# Patient Record
Sex: FEMALE | Race: WHITE | Employment: OTHER | URBAN - METROPOLITAN AREA
[De-identification: names, ages, dates, MRNs, and addresses within clinical notes are randomized per-mention and may not be internally consistent; named-entity substitution may affect disease eponyms.]

---

## 2017-01-11 ENCOUNTER — ANTI-COAG VISIT (OUTPATIENT)
Dept: CARDIOLOGY CLINIC | Age: 77
End: 2017-01-11

## 2017-01-11 LAB — INR BLD: 1.9

## 2017-02-09 ENCOUNTER — ANTI-COAG VISIT (OUTPATIENT)
Dept: CARDIOLOGY CLINIC | Age: 77
End: 2017-02-09

## 2017-02-09 LAB — INR BLD: 2.2

## 2017-03-10 ENCOUNTER — ANTI-COAG VISIT (OUTPATIENT)
Dept: CARDIOLOGY CLINIC | Age: 77
End: 2017-03-10

## 2017-03-10 DIAGNOSIS — E78.00 PURE HYPERCHOLESTEROLEMIA: Primary | ICD-10-CM

## 2017-03-10 LAB — INR BLD: 3.3

## 2017-04-11 DIAGNOSIS — I10 ESSENTIAL HYPERTENSION, BENIGN: ICD-10-CM

## 2017-04-11 DIAGNOSIS — I48.0 PAROXYSMAL ATRIAL FIBRILLATION (HCC): ICD-10-CM

## 2017-04-20 ENCOUNTER — ANTI-COAG VISIT (OUTPATIENT)
Dept: CARDIOLOGY CLINIC | Age: 77
End: 2017-04-20

## 2017-04-20 ENCOUNTER — OFFICE VISIT (OUTPATIENT)
Dept: CARDIOLOGY CLINIC | Age: 77
End: 2017-04-20

## 2017-04-20 VITALS
HEART RATE: 64 BPM | HEIGHT: 66 IN | BODY MASS INDEX: 24.43 KG/M2 | WEIGHT: 152 LBS | SYSTOLIC BLOOD PRESSURE: 158 MMHG | DIASTOLIC BLOOD PRESSURE: 90 MMHG

## 2017-04-20 DIAGNOSIS — I10 ESSENTIAL HYPERTENSION, BENIGN: ICD-10-CM

## 2017-04-20 DIAGNOSIS — E78.00 PURE HYPERCHOLESTEROLEMIA: ICD-10-CM

## 2017-04-20 DIAGNOSIS — I48.0 PAROXYSMAL ATRIAL FIBRILLATION (HCC): Primary | ICD-10-CM

## 2017-04-20 LAB — INR BLD: 2.4

## 2017-04-20 PROCEDURE — G8420 CALC BMI NORM PARAMETERS: HCPCS | Performed by: INTERNAL MEDICINE

## 2017-04-20 PROCEDURE — 93000 ELECTROCARDIOGRAM COMPLETE: CPT | Performed by: INTERNAL MEDICINE

## 2017-04-20 PROCEDURE — G8400 PT W/DXA NO RESULTS DOC: HCPCS | Performed by: INTERNAL MEDICINE

## 2017-04-20 PROCEDURE — 4040F PNEUMOC VAC/ADMIN/RCVD: CPT | Performed by: INTERNAL MEDICINE

## 2017-04-20 PROCEDURE — 1090F PRES/ABSN URINE INCON ASSESS: CPT | Performed by: INTERNAL MEDICINE

## 2017-04-20 PROCEDURE — 1123F ACP DISCUSS/DSCN MKR DOCD: CPT | Performed by: INTERNAL MEDICINE

## 2017-04-20 PROCEDURE — 1036F TOBACCO NON-USER: CPT | Performed by: INTERNAL MEDICINE

## 2017-04-20 PROCEDURE — 99214 OFFICE O/P EST MOD 30 MIN: CPT | Performed by: INTERNAL MEDICINE

## 2017-04-20 PROCEDURE — G8428 CUR MEDS NOT DOCUMENT: HCPCS | Performed by: INTERNAL MEDICINE

## 2017-05-19 ENCOUNTER — ANTI-COAG VISIT (OUTPATIENT)
Dept: CARDIOLOGY CLINIC | Age: 77
End: 2017-05-19

## 2017-05-19 DIAGNOSIS — I48.20 CHRONIC ATRIAL FIBRILLATION (HCC): ICD-10-CM

## 2017-05-19 LAB
INTERNATIONAL NORMALIZATION RATIO, POC: 2.7
PROTHROMBIN TIME, POC: NORMAL

## 2017-05-19 PROCEDURE — 85610 PROTHROMBIN TIME: CPT | Performed by: INTERNAL MEDICINE

## 2017-06-14 ENCOUNTER — ANTI-COAG VISIT (OUTPATIENT)
Dept: CARDIOLOGY CLINIC | Age: 77
End: 2017-06-14

## 2017-06-14 DIAGNOSIS — I48.20 CHRONIC ATRIAL FIBRILLATION (HCC): ICD-10-CM

## 2017-06-14 LAB
INTERNATIONAL NORMALIZATION RATIO, POC: 2.2
PROTHROMBIN TIME, POC: NORMAL

## 2017-06-14 PROCEDURE — 85610 PROTHROMBIN TIME: CPT | Performed by: INTERNAL MEDICINE

## 2017-07-19 ENCOUNTER — ANTI-COAG VISIT (OUTPATIENT)
Dept: CARDIOLOGY CLINIC | Age: 77
End: 2017-07-19

## 2017-07-19 DIAGNOSIS — I48.0 PAROXYSMAL ATRIAL FIBRILLATION (HCC): ICD-10-CM

## 2017-07-19 LAB
INR BLD: 2.2
INTERNATIONAL NORMALIZATION RATIO, POC: 2.2
PROTHROMBIN TIME, POC: NORMAL

## 2017-07-19 PROCEDURE — 85610 PROTHROMBIN TIME: CPT | Performed by: INTERNAL MEDICINE

## 2017-07-19 RX ORDER — ATENOLOL 100 MG/1
100 TABLET ORAL DAILY
Qty: 90 TABLET | Refills: 3 | Status: SHIPPED | OUTPATIENT
Start: 2017-07-19 | End: 2018-08-02 | Stop reason: SDUPTHER

## 2017-07-19 RX ORDER — SIMVASTATIN 40 MG
40 TABLET ORAL NIGHTLY
Qty: 90 TABLET | Refills: 3 | Status: SHIPPED | OUTPATIENT
Start: 2017-07-19 | End: 2018-08-02 | Stop reason: SDUPTHER

## 2017-08-16 ENCOUNTER — ANTI-COAG VISIT (OUTPATIENT)
Dept: CARDIOLOGY CLINIC | Age: 77
End: 2017-08-16

## 2017-08-16 DIAGNOSIS — I48.0 PAROXYSMAL ATRIAL FIBRILLATION (HCC): ICD-10-CM

## 2017-08-16 LAB
INTERNATIONAL NORMALIZATION RATIO, POC: 2.9
PROTHROMBIN TIME, POC: NORMAL

## 2017-08-16 PROCEDURE — 85610 PROTHROMBIN TIME: CPT | Performed by: INTERNAL MEDICINE

## 2017-09-13 ENCOUNTER — ANTI-COAG VISIT (OUTPATIENT)
Dept: CARDIOLOGY CLINIC | Age: 77
End: 2017-09-13

## 2017-09-13 DIAGNOSIS — I48.0 PAROXYSMAL ATRIAL FIBRILLATION (HCC): ICD-10-CM

## 2017-09-13 DIAGNOSIS — E78.00 PURE HYPERCHOLESTEROLEMIA: Primary | ICD-10-CM

## 2017-09-13 LAB
INTERNATIONAL NORMALIZATION RATIO, POC: 2.8
PROTHROMBIN TIME, POC: NORMAL

## 2017-09-13 PROCEDURE — 85610 PROTHROMBIN TIME: CPT | Performed by: INTERNAL MEDICINE

## 2017-09-13 RX ORDER — WARFARIN SODIUM 5 MG/1
TABLET ORAL
Qty: 90 TABLET | Refills: 1 | Status: ON HOLD | OUTPATIENT
Start: 2017-09-13 | End: 2018-07-12

## 2017-10-05 ENCOUNTER — ANTI-COAG VISIT (OUTPATIENT)
Dept: CARDIOLOGY CLINIC | Age: 77
End: 2017-10-05

## 2017-10-05 ENCOUNTER — OFFICE VISIT (OUTPATIENT)
Dept: CARDIOLOGY CLINIC | Age: 77
End: 2017-10-05

## 2017-10-05 VITALS
HEART RATE: 70 BPM | BODY MASS INDEX: 23.3 KG/M2 | DIASTOLIC BLOOD PRESSURE: 84 MMHG | WEIGHT: 145 LBS | SYSTOLIC BLOOD PRESSURE: 140 MMHG | HEIGHT: 66 IN

## 2017-10-05 DIAGNOSIS — I48.0 PAROXYSMAL ATRIAL FIBRILLATION (HCC): Primary | ICD-10-CM

## 2017-10-05 DIAGNOSIS — E78.00 PURE HYPERCHOLESTEROLEMIA: ICD-10-CM

## 2017-10-05 DIAGNOSIS — I10 ESSENTIAL HYPERTENSION, BENIGN: ICD-10-CM

## 2017-10-05 LAB — INR BLD: 2.5

## 2017-10-05 PROCEDURE — 1036F TOBACCO NON-USER: CPT | Performed by: INTERNAL MEDICINE

## 2017-10-05 PROCEDURE — 99214 OFFICE O/P EST MOD 30 MIN: CPT | Performed by: INTERNAL MEDICINE

## 2017-10-05 PROCEDURE — G8420 CALC BMI NORM PARAMETERS: HCPCS | Performed by: INTERNAL MEDICINE

## 2017-10-05 PROCEDURE — G8427 DOCREV CUR MEDS BY ELIG CLIN: HCPCS | Performed by: INTERNAL MEDICINE

## 2017-10-05 PROCEDURE — 4040F PNEUMOC VAC/ADMIN/RCVD: CPT | Performed by: INTERNAL MEDICINE

## 2017-10-05 PROCEDURE — G8484 FLU IMMUNIZE NO ADMIN: HCPCS | Performed by: INTERNAL MEDICINE

## 2017-10-05 PROCEDURE — G8400 PT W/DXA NO RESULTS DOC: HCPCS | Performed by: INTERNAL MEDICINE

## 2017-10-05 PROCEDURE — 1090F PRES/ABSN URINE INCON ASSESS: CPT | Performed by: INTERNAL MEDICINE

## 2017-10-05 PROCEDURE — 1123F ACP DISCUSS/DSCN MKR DOCD: CPT | Performed by: INTERNAL MEDICINE

## 2017-10-05 NOTE — PROGRESS NOTES
[address]   Cardiac Evaluation      Patient: Abner Vick  YOB: 1940  Date: 10/5/17       Chief Complaint   Patient presents with    Atrial Fibrillation    Hypertension    Hyperlipidemia        Referring provider: Scharlene Libman    History of Present Illness:   Mrs. Vonda Thornton is here today for regular follow up of her AF, HTN. Today, Ms Vonda Thornton states she has been well. She denies any chest pain, palpitations, LOPEZ, or edema. She walks regularly for exercise. She will be leaving for [place] at the end of the month. Past Medical History:   has a past medical history of A-fib ([address] Atrial fibrillation ([address] Hypercholesterolemia; and Hypertension. Surgical History:   has a past surgical history that includes Appendectomy. Social History:  Social History     Social History    Marital status:      Spouse name: N/A    Number of children: N/A    Years of education: N/A     Occupational History    Not on file. Social History Main Topics    Smoking status: Never Smoker    Smokeless tobacco: Not on file    Alcohol use No    Drug use: Not on file    Sexual activity: Not on file     Other Topics Concern    Not on file     Social History Narrative       Family History:  family history includes Heart Disease in her father. Allergies:  Penicillins and Sulfur     Review of Systems:   · Constitutional: there has been no unanticipated weight loss. No change in energy or activity level   · Eyes: No visual changes   · ENT: No Headaches, hearing loss or vertigo. No mouth sores or sore throat. · Cardiovascular: Reviewed in HPI  · Respiratory: No cough or wheezing, no sputum production. No hematemesis. · Gastrointestinal: No abdominal pain, appetite loss, blood in stools. No change in bowel or bladder habits. · Genitourinary: No nocturia, dysuria, trouble voiding  · Musculoskeletal:  No gait disturbance, weakness or joint complaints.   · Integumentary: No fibrillation. Normal left ventricle size, wall thickness and systolic function with an estimated ejection fraction of 60%. No regional wall motion abnormalities are seen. There is severe bi atrial enlargement. There is mild mitral, pulmonic and trivial aorta as well as mild tricuspid regurgitation with RVSP estimated at 41.5 mmHg. HR controlled, remains on Coumadin, INR's thru our office      Al Pal appears stable at this time. I encourage her to continue with regular exercise. Follow up 6 months. Thank you for allowing to me to participate in the care of Eliud Mccabe.

## 2017-10-05 NOTE — MR AVS SNAPSHOT
After Visit Summary             Evangelina Parsons   10/5/2017 9:45 AM   Office Visit    Description:  Female : 1940   Provider:  Noé Carney MD   Department:  [address] [place] [address] Cardiology - [facility] [place] and Future Appointments         Below is a list of your follow-up and future appointments. This may not be a complete list as you may have made appointments directly with providers that we are not aware of or your providers may have made some for you. Please call your providers to confirm appointments. It is important to keep your appointments. Please bring your current insurance card, photo ID, co-pay, and all medication bottles to your appointment. If self-pay, payment is expected at the time of service. Your To-Do List     Standing Orders Interval Expires    Protime-INR [employer]]  Once a week until 10/25/2018 10/25/2018    Follow-Up    Return in about 6 months (around 2018). Information from Your Visit        Department     Name Address Phone Fax    [address] [place], [place] [address] [facility] [address] [facility] [address] [phone]      You Were Seen for:         Comments    Paroxysmal atrial fibrillation [facility])   [[id number]]         Vital Signs     Blood Pressure Pulse Height Weight Body Mass Index Smoking Status    140/84 70 5' 6\" (1.676 m) 145 lb (65.8 kg) 23.4 kg/m2 Never Smoker         Medications and Orders      Your Current Medications Are              warfarin (COUMADIN) 5 MG tablet 5 mg three days a week 2.5 the other.     simvastatin (ZOCOR) 40 MG tablet Take 1 tablet by mouth nightly    atenolol (TENORMIN) 100 MG tablet Take 1 tablet by mouth daily    Omega-3 Fatty Acids (FISH OIL PO) Take by mouth    Multiple Vitamins-Minerals (MULTIVITAMIN PO) Take by mouth      Allergies              Penicillins     Sulfur          Additional Information        Basic Information     Date Of Birth Sex Race Ethnicity Preferred Language 1940 Female White Non-/Non  English      Problem List as of 10/5/2017  Date Reviewed: 10/5/2017                Pure hypercholesterolemia    Essential hypertension, benign    Atrial fibrillation [facility])      Preventive Care        Date Due    Tetanus Combination Vaccine (1 - Tdap) 11/25/1958    Zoster Vaccine 11/25/1999    Osteoporosis screening or a bone density scan (Dexa) is recommended once at age 72. Based upon the results and risk factors for bone loss, your provider will recommend whether this needs to be repeated. 11/25/2004    Pneumococcal Vaccines (two) for all adults aged 72 and over (2 of 2 - PCV13) 7/7/2006    Cholesterol Screening 8/25/2016    Yearly Flu Vaccine (1) 9/1/2017            [employer] Signup           [employer] allows you to send messages to your doctor, view your test results, renew your prescriptions, schedule appointments, view visit notes, and more. How Do I Sign Up? 1. In your Internet browser, go to [email or web address]  2. Click on the Sign Up Now link in the Sign In box. You will see the New Member Sign Up page. 3. Enter your [employer] Access Code exactly as it appears below. You will not need to use this code after youve completed the sign-up process. If you do not sign up before the expiration date, you must request a new code. [employer] Access Code: [email or web address]  Expires: 12/4/2017 10:44 AM    4. Enter your Social Security Number ([id number]) and Date of Birth (mm/dd/yyyy) as indicated and click Submit. You will be taken to the next sign-up page. 5. Create a [employer] ID. This will be your [employer] login ID and cannot be changed, so think of one that is secure and easy to remember. 6. Create a [employer] password. You can change your password at any time. 7. Enter your Password Reset Question and Answer. This can be used at a later time if you forget your password. 8. Enter your e-mail address.  You will receive e-mail notification when new information is available in [employer]. 9. Click Sign Up. You can now view your medical record. Additional Information  If you have questions, please contact the physician practice where you receive care. Remember, [employer] is NOT to be used for urgent needs. For medical emergencies, dial 911. For questions regarding your [employer] account call [phone]. If you have a clinical question, please call your doctor's office.

## 2017-10-05 NOTE — LETTER
[address] Cardiology [facility]  [address] [place] [address]  Phone: [phone]  Fax: [phone]    Jeana Lacy MD        October 25, 2017     Glory Soriano  No address on file    Patient: Eliud Mccabe  MR Number: [id number]  YOB: 1940  Date of Visit: 10/5/2017    Dear Dr. Glory Soriano:    Thank you for the request for consultation for Dominic Spann to me for the evaluation of Ms Kaylee Calero. Below are the relevant portions of my assessment and plan of care. [address]   Cardiac Evaluation      Patient: Eliud Mccabe  YOB: 1940  Date: 10/5/17       Chief Complaint   Patient presents with    Atrial Fibrillation    Hypertension    Hyperlipidemia        Referring provider: Glory Soriano    History of Present Illness:   Mrs. Kaylee Calero is here today for regular follow up of her AF, HTN. Today, Ms Kaylee Calero states she has been well. She denies any chest pain, palpitations, LOPEZ, or edema. She walks regularly for exercise. She will be leaving for [place] at the end of the month. Past Medical History:   has a past medical history of A-fib ([address] Atrial fibrillation ([address] Hypercholesterolemia; and Hypertension. Surgical History:   has a past surgical history that includes Appendectomy. Social History:  Social History     Social History    Marital status:      Spouse name: N/A    Number of children: N/A    Years of education: N/A     Occupational History    Not on file. Social History Main Topics    Smoking status: Never Smoker    Smokeless tobacco: Not on file    Alcohol use No    Drug use: Not on file    Sexual activity: Not on file     Other Topics Concern    Not on file     Social History Narrative       Family History:  family history includes Heart Disease in her father.      Allergies:  Penicillins and Sulfur     Review of Systems: · There is no clubbing, cyanosis of the extremities  · No edema  · Femoral Arteries: 2+ and equal  · Pedal Pulses: 2+ and equal   Abdomen:  · No masses or tenderness  · Normal bowel sounds  Neurological/Psychiatric:  · Alert and oriented x3  · Moves all extremities well  · Exhibits normal gait balance and coordination      Assessment/Plan  Pure hypercholesterolemia  Stable, labs 9/25/17: ; TRIG 207; HDL 33;     Essential hypertension, benign  Stable     Atrial fibrillation  Carotid doppler 5/16: 16-49% bilateral   Carotid doppler 4/13: 69-18% MALINI, 6-47% LICA  Echo 3/41: Underlying rhythm is atrial fibrillation. Normal left ventricle size, wall thickness and systolic function with an estimated ejection fraction of 60%. No regional wall motion abnormalities are seen. There is severe bi atrial enlargement. There is mild mitral, pulmonic and trivial aorta as well as mild tricuspid regurgitation with RVSP estimated at 41.5 mmHg. HR controlled, remains on Coumadin, INR's thru our office      Holli Villa appears stable at this time. I encourage her to continue with regular exercise. Follow up 6 months. Thank you for allowing to me to participate in the care of Román Marmolejo. If you have questions, please do not hesitate to call me. I look forward to following Max Ramírez along with you.     Sincerely,        Shasta Berman MD

## 2017-10-25 NOTE — COMMUNICATION BODY
[address]   Cardiac Evaluation      Patient: Gely Castro  YOB: 1940  Date: 10/5/17       Chief Complaint   Patient presents with    Atrial Fibrillation    Hypertension    Hyperlipidemia        Referring provider: Blaire Castillo    History of Present Illness:   Mrs. Mario Street is here today for regular follow up of her AF, HTN. Today, Ms Mario Street states she has been well. She denies any chest pain, palpitations, LOPEZ, or edema. She walks regularly for exercise. She will be leaving for [place] at the end of the month. Past Medical History:   has a past medical history of A-fib [address] Atrial fibrillation ([facility] Hypercholesterolemia; and Hypertension. Surgical History:   has a past surgical history that includes Appendectomy. Social History:  Social History     Social History    Marital status:      Spouse name: N/A    Number of children: N/A    Years of education: N/A     Occupational History    Not on file. Social History Main Topics    Smoking status: Never Smoker    Smokeless tobacco: Not on file    Alcohol use No    Drug use: Not on file    Sexual activity: Not on file     Other Topics Concern    Not on file     Social History Narrative       Family History:  family history includes Heart Disease in her father. Allergies:  Penicillins and Sulfur     Review of Systems:   · Constitutional: there has been no unanticipated weight loss. No change in energy or activity level   · Eyes: No visual changes   · ENT: No Headaches, hearing loss or vertigo. No mouth sores or sore throat. · Cardiovascular: Reviewed in HPI  · Respiratory: No cough or wheezing, no sputum production. No hematemesis. · Gastrointestinal: No abdominal pain, appetite loss, blood in stools. No change in bowel or bladder habits. · Genitourinary: No nocturia, dysuria, trouble voiding  · Musculoskeletal:  No gait disturbance, weakness or joint complaints.   · Integumentary: No fibrillation. Normal left ventricle size, wall thickness and systolic function with an estimated ejection fraction of 60%. No regional wall motion abnormalities are seen. There is severe bi atrial enlargement. There is mild mitral, pulmonic and trivial aorta as well as mild tricuspid regurgitation with RVSP estimated at 41.5 mmHg. HR controlled, remains on Coumadin, INR's thru our office      Facundo Bay appears stable at this time. I encourage her to continue with regular exercise. Follow up 6 months. Thank you for allowing to me to participate in the care of Kaila Wilson.

## 2017-11-07 LAB — INR BLD: 2.2

## 2017-11-09 ENCOUNTER — ANTI-COAG VISIT (OUTPATIENT)
Dept: CARDIOLOGY CLINIC | Age: 77
End: 2017-11-09

## 2017-12-07 LAB — INR BLD: 2.3

## 2017-12-12 ENCOUNTER — ANTI-COAG VISIT (OUTPATIENT)
Dept: CARDIOLOGY CLINIC | Age: 77
End: 2017-12-12

## 2018-01-12 ENCOUNTER — ANTI-COAG VISIT (OUTPATIENT)
Dept: CARDIOLOGY CLINIC | Age: 78
End: 2018-01-12

## 2018-01-12 LAB — INR BLD: 2.4

## 2018-02-08 LAB — INR BLD: 2.2

## 2018-02-13 ENCOUNTER — TELEPHONE (OUTPATIENT)
Dept: CARDIOLOGY CLINIC | Age: 78
End: 2018-02-13

## 2018-02-13 ENCOUNTER — ANTI-COAG VISIT (OUTPATIENT)
Dept: CARDIOLOGY CLINIC | Age: 78
End: 2018-02-13

## 2018-02-13 NOTE — TELEPHONE ENCOUNTER
pt called to fu on PT-INR. Indicated labs were drawn if Fla on 2/8/18 and would like results. Please call to adv.   Thank you CSF

## 2018-03-12 ENCOUNTER — ANTI-COAG VISIT (OUTPATIENT)
Dept: CARDIOLOGY CLINIC | Age: 78
End: 2018-03-12

## 2018-03-12 LAB — INR BLD: 2.9

## 2018-04-12 ENCOUNTER — ANTI-COAG VISIT (OUTPATIENT)
Dept: CARDIOLOGY CLINIC | Age: 78
End: 2018-04-12

## 2018-04-12 DIAGNOSIS — E78.00 PURE HYPERCHOLESTEROLEMIA: Primary | ICD-10-CM

## 2018-04-12 DIAGNOSIS — I48.0 PAROXYSMAL ATRIAL FIBRILLATION (HCC): ICD-10-CM

## 2018-04-12 LAB
INTERNATIONAL NORMALIZATION RATIO, POC: 2.4
PROTHROMBIN TIME, POC: NORMAL

## 2018-04-12 PROCEDURE — 85610 PROTHROMBIN TIME: CPT | Performed by: INTERNAL MEDICINE

## 2018-04-26 ENCOUNTER — OFFICE VISIT (OUTPATIENT)
Dept: CARDIOLOGY CLINIC | Age: 78
End: 2018-04-26

## 2018-04-26 VITALS
WEIGHT: 150 LBS | HEART RATE: 70 BPM | BODY MASS INDEX: 24.11 KG/M2 | HEIGHT: 66 IN | DIASTOLIC BLOOD PRESSURE: 80 MMHG | SYSTOLIC BLOOD PRESSURE: 144 MMHG

## 2018-04-26 DIAGNOSIS — I10 ESSENTIAL HYPERTENSION, BENIGN: ICD-10-CM

## 2018-04-26 DIAGNOSIS — I48.0 PAROXYSMAL ATRIAL FIBRILLATION (HCC): ICD-10-CM

## 2018-04-26 DIAGNOSIS — E78.00 PURE HYPERCHOLESTEROLEMIA: Primary | ICD-10-CM

## 2018-04-26 PROCEDURE — G8427 DOCREV CUR MEDS BY ELIG CLIN: HCPCS | Performed by: INTERNAL MEDICINE

## 2018-04-26 PROCEDURE — 1123F ACP DISCUSS/DSCN MKR DOCD: CPT | Performed by: INTERNAL MEDICINE

## 2018-04-26 PROCEDURE — 1036F TOBACCO NON-USER: CPT | Performed by: INTERNAL MEDICINE

## 2018-04-26 PROCEDURE — G8400 PT W/DXA NO RESULTS DOC: HCPCS | Performed by: INTERNAL MEDICINE

## 2018-04-26 PROCEDURE — G8420 CALC BMI NORM PARAMETERS: HCPCS | Performed by: INTERNAL MEDICINE

## 2018-04-26 PROCEDURE — 99214 OFFICE O/P EST MOD 30 MIN: CPT | Performed by: INTERNAL MEDICINE

## 2018-04-26 PROCEDURE — 4040F PNEUMOC VAC/ADMIN/RCVD: CPT | Performed by: INTERNAL MEDICINE

## 2018-04-26 PROCEDURE — 1090F PRES/ABSN URINE INCON ASSESS: CPT | Performed by: INTERNAL MEDICINE

## 2018-05-14 ENCOUNTER — ANTI-COAG VISIT (OUTPATIENT)
Dept: CARDIOLOGY CLINIC | Age: 78
End: 2018-05-14

## 2018-05-14 DIAGNOSIS — I48.0 PAROXYSMAL ATRIAL FIBRILLATION (HCC): ICD-10-CM

## 2018-05-14 LAB
INTERNATIONAL NORMALIZATION RATIO, POC: 4.3
PROTHROMBIN TIME, POC: NORMAL

## 2018-05-14 PROCEDURE — 85610 PROTHROMBIN TIME: CPT | Performed by: INTERNAL MEDICINE

## 2018-05-23 ENCOUNTER — ANTI-COAG VISIT (OUTPATIENT)
Dept: CARDIOLOGY CLINIC | Age: 78
End: 2018-05-23

## 2018-05-23 DIAGNOSIS — I48.0 PAROXYSMAL ATRIAL FIBRILLATION (HCC): ICD-10-CM

## 2018-05-23 LAB
INR BLD: 2.7
INTERNATIONAL NORMALIZATION RATIO, POC: 2.7
PROTHROMBIN TIME, POC: NORMAL

## 2018-05-23 PROCEDURE — 85610 PROTHROMBIN TIME: CPT | Performed by: INTERNAL MEDICINE

## 2018-06-14 PROBLEM — A41.9 SEPSIS (HCC): Status: ACTIVE | Noted: 2018-06-14

## 2018-06-15 PROBLEM — R65.20 SEVERE SEPSIS (HCC): Status: ACTIVE | Noted: 2018-06-14

## 2018-06-22 ENCOUNTER — TELEPHONE (OUTPATIENT)
Dept: CARDIOLOGY CLINIC | Age: 78
End: 2018-06-22

## 2018-06-28 ENCOUNTER — OFFICE VISIT (OUTPATIENT)
Dept: CARDIOLOGY CLINIC | Age: 78
End: 2018-06-28

## 2018-06-28 VITALS
SYSTOLIC BLOOD PRESSURE: 102 MMHG | DIASTOLIC BLOOD PRESSURE: 58 MMHG | WEIGHT: 143.44 LBS | HEIGHT: 66 IN | BODY MASS INDEX: 23.05 KG/M2 | HEART RATE: 60 BPM

## 2018-06-28 DIAGNOSIS — I48.20 ATRIAL FIBRILLATION, CHRONIC (HCC): Primary | ICD-10-CM

## 2018-06-28 DIAGNOSIS — E78.00 PURE HYPERCHOLESTEROLEMIA: ICD-10-CM

## 2018-06-28 DIAGNOSIS — I10 ESSENTIAL HYPERTENSION: ICD-10-CM

## 2018-06-28 PROCEDURE — 1090F PRES/ABSN URINE INCON ASSESS: CPT | Performed by: INTERNAL MEDICINE

## 2018-06-28 PROCEDURE — 1123F ACP DISCUSS/DSCN MKR DOCD: CPT | Performed by: INTERNAL MEDICINE

## 2018-06-28 PROCEDURE — 1036F TOBACCO NON-USER: CPT | Performed by: INTERNAL MEDICINE

## 2018-06-28 PROCEDURE — 93000 ELECTROCARDIOGRAM COMPLETE: CPT | Performed by: INTERNAL MEDICINE

## 2018-06-28 PROCEDURE — 1111F DSCHRG MED/CURRENT MED MERGE: CPT | Performed by: INTERNAL MEDICINE

## 2018-06-28 PROCEDURE — 99214 OFFICE O/P EST MOD 30 MIN: CPT | Performed by: INTERNAL MEDICINE

## 2018-06-28 PROCEDURE — G8420 CALC BMI NORM PARAMETERS: HCPCS | Performed by: INTERNAL MEDICINE

## 2018-06-28 PROCEDURE — 4040F PNEUMOC VAC/ADMIN/RCVD: CPT | Performed by: INTERNAL MEDICINE

## 2018-06-28 PROCEDURE — G8427 DOCREV CUR MEDS BY ELIG CLIN: HCPCS | Performed by: INTERNAL MEDICINE

## 2018-06-28 PROCEDURE — G8400 PT W/DXA NO RESULTS DOC: HCPCS | Performed by: INTERNAL MEDICINE

## 2018-06-28 RX ORDER — DILTIAZEM HYDROCHLORIDE 60 MG/1
30 TABLET, FILM COATED ORAL EVERY 12 HOURS SCHEDULED
Qty: 90 TABLET | Refills: 3
Start: 2018-06-28 | End: 2019-10-03 | Stop reason: SDUPTHER

## 2018-06-29 ENCOUNTER — ANTI-COAG VISIT (OUTPATIENT)
Dept: CARDIOLOGY | Age: 78
End: 2018-06-29

## 2018-06-29 LAB
ATYPICAL LYMPHOCYTE RELATIVE PERCENT: ABNORMAL % (ref 0–10)
BAND NEUTROPHILS: ABNORMAL %
BANDED NEUTROPHILS ABSOLUTE COUNT: ABNORMAL CELLS/UL (ref 0–750)
BASOPHILS ABSOLUTE: 88 CELLS/UL (ref 0–200)
BASOPHILS RELATIVE PERCENT: 0.8 %
BLASTS ABSOLUTE COUNT: ABNORMAL CELLS/UL
BLASTS RELATIVE PERCENT: ABNORMAL %
BUN / CREAT RATIO: 14 (CALC) (ref 6–22)
BUN BLDV-MCNC: 17 MG/DL (ref 7–25)
CALCIUM SERPL-MCNC: 9.6 MG/DL (ref 8.6–10.4)
CHLORIDE BLD-SCNC: 104 MMOL/L (ref 98–110)
CO2: 28 MMOL/L (ref 20–31)
COMMENT: ABNORMAL
CREAT SERPL-MCNC: 1.25 MG/DL (ref 0.6–0.93)
EOSINOPHILS ABSOLUTE: 132 CELLS/UL (ref 15–500)
EOSINOPHILS RELATIVE PERCENT: 1.2 %
GFR AFRICAN AMERICAN: 48 ML/MIN/1.73M2
GFR SERPL CREATININE-BSD FRML MDRD: 41 ML/MIN/1.73M2
GLUCOSE BLD-MCNC: 93 MG/DL (ref 65–139)
HCT VFR BLD CALC: 38.5 % (ref 35–45)
HEMOGLOBIN: 12.1 G/DL (ref 11.7–15.5)
LYMPHOCYTES ABSOLUTE: 2343 CELLS/UL (ref 850–3900)
LYMPHOCYTES RELATIVE PERCENT: 21.3 %
MCH RBC QN AUTO: 28.3 PG (ref 27–33)
MCHC RBC AUTO-ENTMCNC: 31.4 G/DL (ref 32–36)
MCV RBC AUTO: 90.2 FL (ref 80–100)
METAMYELOCYTES ABSOLUTE COUNT: ABNORMAL CELLS/UL
METAMYELOCYTES RELATIVE PERCENT: ABNORMAL %
MONOCYTES ABSOLUTE: 715 CELLS/UL (ref 200–950)
MONOCYTES RELATIVE PERCENT: 6.5 %
MYELOCYTE PERCENT: ABNORMAL %
MYELOCYTES ABSOLUTE COUNT: ABNORMAL CELLS/UL
NEUTROPHILS ABSOLUTE: 7722 CELLS/UL (ref 1500–7800)
NUCLEATED RED BLOOD CELLS: ABNORMAL /100 WBC
NUCLEATED RED BLOOD CELLS: ABNORMAL CELLS/UL
PDW BLD-RTO: 14.3 % (ref 11–15)
PLATELET # BLD: 506 THOUSAND/UL (ref 140–400)
PMV BLD AUTO: 10.4 FL (ref 7.5–12.5)
POTASSIUM SERPL-SCNC: 3.6 MMOL/L (ref 3.5–5.3)
PROMYELOCYTES ABSOLUTE COUNT: ABNORMAL CELLS/UL
PROMYELOCYTES PERCENT: ABNORMAL %
RBC # BLD: 4.27 MILLION/UL (ref 3.8–5.1)
SEGMENTED NEUTROPHILS RELATIVE PERCENT: 70.2 %
SODIUM BLD-SCNC: 141 MMOL/L (ref 135–146)
WBC # BLD: 11 THOUSAND/UL (ref 3.8–10.8)

## 2018-07-05 ENCOUNTER — TELEPHONE (OUTPATIENT)
Dept: CARDIOLOGY CLINIC | Age: 78
End: 2018-07-05

## 2018-07-05 DIAGNOSIS — I48.20 ATRIAL FIBRILLATION, CHRONIC (HCC): Primary | ICD-10-CM

## 2018-07-06 ENCOUNTER — ANTI-COAG VISIT (OUTPATIENT)
Dept: CARDIOLOGY CLINIC | Age: 78
End: 2018-07-06

## 2018-07-06 LAB
INR BLD: 2.6
PROTHROMBIN TIME: 26.1 SEC (ref 9–11.5)

## 2018-07-06 NOTE — PROGRESS NOTES
Called patient, she states that she is having surgery Monday to remove a mass on her left ovary her instructions from OB/gyn were to hold x 3 days. Called  to advise her INR 2.6 she said to keep her off coumadin till after her surgery.

## 2018-07-06 NOTE — PROGRESS NOTES
I called pt to dose her. I went to tell her to continue normal dose schedule. She then informed me that last night was her last day as she is having surgery on Monday. I gave a verbal understanding letting her know to follow the holding instructions by doctor who is doing surgery. She then gave a verbal understanding.

## 2018-07-09 PROBLEM — R19.00 PELVIC MASS IN FEMALE: Status: ACTIVE | Noted: 2018-07-09

## 2018-07-13 ENCOUNTER — CARE COORDINATION (OUTPATIENT)
Dept: CASE MANAGEMENT | Age: 78
End: 2018-07-13

## 2018-07-13 DIAGNOSIS — R19.00 PELVIC MASS IN FEMALE: Primary | ICD-10-CM

## 2018-07-13 PROCEDURE — 1111F DSCHRG MED/CURRENT MED MERGE: CPT

## 2018-07-13 NOTE — CARE COORDINATION
[address] Transitions Initial Follow Up Call    Call within 2 business days of discharge: Yes    Patient: Rom Living Patient : 1940   MRN: [id number]  Reason for Admission: Pelvic Mass  Discharge Date: 18 RARS: Readmission Risk Score: 15     Spoke with: [address] [facility] [address]: [address] [facility]    Non-face-to-face services provided:  Obtained and reviewed discharge summary and/or continuity of care documents  Education of patient/family/caregiver/guardian to support self-management-discharge instructions, activity, diet and hospital follow up apts  Assessment and support for treatment adherence and medication management-medication review and reconciliation  1111f completed    Care Transitions 24 Hour Call    Do you have any ongoing symptoms?:  No  Do you have a copy of your discharge instructions?:  Yes  Do you have all of your prescriptions and are they filled?:  Yes  Have you been contacted by a [address]?:  No  Have you scheduled your follow up appointment?:  Yes  How are you going to get to your appointment?:  Car - family or friend to transport  Were you discharged with any Home Care or Post Acute Services:  No  Do you feel like you have everything you need to keep you well at home?:  Yes  Care Transitions Interventions  No Identified Needs       Reports she is doing well, denies pain or discomfort. Is moving around without difficulty; Home Care  You may shower. No soaking in the bathtub, jacuzzi or swimming x 4 weeks. Keep your incision sites clean and dry. Eat a regular diet. Drink plenty of fluids. Physical Activity  No driving x 2 weeks  Return to your normal activities gradually. Most normal activities, including sex, can be resumed in about six weeks. Take daily walks as tolerated. Avoid heavy lifting and strenuous exercise x 4 weeks  Ask your doctor when and how to perform Kegel exercises .  These exercises can strengthen the muscles of the pelvic floor and

## 2018-07-16 ENCOUNTER — ANTI-COAG VISIT (OUTPATIENT)
Dept: CARDIOLOGY CLINIC | Age: 78
End: 2018-07-16

## 2018-07-16 DIAGNOSIS — I48.0 PAROXYSMAL ATRIAL FIBRILLATION (HCC): ICD-10-CM

## 2018-07-16 LAB
INR BLD: 1.3
INTERNATIONAL NORMALIZATION RATIO, POC: 1.3
PROTHROMBIN TIME, POC: NORMAL

## 2018-07-16 PROCEDURE — 85610 PROTHROMBIN TIME: CPT | Performed by: INTERNAL MEDICINE

## 2018-07-25 ENCOUNTER — ANTI-COAG VISIT (OUTPATIENT)
Dept: CARDIOLOGY CLINIC | Age: 78
End: 2018-07-25

## 2018-07-25 DIAGNOSIS — I48.20 ATRIAL FIBRILLATION, CHRONIC (HCC): Primary | ICD-10-CM

## 2018-07-25 LAB
INR BLD: 4.3
INR BLD: 5.8
INTERNATIONAL NORMALIZATION RATIO, POC: 4.3
PROTHROMBIN TIME, POC: NORMAL

## 2018-07-25 PROCEDURE — 85610 PROTHROMBIN TIME: CPT | Performed by: INTERNAL MEDICINE

## 2018-07-27 ENCOUNTER — ANTI-COAG VISIT (OUTPATIENT)
Dept: CARDIOLOGY CLINIC | Age: 78
End: 2018-07-27

## 2018-07-27 DIAGNOSIS — I48.20 ATRIAL FIBRILLATION, CHRONIC (HCC): ICD-10-CM

## 2018-07-27 LAB
INR BLD: 2.6
INTERNATIONAL NORMALIZATION RATIO, POC: 2.6
PROTHROMBIN TIME, POC: NORMAL

## 2018-07-27 PROCEDURE — 85610 PROTHROMBIN TIME: CPT | Performed by: INTERNAL MEDICINE

## 2018-08-02 ENCOUNTER — ANTI-COAG VISIT (OUTPATIENT)
Dept: CARDIOLOGY CLINIC | Age: 78
End: 2018-08-02

## 2018-08-02 DIAGNOSIS — I48.20 ATRIAL FIBRILLATION, CHRONIC (HCC): ICD-10-CM

## 2018-08-02 LAB
INR BLD: 2.5
INTERNATIONAL NORMALIZATION RATIO, POC: 2.5
PROTHROMBIN TIME, POC: NORMAL

## 2018-08-02 PROCEDURE — 85610 PROTHROMBIN TIME: CPT | Performed by: INTERNAL MEDICINE

## 2018-08-02 RX ORDER — ATENOLOL 100 MG/1
100 TABLET ORAL DAILY
Qty: 90 TABLET | Refills: 3 | Status: SHIPPED | OUTPATIENT
Start: 2018-08-02 | End: 2019-09-04 | Stop reason: SDUPTHER

## 2018-08-02 RX ORDER — SIMVASTATIN 40 MG
40 TABLET ORAL NIGHTLY
Qty: 90 TABLET | Refills: 3 | Status: SHIPPED | OUTPATIENT
Start: 2018-08-02 | End: 2019-07-17 | Stop reason: SDUPTHER

## 2018-08-02 RX ORDER — WARFARIN SODIUM 5 MG/1
TABLET ORAL
Qty: 90 TABLET | Refills: 1 | Status: SHIPPED | OUTPATIENT
Start: 2018-08-02 | End: 2019-07-17 | Stop reason: SDUPTHER

## 2018-08-13 ENCOUNTER — ANTI-COAG VISIT (OUTPATIENT)
Dept: CARDIOLOGY CLINIC | Age: 78
End: 2018-08-13

## 2018-08-13 DIAGNOSIS — I48.20 ATRIAL FIBRILLATION, CHRONIC (HCC): ICD-10-CM

## 2018-08-13 LAB
INR BLD: 2.2
INR BLD: 3.7
INTERNATIONAL NORMALIZATION RATIO, POC: 2.2
PROTHROMBIN TIME, POC: NORMAL

## 2018-08-13 PROCEDURE — 85610 PROTHROMBIN TIME: CPT | Performed by: INTERNAL MEDICINE

## 2018-08-23 ENCOUNTER — TELEPHONE (OUTPATIENT)
Dept: CARDIOLOGY CLINIC | Age: 78
End: 2018-08-23

## 2018-08-23 NOTE — TELEPHONE ENCOUNTER
Wants to speak to Gerald DEL REAL . Wants to know if she can come in next week with her  to get labs ?

## 2018-08-27 ENCOUNTER — ANTI-COAG VISIT (OUTPATIENT)
Dept: CARDIOLOGY CLINIC | Age: 78
End: 2018-08-27

## 2018-08-27 DIAGNOSIS — I48.20 ATRIAL FIBRILLATION, CHRONIC (HCC): ICD-10-CM

## 2018-08-27 LAB
INR BLD: 1.8
INTERNATIONAL NORMALIZATION RATIO, POC: 1.8
PROTHROMBIN TIME, POC: NORMAL

## 2018-08-27 PROCEDURE — 85610 PROTHROMBIN TIME: CPT | Performed by: INTERNAL MEDICINE

## 2018-09-17 ENCOUNTER — ANTI-COAG VISIT (OUTPATIENT)
Dept: CARDIOLOGY CLINIC | Age: 78
End: 2018-09-17

## 2018-09-17 DIAGNOSIS — I48.20 ATRIAL FIBRILLATION, CHRONIC (HCC): ICD-10-CM

## 2018-09-17 LAB — INR BLD: 1.7

## 2018-09-17 PROCEDURE — 85610 PROTHROMBIN TIME: CPT | Performed by: INTERNAL MEDICINE

## 2018-09-21 ENCOUNTER — ANTI-COAG VISIT (OUTPATIENT)
Dept: CARDIOLOGY CLINIC | Age: 78
End: 2018-09-21

## 2018-09-21 DIAGNOSIS — I48.20 ATRIAL FIBRILLATION, CHRONIC (HCC): ICD-10-CM

## 2018-09-21 DIAGNOSIS — E78.00 PURE HYPERCHOLESTEROLEMIA: Primary | ICD-10-CM

## 2018-09-21 LAB
INR BLD: 1.9
INTERNATIONAL NORMALIZATION RATIO, POC: 1.9
PROTHROMBIN TIME, POC: NORMAL

## 2018-09-21 PROCEDURE — 85610 PROTHROMBIN TIME: CPT | Performed by: INTERNAL MEDICINE

## 2018-10-02 ENCOUNTER — HOSPITAL ENCOUNTER (OUTPATIENT)
Age: 78
Discharge: HOME | End: 2018-10-02
Payer: MEDICARE

## 2018-10-02 ENCOUNTER — HOSPITAL ENCOUNTER (OUTPATIENT)
Dept: CT IMAGING | Age: 78
Discharge: HOME | End: 2018-10-02
Payer: MEDICARE

## 2018-10-02 DIAGNOSIS — N13.39 OTHER HYDRONEPHROSIS: ICD-10-CM

## 2018-10-02 DIAGNOSIS — R31.0 GROSS HEMATURIA: ICD-10-CM

## 2018-10-02 DIAGNOSIS — R30.0 DYSURIA: ICD-10-CM

## 2018-10-02 LAB
ANION GAP SERPL CALCULATED.3IONS-SCNC: 11 MMOL/L (ref 3–16)
BUN BLDV-MCNC: 17 MG/DL (ref 7–20)
CALCIUM SERPL-MCNC: 10.3 MG/DL (ref 8.3–10.6)
CHLORIDE BLD-SCNC: 101 MMOL/L (ref 99–110)
CO2: 28 MMOL/L (ref 21–32)
CREAT SERPL-MCNC: 1.2 MG/DL (ref 0.6–1.2)
GFR AFRICAN AMERICAN: 52
GFR NON-AFRICAN AMERICAN: 43
GLUCOSE BLD-MCNC: 95 MG/DL (ref 70–99)
POTASSIUM SERPL-SCNC: 4.7 MMOL/L (ref 3.5–5.1)
SODIUM BLD-SCNC: 140 MMOL/L (ref 136–145)

## 2018-10-02 PROCEDURE — 36415 COLL VENOUS BLD VENIPUNCTURE: CPT

## 2018-10-02 PROCEDURE — 80048 BASIC METABOLIC PNL TOTAL CA: CPT

## 2018-10-02 PROCEDURE — 74177 CT ABD & PELVIS W/CONTRAST: CPT

## 2018-10-02 PROCEDURE — 6360000004 HC RX CONTRAST MEDICATION: Performed by: FAMILY MEDICINE

## 2018-10-02 RX ADMIN — IOPAMIDOL 75 ML: 755 INJECTION, SOLUTION INTRAVENOUS at 17:51

## 2018-10-02 RX ADMIN — IOHEXOL 50 ML: 240 INJECTION, SOLUTION INTRATHECAL; INTRAVASCULAR; INTRAVENOUS; ORAL at 17:51

## 2018-10-04 ENCOUNTER — OFFICE VISIT (OUTPATIENT)
Dept: CARDIOLOGY CLINIC | Age: 78
End: 2018-10-04
Payer: MEDICARE

## 2018-10-04 ENCOUNTER — ANTI-COAG VISIT (OUTPATIENT)
Dept: CARDIOLOGY CLINIC | Age: 78
End: 2018-10-04

## 2018-10-04 VITALS
DIASTOLIC BLOOD PRESSURE: 80 MMHG | SYSTOLIC BLOOD PRESSURE: 132 MMHG | BODY MASS INDEX: 22.5 KG/M2 | HEIGHT: 66 IN | WEIGHT: 140 LBS | HEART RATE: 70 BPM

## 2018-10-04 DIAGNOSIS — I48.20 ATRIAL FIBRILLATION, CHRONIC (HCC): Primary | ICD-10-CM

## 2018-10-04 DIAGNOSIS — E78.2 MIXED HYPERLIPIDEMIA: ICD-10-CM

## 2018-10-04 DIAGNOSIS — I10 ESSENTIAL HYPERTENSION: ICD-10-CM

## 2018-10-04 LAB — INR BLD: 2.4

## 2018-10-04 PROCEDURE — G8420 CALC BMI NORM PARAMETERS: HCPCS | Performed by: INTERNAL MEDICINE

## 2018-10-04 PROCEDURE — G8400 PT W/DXA NO RESULTS DOC: HCPCS | Performed by: INTERNAL MEDICINE

## 2018-10-04 PROCEDURE — 1090F PRES/ABSN URINE INCON ASSESS: CPT | Performed by: INTERNAL MEDICINE

## 2018-10-04 PROCEDURE — 1036F TOBACCO NON-USER: CPT | Performed by: INTERNAL MEDICINE

## 2018-10-04 PROCEDURE — G8428 CUR MEDS NOT DOCUMENT: HCPCS | Performed by: INTERNAL MEDICINE

## 2018-10-04 PROCEDURE — 1101F PT FALLS ASSESS-DOCD LE1/YR: CPT | Performed by: INTERNAL MEDICINE

## 2018-10-04 PROCEDURE — 1123F ACP DISCUSS/DSCN MKR DOCD: CPT | Performed by: INTERNAL MEDICINE

## 2018-10-04 PROCEDURE — 4040F PNEUMOC VAC/ADMIN/RCVD: CPT | Performed by: INTERNAL MEDICINE

## 2018-10-04 PROCEDURE — G8484 FLU IMMUNIZE NO ADMIN: HCPCS | Performed by: INTERNAL MEDICINE

## 2018-10-04 PROCEDURE — 99214 OFFICE O/P EST MOD 30 MIN: CPT | Performed by: INTERNAL MEDICINE

## 2018-10-04 RX ORDER — PAROXETINE HYDROCHLORIDE 20 MG/1
20 TABLET, FILM COATED ORAL DAILY
Qty: 90 TABLET | Refills: 3 | Status: SHIPPED | OUTPATIENT
Start: 2018-10-04 | End: 2019-07-18 | Stop reason: SDUPTHER

## 2018-10-04 NOTE — PROGRESS NOTES
unanticipated weight loss. No change in energy or activity level   · Eyes: No visual changes   · ENT: No Headaches, hearing loss or vertigo. No mouth sores or sore throat. · Cardiovascular: Reviewed in HPI  · Respiratory: No cough or wheezing, no sputum production. No hematemesis. · Gastrointestinal: No abdominal pain, appetite loss, blood in stools. No change in bowel or bladder habits. · Genitourinary: No nocturia, dysuria, trouble voiding  · Musculoskeletal:  No gait disturbance, weakness or joint complaints. · Integumentary: No rash or pruritis. · Neurological: No headache, change in muscle strength, numbness or tingling. No change in gait, balance, coordination, mood, affect, memory, mentation, behavior. · Psychiatric: No anxiety or depression  · Endocrine: No malaise or fever  · Hematologic/Lymphatic: No abnormal bruising or bleeding, blood clots or swollen lymph nodes. · Allergic/Immunologic: No nasal congestion or hives. Physical Examination:    Vitals:    10/04/18 0956   BP: 132/80   Site: Left Upper Arm   Position: Sitting   Cuff Size: Medium Adult   Pulse: 70   Weight: 140 lb (63.5 kg)   Height: 5' 6\" (1.676 m)     Body mass index is 22.6 kg/m². Wt Readings from Last 3 Encounters:   10/04/18 140 lb (63.5 kg)   07/09/18 137 lb 12.6 oz (62.5 kg)   07/06/18 143 lb (64.9 kg)     BP Readings from Last 3 Encounters:   10/04/18 132/80   07/12/18 (!) 144/87   06/28/18 (!) 102/58          Constitutional and General Appearance:  appears stated age  Respiratory:  · Normal excursion and expansion without use of accessory muscles  · Resp Auscultation: Normal breath sounds without dullness  Cardiovascular:  · The apical impulses not displaced  · Heart is irregularly irregular in rhythm   · The carotid upstroke is normal, no bruit noted   · JVP is not elevated  · Peripheral pulses are symmetrical  · There is no clubbing, cyanosis of the extremities  · Puffy ankles.    · Femoral Arteries: 2+ and equal  · Pedal Pulses: 2+ and equal   Abdomen:  · No masses or tenderness  · Normal bowel sounds  Neurological/Psychiatric:  · Alert and oriented x3  · Moves all extremities well  · Exhibits normal gait balance and coordination    Assessment:    Atrial fibrillation, chronic  HR irregular & controlled    Echo 6/18 normal LVEF 65% with URSZULA and increased RVSP of 51. Echo 4/13> Underlying rhythm is atrial fibrillation. Normal left ventricle size, wall thickness and systolic function with an estimated ejection fraction of 60%. No regional wall motion abnormalities are seen. There is severe bi atrial enlargement. There is mild mitral, pulmonic and trivial aorta as well as mild tricuspid regurgitation with RVSP estimated at 41.5 mmHg. HR controlled, remains on Coumadin, INR's thru our office. Unable to obtain INR today due to error readings. Essential hypertension, benign  Stable  Blood pressure 132/80, pulse 70, height 5' 6\" (1.676 m), weight 140 lb (63.5 kg). Pure hypercholesterolemia  Stable, labs 9/27/18> , TRIG 150, HDL 37,     Carotid doppler 5/16> 16-49% bilateral   Carotid doppler 4/13> 51-55% MALINI, 3-36% LICA      Plan:  Marely Picking appears stable. No med changes. With next lab draw will check lipids, CMP, and CBC. Fu 6 months    Scribe's attestation: This note was scribed in the presence of Dr Bo Garcia MD by Evelyn Elmore RN. The scribe's documentation has been prepared under my direction and personally reviewed by me in its entirety. I confirm that the note above accurately reflects all work, treatment, procedures, and medical decision making performed by me. Thank you for allowing to me to participate in the care of Viv Xiong.

## 2018-10-19 ENCOUNTER — TELEPHONE (OUTPATIENT)
Dept: CARDIOLOGY CLINIC | Age: 78
End: 2018-10-19

## 2018-10-19 NOTE — TELEPHONE ENCOUNTER
10/30/2018 Pt is having kidney stones and a stent removed. Can she hold her coumadin 7 days prior to procedure?

## 2018-10-26 NOTE — PROGRESS NOTES
Name_______________________________________Printed:____________________  Date and time of surgery___10/31/18  0730_____________________Arrival Time:___0600  MAIN_____________   1. Do not eat or drink anything after 12 midnight (or____hours) prior to surgery. This includes no water, chewing gum or mints. Endoscopy patients follow your doctors bowel prep instructions,which may include taking part of prep after midnight. 2. Take the following pills with a small sip of water on the morning of surgery____ATENOLOL, DILTIAZEM_______________________________________________   3. Aspirin, Ibuprofen, Advil, Naproxen, Vitamin E and other Anti-inflammatory products should be stopped for 5 days before surgery or as directed by your physician. 4.X Check with your Doctor regarding stopping Plavix, Coumadin,Eliquis, Lovenox,Effient,Pradaxa,Xarelto, Fragmin or other blood thinners and follow their instructions. 5. Do not smoke, and do not drink any alcoholic beverages 24 hours prior to surgery. This includes NA Beer. Refrain from the usage of any recreational drugs. 6. You may brush your teeth and gargle the morning of surgery. DO NOT SWALLOW WATER   7. You MUST make arrangements for a responsible adult to stay on site while you are here and take you home after your surgery. You will not be allowed to leave alone or drive yourself home. It is strongly suggested someone stay with you the first 24 hrs. Your surgery will be cancelled if you do not have a ride home. 8. A parent/legal guardian must accompany a child scheduled for surgery and plan to stay at the hospital until the child is discharged. Please do not bring other children with you. 9. Please wear simple, loose fitting clothing to the hospital.  Dahlia Pepper not bring valuables (money, credit cards, checkbooks, etc.) Do not wear any makeup (including no eye makeup) or nail polish on your fingers or toes.              10. DO NOT wear any jewelry or piercings on day of

## 2018-10-31 ENCOUNTER — APPOINTMENT (OUTPATIENT)
Dept: GENERAL RADIOLOGY | Age: 78
End: 2018-10-31
Attending: UROLOGY
Payer: MEDICARE

## 2018-10-31 ENCOUNTER — ANESTHESIA (OUTPATIENT)
Dept: OPERATING ROOM | Age: 78
End: 2018-10-31
Payer: MEDICARE

## 2018-10-31 ENCOUNTER — HOSPITAL ENCOUNTER (OUTPATIENT)
Age: 78
Setting detail: OUTPATIENT SURGERY
Discharge: HOME | End: 2018-10-31
Attending: UROLOGY | Admitting: UROLOGY
Payer: MEDICARE

## 2018-10-31 ENCOUNTER — ANESTHESIA EVENT (OUTPATIENT)
Dept: OPERATING ROOM | Age: 78
End: 2018-10-31
Payer: MEDICARE

## 2018-10-31 VITALS
BODY MASS INDEX: 22.75 KG/M2 | HEART RATE: 75 BPM | SYSTOLIC BLOOD PRESSURE: 153 MMHG | HEIGHT: 66 IN | DIASTOLIC BLOOD PRESSURE: 88 MMHG | TEMPERATURE: 97.5 F | OXYGEN SATURATION: 95 % | RESPIRATION RATE: 18 BRPM | WEIGHT: 141.54 LBS

## 2018-10-31 VITALS
TEMPERATURE: 97.7 F | SYSTOLIC BLOOD PRESSURE: 106 MMHG | DIASTOLIC BLOOD PRESSURE: 62 MMHG | RESPIRATION RATE: 2 BRPM | OXYGEN SATURATION: 98 %

## 2018-10-31 DIAGNOSIS — N20.1 URETERAL STONE: Primary | ICD-10-CM

## 2018-10-31 DIAGNOSIS — R52 PAIN: ICD-10-CM

## 2018-10-31 LAB
APTT: 26.3 SEC (ref 26–36)
HCT VFR BLD CALC: 43.7 % (ref 36–48)
HEMOGLOBIN: 14.6 G/DL (ref 12–16)
INR BLD: 1.02 (ref 0.86–1.14)
MCH RBC QN AUTO: 30.7 PG (ref 26–34)
MCHC RBC AUTO-ENTMCNC: 33.4 G/DL (ref 31–36)
MCV RBC AUTO: 92 FL (ref 80–100)
PDW BLD-RTO: 13.9 % (ref 12.4–15.4)
PLATELET # BLD: 254 K/UL (ref 135–450)
PMV BLD AUTO: 8.2 FL (ref 5–10.5)
PROTHROMBIN TIME: 11.6 SEC (ref 9.8–13)
RBC # BLD: 4.75 M/UL (ref 4–5.2)
WBC # BLD: 7.7 K/UL (ref 4–11)

## 2018-10-31 PROCEDURE — 3209999900 FLUORO FOR SURGICAL PROCEDURES

## 2018-10-31 PROCEDURE — C2617 STENT, NON-COR, TEM W/O DEL: HCPCS | Performed by: UROLOGY

## 2018-10-31 PROCEDURE — 3600000014 HC SURGERY LEVEL 4 ADDTL 15MIN: Performed by: UROLOGY

## 2018-10-31 PROCEDURE — 3700000001 HC ADD 15 MINUTES (ANESTHESIA): Performed by: UROLOGY

## 2018-10-31 PROCEDURE — 2580000003 HC RX 258

## 2018-10-31 PROCEDURE — 2500000003 HC RX 250 WO HCPCS: Performed by: NURSE ANESTHETIST, CERTIFIED REGISTERED

## 2018-10-31 PROCEDURE — 7100000000 HC PACU RECOVERY - FIRST 15 MIN: Performed by: UROLOGY

## 2018-10-31 PROCEDURE — 7100000010 HC PHASE II RECOVERY - FIRST 15 MIN: Performed by: UROLOGY

## 2018-10-31 PROCEDURE — 6360000002 HC RX W HCPCS

## 2018-10-31 PROCEDURE — 7100000001 HC PACU RECOVERY - ADDTL 15 MIN: Performed by: UROLOGY

## 2018-10-31 PROCEDURE — 2580000003 HC RX 258: Performed by: UROLOGY

## 2018-10-31 PROCEDURE — 3700000000 HC ANESTHESIA ATTENDED CARE: Performed by: UROLOGY

## 2018-10-31 PROCEDURE — 3600000004 HC SURGERY LEVEL 4 BASE: Performed by: UROLOGY

## 2018-10-31 PROCEDURE — C1769 GUIDE WIRE: HCPCS | Performed by: UROLOGY

## 2018-10-31 PROCEDURE — 85610 PROTHROMBIN TIME: CPT

## 2018-10-31 PROCEDURE — 7100000011 HC PHASE II RECOVERY - ADDTL 15 MIN: Performed by: UROLOGY

## 2018-10-31 PROCEDURE — 85730 THROMBOPLASTIN TIME PARTIAL: CPT

## 2018-10-31 PROCEDURE — 6360000002 HC RX W HCPCS: Performed by: NURSE ANESTHETIST, CERTIFIED REGISTERED

## 2018-10-31 PROCEDURE — 2720000010 HC SURG SUPPLY STERILE: Performed by: UROLOGY

## 2018-10-31 PROCEDURE — 2709999900 HC NON-CHARGEABLE SUPPLY: Performed by: UROLOGY

## 2018-10-31 PROCEDURE — 82365 CALCULUS SPECTROSCOPY: CPT

## 2018-10-31 PROCEDURE — 85027 COMPLETE CBC AUTOMATED: CPT

## 2018-10-31 DEVICE — URETERAL STENT
Type: IMPLANTABLE DEVICE | Site: URETER | Status: FUNCTIONAL
Brand: PERCUFLEX™ PLUS

## 2018-10-31 RX ORDER — SODIUM CHLORIDE, SODIUM LACTATE, POTASSIUM CHLORIDE, CALCIUM CHLORIDE 600; 310; 30; 20 MG/100ML; MG/100ML; MG/100ML; MG/100ML
INJECTION, SOLUTION INTRAVENOUS
Status: COMPLETED
Start: 2018-10-31 | End: 2018-10-31

## 2018-10-31 RX ORDER — CIPROFLOXACIN 2 MG/ML
400 INJECTION, SOLUTION INTRAVENOUS
Status: COMPLETED | OUTPATIENT
Start: 2018-10-31 | End: 2018-10-31

## 2018-10-31 RX ORDER — ONDANSETRON 2 MG/ML
INJECTION INTRAMUSCULAR; INTRAVENOUS PRN
Status: DISCONTINUED | OUTPATIENT
Start: 2018-10-31 | End: 2018-10-31 | Stop reason: SDUPTHER

## 2018-10-31 RX ORDER — ONDANSETRON 2 MG/ML
4 INJECTION INTRAMUSCULAR; INTRAVENOUS
Status: DISCONTINUED | OUTPATIENT
Start: 2018-10-31 | End: 2018-10-31 | Stop reason: HOSPADM

## 2018-10-31 RX ORDER — HYDRALAZINE HYDROCHLORIDE 20 MG/ML
5 INJECTION INTRAMUSCULAR; INTRAVENOUS EVERY 10 MIN PRN
Status: DISCONTINUED | OUTPATIENT
Start: 2018-10-31 | End: 2018-10-31 | Stop reason: HOSPADM

## 2018-10-31 RX ORDER — HYDROMORPHONE HCL 110MG/55ML
0.5 PATIENT CONTROLLED ANALGESIA SYRINGE INTRAVENOUS EVERY 5 MIN PRN
Status: DISCONTINUED | OUTPATIENT
Start: 2018-10-31 | End: 2018-10-31 | Stop reason: HOSPADM

## 2018-10-31 RX ORDER — MEPERIDINE HYDROCHLORIDE 25 MG/ML
12.5 INJECTION INTRAMUSCULAR; INTRAVENOUS; SUBCUTANEOUS EVERY 5 MIN PRN
Status: DISCONTINUED | OUTPATIENT
Start: 2018-10-31 | End: 2018-10-31 | Stop reason: HOSPADM

## 2018-10-31 RX ORDER — LIDOCAINE HYDROCHLORIDE 10 MG/ML
0.5 INJECTION, SOLUTION EPIDURAL; INFILTRATION; INTRACAUDAL; PERINEURAL ONCE
Status: DISCONTINUED | OUTPATIENT
Start: 2018-10-31 | End: 2018-10-31 | Stop reason: HOSPADM

## 2018-10-31 RX ORDER — CIPROFLOXACIN 2 MG/ML
INJECTION, SOLUTION INTRAVENOUS
Status: COMPLETED
Start: 2018-10-31 | End: 2018-10-31

## 2018-10-31 RX ORDER — LABETALOL HYDROCHLORIDE 5 MG/ML
5 INJECTION, SOLUTION INTRAVENOUS EVERY 10 MIN PRN
Status: DISCONTINUED | OUTPATIENT
Start: 2018-10-31 | End: 2018-10-31 | Stop reason: HOSPADM

## 2018-10-31 RX ORDER — OXYCODONE HYDROCHLORIDE AND ACETAMINOPHEN 5; 325 MG/1; MG/1
1 TABLET ORAL
Status: DISCONTINUED | OUTPATIENT
Start: 2018-10-31 | End: 2018-10-31 | Stop reason: HOSPADM

## 2018-10-31 RX ORDER — SODIUM CHLORIDE, SODIUM LACTATE, POTASSIUM CHLORIDE, CALCIUM CHLORIDE 600; 310; 30; 20 MG/100ML; MG/100ML; MG/100ML; MG/100ML
INJECTION, SOLUTION INTRAVENOUS CONTINUOUS
Status: DISCONTINUED | OUTPATIENT
Start: 2018-10-31 | End: 2018-10-31 | Stop reason: HOSPADM

## 2018-10-31 RX ORDER — MAGNESIUM HYDROXIDE 1200 MG/15ML
LIQUID ORAL
Status: COMPLETED | OUTPATIENT
Start: 2018-10-31 | End: 2018-10-31

## 2018-10-31 RX ORDER — TAMSULOSIN HYDROCHLORIDE 0.4 MG/1
0.4 CAPSULE ORAL DAILY
Qty: 30 CAPSULE | Refills: 0 | Status: SHIPPED | OUTPATIENT
Start: 2018-10-31 | End: 2019-03-25 | Stop reason: ALTCHOICE

## 2018-10-31 RX ORDER — LIDOCAINE HYDROCHLORIDE 20 MG/ML
INJECTION, SOLUTION EPIDURAL; INFILTRATION; INTRACAUDAL; PERINEURAL PRN
Status: DISCONTINUED | OUTPATIENT
Start: 2018-10-31 | End: 2018-10-31 | Stop reason: SDUPTHER

## 2018-10-31 RX ORDER — HYDROCODONE BITARTRATE AND ACETAMINOPHEN 5; 325 MG/1; MG/1
1 TABLET ORAL EVERY 6 HOURS PRN
Qty: 20 TABLET | Refills: 0 | Status: SHIPPED | OUTPATIENT
Start: 2018-10-31 | End: 2018-11-05

## 2018-10-31 RX ORDER — PROPOFOL 10 MG/ML
INJECTION, EMULSION INTRAVENOUS PRN
Status: DISCONTINUED | OUTPATIENT
Start: 2018-10-31 | End: 2018-10-31 | Stop reason: SDUPTHER

## 2018-10-31 RX ORDER — FENTANYL CITRATE 50 UG/ML
INJECTION, SOLUTION INTRAMUSCULAR; INTRAVENOUS PRN
Status: DISCONTINUED | OUTPATIENT
Start: 2018-10-31 | End: 2018-10-31 | Stop reason: SDUPTHER

## 2018-10-31 RX ORDER — AMOXICILLIN 250 MG
1 CAPSULE ORAL 2 TIMES DAILY
Qty: 30 TABLET | Refills: 0 | Status: SHIPPED | OUTPATIENT
Start: 2018-10-31 | End: 2018-11-15

## 2018-10-31 RX ADMIN — SODIUM CHLORIDE, POTASSIUM CHLORIDE, SODIUM LACTATE AND CALCIUM CHLORIDE: 600; 310; 30; 20 INJECTION, SOLUTION INTRAVENOUS at 06:39

## 2018-10-31 RX ADMIN — CIPROFLOXACIN 400 MG: 2 INJECTION, SOLUTION INTRAVENOUS at 07:25

## 2018-10-31 RX ADMIN — ONDANSETRON HYDROCHLORIDE 4 MG: 2 SOLUTION INTRAMUSCULAR; INTRAVENOUS at 07:46

## 2018-10-31 RX ADMIN — FENTANYL CITRATE 25 MCG: 50 INJECTION, SOLUTION INTRAMUSCULAR; INTRAVENOUS at 07:35

## 2018-10-31 RX ADMIN — FENTANYL CITRATE 25 MCG: 50 INJECTION, SOLUTION INTRAMUSCULAR; INTRAVENOUS at 07:31

## 2018-10-31 RX ADMIN — PHENYLEPHRINE HYDROCHLORIDE 100 MCG: 10 INJECTION INTRAVENOUS at 08:20

## 2018-10-31 RX ADMIN — PROPOFOL 100 MG: 10 INJECTION, EMULSION INTRAVENOUS at 07:33

## 2018-10-31 RX ADMIN — PHENYLEPHRINE HYDROCHLORIDE 100 MCG: 10 INJECTION INTRAVENOUS at 08:00

## 2018-10-31 RX ADMIN — PHENYLEPHRINE HYDROCHLORIDE 100 MCG: 10 INJECTION INTRAVENOUS at 07:53

## 2018-10-31 RX ADMIN — SODIUM CHLORIDE, SODIUM LACTATE, POTASSIUM CHLORIDE, CALCIUM CHLORIDE: 600; 310; 30; 20 INJECTION, SOLUTION INTRAVENOUS at 06:39

## 2018-10-31 RX ADMIN — LIDOCAINE HYDROCHLORIDE 50 MG: 20 INJECTION, SOLUTION EPIDURAL; INFILTRATION; INTRACAUDAL; PERINEURAL at 07:32

## 2018-10-31 ASSESSMENT — PULMONARY FUNCTION TESTS
PIF_VALUE: 3
PIF_VALUE: 4
PIF_VALUE: 2
PIF_VALUE: 2
PIF_VALUE: 4
PIF_VALUE: 4
PIF_VALUE: 2
PIF_VALUE: 3
PIF_VALUE: 1
PIF_VALUE: 3
PIF_VALUE: 4
PIF_VALUE: 13
PIF_VALUE: 0
PIF_VALUE: 3
PIF_VALUE: 4
PIF_VALUE: 3
PIF_VALUE: 1
PIF_VALUE: 2
PIF_VALUE: 3
PIF_VALUE: 2
PIF_VALUE: 3
PIF_VALUE: 3
PIF_VALUE: 4
PIF_VALUE: 2
PIF_VALUE: 1
PIF_VALUE: 0
PIF_VALUE: 2
PIF_VALUE: 3
PIF_VALUE: 3
PIF_VALUE: 2
PIF_VALUE: 3
PIF_VALUE: 0
PIF_VALUE: 3
PIF_VALUE: 3
PIF_VALUE: 2
PIF_VALUE: 3
PIF_VALUE: 4
PIF_VALUE: 4
PIF_VALUE: 2
PIF_VALUE: 3
PIF_VALUE: 4
PIF_VALUE: 3
PIF_VALUE: 2
PIF_VALUE: 3
PIF_VALUE: 4
PIF_VALUE: 3
PIF_VALUE: 4
PIF_VALUE: 4
PIF_VALUE: 3
PIF_VALUE: 4
PIF_VALUE: 2
PIF_VALUE: 3
PIF_VALUE: 4

## 2018-10-31 ASSESSMENT — PAIN - FUNCTIONAL ASSESSMENT: PAIN_FUNCTIONAL_ASSESSMENT: 0-10

## 2018-10-31 NOTE — ANESTHESIA PRE PROCEDURE
10/31/18                        Date of last solid food consumption: 10/30/18    BMI:   Wt Readings from Last 3 Encounters:   10/31/18 141 lb 8.6 oz (64.2 kg)   10/04/18 140 lb (63.5 kg)   07/09/18 137 lb 12.6 oz (62.5 kg)     Body mass index is 22.84 kg/m². CBC:   Lab Results   Component Value Date    WBC 7.7 10/31/2018    RBC 4.75 10/31/2018    HGB 14.6 10/31/2018    HCT 43.7 10/31/2018    MCV 92.0 10/31/2018    RDW 13.9 10/31/2018     10/31/2018       CMP:   Lab Results   Component Value Date     10/02/2018    K 4.7 10/02/2018    K 4.4 07/11/2018     10/02/2018    CO2 28 10/02/2018    BUN 17 10/02/2018    CREATININE 1.2 10/02/2018    GFRAA 52 10/02/2018    AGRATIO 0.7 06/13/2018    LABGLOM 43 10/02/2018    LABGLOM 41 06/28/2018    GLUCOSE 95 10/02/2018    GLUCOSE 101 08/25/2011    PROT 6.4 06/14/2018    CALCIUM 10.3 10/02/2018    BILITOT 0.7 06/14/2018    ALKPHOS 149 06/14/2018    AST 33 06/14/2018    ALT 21 06/14/2018       POC Tests: No results for input(s): POCGLU, POCNA, POCK, POCCL, POCBUN, POCHEMO, POCHCT in the last 72 hours.     Coags:   Lab Results   Component Value Date    PROTIME 11.6 10/31/2018    PROTIME 3.3 07/08/2013    INR 1.02 10/31/2018    APTT 26.3 10/31/2018       HCG (If Applicable): No results found for: PREGTESTUR, PREGSERUM, HCG, HCGQUANT     ABGs: No results found for: PHART, PO2ART, NPS9SRD, AAS8PMB, BEART, W9OIASFG     Type & Screen (If Applicable):  No results found for: LABABO, [address]    Anesthesia Evaluation  Patient summary reviewed and Nursing notes reviewed no history of anesthetic complications:   Airway: Mallampati: II  TM distance: >3 FB   Neck ROM: full  Mouth opening: < 3 FB Dental:          Pulmonary:normal exam                               Cardiovascular:  Exercise tolerance: good (>4 METS),   (+) hypertension:, dysrhythmias: atrial fibrillation, hyperlipidemia                  Neuro/Psych:   (+) psychiatric history:            GI/Hepatic/Renal:

## 2018-11-02 ENCOUNTER — ANTI-COAG VISIT (OUTPATIENT)
Dept: CARDIOLOGY CLINIC | Age: 78
End: 2018-11-02
Payer: MEDICARE

## 2018-11-02 DIAGNOSIS — I48.20 ATRIAL FIBRILLATION, CHRONIC (HCC): ICD-10-CM

## 2018-11-02 LAB
INTERNATIONAL NORMALIZATION RATIO, POC: 1.1
PROTHROMBIN TIME, POC: NORMAL

## 2018-11-02 PROCEDURE — 85610 PROTHROMBIN TIME: CPT | Performed by: INTERNAL MEDICINE

## 2018-11-05 LAB
CALCULI COMPOSITION: NORMAL
MASS: 273 MG
STONE DESCRIPTION: NORMAL
STONE NUMBER: NORMAL
STONE SIZE: NORMAL MM

## 2018-11-21 ENCOUNTER — ANTI-COAG VISIT (OUTPATIENT)
Dept: CARDIOLOGY CLINIC | Age: 78
End: 2018-11-21

## 2018-11-21 ENCOUNTER — ANTI-COAG VISIT (OUTPATIENT)
Dept: CARDIOLOGY CLINIC | Age: 78
End: 2018-11-21
Payer: MEDICARE

## 2018-11-21 LAB
INR BLD: 2.1
INR BLD: 3

## 2018-11-21 PROCEDURE — 85610 PROTHROMBIN TIME: CPT | Performed by: INTERNAL MEDICINE

## 2018-12-05 ENCOUNTER — ANTI-COAG VISIT (OUTPATIENT)
Dept: CARDIOLOGY CLINIC | Age: 78
End: 2018-12-05
Payer: MEDICARE

## 2018-12-05 DIAGNOSIS — I48.20 ATRIAL FIBRILLATION, CHRONIC (HCC): ICD-10-CM

## 2018-12-05 LAB
INR BLD: 2.8
INR BLD: 2.9
INTERNATIONAL NORMALIZATION RATIO, POC: 2.9
PROTHROMBIN TIME, POC: NORMAL

## 2018-12-05 PROCEDURE — 85610 PROTHROMBIN TIME: CPT | Performed by: INTERNAL MEDICINE

## 2019-01-09 ENCOUNTER — ANTI-COAG VISIT (OUTPATIENT)
Dept: CARDIOLOGY CLINIC | Age: 79
End: 2019-01-09
Payer: MEDICARE

## 2019-01-09 DIAGNOSIS — I48.20 ATRIAL FIBRILLATION, CHRONIC (HCC): ICD-10-CM

## 2019-01-09 LAB
INR BLD: 2.1
INTERNATIONAL NORMALIZATION RATIO, POC: 2.1
PROTHROMBIN TIME, POC: NORMAL

## 2019-01-09 PROCEDURE — 36415 COLL VENOUS BLD VENIPUNCTURE: CPT | Performed by: INTERNAL MEDICINE

## 2019-01-09 PROCEDURE — 85610 PROTHROMBIN TIME: CPT | Performed by: INTERNAL MEDICINE

## 2019-02-06 ENCOUNTER — ANTI-COAG VISIT (OUTPATIENT)
Dept: CARDIOLOGY CLINIC | Age: 79
End: 2019-02-06
Payer: MEDICARE

## 2019-02-06 DIAGNOSIS — I48.20 ATRIAL FIBRILLATION, CHRONIC (HCC): ICD-10-CM

## 2019-02-06 LAB
INTERNATIONAL NORMALIZATION RATIO, POC: 2.8
PROTHROMBIN TIME, POC: NORMAL

## 2019-02-06 PROCEDURE — 85610 PROTHROMBIN TIME: CPT | Performed by: INTERNAL MEDICINE

## 2019-03-07 ENCOUNTER — ANTI-COAG VISIT (OUTPATIENT)
Dept: CARDIOLOGY CLINIC | Age: 79
End: 2019-03-07
Payer: MEDICARE

## 2019-03-07 DIAGNOSIS — I48.20 ATRIAL FIBRILLATION, CHRONIC (HCC): ICD-10-CM

## 2019-03-07 LAB
INR BLD: 3.1
INTERNATIONAL NORMALIZATION RATIO, POC: 3.1
PROTHROMBIN TIME, POC: NORMAL

## 2019-03-07 PROCEDURE — 85610 PROTHROMBIN TIME: CPT | Performed by: INTERNAL MEDICINE

## 2019-03-22 ENCOUNTER — ANTI-COAG VISIT (OUTPATIENT)
Dept: CARDIOLOGY CLINIC | Age: 79
End: 2019-03-22
Payer: MEDICARE

## 2019-03-22 DIAGNOSIS — I48.20 ATRIAL FIBRILLATION, CHRONIC (HCC): ICD-10-CM

## 2019-03-22 DIAGNOSIS — E78.2 MIXED HYPERLIPIDEMIA: Primary | ICD-10-CM

## 2019-03-22 LAB
INTERNATIONAL NORMALIZATION RATIO, POC: 2.9
PROTHROMBIN TIME, POC: NORMAL

## 2019-03-22 PROCEDURE — 85610 PROTHROMBIN TIME: CPT | Performed by: INTERNAL MEDICINE

## 2019-04-25 ENCOUNTER — OFFICE VISIT (OUTPATIENT)
Dept: CARDIOLOGY CLINIC | Age: 79
End: 2019-04-25
Payer: MEDICARE

## 2019-04-25 ENCOUNTER — ANTI-COAG VISIT (OUTPATIENT)
Dept: CARDIOLOGY CLINIC | Age: 79
End: 2019-04-25

## 2019-04-25 VITALS
BODY MASS INDEX: 23.3 KG/M2 | HEIGHT: 66 IN | OXYGEN SATURATION: 96 % | SYSTOLIC BLOOD PRESSURE: 144 MMHG | WEIGHT: 145 LBS | HEART RATE: 71 BPM | DIASTOLIC BLOOD PRESSURE: 80 MMHG

## 2019-04-25 DIAGNOSIS — E78.00 PURE HYPERCHOLESTEROLEMIA: Primary | ICD-10-CM

## 2019-04-25 DIAGNOSIS — I10 ESSENTIAL HYPERTENSION: ICD-10-CM

## 2019-04-25 DIAGNOSIS — I48.20 ATRIAL FIBRILLATION, CHRONIC (HCC): ICD-10-CM

## 2019-04-25 LAB — INR BLD: 2.6

## 2019-04-25 PROCEDURE — G8427 DOCREV CUR MEDS BY ELIG CLIN: HCPCS | Performed by: INTERNAL MEDICINE

## 2019-04-25 PROCEDURE — 1090F PRES/ABSN URINE INCON ASSESS: CPT | Performed by: INTERNAL MEDICINE

## 2019-04-25 PROCEDURE — 1036F TOBACCO NON-USER: CPT | Performed by: INTERNAL MEDICINE

## 2019-04-25 PROCEDURE — 99214 OFFICE O/P EST MOD 30 MIN: CPT | Performed by: INTERNAL MEDICINE

## 2019-04-25 PROCEDURE — G8420 CALC BMI NORM PARAMETERS: HCPCS | Performed by: INTERNAL MEDICINE

## 2019-04-25 PROCEDURE — 1123F ACP DISCUSS/DSCN MKR DOCD: CPT | Performed by: INTERNAL MEDICINE

## 2019-04-25 PROCEDURE — 4040F PNEUMOC VAC/ADMIN/RCVD: CPT | Performed by: INTERNAL MEDICINE

## 2019-04-25 PROCEDURE — G8400 PT W/DXA NO RESULTS DOC: HCPCS | Performed by: INTERNAL MEDICINE

## 2019-04-25 NOTE — PROGRESS NOTES
[address]   Cardiac Evaluation      Patient: Kevin Rojas  YOB: 1940  Date: 4/25/19       No chief complaint on file. Referring provider: Rajat Moss    History of Present Illness:   Mrs. Rin Joyner is here today for regular follow up of her AF, HTN, HLD. 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 She had surgery on 7/18. Hospital course complicated by increased HR of her AF which is chronic (due to her illness) requiring the addition of cardizem and her usual atenolol. Today, she states she has been well. She denies exertional chest pain, LOPEZ/PND, palpitations, dizziness, or edema. She walks regularly for exercise and takes care of her home and yard. She is with her  today. Past Medical History:   has a past medical history of A-fib [facility]), Atrial fibrillation ([address] Hypercholesterolemia, Hypertension, and Pelvic mass. Surgical History:   has a past surgical history that includes Appendectomy; Cystocopy (Right, 06/14/2018); Hysterectomy, total abdominal (07/09/2018); and pr cysto/uretero w/lithotripsy &indwell stent insrt (Right, 10/31/2018). Resection of ovarian serous cystadenoma.      Social History:  Social History     Socioeconomic History    Marital status:      Spouse name: Not on file    Number of children: Not on file    Years of education: Not on file    Highest education level: Not on file   Occupational History    Not on file   Social Needs    Financial resource strain: Not on file    Food insecurity:     Worry: Not on file     Inability: Not on file    Transportation needs:     Medical: Not on file     Non-medical: Not on file   Tobacco Use    Smoking status: Never Smoker    Smokeless tobacco: Never Used   Substance and Sexual Activity    Alcohol use: No    Drug use: No    Sexual activity: Not Currently Lifestyle    Physical activity:     Days per week: Not on file     Minutes per session: Not on file    Stress: Not on file   Relationships    Social connections:     Talks on phone: Not on file     Gets together: Not on file     Attends Confucianism service: Not on file     Active member of club or organization: Not on file     Attends meetings of clubs or organizations: Not on file     Relationship status: Not on file    Intimate partner violence:     Fear of current or ex partner: Not on file     Emotionally abused: Not on file     Physically abused: Not on file     Forced sexual activity: Not on file   Other Topics Concern    Not on file   Social History Narrative    Not on file     Family History:  family history includes Heart Disease in her father. Allergies:  Penicillins and Sulfa antibiotics     Review of Systems:   · Constitutional: there has been no unanticipated weight loss. No change in energy or activity level   · Eyes: No visual changes   · ENT: No Headaches, hearing loss or vertigo. No mouth sores or sore throat. · Cardiovascular: Reviewed in HPI  · Respiratory: No cough or wheezing, no sputum production. No hematemesis. · Gastrointestinal: No abdominal pain, appetite loss, blood in stools. No change in bowel or bladder habits. · Genitourinary: No nocturia, dysuria, trouble voiding  · Musculoskeletal:  No gait disturbance, weakness or joint complaints. · Integumentary: No rash or pruritis. · Neurological: No headache, change in muscle strength, numbness or tingling. No change in gait, balance, coordination, mood, affect, memory, mentation, behavior. · Psychiatric: No anxiety or depression  · Endocrine: No malaise or fever  · Hematologic/Lymphatic: No abnormal bruising or bleeding, blood clots or swollen lymph nodes. · Allergic/Immunologic: No nasal congestion or hives.     Physical Examination:    Vitals:    04/25/19 1140 04/25/19 1143   BP: (!) 144/80 (!) 144/80   Site: Left Upper Arm Left Upper Arm   Position: Sitting Sitting   Cuff Size: Medium Adult Medium Adult   Pulse: 71    SpO2: 96%    Weight: 145 lb (65.8 kg)    Height: 5' 6\" (1.676 m)      Body mass index is 23.4 kg/m². Wt Readings from Last 3 Encounters:   04/25/19 145 lb (65.8 kg)   10/31/18 141 lb 8.6 oz (64.2 kg)   10/04/18 140 lb (63.5 kg)     BP Readings from Last 3 Encounters:   04/25/19 (!) 144/80   10/31/18 (!) 153/88   10/31/18 106/62          Constitutional and General Appearance:  appears stated age  Respiratory:  · Normal excursion and expansion without use of accessory muscles  · Resp Auscultation: Normal breath sounds without dullness  Cardiovascular:  · The apical impulses not displaced  · Heart is irregularly irregular in rhythm   · The carotid upstroke is normal, no bruit noted   · JVP is not elevated  · Peripheral pulses are symmetrical  · There is no clubbing, cyanosis of the extremities  · No edema   · Femoral Arteries: 2+ and equal  · Pedal Pulses: 2+ and equal   Abdomen:  · No masses or tenderness  · Normal bowel sounds  Neurological/Psychiatric:  · Alert and oriented x3  · Moves all extremities well  · Exhibits normal gait balance and coordination    Assessment:    Atrial fibrillation, chronic  HR irregular but controlled    Echo 6/18 normal LVEF 65% with URSZULA and increased RVSP of 51. Echo 4/13> Underlying rhythm is atrial fibrillation. Normal left ventricle size, wall thickness and systolic function with an estimated ejection fraction of 60%. No regional wall motion abnormalities are seen. There is severe bi atrial enlargement. There is mild mitral, pulmonic and trivial aorta as well as mild tricuspid regurgitation with RVSP estimated at 41.5 mmHg. HR controlled, remains on Coumadin, INR's thru our office. Unable to obtain INR today due to error readings.     Essential hypertension, benign  Stable  Blood pressure (!) 144/80, pulse 71, height 5' 6\" (1.676 m), weight 145 lb (65.8 kg), SpO2 96 %.      Pure hypercholesterolemia  Stable, labs 4/16/19: ; TRIG 136; HDL 45; LDL 92    Carotid doppler 5/16> 16-49% bilateral   Carotid doppler 4/13> 14-69% MALINI, 8-48% LICA      Plan: Cisco Childress appears stable. No med changes. Encouraged to stay active. Fu 6 months. Scribe's attestation: This note was scribed in the presence of Dr Iris Belle MD by Hipolito Tsai RN. The scribe's documentation has been prepared under my direction and personally reviewed by me in its entirety. I confirm that the note above accurately reflects all work, treatment, procedures, and medical decision making performed by me. Thank you for allowing to me to participate in the care of Jagjit Beal.

## 2019-04-25 NOTE — LETTER
[address] Cardiology [facility]  [address] [place] [facility] [address]  Phone: [phone]  Fax: 348 Mere Franks MD        May 8, 2019     Brent Mccain  No address on file    Patient: Jagjit Beal  MR Number: [id number]  YOB: 1940  Date of Visit: 4/25/2019    Dear Dr. Brent Mccain:    [facility]   Cardiac Evaluation      Patient: Jagjit Beal  YOB: 1940  Date: 4/25/19       No chief complaint on file. Referring provider: Brent Mccain    History of Present Illness:   Mrs. Divine Torres is here today for regular follow up of her AF, HTN, HLD. 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 She had surgery on 7/18. Hospital course complicated by increased HR of her AF which is chronic (due to her illness) requiring the addition of cardizem and her usual atenolol. Today, she states she has been well. She denies exertional chest pain, LOPEZ/PND, palpitations, dizziness, or edema. She walks regularly for exercise and takes care of her home and yard. She is with her  today. Past Medical History:   has a past medical history of A-fib [facility]), Atrial fibrillation ([address] Hypercholesterolemia, Hypertension, and Pelvic mass. Surgical History:   has a past surgical history that includes Appendectomy; Cystocopy (Right, 06/14/2018); Hysterectomy, total abdominal (07/09/2018); and pr cysto/uretero w/lithotripsy &indwell stent insrt (Right, 10/31/2018). Resection of ovarian serous cystadenoma.      Social History:  Social History     Socioeconomic History    Marital status:      Spouse name: Not on file    Number of children: Not on file    Years of education: Not on file    Highest education level: Not on file   Occupational History    Not on file   Social Needs  Financial resource strain: Not on file   Kyle-Zain insecurity:     Worry: Not on file     Inability: Not on file    Transportation needs:     Medical: Not on file     Non-medical: Not on file   Tobacco Use    Smoking status: Never Smoker    Smokeless tobacco: Never Used   Substance and Sexual Activity    Alcohol use: No    Drug use: No    Sexual activity: Not Currently   Lifestyle    Physical activity:     Days per week: Not on file     Minutes per session: Not on file    Stress: Not on file   Relationships    Social connections:     Talks on phone: Not on file     Gets together: Not on file     Attends [employer]an service: Not on file     Active member of club or organization: Not on file     Attends meetings of clubs or organizations: Not on file     Relationship status: Not on file    Intimate partner violence:     Fear of current or ex partner: Not on file     Emotionally abused: Not on file     Physically abused: Not on file     Forced sexual activity: Not on file   Other Topics Concern    Not on file   Social History Narrative    Not on file     Family History:  family history includes Heart Disease in her father. Allergies:  Penicillins and Sulfa antibiotics     Review of Systems:   · Constitutional: there has been no unanticipated weight loss. No change in energy or activity level   · Eyes: No visual changes   · ENT: No Headaches, hearing loss or vertigo. No mouth sores or sore throat. · Cardiovascular: Reviewed in HPI  · Respiratory: No cough or wheezing, no sputum production. No hematemesis. · Gastrointestinal: No abdominal pain, appetite loss, blood in stools. No change in bowel or bladder habits. · Genitourinary: No nocturia, dysuria, trouble voiding  · Musculoskeletal:  No gait disturbance, weakness or joint complaints. · Integumentary: No rash or pruritis.   · Neurological: No headache, change in muscle strength, numbness or tingling. No change in gait, balance, coordination, mood, affect, memory, mentation, behavior. · Psychiatric: No anxiety or depression  · Endocrine: No malaise or fever  · Hematologic/Lymphatic: No abnormal bruising or bleeding, blood clots or swollen lymph nodes. · Allergic/Immunologic: No nasal congestion or hives. Physical Examination:    Vitals:    04/25/19 1140 04/25/19 1143   BP: (!) 144/80 (!) 144/80   Site: Left Upper Arm Left Upper Arm   Position: Sitting Sitting   Cuff Size: Medium Adult Medium Adult   Pulse: 71    SpO2: 96%    Weight: 145 lb (65.8 kg)    Height: 5' 6\" (1.676 m)      Body mass index is 23.4 kg/m². Wt Readings from Last 3 Encounters:   04/25/19 145 lb (65.8 kg)   10/31/18 141 lb 8.6 oz (64.2 kg)   10/04/18 140 lb (63.5 kg)     BP Readings from Last 3 Encounters:   04/25/19 (!) 144/80   10/31/18 (!) 153/88   10/31/18 106/62          Constitutional and General Appearance:  appears stated age  Respiratory:  · Normal excursion and expansion without use of accessory muscles  · Resp Auscultation: Normal breath sounds without dullness  Cardiovascular:  · The apical impulses not displaced  · Heart is irregularly irregular in rhythm   · The carotid upstroke is normal, no bruit noted   · JVP is not elevated  · Peripheral pulses are symmetrical  · There is no clubbing, cyanosis of the extremities  · No edema   · Femoral Arteries: 2+ and equal  · Pedal Pulses: 2+ and equal   Abdomen:  · No masses or tenderness  · Normal bowel sounds  Neurological/Psychiatric:  · Alert and oriented x3  · Moves all extremities well  · Exhibits normal gait balance and coordination    Assessment:    Atrial fibrillation, chronic  HR irregular but controlled    Echo 6/18 normal LVEF 65% with URSZULA and increased RVSP of 51. Echo 4/13> Underlying rhythm is atrial fibrillation.  Normal left ventricle size, wall thickness and systolic function with an estimated ejection fraction of 60%. No regional wall motion abnormalities are seen. There is severe bi atrial enlargement. There is mild mitral, pulmonic and trivial aorta as well as mild tricuspid regurgitation with RVSP estimated at 41.5 mmHg. HR controlled, remains on Coumadin, INR's thru our office. Unable to obtain INR today due to error readings. Essential hypertension, benign  Stable  Blood pressure (!) 144/80, pulse 71, height 5' 6\" (1.676 m), weight 145 lb (65.8 kg), SpO2 96 %. Pure hypercholesterolemia  Stable, labs 4/16/19: ; TRIG 136; HDL 45; LDL 92    Carotid doppler 5/16> 16-49% bilateral   Carotid doppler 4/13> 92-54% MALINI, 4-98% LICA      Plan: Kristi Gonzales appears stable. No med changes. Encouraged to stay active. Fu 6 months. Scribe's attestation: This note was scribed in the presence of Dr Anabelle Patel MD by Charli Long RN. The scribe's documentation has been prepared under my direction and personally reviewed by me in its entirety. I confirm that the note above accurately reflects all work, treatment, procedures, and medical decision making performed by me. Thank you for allowing to me to participate in the care of Lorena Hope. If you have questions, please do not hesitate to call me. I look forward to following Kristi Gonzales along with you.     Sincerely,        Sophy Torres MD

## 2019-05-22 ENCOUNTER — ANTI-COAG VISIT (OUTPATIENT)
Dept: CARDIOLOGY CLINIC | Age: 79
End: 2019-05-22
Payer: MEDICARE

## 2019-05-22 DIAGNOSIS — I48.91 ATRIAL FIBRILLATION, UNSPECIFIED TYPE (HCC): Primary | ICD-10-CM

## 2019-05-22 LAB — INR BLD: 2.9

## 2019-05-22 PROCEDURE — 85610 PROTHROMBIN TIME: CPT | Performed by: INTERNAL MEDICINE

## 2019-06-24 ENCOUNTER — ANTI-COAG VISIT (OUTPATIENT)
Dept: CARDIOLOGY CLINIC | Age: 79
End: 2019-06-24
Payer: MEDICARE

## 2019-06-24 DIAGNOSIS — I48.91 ATRIAL FIBRILLATION, UNSPECIFIED TYPE (HCC): ICD-10-CM

## 2019-06-24 LAB — INR BLD: 3.4

## 2019-06-24 PROCEDURE — 85610 PROTHROMBIN TIME: CPT | Performed by: INTERNAL MEDICINE

## 2019-07-05 ENCOUNTER — ANTI-COAG VISIT (OUTPATIENT)
Dept: CARDIOLOGY CLINIC | Age: 79
End: 2019-07-05
Payer: MEDICARE

## 2019-07-05 DIAGNOSIS — I48.91 ATRIAL FIBRILLATION, UNSPECIFIED TYPE (HCC): ICD-10-CM

## 2019-07-05 LAB — INR BLD: 3.1

## 2019-07-05 PROCEDURE — 85610 PROTHROMBIN TIME: CPT | Performed by: INTERNAL MEDICINE

## 2019-07-17 RX ORDER — SIMVASTATIN 40 MG
40 TABLET ORAL NIGHTLY
Qty: 90 TABLET | Refills: 3 | Status: SHIPPED | OUTPATIENT
Start: 2019-07-17 | End: 2020-08-12 | Stop reason: SDUPTHER

## 2019-07-17 RX ORDER — WARFARIN SODIUM 5 MG/1
TABLET ORAL
Qty: 90 TABLET | Refills: 1 | Status: SHIPPED | OUTPATIENT
Start: 2019-07-17 | End: 2020-08-12 | Stop reason: SDUPTHER

## 2019-07-18 RX ORDER — PAROXETINE HYDROCHLORIDE 20 MG/1
20 TABLET, FILM COATED ORAL DAILY
Qty: 90 TABLET | Refills: 3 | Status: SHIPPED | OUTPATIENT
Start: 2019-07-18 | End: 2019-10-03 | Stop reason: SDUPTHER

## 2019-07-29 ENCOUNTER — ANTI-COAG VISIT (OUTPATIENT)
Dept: CARDIOLOGY CLINIC | Age: 79
End: 2019-07-29
Payer: MEDICARE

## 2019-07-29 DIAGNOSIS — I48.91 ATRIAL FIBRILLATION, UNSPECIFIED TYPE (HCC): ICD-10-CM

## 2019-07-29 LAB — INR BLD: 3.6

## 2019-07-29 PROCEDURE — 85610 PROTHROMBIN TIME: CPT | Performed by: INTERNAL MEDICINE

## 2019-08-12 ENCOUNTER — ANTI-COAG VISIT (OUTPATIENT)
Dept: CARDIOLOGY CLINIC | Age: 79
End: 2019-08-12
Payer: MEDICARE

## 2019-08-12 DIAGNOSIS — I48.91 ATRIAL FIBRILLATION, UNSPECIFIED TYPE (HCC): ICD-10-CM

## 2019-08-12 LAB — INR BLD: 2.4

## 2019-08-12 PROCEDURE — 85610 PROTHROMBIN TIME: CPT | Performed by: INTERNAL MEDICINE

## 2019-09-04 RX ORDER — ATENOLOL 100 MG/1
100 TABLET ORAL DAILY
Qty: 90 TABLET | Refills: 3 | Status: SHIPPED | OUTPATIENT
Start: 2019-09-04 | End: 2020-08-12 | Stop reason: SDUPTHER

## 2019-09-06 ENCOUNTER — ANTI-COAG VISIT (OUTPATIENT)
Dept: CARDIOLOGY CLINIC | Age: 79
End: 2019-09-06
Payer: MEDICARE

## 2019-09-06 DIAGNOSIS — I48.91 ATRIAL FIBRILLATION, UNSPECIFIED TYPE (HCC): ICD-10-CM

## 2019-09-06 DIAGNOSIS — E78.2 MIXED HYPERLIPIDEMIA: Primary | ICD-10-CM

## 2019-09-06 LAB — INR BLD: 2.6

## 2019-09-06 PROCEDURE — 85610 PROTHROMBIN TIME: CPT | Performed by: INTERNAL MEDICINE

## 2019-09-24 ENCOUNTER — TELEPHONE (OUTPATIENT)
Dept: CARDIOLOGY CLINIC | Age: 79
End: 2019-09-24

## 2019-09-24 DIAGNOSIS — E78.00 PURE HYPERCHOLESTEROLEMIA: Primary | ICD-10-CM

## 2019-09-24 DIAGNOSIS — E78.2 MIXED HYPERLIPIDEMIA: ICD-10-CM

## 2019-10-03 ENCOUNTER — OFFICE VISIT (OUTPATIENT)
Dept: CARDIOLOGY CLINIC | Age: 79
End: 2019-10-03
Payer: MEDICARE

## 2019-10-03 ENCOUNTER — ANTI-COAG VISIT (OUTPATIENT)
Dept: CARDIOLOGY CLINIC | Age: 79
End: 2019-10-03

## 2019-10-03 VITALS
HEART RATE: 63 BPM | DIASTOLIC BLOOD PRESSURE: 80 MMHG | WEIGHT: 146 LBS | HEIGHT: 66 IN | BODY MASS INDEX: 23.46 KG/M2 | OXYGEN SATURATION: 98 % | SYSTOLIC BLOOD PRESSURE: 144 MMHG

## 2019-10-03 DIAGNOSIS — I10 ESSENTIAL HYPERTENSION: ICD-10-CM

## 2019-10-03 DIAGNOSIS — I48.20 ATRIAL FIBRILLATION, CHRONIC (HCC): ICD-10-CM

## 2019-10-03 DIAGNOSIS — E78.2 MIXED HYPERLIPIDEMIA: ICD-10-CM

## 2019-10-03 DIAGNOSIS — E78.00 PURE HYPERCHOLESTEROLEMIA: Primary | ICD-10-CM

## 2019-10-03 LAB — INR BLD: 2.7

## 2019-10-03 PROCEDURE — 1036F TOBACCO NON-USER: CPT | Performed by: INTERNAL MEDICINE

## 2019-10-03 PROCEDURE — 99214 OFFICE O/P EST MOD 30 MIN: CPT | Performed by: INTERNAL MEDICINE

## 2019-10-03 PROCEDURE — 1123F ACP DISCUSS/DSCN MKR DOCD: CPT | Performed by: INTERNAL MEDICINE

## 2019-10-03 PROCEDURE — G8484 FLU IMMUNIZE NO ADMIN: HCPCS | Performed by: INTERNAL MEDICINE

## 2019-10-03 PROCEDURE — G8428 CUR MEDS NOT DOCUMENT: HCPCS | Performed by: INTERNAL MEDICINE

## 2019-10-03 PROCEDURE — 1090F PRES/ABSN URINE INCON ASSESS: CPT | Performed by: INTERNAL MEDICINE

## 2019-10-03 PROCEDURE — 4040F PNEUMOC VAC/ADMIN/RCVD: CPT | Performed by: INTERNAL MEDICINE

## 2019-10-03 PROCEDURE — G8420 CALC BMI NORM PARAMETERS: HCPCS | Performed by: INTERNAL MEDICINE

## 2019-10-03 PROCEDURE — G8400 PT W/DXA NO RESULTS DOC: HCPCS | Performed by: INTERNAL MEDICINE

## 2019-10-03 RX ORDER — PAROXETINE HYDROCHLORIDE 20 MG/1
20 TABLET, FILM COATED ORAL DAILY
Qty: 90 TABLET | Refills: 3 | Status: SHIPPED | OUTPATIENT
Start: 2019-10-03 | End: 2020-08-12 | Stop reason: SDUPTHER

## 2019-10-03 RX ORDER — DILTIAZEM HYDROCHLORIDE 60 MG/1
30 TABLET, FILM COATED ORAL EVERY 12 HOURS SCHEDULED
Qty: 90 TABLET | Refills: 3 | Status: SHIPPED | OUTPATIENT
Start: 2019-10-03 | End: 2020-08-12 | Stop reason: SDUPTHER

## 2019-10-30 ENCOUNTER — ANTI-COAG VISIT (OUTPATIENT)
Dept: CARDIOLOGY CLINIC | Age: 79
End: 2019-10-30
Payer: MEDICARE

## 2019-10-30 DIAGNOSIS — I48.91 ATRIAL FIBRILLATION, UNSPECIFIED TYPE (HCC): ICD-10-CM

## 2019-10-30 LAB — INR BLD: 2.1

## 2019-10-30 PROCEDURE — 85610 PROTHROMBIN TIME: CPT | Performed by: INTERNAL MEDICINE

## 2019-12-03 LAB — INR BLD: 2

## 2019-12-04 ENCOUNTER — ANTI-COAG VISIT (OUTPATIENT)
Dept: CARDIOLOGY CLINIC | Age: 79
End: 2019-12-04

## 2019-12-09 ENCOUNTER — TELEPHONE (OUTPATIENT)
Dept: CARDIOLOGY CLINIC | Age: 79
End: 2019-12-09

## 2019-12-13 NOTE — PROGRESS NOTES
POST VASECTOMY INSTRUCTIONS    1.) If you have any concerns or questions, please contact our office at [phone].     2.) It is okay to take a shower, however, do not soak in water (bath,swimming, hot tub,etc....) until your incision is healed.    3.) You might notice some swelling, mild bruising, and discomfort for several days after your vasectomy. This is to be expected. For at least the next 24 hours, an ice pack should be applied for 20 minutes every hour that you are awake. Ice will help with discomfort and swelling. Do not place directly on the skin.    4.) No intercourse, strenuous activity or exercise for at least 7-10 days, even if you feel fine.    5.) You need to wear good scrotal support while you are healing. We strongly recommend an athletic supporter or a pair of regular briefs that are one size too small. Boxer briefs do not offer enough support.    6.) Tylenol as directed on the bottle is preferred for discomfort. Please avoid any blood thinning products such as ibuprofen and aspirin (Motrin, Advil, Excedrin, Aleve, ect..) for at least the next week.    7.) It is normal to have mild drainage from the incision area for several days. However, please contact our office if you notice: bright red blood that does not stop after three days, increased pain, heat at the incision, red streaks, foul smelling discharge, or if you start to run a fever.     8.) YOU MUST CONTINUE BIRTH CONTROL UNTIL WE CONFIRM YOUR STERILITY.  This process can take up to a year to complete (rare occurrence).     9.) You have been given a form with specimen cup and instructions for your follow up specimen. You will be cleared once we receive ONE negative specimen. If your specimen comes back positive (sperm seen) you will be asked to repeat the test. This does not mean that your vasectomy has failed.    Teaching / education initiated regarding perioperative experience, expectations, and pain management during stay. Patient verbalized understanding.

## 2020-01-06 LAB — INR BLD: 1.8

## 2020-01-07 ENCOUNTER — ANTI-COAG VISIT (OUTPATIENT)
Dept: CARDIOLOGY CLINIC | Age: 80
End: 2020-01-07

## 2020-01-27 ENCOUNTER — ANTI-COAG VISIT (OUTPATIENT)
Dept: CARDIOLOGY CLINIC | Age: 80
End: 2020-01-27
Payer: MEDICARE

## 2020-01-27 LAB — INR BLD: 3.6

## 2020-01-27 PROCEDURE — 85610 PROTHROMBIN TIME: CPT | Performed by: INTERNAL MEDICINE

## 2020-02-10 ENCOUNTER — ANTI-COAG VISIT (OUTPATIENT)
Dept: CARDIOLOGY CLINIC | Age: 80
End: 2020-02-10
Payer: MEDICARE

## 2020-02-10 LAB — INR BLD: 1.8

## 2020-02-10 PROCEDURE — 85610 PROTHROMBIN TIME: CPT | Performed by: INTERNAL MEDICINE

## 2020-03-02 ENCOUNTER — ANTI-COAG VISIT (OUTPATIENT)
Dept: CARDIOLOGY CLINIC | Age: 80
End: 2020-03-02
Payer: MEDICARE

## 2020-03-02 LAB — INR BLD: 2.2

## 2020-03-02 PROCEDURE — 85610 PROTHROMBIN TIME: CPT | Performed by: INTERNAL MEDICINE

## 2020-03-30 ENCOUNTER — ANTI-COAG VISIT (OUTPATIENT)
Dept: CARDIOLOGY CLINIC | Age: 80
End: 2020-03-30
Payer: MEDICARE

## 2020-03-30 LAB — INR BLD: 3

## 2020-03-30 PROCEDURE — 85610 PROTHROMBIN TIME: CPT | Performed by: INTERNAL MEDICINE

## 2020-04-20 ENCOUNTER — TELEPHONE (OUTPATIENT)
Dept: CARDIOLOGY CLINIC | Age: 80
End: 2020-04-20

## 2020-04-20 NOTE — TELEPHONE ENCOUNTER
She has appt 4/30/20 in [address] [place] [address],[address] office with [address]. She is scared to go to the hospital to have her labs done . Because she isnt getting her labs , does [address] still want to see her ? Should she post-pone her appt ?

## 2020-04-30 ENCOUNTER — ANTI-COAG VISIT (OUTPATIENT)
Dept: CARDIOLOGY CLINIC | Age: 80
End: 2020-04-30

## 2020-04-30 ENCOUNTER — OFFICE VISIT (OUTPATIENT)
Dept: CARDIOLOGY CLINIC | Age: 80
End: 2020-04-30
Payer: MEDICARE

## 2020-04-30 VITALS
SYSTOLIC BLOOD PRESSURE: 138 MMHG | BODY MASS INDEX: 25.71 KG/M2 | WEIGHT: 160 LBS | HEIGHT: 66 IN | HEART RATE: 82 BPM | OXYGEN SATURATION: 97 % | DIASTOLIC BLOOD PRESSURE: 72 MMHG | TEMPERATURE: 98.5 F

## 2020-04-30 LAB — INR BLD: 3.7

## 2020-04-30 PROCEDURE — 99214 OFFICE O/P EST MOD 30 MIN: CPT | Performed by: INTERNAL MEDICINE

## 2020-04-30 PROCEDURE — G8400 PT W/DXA NO RESULTS DOC: HCPCS | Performed by: INTERNAL MEDICINE

## 2020-04-30 PROCEDURE — 1036F TOBACCO NON-USER: CPT | Performed by: INTERNAL MEDICINE

## 2020-04-30 PROCEDURE — G8417 CALC BMI ABV UP PARAM F/U: HCPCS | Performed by: INTERNAL MEDICINE

## 2020-04-30 PROCEDURE — 1123F ACP DISCUSS/DSCN MKR DOCD: CPT | Performed by: INTERNAL MEDICINE

## 2020-04-30 PROCEDURE — 4040F PNEUMOC VAC/ADMIN/RCVD: CPT | Performed by: INTERNAL MEDICINE

## 2020-04-30 PROCEDURE — G8427 DOCREV CUR MEDS BY ELIG CLIN: HCPCS | Performed by: INTERNAL MEDICINE

## 2020-04-30 PROCEDURE — 1090F PRES/ABSN URINE INCON ASSESS: CPT | Performed by: INTERNAL MEDICINE

## 2020-04-30 NOTE — LETTER
 Highest education level: Not on file   Occupational History    Not on file   Social Needs    Financial resource strain: Not on file    Food insecurity     Worry: Not on file     Inability: Not on file    Transportation needs     Medical: Not on file     Non-medical: Not on file   Tobacco Use    Smoking status: Never Smoker    Smokeless tobacco: Never Used   Substance and Sexual Activity    Alcohol use: No    Drug use: No    Sexual activity: Not Currently   Lifestyle    Physical activity     Days per week: Not on file     Minutes per session: Not on file    Stress: Not on file   Relationships    Social connections     Talks on phone: Not on file     Gets together: Not on file     Attends Zoroastrianism service: Not on file     Active member of club or organization: Not on file     Attends meetings of clubs or organizations: Not on file     Relationship status: Not on file    Intimate partner violence     Fear of current or ex partner: Not on file     Emotionally abused: Not on file     Physically abused: Not on file     Forced sexual activity: Not on file   Other Topics Concern    Not on file   Social History Narrative    Not on file     Family History:  family history includes Heart Disease in her father. Allergies:  Penicillins and Sulfa antibiotics     Review of Systems:   · Constitutional: there has been no unanticipated weight loss. No change in energy or activity level   · Eyes: No visual changes   · ENT: No Headaches, hearing loss or vertigo. No mouth sores or sore throat. · Cardiovascular: Reviewed in HPI  · Respiratory: No cough or wheezing, no sputum production. No hematemesis. · Gastrointestinal: No abdominal pain, appetite loss, blood in stools. No change in bowel or bladder habits. · Genitourinary: No nocturia, dysuria, trouble voiding  · Musculoskeletal:  No gait disturbance, weakness or joint complaints. · Integumentary: No rash or pruritis. fraction of 60%. No regional wall motion abnormalities are seen. There is severe bi atrial enlargement. There is mild mitral, pulmonic and trivial aorta as well as mild tricuspid regurgitation with RVSP estimated at 41.5 mmHg. HR controlled, remains on Coumadin, INR's thru our office. Essential hypertension, benign  Stable  Blood pressure 138/72, pulse 82, temperature 98.5 °F (36.9 °C), height 5' 6\" (1.676 m), weight 160 lb (72.6 kg), SpO2 97 %. Pure hypercholesterolemia  Stable, labs 9/25/19: ; TRIG 124; HDL 43; LDL 76    Carotid doppler 5/16> 16-49% bilateral   Carotid doppler 4/13> 01-53% MALINI, 0-64% LICA      Plan:  Ms Doron Petersen appears stable. No med changes. Encouraged to get regular exercise. FU 6 months. Scribe's attestation: This note was scribed in the presence of Dr Kirill Odom MD by Shelly Kruse RN. The scribe's documentation has been prepared under my direction and personally reviewed by me in its entirety. I confirm that the note above accurately reflects all work, treatment, procedures, and medical decision making performed by me. If you have questions, please do not hesitate to call me. I look forward to following Melonie Serna along with you.     Sincerely,        Nacho Bustillo MD

## 2020-05-29 ENCOUNTER — ANTI-COAG VISIT (OUTPATIENT)
Dept: CARDIOLOGY CLINIC | Age: 80
End: 2020-05-29
Payer: MEDICARE

## 2020-05-29 VITALS — TEMPERATURE: 97.8 F

## 2020-05-29 LAB
INR BLD: 2.5
INR BLD: 3

## 2020-05-29 PROCEDURE — 85610 PROTHROMBIN TIME: CPT | Performed by: INTERNAL MEDICINE

## 2020-06-26 ENCOUNTER — ANTI-COAG VISIT (OUTPATIENT)
Dept: CARDIOLOGY CLINIC | Age: 80
End: 2020-06-26
Payer: MEDICARE

## 2020-06-26 LAB — INR BLD: 2.9

## 2020-06-26 PROCEDURE — 85610 PROTHROMBIN TIME: CPT | Performed by: INTERNAL MEDICINE

## 2020-07-24 ENCOUNTER — ANTI-COAG VISIT (OUTPATIENT)
Dept: CARDIOLOGY CLINIC | Age: 80
End: 2020-07-24
Payer: MEDICARE

## 2020-07-24 LAB — INR BLD: 2.4

## 2020-07-24 PROCEDURE — 85610 PROTHROMBIN TIME: CPT | Performed by: INTERNAL MEDICINE

## 2020-08-12 RX ORDER — SIMVASTATIN 40 MG
40 TABLET ORAL NIGHTLY
Qty: 90 TABLET | Refills: 3 | Status: SHIPPED | OUTPATIENT
Start: 2020-08-12 | End: 2021-08-04 | Stop reason: SDUPTHER

## 2020-08-12 RX ORDER — PAROXETINE HYDROCHLORIDE 20 MG/1
20 TABLET, FILM COATED ORAL DAILY
Qty: 90 TABLET | Refills: 3 | Status: SHIPPED | OUTPATIENT
Start: 2020-08-12 | End: 2021-08-04 | Stop reason: SDUPTHER

## 2020-08-12 RX ORDER — WARFARIN SODIUM 5 MG/1
TABLET ORAL
Qty: 90 TABLET | Refills: 1 | Status: SHIPPED | OUTPATIENT
Start: 2020-08-12 | End: 2020-10-29 | Stop reason: SDUPTHER

## 2020-08-12 RX ORDER — DILTIAZEM HYDROCHLORIDE 60 MG/1
30 TABLET, FILM COATED ORAL EVERY 12 HOURS SCHEDULED
Qty: 90 TABLET | Refills: 3 | Status: SHIPPED | OUTPATIENT
Start: 2020-08-12 | End: 2021-08-04 | Stop reason: SDUPTHER

## 2020-08-12 RX ORDER — ATENOLOL 100 MG/1
100 TABLET ORAL DAILY
Qty: 90 TABLET | Refills: 3 | Status: ON HOLD | OUTPATIENT
Start: 2020-08-12 | End: 2020-12-04 | Stop reason: HOSPADM

## 2020-08-26 ENCOUNTER — ANTI-COAG VISIT (OUTPATIENT)
Dept: CARDIOLOGY CLINIC | Age: 80
End: 2020-08-26
Payer: MEDICARE

## 2020-08-26 LAB — INR BLD: 2.5

## 2020-08-26 PROCEDURE — 85610 PROTHROMBIN TIME: CPT | Performed by: INTERNAL MEDICINE

## 2020-09-25 ENCOUNTER — TELEPHONE (OUTPATIENT)
Dept: CARDIOLOGY CLINIC | Age: 80
End: 2020-09-25

## 2020-09-30 ENCOUNTER — ANTI-COAG VISIT (OUTPATIENT)
Dept: CARDIOLOGY CLINIC | Age: 80
End: 2020-09-30
Payer: MEDICARE

## 2020-09-30 LAB — INR BLD: 2.1

## 2020-09-30 PROCEDURE — 85610 PROTHROMBIN TIME: CPT | Performed by: INTERNAL MEDICINE

## 2020-10-29 ENCOUNTER — ANTI-COAG VISIT (OUTPATIENT)
Dept: CARDIOLOGY CLINIC | Age: 80
End: 2020-10-29

## 2020-10-29 ENCOUNTER — OFFICE VISIT (OUTPATIENT)
Dept: CARDIOLOGY CLINIC | Age: 80
End: 2020-10-29
Payer: MEDICARE

## 2020-10-29 VITALS
HEART RATE: 77 BPM | OXYGEN SATURATION: 95 % | DIASTOLIC BLOOD PRESSURE: 84 MMHG | BODY MASS INDEX: 24.91 KG/M2 | SYSTOLIC BLOOD PRESSURE: 138 MMHG | WEIGHT: 155 LBS | HEIGHT: 66 IN

## 2020-10-29 LAB — INR BLD: 2.2

## 2020-10-29 PROCEDURE — G8417 CALC BMI ABV UP PARAM F/U: HCPCS | Performed by: INTERNAL MEDICINE

## 2020-10-29 PROCEDURE — 1036F TOBACCO NON-USER: CPT | Performed by: INTERNAL MEDICINE

## 2020-10-29 PROCEDURE — G8400 PT W/DXA NO RESULTS DOC: HCPCS | Performed by: INTERNAL MEDICINE

## 2020-10-29 PROCEDURE — 93000 ELECTROCARDIOGRAM COMPLETE: CPT | Performed by: INTERNAL MEDICINE

## 2020-10-29 PROCEDURE — 4040F PNEUMOC VAC/ADMIN/RCVD: CPT | Performed by: INTERNAL MEDICINE

## 2020-10-29 PROCEDURE — G8484 FLU IMMUNIZE NO ADMIN: HCPCS | Performed by: INTERNAL MEDICINE

## 2020-10-29 PROCEDURE — G8428 CUR MEDS NOT DOCUMENT: HCPCS | Performed by: INTERNAL MEDICINE

## 2020-10-29 PROCEDURE — 1090F PRES/ABSN URINE INCON ASSESS: CPT | Performed by: INTERNAL MEDICINE

## 2020-10-29 PROCEDURE — 99214 OFFICE O/P EST MOD 30 MIN: CPT | Performed by: INTERNAL MEDICINE

## 2020-10-29 PROCEDURE — 1123F ACP DISCUSS/DSCN MKR DOCD: CPT | Performed by: INTERNAL MEDICINE

## 2020-10-29 RX ORDER — WARFARIN SODIUM 5 MG/1
TABLET ORAL
Qty: 90 TABLET | Refills: 1 | Status: SHIPPED | OUTPATIENT
Start: 2020-10-29 | End: 2021-04-23

## 2020-10-29 NOTE — PROGRESS NOTES
[facility]   Cardiac Evaluation      Patient: Vee Jones  YOB: 1940  Date: 10/29/20       Chief Complaint   Patient presents with    Atrial Fibrillation    Hypertension    Hyperlipidemia     Referring provider: Deana Santana    History of Present Illness:   Mrs. Brenda Lucio is here today for regular follow up of her AF, HTN, HLD. 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 She had surgery on 7/18. Hospital course complicated by increased HR of her AF which is chronic (due to her illness) requiring the addition of cardizem and her usual atenolol. Today, Ivon Richardson states she has been fine. She continues to take care of her yard, but stays home mostly. Ivon Richardson denies any chest pain, palpitations, LOPEZ, dizziness, or edema. Past Medical History:   has a past medical history of A-fib [facility]), Atrial fibrillation ([address] Hypercholesterolemia, Hypertension, and Pelvic mass. Surgical History:   has a past surgical history that includes Appendectomy; Cystocopy (Right, 06/14/2018); Hysterectomy, total abdominal (07/09/2018); and pr cysto/uretero w/lithotripsy &indwell stent insrt (Right, 10/31/2018). Resection of ovarian serous cystadenoma. Social History:  Social History     Socioeconomic History    Marital status:       Spouse name: Not on file    Number of children: Not on file    Years of education: Not on file    Highest education level: Not on file   Occupational History    Not on file   Social Needs    Financial resource strain: Not on file    Food insecurity     Worry: Not on file     Inability: Not on file    Transportation needs     Medical: Not on file     Non-medical: Not on file   Tobacco Use    Smoking status: Never Smoker    Smokeless tobacco: Never Used   Substance and Sexual Activity    Alcohol use: No    Drug use: No    Sexual activity: Not Currently   Lifestyle    Physical activity     Days per week: Not on file     Minutes per session: Not on file    Stress: Not on file   Relationships    Social connections     Talks on phone: Not on file     Gets together: Not on file     Attends Druze service: Not on file     Active member of club or organization: Not on file     Attends meetings of clubs or organizations: Not on file     Relationship status: Not on file    Intimate partner violence     Fear of current or ex partner: Not on file     Emotionally abused: Not on file     Physically abused: Not on file     Forced sexual activity: Not on file   Other Topics Concern    Not on file   Social History Narrative    Not on file     Family History:  family history includes Heart Disease in her father. Allergies:  Penicillins and Sulfa antibiotics     Review of Systems:   · Constitutional: there has been no unanticipated weight loss. No change in energy or activity level   · Eyes: No visual changes   · ENT: No Headaches, hearing loss or vertigo. No mouth sores or sore throat. · Cardiovascular: Reviewed in HPI  · Respiratory: No cough or wheezing, no sputum production. No hematemesis. · Gastrointestinal: No abdominal pain, appetite loss, blood in stools. No change in bowel or bladder habits. · Genitourinary: No nocturia, dysuria, trouble voiding  · Musculoskeletal:  No gait disturbance, weakness or joint complaints. · Integumentary: No rash or pruritis. · Neurological: No headache, change in muscle strength, numbness or tingling. No change in gait, balance, coordination, mood, affect, memory, mentation, behavior. · Psychiatric: No anxiety or depression  · Endocrine: No malaise or fever  · Hematologic/Lymphatic: No abnormal bruising or bleeding, blood clots or swollen lymph nodes. · Allergic/Immunologic: No nasal congestion or hives.     Physical Examination:    Vitals:    10/29/20 0932   BP: 138/84   Site: Left Upper Arm Position: Sitting   Cuff Size: Medium Adult   Pulse: 77   SpO2: 95%   Weight: 155 lb (70.3 kg)   Height: 5' 6\" (1.676 m)     Body mass index is 25.02 kg/m². Wt Readings from Last 3 Encounters:   10/29/20 155 lb (70.3 kg)   04/30/20 160 lb (72.6 kg)   10/03/19 146 lb (66.2 kg)     BP Readings from Last 3 Encounters:   10/29/20 138/84   04/30/20 138/72   10/03/19 (!) 144/80          Constitutional and General Appearance:  appears stated age  Respiratory:  · Normal excursion and expansion without use of accessory muscles  · Resp Auscultation: Normal breath sounds without dullness  Cardiovascular:  · The apical impulses not displaced  · Heart is irregularly irregular in rhythm   · The carotid upstroke is normal, no bruit noted   · JVP is not elevated  · Peripheral pulses are symmetrical  · There is no clubbing, cyanosis of the extremities  · No edema   · Femoral Arteries: 2+ and equal  · Pedal Pulses: 2+ and equal   Abdomen:  · No masses or tenderness  · Normal bowel sounds  Neurological/Psychiatric:  · Alert and oriented x3  · Moves all extremities well  · Exhibits normal gait balance and coordination    Assessment:    Atrial fibrillation, chronic  EKG>atrial fibrillation, nonspecific T wave changes, HR 78bpm    Echo 6/18 normal LVEF 65% with URSZULA and increased RVSP of 51. Echo 4/13> Underlying rhythm is atrial fibrillation. Normal left ventricle size, wall thickness and systolic function with an estimated ejection fraction of 60%. No regional wall motion abnormalities are seen. There is severe bi atrial enlargement. There is mild mitral, pulmonic and trivial aorta as well as mild tricuspid regurgitation with RVSP estimated at 41.5 mmHg. HR controlled, remains on Coumadin, INR's thru our office. Essential hypertension, benign  Stable  Blood pressure 138/84, pulse 77, height 5' 6\" (1.676 m), weight 155 lb (70.3 kg), SpO2 95 %.       Pure hypercholesterolemia  Stable, labs 9/25/19: ; TRIG 124; HDL 43; LDL 76    Carotid doppler 5/16> 16-49% bilateral   Carotid doppler 4/13> 03-88% MALINI, 7-06% LICA      Plan:  Repeat lipids and CMP. Encouraged to get regular exercise. FU 6 months. Scribe's attestation: This note was scribed in the presence of Dr Rudine Nyhan MD by Yoan Do RN. The scribe's documentation has been prepared under my direction and personally reviewed by me in its entirety. I confirm that the note above accurately reflects all work, treatment, procedures, and medical decision making performed by me.

## 2020-11-03 LAB
A/G RATIO: 1.3 (ref 1–2)
ALBUMIN SERPL-MCNC: 4 G/DL (ref 3.5–5.7)
ALBUMIN/PROTEIN TOTAL, SER/PL: 7.2 G/DL (ref 6–8.3)
ALP BLD-CCNC: 62 U/L (ref 34–104)
ALT SERPL-CCNC: 13 U/L (ref 7–52)
ANION GAP 4: 10 MMOL/L (ref 7–16)
AST W/O P-5'-P: 26 U/L (ref 15–39)
BILIRUB SERPL-MCNC: 0.7 MG/DL (ref 0.3–1)
BUN BLDV-MCNC: 20 MG/DL (ref 7–25)
CALCIUM SERPL-MCNC: 9.2 MG/DL (ref 8.6–10.2)
CHLORIDE BLD-SCNC: 102 MMOL/L (ref 98–107)
CHOLESTEROL, STONE: 145 MG/DL
CO2: 29 MMOL/L (ref 21–31)
CREATININE + EGFR PANEL: 1.2 MG/DL (ref 0.6–1.2)
GFR CALCULATED: 43 ML/MIN/1.73M2
GFR CALCULATED: 52 ML/MIN/1.73M2
GLOBULIN: 3.2 G/DL (ref 2.6–4.2)
GLUCOSE: 116 MG/DL (ref 74–109)
HDLC SERPL-MCNC: 32 MG/DL (ref 40–60)
LDL CHOLESTEROL CALCULATED: 78 MG/DL (ref 0–99)
POTASSIUM SERPL-SCNC: 4.2 MMOL/L (ref 3.5–5.3)
SODIUM BLD-SCNC: 141 MMOL/L (ref 136–145)
TRIGL SERPL-MCNC: 176 MG/DL
VLDLC SERPL CALC-MCNC: 35 MG/DL (ref 0–40)

## 2020-11-23 ENCOUNTER — ANTI-COAG VISIT (OUTPATIENT)
Dept: CARDIOLOGY CLINIC | Age: 80
End: 2020-11-23
Payer: MEDICARE

## 2020-11-23 LAB — INR BLD: 2.4

## 2020-11-23 PROCEDURE — 85610 PROTHROMBIN TIME: CPT | Performed by: INTERNAL MEDICINE

## 2020-11-27 ENCOUNTER — HOSPITAL ENCOUNTER (INPATIENT)
Age: 80
LOS: 7 days | Discharge: HOME HEALTH | DRG: 871 | End: 2020-12-04
Attending: STUDENT IN AN ORGANIZED HEALTH CARE EDUCATION/TRAINING PROGRAM | Admitting: FAMILY MEDICINE
Payer: MEDICARE

## 2020-11-27 ENCOUNTER — APPOINTMENT (OUTPATIENT)
Dept: GENERAL RADIOLOGY | Age: 80
DRG: 871 | End: 2020-11-27
Payer: MEDICARE

## 2020-11-27 ENCOUNTER — NURSE TRIAGE (OUTPATIENT)
Dept: OTHER | Facility: CLINIC | Age: 80
End: 2020-11-27

## 2020-11-27 PROBLEM — N19 RENAL FAILURE: Status: ACTIVE | Noted: 2020-11-27

## 2020-11-27 LAB
A/G RATIO: 1 (ref 1.1–2.2)
ALBUMIN SERPL-MCNC: 3.4 G/DL (ref 3.4–5)
ALP BLD-CCNC: 136 U/L (ref 40–129)
ALT SERPL-CCNC: 26 U/L (ref 10–40)
ANION GAP SERPL CALCULATED.3IONS-SCNC: 14 MMOL/L (ref 3–16)
AST SERPL-CCNC: 43 U/L (ref 15–37)
BASOPHILS ABSOLUTE: 0 K/UL (ref 0–0.2)
BASOPHILS RELATIVE PERCENT: 0 %
BILIRUB SERPL-MCNC: 0.6 MG/DL (ref 0–1)
BILIRUBIN URINE: NEGATIVE
BLOOD, URINE: ABNORMAL
BUN BLDV-MCNC: 75 MG/DL (ref 7–20)
CALCIUM SERPL-MCNC: 9.9 MG/DL (ref 8.3–10.6)
CHLORIDE BLD-SCNC: 94 MMOL/L (ref 99–110)
CLARITY: ABNORMAL
CO2: 21 MMOL/L (ref 21–32)
COLOR: YELLOW
CREAT SERPL-MCNC: 5 MG/DL (ref 0.6–1.2)
EOSINOPHILS ABSOLUTE: 0.4 K/UL (ref 0–0.6)
EOSINOPHILS RELATIVE PERCENT: 2 %
EPITHELIAL CELLS, UA: 0 /HPF (ref 0–5)
GFR AFRICAN AMERICAN: 10
GFR NON-AFRICAN AMERICAN: 8
GLOBULIN: 3.5 G/DL
GLUCOSE BLD-MCNC: 196 MG/DL (ref 70–99)
GLUCOSE URINE: NEGATIVE MG/DL
HCT VFR BLD CALC: 44 % (ref 36–48)
HEMOGLOBIN: 14.4 G/DL (ref 12–16)
HYALINE CASTS: 2 /LPF (ref 0–8)
INR BLD: 3.26 (ref 0.86–1.14)
KETONES, URINE: NEGATIVE MG/DL
LACTIC ACID, SEPSIS: 1.9 MMOL/L (ref 0.4–1.9)
LACTIC ACID: 2.2 MMOL/L (ref 0.4–2)
LEUKOCYTE ESTERASE, URINE: ABNORMAL
LYMPHOCYTES ABSOLUTE: 2 K/UL (ref 1–5.1)
LYMPHOCYTES RELATIVE PERCENT: 10 %
MCH RBC QN AUTO: 30.5 PG (ref 26–34)
MCHC RBC AUTO-ENTMCNC: 32.6 G/DL (ref 31–36)
MCV RBC AUTO: 93.6 FL (ref 80–100)
MICROSCOPIC EXAMINATION: YES
MONOCYTES ABSOLUTE: 0.6 K/UL (ref 0–1.3)
MONOCYTES RELATIVE PERCENT: 3 %
NEUTROPHILS ABSOLUTE: 17.2 K/UL (ref 1.7–7.7)
NEUTROPHILS RELATIVE PERCENT: 85 %
NITRITE, URINE: NEGATIVE
PDW BLD-RTO: 14.6 % (ref 12.4–15.4)
PH UA: 6 (ref 5–8)
PLATELET # BLD: 184 K/UL (ref 135–450)
PLATELET SLIDE REVIEW: ADEQUATE
PMV BLD AUTO: 9.4 FL (ref 5–10.5)
POTASSIUM REFLEX MAGNESIUM: 4.6 MMOL/L (ref 3.5–5.1)
PROTEIN UA: 100 MG/DL
PROTHROMBIN TIME: 38.3 SEC (ref 10–13.2)
RBC # BLD: 4.71 M/UL (ref 4–5.2)
RBC # BLD: NORMAL 10*6/UL
RBC UA: 11 /HPF (ref 0–4)
SLIDE REVIEW: ABNORMAL
SODIUM BLD-SCNC: 129 MMOL/L (ref 136–145)
SPECIFIC GRAVITY UA: 1.01 (ref 1–1.03)
TOTAL PROTEIN: 6.9 G/DL (ref 6.4–8.2)
TROPONIN: 0.01 NG/ML
TROPONIN: 0.04 NG/ML
URINE TYPE: ABNORMAL
UROBILINOGEN, URINE: 0.2 E.U./DL
WBC # BLD: 20.2 K/UL (ref 4–11)
WBC UA: 114 /HPF (ref 0–5)

## 2020-11-27 PROCEDURE — 83605 ASSAY OF LACTIC ACID: CPT

## 2020-11-27 PROCEDURE — 99283 EMERGENCY DEPT VISIT LOW MDM: CPT

## 2020-11-27 PROCEDURE — 87150 DNA/RNA AMPLIFIED PROBE: CPT

## 2020-11-27 PROCEDURE — 96375 TX/PRO/DX INJ NEW DRUG ADDON: CPT

## 2020-11-27 PROCEDURE — 84484 ASSAY OF TROPONIN QUANT: CPT

## 2020-11-27 PROCEDURE — 87040 BLOOD CULTURE FOR BACTERIA: CPT

## 2020-11-27 PROCEDURE — 6360000002 HC RX W HCPCS: Performed by: PHYSICIAN ASSISTANT

## 2020-11-27 PROCEDURE — 71045 X-RAY EXAM CHEST 1 VIEW: CPT

## 2020-11-27 PROCEDURE — 87186 SC STD MICRODIL/AGAR DIL: CPT

## 2020-11-27 PROCEDURE — 2580000003 HC RX 258: Performed by: PHYSICIAN ASSISTANT

## 2020-11-27 PROCEDURE — 93005 ELECTROCARDIOGRAM TRACING: CPT | Performed by: STUDENT IN AN ORGANIZED HEALTH CARE EDUCATION/TRAINING PROGRAM

## 2020-11-27 PROCEDURE — 85610 PROTHROMBIN TIME: CPT

## 2020-11-27 PROCEDURE — 2580000003 HC RX 258: Performed by: STUDENT IN AN ORGANIZED HEALTH CARE EDUCATION/TRAINING PROGRAM

## 2020-11-27 PROCEDURE — 81001 URINALYSIS AUTO W/SCOPE: CPT

## 2020-11-27 PROCEDURE — 82570 ASSAY OF URINE CREATININE: CPT

## 2020-11-27 PROCEDURE — 85025 COMPLETE CBC W/AUTO DIFF WBC: CPT

## 2020-11-27 PROCEDURE — 6370000000 HC RX 637 (ALT 250 FOR IP): Performed by: FAMILY MEDICINE

## 2020-11-27 PROCEDURE — 1200000000 HC SEMI PRIVATE

## 2020-11-27 PROCEDURE — 36415 COLL VENOUS BLD VENIPUNCTURE: CPT

## 2020-11-27 PROCEDURE — 80053 COMPREHEN METABOLIC PANEL: CPT

## 2020-11-27 PROCEDURE — U0002 COVID-19 LAB TEST NON-CDC: HCPCS

## 2020-11-27 PROCEDURE — 84300 ASSAY OF URINE SODIUM: CPT

## 2020-11-27 PROCEDURE — U0003 INFECTIOUS AGENT DETECTION BY NUCLEIC ACID (DNA OR RNA); SEVERE ACUTE RESPIRATORY SYNDROME CORONAVIRUS 2 (SARS-COV-2) (CORONAVIRUS DISEASE [COVID-19]), AMPLIFIED PROBE TECHNIQUE, MAKING USE OF HIGH THROUGHPUT TECHNOLOGIES AS DESCRIBED BY CMS-2020-01-R: HCPCS

## 2020-11-27 PROCEDURE — 2580000003 HC RX 258: Performed by: FAMILY MEDICINE

## 2020-11-27 PROCEDURE — 96365 THER/PROPH/DIAG IV INF INIT: CPT

## 2020-11-27 PROCEDURE — 96367 TX/PROPH/DG ADDL SEQ IV INF: CPT

## 2020-11-27 PROCEDURE — 6360000002 HC RX W HCPCS: Performed by: STUDENT IN AN ORGANIZED HEALTH CARE EDUCATION/TRAINING PROGRAM

## 2020-11-27 RX ORDER — PROMETHAZINE HYDROCHLORIDE 25 MG/1
12.5 TABLET ORAL EVERY 6 HOURS PRN
Status: DISCONTINUED | OUTPATIENT
Start: 2020-11-27 | End: 2020-12-04 | Stop reason: HOSPADM

## 2020-11-27 RX ORDER — POLYETHYLENE GLYCOL 3350 17 G/17G
17 POWDER, FOR SOLUTION ORAL DAILY PRN
Status: DISCONTINUED | OUTPATIENT
Start: 2020-11-27 | End: 2020-12-04 | Stop reason: HOSPADM

## 2020-11-27 RX ORDER — 0.9 % SODIUM CHLORIDE 0.9 %
30 INTRAVENOUS SOLUTION INTRAVENOUS ONCE
Status: COMPLETED | OUTPATIENT
Start: 2020-11-27 | End: 2020-11-27

## 2020-11-27 RX ORDER — ATORVASTATIN CALCIUM 20 MG/1
20 TABLET, FILM COATED ORAL NIGHTLY
Status: DISCONTINUED | OUTPATIENT
Start: 2020-11-27 | End: 2020-12-04 | Stop reason: HOSPADM

## 2020-11-27 RX ORDER — SODIUM CHLORIDE 0.9 % (FLUSH) 0.9 %
10 SYRINGE (ML) INJECTION EVERY 12 HOURS SCHEDULED
Status: DISCONTINUED | OUTPATIENT
Start: 2020-11-27 | End: 2020-12-04 | Stop reason: HOSPADM

## 2020-11-27 RX ORDER — SODIUM CHLORIDE 9 MG/ML
INJECTION, SOLUTION INTRAVENOUS CONTINUOUS
Status: DISCONTINUED | OUTPATIENT
Start: 2020-11-27 | End: 2020-11-28

## 2020-11-27 RX ORDER — HEPARIN SODIUM 5000 [USP'U]/ML
5000 INJECTION, SOLUTION INTRAVENOUS; SUBCUTANEOUS EVERY 8 HOURS SCHEDULED
Status: DISCONTINUED | OUTPATIENT
Start: 2020-11-27 | End: 2020-11-27

## 2020-11-27 RX ORDER — WARFARIN SODIUM 2.5 MG/1
1.25 TABLET ORAL DAILY
Status: DISCONTINUED | OUTPATIENT
Start: 2020-11-27 | End: 2020-11-27

## 2020-11-27 RX ORDER — ACETAMINOPHEN 325 MG/1
650 TABLET ORAL EVERY 6 HOURS PRN
Status: DISCONTINUED | OUTPATIENT
Start: 2020-11-27 | End: 2020-12-04 | Stop reason: HOSPADM

## 2020-11-27 RX ORDER — PAROXETINE HYDROCHLORIDE 20 MG/1
20 TABLET, FILM COATED ORAL DAILY
Status: DISCONTINUED | OUTPATIENT
Start: 2020-11-27 | End: 2020-12-04 | Stop reason: HOSPADM

## 2020-11-27 RX ORDER — ACETAMINOPHEN 650 MG/1
650 SUPPOSITORY RECTAL EVERY 6 HOURS PRN
Status: DISCONTINUED | OUTPATIENT
Start: 2020-11-27 | End: 2020-12-04 | Stop reason: HOSPADM

## 2020-11-27 RX ORDER — SODIUM CHLORIDE 0.9 % (FLUSH) 0.9 %
10 SYRINGE (ML) INJECTION PRN
Status: DISCONTINUED | OUTPATIENT
Start: 2020-11-27 | End: 2020-12-04 | Stop reason: HOSPADM

## 2020-11-27 RX ORDER — ONDANSETRON 2 MG/ML
4 INJECTION INTRAMUSCULAR; INTRAVENOUS EVERY 6 HOURS PRN
Status: DISCONTINUED | OUTPATIENT
Start: 2020-11-27 | End: 2020-12-04 | Stop reason: HOSPADM

## 2020-11-27 RX ADMIN — SODIUM CHLORIDE 2040 ML: 9 INJECTION, SOLUTION INTRAVENOUS at 13:03

## 2020-11-27 RX ADMIN — SODIUM CHLORIDE: 9 INJECTION, SOLUTION INTRAVENOUS at 17:04

## 2020-11-27 RX ADMIN — VANCOMYCIN HYDROCHLORIDE 1000 MG: 1 INJECTION, POWDER, LYOPHILIZED, FOR SOLUTION INTRAVENOUS at 14:12

## 2020-11-27 RX ADMIN — DILTIAZEM HYDROCHLORIDE 30 MG: 30 TABLET, FILM COATED ORAL at 21:23

## 2020-11-27 RX ADMIN — PAROXETINE HYDROCHLORIDE 20 MG: 20 TABLET, FILM COATED ORAL at 17:03

## 2020-11-27 RX ADMIN — ATORVASTATIN CALCIUM 20 MG: 20 TABLET, FILM COATED ORAL at 21:23

## 2020-11-27 RX ADMIN — AZITHROMYCIN MONOHYDRATE 500 MG: 500 INJECTION, POWDER, LYOPHILIZED, FOR SOLUTION INTRAVENOUS at 13:03

## 2020-11-27 RX ADMIN — Medication 1 G: at 13:03

## 2020-11-27 ASSESSMENT — ENCOUNTER SYMPTOMS
BACK PAIN: 0
COUGH: 1
SHORTNESS OF BREATH: 1
VOMITING: 0
NAUSEA: 0
ABDOMINAL PAIN: 0
CONSTIPATION: 0
DIARRHEA: 0
COLOR CHANGE: 0

## 2020-11-27 ASSESSMENT — PAIN SCALES - GENERAL
PAINLEVEL_OUTOF10: 0

## 2020-11-27 NOTE — TELEPHONE ENCOUNTER
Reason for Disposition   [1] MILD difficulty breathing (e.g., minimal/no SOB at rest, SOB with walking, pulse <100) AND [2] NEW-onset or WORSE than normal    Answer Assessment - Initial Assessment Questions  1. RESPIRATORY STATUS: \"Describe your breathing? \" (e.g., wheezing, shortness of breath, unable to speak, severe coughing)       Shortness of breath, when I get up to do something. 2. ONSET: \"When did this breathing problem begin? \"       Monday. 3. PATTERN \"Does the difficult breathing come and go, or has it been constant since it started? \"       Comes and go,  When I get up and do something I have to go sit down before I finish what I go up to do. 4. SEVERITY: \"How bad is your breathing? \" (e.g., mild, moderate, severe)     - MILD: No SOB at rest, mild SOB with walking, speaks normally in sentences, can lay down, no retractions, pulse < 100.     - MODERATE: SOB at rest, SOB with minimal exertion and prefers to sit, cannot lie down flat, speaks in phrases, mild retractions, audible wheezing, pulse 100-120.     - SEVERE: Very SOB at rest, speaks in single words, struggling to breathe, sitting hunched forward, retractions, pulse > 120       Mild, no SOB with rest.    5. RECURRENT SYMPTOM: \"Have you had difficulty breathing before? \" If so, ask: \"When was the last time? \" and \"What happened that time? \"       No.    6. CARDIAC HISTORY: \"Do you have any history of heart disease? \" (e.g., heart attack, angina, bypass surgery, angioplasty)       Atrial fib. 7. LUNG HISTORY: \"Do you have any history of lung disease? \"  (e.g., pulmonary embolus, asthma, emphysema)      No    8. CAUSE: \"What do you think is causing the breathing problem? \"       I don't really know. 9. OTHER SYMPTOMS: \"Do you have any other symptoms? (e.g., dizziness, runny nose, cough, chest pain, fever)      Slight cough, but haven't coughed today or last night. 10. PREGNANCY: \"Is there any chance you are pregnant? \" \"When was your last

## 2020-11-27 NOTE — ED PROVIDER NOTES
MidLevel Attestation   I havepersonally performed and/or participated in the history, exam and medical decision making and agree with all pertinent clinical information. I have also reviewed and agree with the past medical, family and social historyunless otherwise noted. I have personally performed a face to face diagnostic evaluation onthis patient. I have reviewed the mid-levels findings and agree. In brief, Bolivar Singh is a 79 y.o. female that presented to the emergency department with   Chief Complaint   Patient presents with    Shortness of Breath     pt states she has had increase SOB and cough for seveal days. dneies fever or pain     CONSTITUTIONAL: Elderly appears to be in no distress  EYES: PERRL, No injection, discharge or scleral icterus. NECK: Normal ROM, NO LAD and Moist mucous membranes. CARDIOVASCULAR: Regular rate and rhythm. No murmurs or gallop. PULMONARY/CHEST: Airway patent. No retractions. Breath sounds clear with good air entry bilaterally. ABDOMEN: Soft, Non-distended and non-tender, without guarding or rebound. SKIN: Acyanotic, warm, dry   MUSCULOSKELETAL: No swelling, tenderness or deformity   NEUROLOGICAL: Awake. Pulses intact. Grossly nonfocal     Presenting with shortness of breath for a few days. On exam she looks elderly and frail but nontoxic. Work-up with leukocytosis of 20, lactate of 2.2 chest x-ray 5 focal pneumonia possible viral especially COVID-19. COVID-19 test obtained and pending. Nonetheless patient admitted for sepsis, at this time of unknown cause pending further investigation. Started on Vanco ceftriaxone and azithromycin and admitted to the hospitalist service for further treatment. EKG by my preliminary interpretation shows sinus rhythm with rate of 88, normal axis, normal intervals, with no ST changes indicative of ischemia at this time.     I have reviewed and interpreted all of the currently available lab results and diagnostics from this visit:  Results for orders placed or performed during the hospital encounter of 11/27/20   CBC Auto Differential   Result Value Ref Range    WBC 20.2 (H) 4.0 - 11.0 K/uL    RBC 4.71 4.00 - 5.20 M/uL    Hemoglobin 14.4 12.0 - 16.0 g/dL    Hematocrit 44.0 36.0 - 48.0 %    MCV 93.6 80.0 - 100.0 fL    MCH 30.5 26.0 - 34.0 pg    MCHC 32.6 31.0 - 36.0 g/dL    RDW 14.6 12.4 - 15.4 %    Platelets 561 444 - 906 K/uL    MPV 9.4 5.0 - 10.5 fL    PLATELET SLIDE REVIEW Adequate     SLIDE REVIEW see below     Neutrophils % 85.0 %    Lymphocytes % 10.0 %    Monocytes % 3.0 %    Eosinophils % 2.0 %    Basophils % 0.0 %    Neutrophils Absolute 17.2 (H) 1.7 - 7.7 K/uL    Lymphocytes Absolute 2.0 1.0 - 5.1 K/uL    Monocytes Absolute 0.6 0.0 - 1.3 K/uL    Eosinophils Absolute 0.4 0.0 - 0.6 K/uL    Basophils Absolute 0.0 0.0 - 0.2 K/uL    RBC Morphology Normal    Comprehensive Metabolic Panel w/ Reflex to MG   Result Value Ref Range    Sodium 129 (L) 136 - 145 mmol/L    Potassium reflex Magnesium 4.6 3.5 - 5.1 mmol/L    Chloride 94 (L) 99 - 110 mmol/L    CO2 21 21 - 32 mmol/L    Anion Gap 14 3 - 16    Glucose 196 (H) 70 - 99 mg/dL    BUN 75 (H) 7 - 20 mg/dL    CREATININE 5.0 (H) 0.6 - 1.2 mg/dL    GFR Non-African American 8 (A) >60    GFR  10 (A) >60    Calcium 9.9 8.3 - 10.6 mg/dL    Total Protein 6.9 6.4 - 8.2 g/dL    Alb 3.4 3.4 - 5.0 g/dL    Albumin/Globulin Ratio 1.0 (L) 1.1 - 2.2    Total Bilirubin 0.6 0.0 - 1.0 mg/dL    Alkaline Phosphatase 136 (H) 40 - 129 U/L    ALT 26 10 - 40 U/L    AST 43 (H) 15 - 37 U/L    Globulin 3.5 g/dL   Lactic Acid, Plasma   Result Value Ref Range    Lactic Acid 2.2 (H) 0.4 - 2.0 mmol/L   Troponin   Result Value Ref Range    Troponin 0.04 (H) <0.01 ng/mL   Protime-INR   Result Value Ref Range    Protime 38.3 (H) 10.0 - 13.2 sec    INR 3.26 (H) 0.86 - 1.14   Lactate, Sepsis   Result Value Ref Range    Lactic Acid, Sepsis 1.9 0.4 - 1.9 mmol/L   EKG 12 Lead   Result Value Ref Range    Ventricular Rate 88 BPM    Atrial Rate 227 BPM    QRS Duration 82 ms    Q-T Interval 346 ms    QTc Calculation (Bazett) 418 ms    R Axis 43 degrees    T Axis -24 degrees    Diagnosis       Atrial fibrillationT wave abnormality, consider inferior ischemia or digitalis effectAbnormal ECG     Xr Chest Portable    Result Date: 11/27/2020  EXAMINATION: ONE X-RAY VIEW OF THE CHEST 11/27/2020 11:56 am COMPARISON: 06/14/2018. HISTORY: ORDERING SYSTEM PROVIDED HISTORY: SOB TECHNOLOGIST PROVIDED HISTORY: Reason for exam:   SOB Reason for Exam: Shortness of Breath (pt states she has had increase SOB and cough for several days. denies fever or pain). Acuity: Unknown Type of Exam: Unknown FINDINGS: Minimal streaky bibasilar airspace opacities. No pleural effusions, pulmonary edema or pneumothoraces. Stable cardiomegaly. No acute bony abnormality. Minimal streaky bibasilar airspace opacities may relate to atelectasis or multifocal pneumonia with atypical/viral pneumonia in the differential. Stable cardiomegaly.        ED Medication Orders (From admission, onward)    Start Ordered     Status Ordering Provider    11/27/20 1345 11/27/20 1337  vancomycin 1000 mg IVPB in 250 mL D5W addavial  ONCE     Question:  Antimicrobial Indications  Answer:  Sepsis of Unknown Etiology    Last MAR action:  Stopped - by Raegan Benson [facility] [birth year] [facility] [address]    11/27/20 1245 11/27/20 1243  0.9 % sodium chloride bolus  ONCE      Last MAR action:  Stopped - by Raegan Benson on 89/01/03 at [address] [facility] KIMBERLY [place] N    11/27/20 1245 11/27/20 1243  cefTRIAXone (ROCEPHIN) 1 g in sterile water 10 mL IV syringe  ONCE     Question:  Antimicrobial Indications  Answer:  Pneumonia (CAP)    Last MAR action:  Given - by Raegan Benson on 41/87/08 at [address] N    11/27/20 1245 11/27/20 1243  azithromycin (ZITHROMAX) 500 mg in D5W 250ml Vial Mate  ONCE     Question:  Antimicrobial Indications  Answer:  Pneumonia (CAP) Last MAR action:  Stopped - by Migel Calvillo on [birth year] at 535 KIMBER[address] [place]          Final Impression      1. Multifocal pneumonia    2. Severe sepsis ([facility] [address]    3. CHARISSE (acute kidney injury) ([facility] [address]    4. Elevated troponin        DISPOSITION Admitted 11/27/2020 02:37:50 PM       Amount and/or Complexity of Data Reviewed:  Clinical lab tests: ordered and reviewed   Tests in the radiology section of CPT®: ordered and reviewed   Tests in the medicine section of CPT®: ordered and reviewed   Decide to obtain previous medical records or to obtain history from someone other than the patient: no  Obtain history from someone other than the patient: no  Review and summarize past medical records:yes  I looked up the patient in our electronic medical record:yes  Discuss the patient with other providers:yes  Independent visualization of images, tracings, or specimens:yes  Risk of Complications, Morbidity, and/or Mortality:Moderate  Presenting problems: moderate Diagnostic procedures: moderate yes Management options: moderate     Critical Care Attestation   Total critical care time: 35 minutes minimum   Critical care time does not include separately billable procedures and treating other patients. Critical care was necessary to treat or prevent imminent or life-threatening deterioration of the following conditions: Pneumonia COVID with sepsis. Patient provided with supplemental oxygen and treated urgently in the ED with antibiotics. Case discussed with consultants. This record is transcribed utilizing voice recognition technology. There are inherent limitations in this technology. In addition, there may be limitations in editing of this report. If there are any discrepancies, please contact me directly.     Naveen Mejia MD   11/27/2020         [place]seh[place] Karlene Barbosa MD  11/27/20 1625

## 2020-11-27 NOTE — ED NOTES
Bed: 02  Expected date:   Expected time:   Means of arrival: Walk In  Comments:     Mer Chavez, DINH  11/27/20 0242

## 2020-11-27 NOTE — ED NOTES
Bedside handoff to [facility], nurse resuming care of patient. Pt placed on telemetry monitor. Transported via wheelchair with belongings in tow.       Alistair Zhang RN  15/86/66 [id number]

## 2020-11-27 NOTE — ED PROVIDER NOTES
constipation, diarrhea, nausea and vomiting. Genitourinary: Negative for difficulty urinating and dysuria. Musculoskeletal: Negative for back pain. Skin: Negative for color change and rash. Neurological: Negative for dizziness, weakness, light-headedness and numbness. All other systems reviewed and are negative. Positives and Pertinent negatives as per HPI. Except as noted above in the ROS, all other systems were reviewed and negative. PAST MEDICAL HISTORY     Past Medical History:   Diagnosis Date    A-fib [facility])     Atrial fibrillation ([address]     Hypercholesterolemia     Hypertension     Pelvic mass          SURGICAL HISTORY     Past Surgical History:   Procedure Laterality Date    APPENDECTOMY      CYSTOSCOPY Right 06/14/2018    retrograde pyelogram, ureteroscopy, and stent placement    HYSTERECTOMY, TOTAL ABDOMINAL  07/09/2018    robotic with BSO, omentectomy, pelvic mass removal, lymphnode dissection    NE CYSTO/URETERO W/LITHOTRIPSY &INDWELL STENT INSRT Right 10/31/2018    CYSTOSCOPY WITH RIGHT URETEROSCOPY HOLMIUM LASER LITHOTRIPSY STONE MANIPULATION STONE BASKET WITH RIGHT STENT EXCHANGE performed by Matty Narvaez MD at [facility]       Previous Medications    ATENOLOL (TENORMIN) 100 MG TABLET    Take 1 tablet by mouth daily    DILTIAZEM (CARDIZEM) 60 MG TABLET    Take 0.5 tablets by mouth every 12 hours    MULTIPLE VITAMINS-MINERALS (MULTIVITAMIN PO)    Take by mouth    OMEGA-3 FATTY ACIDS (FISH OIL PO)    Take by mouth    PAROXETINE (PAXIL) 20 MG TABLET    Take 1 tablet by mouth daily    SIMVASTATIN (ZOCOR) 40 MG TABLET    Take 1 tablet by mouth nightly    WARFARIN (COUMADIN) 5 MG TABLET    5 mg three days a week 2.5 the other.          ALLERGIES     Penicillins and Sulfa antibiotics    FAMILYHISTORY       Family History   Problem Relation Age of Onset    Heart Disease Father           SOCIAL HISTORY       Social History     Tobacco Use    Smoking status: Never Smoker    Smokeless tobacco: Never Used   Substance Use Topics    Alcohol use: No    Drug use: No       SCREENINGS             PHYSICAL EXAM    (up to 7 for level 4, 8 or more for level 5)     ED Triage Vitals [11/27/20 1126]   BP Temp Temp Source Pulse Resp SpO2 Height Weight   99/66 97.7 °F (36.5 °C) Oral 87 20 96 % 5' 6\" (1.676 m) 150 lb (68 kg)       Physical Exam  Vitals signs and nursing note reviewed. Constitutional:       Appearance: Normal appearance. She is well-developed. She is not toxic-appearing or diaphoretic. HENT:      Head: Normocephalic. Right Ear: External ear normal.      Left Ear: External ear normal.      Nose: Nose normal.   Eyes:      General:         Right eye: No discharge. Left eye: No discharge. Conjunctiva/sclera: Conjunctivae normal.   Neck:      Musculoskeletal: Normal range of motion and neck supple. Cardiovascular:      Rate and Rhythm: Normal rate and regular rhythm. Heart sounds: Normal heart sounds. No murmur. No friction rub. No gallop. Pulmonary:      Effort: Pulmonary effort is normal. No respiratory distress. Breath sounds: Normal breath sounds. No wheezing or rales. Musculoskeletal: Normal range of motion. Skin:     General: Skin is warm and dry. Coloration: Skin is not pale. Neurological:      Mental Status: She is alert and oriented to person, place, and time. Psychiatric:         Behavior: Behavior normal. Behavior is cooperative.          DIAGNOSTIC RESULTS   LABS:    Labs Reviewed   CBC WITH AUTO DIFFERENTIAL - Abnormal; Notable for the following components:       Result Value    WBC 20.2 (*)     Neutrophils Absolute 17.2 (*)     All other components within normal limits    Narrative:     Performed at:  [facility]  [address],  [place], [address]   Phone [phone]   COMPREHENSIVE METABOLIC PANEL W/ REFLEX TO MG FOR LOW K - Abnormal; Notable for the following components:    Sodium 129 (*)     Chloride 94 (*)     Glucose 196 (*)     BUN 75 (*)     CREATININE 5.0 (*)     GFR Non- 8 (*)     GFR African American 10 (*)     Albumin/Globulin Ratio 1.0 (*)     Alkaline Phosphatase 136 (*)     AST 43 (*)     All other components within normal limits    Narrative:     Performed at:  [facility]  [address] [place]  [place], [address]   Phone [phone]   LACTIC ACID, PLASMA - Abnormal; Notable for the following components:    Lactic Acid 2.2 (*)     All other components within normal limits    Narrative:     Performed at:  [facility]  [address] [facility],  [place], [address]   Phone [phone]   TROPONIN - Abnormal; Notable for the following components:    Troponin 0.04 (*)     All other components within normal limits    Narrative:     Performed at:  [facility]  [address]   Phone [phone]   CULTURE, BLOOD 1   CULTURE, BLOOD 2   COVID-19   PROTIME-INR   LACTATE, SEPSIS   LACTATE, SEPSIS       All other labs were within normal range or not returned as of this dictation. EKG: All EKG's are interpreted by the Emergency Department Physician in the absence of a cardiologist.  Please see their note for interpretation of EKG. RADIOLOGY:   Non-plain film images such as CT, Ultrasound and MRI are read by the radiologist. Plain radiographic images are visualized and preliminarily interpreted by the ED Provider with the below findings:        Interpretation per the Radiologist below, if available at the time of this note:    XR CHEST PORTABLE   Final Result   Minimal streaky bibasilar airspace opacities may relate to atelectasis or   multifocal pneumonia with atypical/viral pneumonia in the differential.      Stable cardiomegaly.              PROCEDURES   Unless otherwise noted below, none     Procedures    CRITICAL CARE TIME Critical Care  There was a high probability of life-threatening clinical deterioration in the patient's condition requiring my urgent intervention. Total critical care time with the patient was 35 minutes excluding separately reportable procedures. Critical care required due to patients severe sepsis, multifocal pneumonia. CONSULTS:  IP CONSULT TO HOSPITALIST      EMERGENCY DEPARTMENT COURSE and DIFFERENTIAL DIAGNOSIS/MDM:   Vitals:    Vitals:    11/27/20 1200 11/27/20 1203 11/27/20 1215 11/27/20 1230   BP: 115/64 (!) 116/58  100/63   Pulse: 87  83 79   Resp: 24 20 20   Temp:       TempSrc:       SpO2: 95%  92% 92%   Weight:       Height:           Patient was given the following medications:  Medications   vancomycin 1000 mg IVPB in 250 mL D5W addavial (1,000 mg Intravenous New Bag 11/27/20 1412)   0.9 % sodium chloride bolus (0 mLs Intravenous Stopped 11/27/20 1414)   cefTRIAXone (ROCEPHIN) 1 g in sterile water 10 mL IV syringe (1 g Intravenous Given 11/27/20 1303)     And   azithromycin (ZITHROMAX) 500 mg in D5W 250ml Vial Mate (0 mg Intravenous Stopped 11/27/20 1414)     Argelia Wu is a 79 y.o. female who presents to the emergency department with complaints of shortness of breath and cough that started on Monday. She no longer has cough but does still continue to feel short of breath. Denies any fever or pain. Denies productive cough. Denies any known exposure to COVID-19. Does not feel at this time lightheaded, weak or dizzy. Denies chest pain. Denies any home oxygen use. Symptoms are worse with ambulation. At time of arrival while laying flat patient's oxygen is at 92% and increased respirations. Denies any history of PE or DVT. No pleuritic component. Denies recent travel recent surgeries or prolonged immobilization. Patient states that she only goes to the grocery store and does not have much interaction with others in public. Patient on room air has an SPO2 of 92%.   Even

## 2020-11-27 NOTE — ED PROVIDER NOTES
EKG is reviewed myself. Dated today at [phone]. Rate 88. A. fib.   No significant change from 14 June 2018     Dina Damon MD  11/27/20 1131

## 2020-11-27 NOTE — PROGRESS NOTES
Clinical Pharmacy Note: Pharmacy to Dose Warfarin    Pharmacy consulted by Dr. Zane Hawley to dose warfarin. Bolivar Singh is a 79 y.o. female  is receiving warfarin for indication: AF. INR Goal Range: 2-3  Prior to admission warfarin dosing regimen: 2.5mg daily    INR today:   Lab Results   Component Value Date    INR 3.26 11/27/2020       Assessment/Plan:  INR is supratherapeutic on prior to admission dosing regimen. Possible concomitant drug-drug interactions include: Azithromycin   Based on today's assessment, patient has not received warfarin yet today, will hold tonight and redose in the morning based off morning INR. Daily INR is ordered. Pharmacy will continue to monitor and make adjustments to regimen as necessary.      Thank you for the consult,     Sarah Kay, PharmD

## 2020-11-27 NOTE — PROGRESS NOTES
Pt upto 3A from ER. Vitals obtained and are stable. Pt on 2L 02. IS 85% on RA. Denies any pain. Call light with in reach. Denies an needs at this time. Will monitor.

## 2020-11-28 LAB
A/G RATIO: 1.3 (ref 1.1–2.2)
ABO/RH: NORMAL
ALBUMIN SERPL-MCNC: 2.8 G/DL (ref 3.4–5)
ALP BLD-CCNC: 119 U/L (ref 40–129)
ALT SERPL-CCNC: 26 U/L (ref 10–40)
ANION GAP SERPL CALCULATED.3IONS-SCNC: 10 MMOL/L (ref 3–16)
ANION GAP SERPL CALCULATED.3IONS-SCNC: 15 MMOL/L (ref 3–16)
ANTIBODY SCREEN: NORMAL
AST SERPL-CCNC: 41 U/L (ref 15–37)
BASE EXCESS VENOUS: -5.5 MMOL/L (ref -3–3)
BASOPHILS ABSOLUTE: 0.1 K/UL (ref 0–0.2)
BASOPHILS RELATIVE PERCENT: 0.4 %
BILIRUB SERPL-MCNC: 0.6 MG/DL (ref 0–1)
BLOOD BANK DISPENSE STATUS: NORMAL
BLOOD BANK DISPENSE STATUS: NORMAL
BLOOD BANK PRODUCT CODE: NORMAL
BLOOD BANK PRODUCT CODE: NORMAL
BPU ID: NORMAL
BPU ID: NORMAL
BUN BLDV-MCNC: 88 MG/DL (ref 7–20)
BUN BLDV-MCNC: 97 MG/DL (ref 7–20)
CALCIUM SERPL-MCNC: 7.9 MG/DL (ref 8.3–10.6)
CALCIUM SERPL-MCNC: 8.4 MG/DL (ref 8.3–10.6)
CARBOXYHEMOGLOBIN: 5.1 % (ref 0–1.5)
CHLORIDE BLD-SCNC: 105 MMOL/L (ref 99–110)
CHLORIDE BLD-SCNC: 109 MMOL/L (ref 99–110)
CO2: 16 MMOL/L (ref 21–32)
CO2: 20 MMOL/L (ref 21–32)
CREAT SERPL-MCNC: 3.9 MG/DL (ref 0.6–1.2)
CREAT SERPL-MCNC: 4.2 MG/DL (ref 0.6–1.2)
CREATININE URINE: 79.4 MG/DL (ref 28–259)
DESCRIPTION BLOOD BANK: NORMAL
DESCRIPTION BLOOD BANK: NORMAL
EKG ATRIAL RATE: 227 BPM
EKG DIAGNOSIS: NORMAL
EKG Q-T INTERVAL: 346 MS
EKG QRS DURATION: 82 MS
EKG QTC CALCULATION (BAZETT): 418 MS
EKG R AXIS: 43 DEGREES
EKG T AXIS: -24 DEGREES
EKG VENTRICULAR RATE: 88 BPM
EOSINOPHILS ABSOLUTE: 0.6 K/UL (ref 0–0.6)
EOSINOPHILS RELATIVE PERCENT: 3.9 %
FERRITIN: 377.9 NG/ML (ref 15–150)
FOLATE: >20 NG/ML (ref 4.78–24.2)
GFR AFRICAN AMERICAN: 12
GFR AFRICAN AMERICAN: 13
GFR NON-AFRICAN AMERICAN: 10
GFR NON-AFRICAN AMERICAN: 11
GLOBULIN: 2.1 G/DL
GLUCOSE BLD-MCNC: 117 MG/DL (ref 70–99)
GLUCOSE BLD-MCNC: 157 MG/DL (ref 70–99)
GLUCOSE BLD-MCNC: 172 MG/DL (ref 70–99)
HCO3 VENOUS: 18.8 MMOL/L (ref 23–29)
HCT VFR BLD CALC: 21.5 % (ref 36–48)
HCT VFR BLD CALC: 33.5 % (ref 36–48)
HEMOGLOBIN: 11 G/DL (ref 12–16)
HEMOGLOBIN: 6.9 G/DL (ref 12–16)
IMMATURE RETIC FRACT: 0.43 (ref 0.21–0.37)
IRON SATURATION: 12 % (ref 15–50)
IRON: 29 UG/DL (ref 37–145)
LACTIC ACID: 2.3 MMOL/L (ref 0.4–2)
LYMPHOCYTES ABSOLUTE: 2.1 K/UL (ref 1–5.1)
LYMPHOCYTES RELATIVE PERCENT: 13.7 %
MAGNESIUM: 1.8 MG/DL (ref 1.8–2.4)
MCH RBC QN AUTO: 30.7 PG (ref 26–34)
MCHC RBC AUTO-ENTMCNC: 32.8 G/DL (ref 31–36)
MCV RBC AUTO: 93.6 FL (ref 80–100)
METHEMOGLOBIN VENOUS: 0 %
MONOCYTES ABSOLUTE: 0.7 K/UL (ref 0–1.3)
MONOCYTES RELATIVE PERCENT: 4.7 %
NEUTROPHILS ABSOLUTE: 11.7 K/UL (ref 1.7–7.7)
NEUTROPHILS RELATIVE PERCENT: 77.3 %
O2 CONTENT, VEN: 10 VOL %
O2 SAT, VEN: 100 %
O2 THERAPY: ABNORMAL
OCCULT BLOOD DIAGNOSTIC: ABNORMAL
PCO2, VEN: 31.1 MMHG (ref 40–50)
PDW BLD-RTO: 14.3 % (ref 12.4–15.4)
PERFORMED ON: ABNORMAL
PH VENOUS: 7.39 (ref 7.35–7.45)
PHOSPHORUS: 3.8 MG/DL (ref 2.5–4.9)
PLATELET # BLD: 165 K/UL (ref 135–450)
PMV BLD AUTO: 9.8 FL (ref 5–10.5)
PO2, VEN: 116 MMHG (ref 25–40)
POTASSIUM SERPL-SCNC: 5.2 MMOL/L (ref 3.5–5.1)
POTASSIUM SERPL-SCNC: 5.5 MMOL/L (ref 3.5–5.1)
PROCALCITONIN: 15.99 NG/ML (ref 0–0.15)
RBC # BLD: 3.58 M/UL (ref 4–5.2)
REPORT: NORMAL
RETICULOCYTE ABSOLUTE COUNT: 0.03 M/UL (ref 0.02–0.1)
RETICULOCYTE COUNT PCT: 0.91 % (ref 0.5–2.18)
SARS-COV-2, PCR: NOT DETECTED
SODIUM BLD-SCNC: 136 MMOL/L (ref 136–145)
SODIUM BLD-SCNC: 139 MMOL/L (ref 136–145)
SODIUM URINE: 45 MMOL/L
TCO2 CALC VENOUS: 44 MMOL/L
TOTAL IRON BINDING CAPACITY: 239 UG/DL (ref 260–445)
TOTAL PROTEIN: 4.9 G/DL (ref 6.4–8.2)
VITAMIN B-12: 1861 PG/ML (ref 211–911)
WBC # BLD: 15.2 K/UL (ref 4–11)

## 2020-11-28 PROCEDURE — 2700000000 HC OXYGEN THERAPY PER DAY

## 2020-11-28 PROCEDURE — 6360000002 HC RX W HCPCS: Performed by: FAMILY MEDICINE

## 2020-11-28 PROCEDURE — 85014 HEMATOCRIT: CPT

## 2020-11-28 PROCEDURE — P9017 PLASMA 1 DONOR FRZ W/IN 8 HR: HCPCS

## 2020-11-28 PROCEDURE — 94760 N-INVAS EAR/PLS OXIMETRY 1: CPT

## 2020-11-28 PROCEDURE — 83735 ASSAY OF MAGNESIUM: CPT

## 2020-11-28 PROCEDURE — 2580000003 HC RX 258: Performed by: INTERNAL MEDICINE

## 2020-11-28 PROCEDURE — 83605 ASSAY OF LACTIC ACID: CPT

## 2020-11-28 PROCEDURE — 83550 IRON BINDING TEST: CPT

## 2020-11-28 PROCEDURE — 82607 VITAMIN B-12: CPT

## 2020-11-28 PROCEDURE — 82746 ASSAY OF FOLIC ACID SERUM: CPT

## 2020-11-28 PROCEDURE — 84100 ASSAY OF PHOSPHORUS: CPT

## 2020-11-28 PROCEDURE — 86901 BLOOD TYPING SEROLOGIC RH(D): CPT

## 2020-11-28 PROCEDURE — 86900 BLOOD TYPING SEROLOGIC ABO: CPT

## 2020-11-28 PROCEDURE — 85025 COMPLETE CBC W/AUTO DIFF WBC: CPT

## 2020-11-28 PROCEDURE — G0328 FECAL BLOOD SCRN IMMUNOASSAY: HCPCS

## 2020-11-28 PROCEDURE — 84145 PROCALCITONIN (PCT): CPT

## 2020-11-28 PROCEDURE — 86850 RBC ANTIBODY SCREEN: CPT

## 2020-11-28 PROCEDURE — P9016 RBC LEUKOCYTES REDUCED: HCPCS

## 2020-11-28 PROCEDURE — 83540 ASSAY OF IRON: CPT

## 2020-11-28 PROCEDURE — 85018 HEMOGLOBIN: CPT

## 2020-11-28 PROCEDURE — 2580000003 HC RX 258: Performed by: FAMILY MEDICINE

## 2020-11-28 PROCEDURE — 6360000002 HC RX W HCPCS: Performed by: INTERNAL MEDICINE

## 2020-11-28 PROCEDURE — C9113 INJ PANTOPRAZOLE SODIUM, VIA: HCPCS | Performed by: INTERNAL MEDICINE

## 2020-11-28 PROCEDURE — 36430 TRANSFUSION BLD/BLD COMPNT: CPT

## 2020-11-28 PROCEDURE — 80053 COMPREHEN METABOLIC PANEL: CPT

## 2020-11-28 PROCEDURE — 2500000003 HC RX 250 WO HCPCS: Performed by: INTERNAL MEDICINE

## 2020-11-28 PROCEDURE — 86923 COMPATIBILITY TEST ELECTRIC: CPT

## 2020-11-28 PROCEDURE — 6370000000 HC RX 637 (ALT 250 FOR IP): Performed by: INTERNAL MEDICINE

## 2020-11-28 PROCEDURE — 82803 BLOOD GASES ANY COMBINATION: CPT

## 2020-11-28 PROCEDURE — 85045 AUTOMATED RETICULOCYTE COUNT: CPT

## 2020-11-28 PROCEDURE — 2000000000 HC ICU R&B

## 2020-11-28 PROCEDURE — 93010 ELECTROCARDIOGRAM REPORT: CPT | Performed by: INTERNAL MEDICINE

## 2020-11-28 PROCEDURE — 36415 COLL VENOUS BLD VENIPUNCTURE: CPT

## 2020-11-28 PROCEDURE — 6370000000 HC RX 637 (ALT 250 FOR IP): Performed by: FAMILY MEDICINE

## 2020-11-28 PROCEDURE — 82728 ASSAY OF FERRITIN: CPT

## 2020-11-28 RX ORDER — PANTOPRAZOLE SODIUM 40 MG/1
40 TABLET, DELAYED RELEASE ORAL
Status: DISCONTINUED | OUTPATIENT
Start: 2020-12-01 | End: 2020-11-28

## 2020-11-28 RX ORDER — 0.9 % SODIUM CHLORIDE 0.9 %
1000 INTRAVENOUS SOLUTION INTRAVENOUS ONCE
Status: COMPLETED | OUTPATIENT
Start: 2020-11-28 | End: 2020-11-28

## 2020-11-28 RX ORDER — MIDODRINE HYDROCHLORIDE 5 MG/1
5 TABLET ORAL
Status: DISCONTINUED | OUTPATIENT
Start: 2020-11-28 | End: 2020-12-01

## 2020-11-28 RX ORDER — 0.9 % SODIUM CHLORIDE 0.9 %
20 INTRAVENOUS SOLUTION INTRAVENOUS ONCE
Status: COMPLETED | OUTPATIENT
Start: 2020-11-28 | End: 2020-11-28

## 2020-11-28 RX ORDER — 0.9 % SODIUM CHLORIDE 0.9 %
20 INTRAVENOUS SOLUTION INTRAVENOUS ONCE
Status: DISCONTINUED | OUTPATIENT
Start: 2020-11-28 | End: 2020-12-04 | Stop reason: HOSPADM

## 2020-11-28 RX ADMIN — ONDANSETRON 4 MG: 2 INJECTION INTRAMUSCULAR; INTRAVENOUS at 06:47

## 2020-11-28 RX ADMIN — SODIUM CHLORIDE 1000 ML: 9 INJECTION, SOLUTION INTRAVENOUS at 08:23

## 2020-11-28 RX ADMIN — SODIUM CHLORIDE: 9 INJECTION, SOLUTION INTRAVENOUS at 04:11

## 2020-11-28 RX ADMIN — AZITHROMYCIN MONOHYDRATE 500 MG: 500 INJECTION, POWDER, LYOPHILIZED, FOR SOLUTION INTRAVENOUS at 15:48

## 2020-11-28 RX ADMIN — MIDODRINE HYDROCHLORIDE 5 MG: 5 TABLET ORAL at 15:44

## 2020-11-28 RX ADMIN — ONDANSETRON 4 MG: 2 INJECTION INTRAMUSCULAR; INTRAVENOUS at 17:34

## 2020-11-28 RX ADMIN — SODIUM CHLORIDE 80 MG: 9 INJECTION, SOLUTION INTRAVENOUS at 12:00

## 2020-11-28 RX ADMIN — PAROXETINE HYDROCHLORIDE 20 MG: 20 TABLET, FILM COATED ORAL at 15:43

## 2020-11-28 RX ADMIN — SODIUM CHLORIDE 8 MG/HR: 9 INJECTION, SOLUTION INTRAVENOUS at 19:41

## 2020-11-28 RX ADMIN — SODIUM CHLORIDE 20 ML: 9 INJECTION, SOLUTION INTRAVENOUS at 19:00

## 2020-11-28 RX ADMIN — SODIUM BICARBONATE: 84 INJECTION, SOLUTION INTRAVENOUS at 15:44

## 2020-11-28 RX ADMIN — CEFEPIME HYDROCHLORIDE 1000 MG: 1 INJECTION, POWDER, FOR SOLUTION INTRAMUSCULAR; INTRAVENOUS at 15:45

## 2020-11-28 RX ADMIN — SODIUM CHLORIDE 1000 ML: 9 INJECTION, SOLUTION INTRAVENOUS at 08:30

## 2020-11-28 RX ADMIN — PHYTONADIONE 10 MG: 10 INJECTION, EMULSION INTRAMUSCULAR; INTRAVENOUS; SUBCUTANEOUS at 15:44

## 2020-11-28 ASSESSMENT — PAIN SCALES - GENERAL
PAINLEVEL_OUTOF10: 0

## 2020-11-28 NOTE — CONSULTS
[address]                     [address] [facility], [address]                                  CONSULTATION    PATIENT NAME: Aure Washington                   :        1940  MED REC NO:   [id number]                          ROOM:       8503  ACCOUNT NO:   [de-identified]                           ADMIT DATE: 2020  PROVIDER:     Cheryle London, MD    RENAL CONSULTATION    CONSULT DATE:  2020    CONSULTING PHYSICIAN:  Cheryle London, MD    REFERRING PROVIDER:  Robbin Montana MD    REASON FOR CONSULTATION:  Acute kidney injury, hyponatremia,  hyperkalemia, metabolic acidosis. HISTORY OF PRESENT ILLNESS:  The patient is an 19-year-old female with  history of atrial fibrillation, hyperlipidemia, hypertension, pelvic  mass who was admitted to the hospital yesterday secondary to increasing  shortness of breath and coughing. Her COVID testing is currently  pending. I am asked to see the patient because her creatinine is up to  5 from a baseline of 1.2. BUN is also elevated at 75. Creatinine is up  to 5.2 with a serum bicarbonate of 16. Creatinine however improved to  4.2 with IV fluid administration. She has also been having maroon  stools and her hemoglobin has dropped from 14 to 11. She is currently  on antibiotics for possible pneumonia. The patient is currently drowsy. Lowest systolic blood pressure in the 80s. She is on Coumadin as an  outpatient. PAST MEDICAL HISTORY:  Includes hypertension, atrial fibrillation,  hyperlipidemia, pelvic mass. ALLERGIES:  PENICILLIN and SULFA ANTIBIOTICS. MEDICATIONS PRIOR TO ADMISSION:  Includes warfarin, atenolol, omega 3  fatty acid, multivitamins, paroxetine, diltiazem, simvastatin. SOCIAL HISTORY:  No active smoking, alcohol or drug abuse. FAMILY HISTORY:  Includes heart disease. REVIEW OF SYSTEMS:  She is not able to cooperate.     PHYSICAL EXAMINATION:  VITAL SIGNS:  Blood pressure 117/77, pulse 90, respirations 18,  temperature 97.4, saturating 95%. Weight listed at 68 kg. Urine output  recorded only at 150 mL. GENERAL:  Drowsy. HEENT:  Anicteric sclerae, clear conjunctivae. SKIN:  Anicteric, no rash. CHEST:  Rhonchi bilaterally. HEART:  Regular. ABDOMEN:  Soft, nontender. No rebound or involuntary guarding. EXTREMITIES:  Shows 1+ edema. LABORATORY DATA:  Sodium 136, potassium 5.2, chloride 105, bicarb 16,  BUN 88, creatinine 4.2, glucose 172, calcium 8.4, phosphorus 3.8. Procalcitonin 15.99. Albumin 2.8. WBC 15.2, hemoglobin 11, hematocrit  33.5, platelet count 073,091. INR 3.3. Stool for occult blood is  positive. Urinalysis, specific gravity 1.013, pH is 6, blood is  moderate, protein is 100, leukocyte esterase is moderate, wbc 114, urine  sodium 45. DIAGNOSTIC DATA:  Chest x-ray shows stable cardiomegaly, minimal streaky  bibasilar airspace opacities. ASSESSMENT AND PLAN:  1. Acute kidney injury on CKD IIIA, baseline creatinine 1.2. Currently  oliguric. At risk for ATN. Creatinine is slightly better. We will  switch to bicarbonate infusion. No need for acute renal replacement  therapy today, but would need close followup. 2.  Hyperkalemia in the setting of decreased GFR and metabolic acidosis. Low potassium diet. Follow with bicarbonate infusion. 3.  Hyponatremia in the setting of hypotension. This is better. 4.  Low serum bicarbonate. Positive anion gap. Probably secondary to  lactic acidosis and renal impairment. Continue to follow. 5.  Relative hypotension. We will start midodrine. Decrease IV fluid  rate. 6.  Pneumonia, currently on vancomycin plus Zosyn. Follow vancomycin  level. We will discuss with Medicine if we can use cefepime instead to  avoid the vanco and Zosyn combination. 7.  Possible GI bleed. GI is following. Currently on PPI. Follow  hemoglobin closely. 8.  COVID testing is pending.     The patient is high risk, would need close

## 2020-11-28 NOTE — CONSULTS
GI Consult Note      Admission Date: 11/27/2020  Hospital Day: Hospital Day: 2  Attending: Daria Casillas MD  Date of service: 11/28/20    Subjective:     Chief complaint/ Reason for consult:   GI bleed/anemia     HPI: Elham Elizalde is a 79 y.o.  female patient, who was seen at the request of Dr. Daria Casillas MD.    History was obtained from chart review and the patient. Patient admitted to hospital for SOB. Work up here revealed multifocal pneumonia. Patient started on IV antibiotics and being ruled out for Covid. Early today, patient started passing dark maroon colored stools. She was started on IV PPI, FFPs and Vitamin K. She has h/o chronic Afib ( anticoagulated with coumadin), HTN , OA    Past Endoscopic History:  No previous EGD and colonoscopy                 Past Medical History:     Past Medical History:   Diagnosis Date    [facility]     Atrial fibrillation ([facility]     Hypercholesterolemia     Hypertension     Pelvic mass        Past Surgical History:    Past Surgical History:   Procedure Laterality Date    APPENDECTOMY      CYSTOSCOPY Right 06/14/2018    retrograde pyelogram, ureteroscopy, and stent placement    HYSTERECTOMY, TOTAL ABDOMINAL  07/09/2018    robotic with BSO, omentectomy, pelvic mass removal, lymphnode dissection    NE CYSTO/URETERO W/LITHOTRIPSY &INDWELL STENT INSRT Right 10/31/2018    CYSTOSCOPY WITH RIGHT URETEROSCOPY HOLMIUM LASER LITHOTRIPSY STONE MANIPULATION STONE BASKET WITH RIGHT STENT EXCHANGE [facility] History:     · Tobacco use:   reports that she has never smoked. She has never used smokeless tobacco.  · Alcohol use:   reports no history of alcohol use. · Currently lives in: [facility]  ·  reports no history of drug use.    · Lives alone,        Family History:       Problem Relation Age of Onset    Heart Disease Father      Medications:    phytonadione (VITAMIN K)  IVPB  10 hours. No results for input(s): OCCULTBLD in the last 72 hours. Imaging:    XR CHEST PORTABLE   Final Result   Minimal streaky bibasilar airspace opacities may relate to atelectasis or   multifocal pneumonia with atypical/viral pneumonia in the differential.      Stable cardiomegaly. Known drug Allergies: Allergies   Allergen Reactions    Penicillins     Sulfa Antibiotics        CT scan A/P 10/2018  1. Right-sided nephroureteral stent with 11 mm calculus in the upper ureter. 2. Thickening of the gastric fundus may reflect sequela of chronic    inflammatory process but an underlying mass lesion is not excluded.  Consider    direct visualization. 3. Thickened bladder wall may reflect changes related to presence of    nephroureteral stent or cystitis. Assessment:   The patient is a 79 y.o. old female  with following problems:    Active Problems:    Renal failure  Resolved Problems:    * No resolved hospital problems. *    1. Multifocal pneumonia, bacterial vs covid   2. Acute blood loss anemia. Hematochezia started earlier today. Hb 14--> 11. I suspect this is an upper GI bleed. She also has an abnormal CT in 2018 that showed thickening in gastric fundus   3. Acute kidney injury, 1.2 --> 5  4. AF on coumadin. INR 3.26     Recommendations:   IVF   IV PPI   Agree with FFPs and Vitamin K   Monitor H/H Q6, transfuse to keep Hb>8   Depending on clinical course, we may need to do her EGD over the weekend (INR goal 1.5-2 before we can do an EGD). Keep NPO.      Cain Draper MD, [address]

## 2020-11-28 NOTE — PROGRESS NOTES
Called to room by patient for bout of nausea and vomiting. Emesis full of mucous and dark red blood. Call rec'd from lab with Hgb result of 6. 9. Assisted patient with cleaning up,gown change,and josue care. Brief dirty with large amt of dark red blood. Provided hygiene,medicated with Zofran,and placed call to GI. Orders received for 2 units PRBC,Protonix gtt,and follow up H&H and INR after transfusion. Patient's daughter Natasha Barakat updated by phone,understanding voiced. Patient asleep at this time.

## 2020-11-28 NOTE — H&P
HOSPITALISTS HISTORY AND PHYSICAL    11/27/2020 7:45 PM    Patient Information:  Faustino Johnson is a 79 y.o. female [id number]  PCP:  BRIGHT Morgan CNP (Tel: [phone] )    Chief complaint:    Chief Complaint   Patient presents with    Shortness of Breath     pt states she has had increase SOB and cough for seveal days. dneies fever or pain        History of Present Illness:  Arabella Henry is a 79 y.o. female  With chronic Afib ( anticoagulated with coumadin) , HTN , OA presents with c/o dyspnea, and cough on going for several days  The pt was found to be hypoxic in the ER with sats in 85% and is now placed on 2  L O2 via NC. She does not wear O2 at baseline. Found to be in renal failure with cr up to 5. No known sick contact. Chest xray is concerning for multifocal pneumonia. The pt remains afebrile and WBC is normal  .  She has been started on abx    REVIEW OF SYSTEMS:   Constitutional: Negative for fever,chills or night sweats  ENT: Negative for rhinorrhea, epistaxis, hoarseness, sore throat. Respiratory: +ve for shortness of breath,wheezing  Cardiovascular: Negative for chest pain, palpitations   Gastrointestinal: Negative for nausea, vomiting, diarrhea  Genitourinary: Negative for polyuria, dysuria   Hematologic/Lymphatic: Negative for bleeding tendency, easy bruising  Musculoskeletal: Negative for myalgias and arthralgias  Neurologic: Negative for confusion,dysarthria. Skin: Negative for itching,rash  Psychiatric: Negative for depression,anxiety, agitation. Endocrine: Negative for polydipsia,polyuria,heat /cold intolerance. Past Medical History:   has a past medical history of A-fib [facility]), Atrial fibrillation ([place]), Hypercholesterolemia, Hypertension, and Pelvic mass. Past Surgical History:   has a past surgical history that includes Appendectomy; Cystocopy (Right, 06/14/2018);  Hysterectomy, total abdominal (07/09/2018); and pr cysto/uretero w/lithotripsy &indwell stent insrt (Right, 10/31/2018). Medications:  No current facility-administered medications on file prior to encounter. Current Outpatient Medications on File Prior to Encounter   Medication Sig Dispense Refill    warfarin (COUMADIN) 5 MG tablet 5 mg three days a week 2.5 the other.  (Patient taking differently: Take 2.5 mg by mouth daily 5 mg three days a week 2.5 the other.) 90 tablet 1    atenolol (TENORMIN) 100 MG tablet Take 1 tablet by mouth daily 90 tablet 3    dilTIAZem (CARDIZEM) 60 MG tablet Take 0.5 tablets by mouth every 12 hours 90 tablet 3    simvastatin (ZOCOR) 40 MG tablet Take 1 tablet by mouth nightly 90 tablet 3    PARoxetine (PAXIL) 20 MG tablet Take 1 tablet by mouth daily 90 tablet 3    Omega-3 Fatty Acids (FISH OIL PO) Take by mouth      Multiple Vitamins-Minerals (MULTIVITAMIN PO) Take by mouth       Current Facility-Administered Medications   Medication Dose Route Frequency Provider Last Rate Last Dose    [START ON 11/28/2020] cefTRIAXone (ROCEPHIN) 1 g in sterile water 10 mL IV syringe  1 g Intravenous Q24H MD Janet Simpson [START ON 11/28/2020] azithromycin (ZITHROMAX) 500 mg in D5W 250ml Vial Mate  500 mg Intravenous Q24H Waldo Xavier MD        0.9 % sodium chloride infusion   Intravenous Continuous Waldo Xavier  mL/hr at 11/27/20 1704      dilTIAZem (CARDIZEM) tablet 30 mg  30 mg Oral 2 times per day MD Janet Simpson PARoxetine (PAXIL) tablet 20 mg  20 mg Oral Daily Waldo Xavier MD   20 mg at 11/27/20 1703    atorvastatin (LIPITOR) tablet 20 mg  20 mg Oral Nightly Waldo Xavier MD        sodium chloride flush 0.9 % injection 10 mL  10 mL Intravenous 2 times per day Waldo Xavier MD        sodium chloride flush 0.9 % injection 10 mL  10 mL Intravenous PRN Waldo Xavier MD        acetaminophen (TYLENOL) tablet 650 mg  650 mg Oral Q6H PRN Waldo Xavier MD        Or    acetaminophen (TYLENOL) suppository 650 mg  650 mg Rectal Q6H PRN Rashad Borden MD        polyethylene glycol (GLYCOLAX) packet 17 g  17 g Oral Daily PRN Rashad Borden MD        promethazine (PHENERGAN) tablet 12.5 mg  12.5 mg Oral Q6H PRN Rashad Borden MD        Or    ondansetron (ZOFRAN) injection 4 mg  4 mg Intravenous Q6H PRN Rashad Borden MD        warfarin (COUMADIN) daily dosing (placeholder)   Other Kenneydesi Connor MD           Allergies: Allergies   Allergen Reactions    Penicillins     Sulfa Antibiotics         Social History:   reports that she has never smoked. She has never used smokeless tobacco. She reports that she does not drink alcohol or use drugs. Family History:  family history includes Heart Disease in her father. ,     Physical Exam:  BP (!) 109/53   Pulse 83   Temp 97.5 °F (36.4 °C) (Oral)   Resp 18   Ht 5' 6\" (1.676 m)   Wt 150 lb (68 kg)   SpO2 93%   BMI 24.21 kg/m²     General appearance:  Appears comfortable. Well nourished  Eyes: Sclera clear, pupils equal  ENT: Moist mucus membranes, no thrush. Trachea midline. Cardiovascular: Regular rhythm, normal S1, S2. No murmur, gallop, rub. No edema in lower extremities  Respiratory: Clear to auscultation bilaterally, no wheeze, good inspiratory effort  Gastrointestinal: Abdomen soft, non-tender, not distended, normal bowel sounds  Musculoskeletal: No cyanosis in digits, neck supple  Neurology: Cranial nerves grossly intact. Alert and oriented in time, place and person. No speech or motor deficits  Psychiatry: Appropriate affect.  Not agitated  Skin: Warm, dry, normal turgor, no rash    Labs:  CBC:   Lab Results   Component Value Date    WBC 20.2 11/27/2020    RBC 4.71 11/27/2020    HGB 14.4 11/27/2020    HCT 44.0 11/27/2020    MCV 93.6 11/27/2020    MCH 30.5 11/27/2020    MCHC 32.6 11/27/2020    RDW 14.6 11/27/2020     11/27/2020    MPV 9.4 11/27/2020     BMP:    Lab Results   Component Value Date     11/27/2020 K 4.6 11/27/2020    CL 94 11/27/2020    CO2 21 11/27/2020    BUN 75 11/27/2020    CREATININE 5.0 11/27/2020    CALCIUM 9.9 11/27/2020    GFRAA 10 11/27/2020    LABGLOM 8 11/27/2020    LABGLOM 41 06/28/2018    GLUCOSE 196 11/27/2020    GLUCOSE 101 08/25/2011       Chest Xray:   EKG:         Problem List  Active Problems:    Renal failure  Resolved Problems:    * No resolved hospital problems. *        Assessment/Plan:         1. Acute on chronic renal failure  Cr up to 5. Baseline is around 1.2  Hydrate  Holding Lisinopril and spironolactone  Appreciate nephrology recommendation      Multifocal pneumonia  Reported on chest Xray  leucocutosis and lactic acidosis present upon admission   Cultures pending  Will cont IV abx  CoVID test pending    Acute resp failure with hypoxia  On   2 L O2      Admit as inpatien I anticipate hospitalization spanning more than two midnights for investigation and treatment of the above medically necessary diagnoses.       Emilia Bills MD    11/27/2020 7:45 PM

## 2020-11-28 NOTE — PROGRESS NOTES
Patient arrived at [phone] in ICU. She is alert,oriented,pleasant,and cooperative;transfered to bed,positioned for comfort. VSS,O2 sat 98% on 6L nasal O2. Patient in no apparent distress,denies needs;see flowsheets. Call light at hand.

## 2020-11-28 NOTE — FLOWSHEET NOTE
11/28/20 0703   Vital Signs   Pulse 111   BP 98/62   BP Location Left upper arm   Patient with large stool black in appearance with red clots. Blood tinged sputum/emesis as well. Patient dizzy/lightheaded. RN assisted patient back into bed as patient weak at this time. RN attempting to contact provider at this time for further work up.

## 2020-11-28 NOTE — PROGRESS NOTES
Osvaldo Hurley MD called RN back with orders to be placed for patient to be transferred to step down. DINH Bowser made aware during hand off report.

## 2020-11-28 NOTE — PROGRESS NOTES
 sodium bicarbonate infusion       PRN Meds: sodium chloride flush, acetaminophen **OR** acetaminophen, polyethylene glycol, promethazine **OR** ondansetron    Labs/imaging:  CBC:   Recent Labs     11/27/20  1200 11/28/20  0741   WBC 20.2* 15.2*   HGB 14.4 11.0*    165     BMP:    Recent Labs     11/27/20  1200 11/28/20  0741   * 136   K 4.6 5.2*   CL 94* 105   CO2 21 16*   BUN 75* 88*   CREATININE 5.0* 4.2*   GLUCOSE 196* 172*     Hepatic:   Recent Labs     11/27/20  1200 11/28/20  0741   AST 43* 41*   ALT 26 26   BILITOT 0.6 0.6   ALKPHOS 136* 119       Mansoor Lantigua MD  -------------------------------  Rounding hospitalist

## 2020-11-28 NOTE — PROGRESS NOTES
Patient called out for RN to assist at bedside. RN went to bedside to see that patient had accidentally pulled out her PIV access and had blood along with fluids all over floor and bed. RN to provide bed change and replace PIV access.

## 2020-11-28 NOTE — CONSULTS
Renal Consult  Full Note Dictated [id number]  A/P  1. CHARISSE- at risk for ATN. No need for RRT today but would need close f/u. Switch to HCO3.   2.  Low serum HCO3-+anion gap. Check VBG.  +Lactic acidosis and renal impairment. 3.  Hypotension/Pneumonia-?if can use Cefepime to avoid Zosyn+Vanco.  Start Midodrine. 4.  GI bleed - PPI. GI following. 5.  COVID pending    High risk. Thank you. Discussed with Medicine.

## 2020-11-28 NOTE — PROGRESS NOTES
Abnormal lab:    Blood Culture, Routine Abnormal    11/27/2020 12:00 PM  [facility]    Gram stain Anaerobic bottle:   Gram negative rods   Information to follow      Information sent to provider via perfect serve. No new orders at this time.

## 2020-11-28 NOTE — PROGRESS NOTES
Patient ambulated to the restroom. RN went in to check on patient. Patient stated that she was feeling nausea and then began to vomit. Emesis blood tinged and was thick. Not coffee ground in appearance jut bright red. Patient currently attempting to have bowel movement and not feeling well.

## 2020-11-28 NOTE — PROGRESS NOTES
RN still awaiting call back from provider at this time. RN placed stat lab orders in for patient at this time.

## 2020-11-28 NOTE — PROGRESS NOTES
Pt had GI bleed upon arrival to shift. Pressures soft. Called MD. Doc ROSAS ordered fluid bolus and 2 units FFP. Administered. PT brief changed q90 minutes due to continued rectal bleeding. MD aware. Blood Pressure remained stable. Oxygen sat variable. Pt on 4L most of shift. Had to increase to 6L to maintain a saturation above 90%. Pt transferred to ICU @1530. Report given via phone and at bedside to RN, Leila Dakins. Pt stable upon arrival to ICU. Pt daughters updated on Pts status and on pending transfer to ICU.

## 2020-11-29 ENCOUNTER — APPOINTMENT (OUTPATIENT)
Dept: GENERAL RADIOLOGY | Age: 80
DRG: 871 | End: 2020-11-29
Payer: MEDICARE

## 2020-11-29 ENCOUNTER — ANESTHESIA EVENT (OUTPATIENT)
Dept: ENDOSCOPY | Age: 80
DRG: 871 | End: 2020-11-29
Payer: MEDICARE

## 2020-11-29 ENCOUNTER — ANESTHESIA (OUTPATIENT)
Dept: ENDOSCOPY | Age: 80
DRG: 871 | End: 2020-11-29
Payer: MEDICARE

## 2020-11-29 VITALS — DIASTOLIC BLOOD PRESSURE: 65 MMHG | SYSTOLIC BLOOD PRESSURE: 129 MMHG

## 2020-11-29 LAB
A/G RATIO: 1.1 (ref 1.1–2.2)
ALBUMIN SERPL-MCNC: 2.5 G/DL (ref 3.4–5)
ALP BLD-CCNC: 87 U/L (ref 40–129)
ALT SERPL-CCNC: 20 U/L (ref 10–40)
ANION GAP SERPL CALCULATED.3IONS-SCNC: 8 MMOL/L (ref 3–16)
AST SERPL-CCNC: 25 U/L (ref 15–37)
BILIRUB SERPL-MCNC: 1.2 MG/DL (ref 0–1)
BLOOD BANK DISPENSE STATUS: NORMAL
BLOOD BANK DISPENSE STATUS: NORMAL
BLOOD BANK PRODUCT CODE: NORMAL
BLOOD BANK PRODUCT CODE: NORMAL
BPU ID: NORMAL
BPU ID: NORMAL
BUN BLDV-MCNC: 101 MG/DL (ref 7–20)
CALCIUM SERPL-MCNC: 8 MG/DL (ref 8.3–10.6)
CHLORIDE BLD-SCNC: 107 MMOL/L (ref 99–110)
CO2: 23 MMOL/L (ref 21–32)
CREAT SERPL-MCNC: 3.5 MG/DL (ref 0.6–1.2)
DESCRIPTION BLOOD BANK: NORMAL
DESCRIPTION BLOOD BANK: NORMAL
GFR AFRICAN AMERICAN: 15
GFR NON-AFRICAN AMERICAN: 13
GLOBULIN: 2.3 G/DL
GLUCOSE BLD-MCNC: 145 MG/DL (ref 70–99)
HCT VFR BLD CALC: 27.7 % (ref 36–48)
HCT VFR BLD CALC: 28.1 % (ref 36–48)
HCT VFR BLD CALC: 28.1 % (ref 36–48)
HEMOGLOBIN: 9.2 G/DL (ref 12–16)
HEMOGLOBIN: 9.3 G/DL (ref 12–16)
HEMOGLOBIN: 9.4 G/DL (ref 12–16)
INR BLD: 1.2 (ref 0.86–1.14)
POTASSIUM REFLEX MAGNESIUM: 4.7 MMOL/L (ref 3.5–5.1)
PROCALCITONIN: 5.38 NG/ML (ref 0–0.15)
PROTHROMBIN TIME: 14 SEC (ref 10–13.2)
SODIUM BLD-SCNC: 138 MMOL/L (ref 136–145)
TOTAL PROTEIN: 4.8 G/DL (ref 6.4–8.2)

## 2020-11-29 PROCEDURE — 88342 IMHCHEM/IMCYTCHM 1ST ANTB: CPT

## 2020-11-29 PROCEDURE — 3700000000 HC ANESTHESIA ATTENDED CARE: Performed by: INTERNAL MEDICINE

## 2020-11-29 PROCEDURE — 84145 PROCALCITONIN (PCT): CPT

## 2020-11-29 PROCEDURE — 3609012400 HC EGD TRANSORAL BIOPSY SINGLE/MULTIPLE: Performed by: INTERNAL MEDICINE

## 2020-11-29 PROCEDURE — 36415 COLL VENOUS BLD VENIPUNCTURE: CPT

## 2020-11-29 PROCEDURE — 2000000000 HC ICU R&B

## 2020-11-29 PROCEDURE — 2500000003 HC RX 250 WO HCPCS: Performed by: ANESTHESIOLOGY

## 2020-11-29 PROCEDURE — 2580000003 HC RX 258: Performed by: INTERNAL MEDICINE

## 2020-11-29 PROCEDURE — 94150 VITAL CAPACITY TEST: CPT

## 2020-11-29 PROCEDURE — 6370000000 HC RX 637 (ALT 250 FOR IP): Performed by: FAMILY MEDICINE

## 2020-11-29 PROCEDURE — 71045 X-RAY EXAM CHEST 1 VIEW: CPT

## 2020-11-29 PROCEDURE — 88305 TISSUE EXAM BY PATHOLOGIST: CPT

## 2020-11-29 PROCEDURE — 85014 HEMATOCRIT: CPT

## 2020-11-29 PROCEDURE — C9113 INJ PANTOPRAZOLE SODIUM, VIA: HCPCS | Performed by: INTERNAL MEDICINE

## 2020-11-29 PROCEDURE — 85018 HEMOGLOBIN: CPT

## 2020-11-29 PROCEDURE — 99291 CRITICAL CARE FIRST HOUR: CPT | Performed by: INTERNAL MEDICINE

## 2020-11-29 PROCEDURE — 2709999900 HC NON-CHARGEABLE SUPPLY: Performed by: INTERNAL MEDICINE

## 2020-11-29 PROCEDURE — 80053 COMPREHEN METABOLIC PANEL: CPT

## 2020-11-29 PROCEDURE — 0DB68ZX EXCISION OF STOMACH, VIA NATURAL OR ARTIFICIAL OPENING ENDOSCOPIC, DIAGNOSTIC: ICD-10-PCS | Performed by: INTERNAL MEDICINE

## 2020-11-29 PROCEDURE — 85610 PROTHROMBIN TIME: CPT

## 2020-11-29 PROCEDURE — 2720000010 HC SURG SUPPLY STERILE: Performed by: INTERNAL MEDICINE

## 2020-11-29 PROCEDURE — 2700000000 HC OXYGEN THERAPY PER DAY

## 2020-11-29 PROCEDURE — 0W3P8ZZ CONTROL BLEEDING IN GASTROINTESTINAL TRACT, VIA NATURAL OR ARTIFICIAL OPENING ENDOSCOPIC: ICD-10-PCS | Performed by: INTERNAL MEDICINE

## 2020-11-29 PROCEDURE — 6360000002 HC RX W HCPCS: Performed by: INTERNAL MEDICINE

## 2020-11-29 PROCEDURE — 6370000000 HC RX 637 (ALT 250 FOR IP): Performed by: INTERNAL MEDICINE

## 2020-11-29 PROCEDURE — 3609013000 HC EGD TRANSORAL CONTROL BLEEDING ANY METHOD: Performed by: INTERNAL MEDICINE

## 2020-11-29 PROCEDURE — 2580000003 HC RX 258: Performed by: FAMILY MEDICINE

## 2020-11-29 PROCEDURE — 3700000001 HC ADD 15 MINUTES (ANESTHESIA): Performed by: INTERNAL MEDICINE

## 2020-11-29 PROCEDURE — 6360000002 HC RX W HCPCS: Performed by: ANESTHESIOLOGY

## 2020-11-29 PROCEDURE — 2500000003 HC RX 250 WO HCPCS: Performed by: INTERNAL MEDICINE

## 2020-11-29 RX ORDER — SODIUM CHLORIDE, SODIUM LACTATE, POTASSIUM CHLORIDE, CALCIUM CHLORIDE 600; 310; 30; 20 MG/100ML; MG/100ML; MG/100ML; MG/100ML
INJECTION, SOLUTION INTRAVENOUS CONTINUOUS
Status: DISCONTINUED | OUTPATIENT
Start: 2020-11-29 | End: 2020-11-30

## 2020-11-29 RX ORDER — PROPOFOL 10 MG/ML
INJECTION, EMULSION INTRAVENOUS PRN
Status: DISCONTINUED | OUTPATIENT
Start: 2020-11-29 | End: 2020-11-29 | Stop reason: SDUPTHER

## 2020-11-29 RX ORDER — LIDOCAINE HYDROCHLORIDE 20 MG/ML
INJECTION, SOLUTION EPIDURAL; INFILTRATION; INTRACAUDAL; PERINEURAL PRN
Status: DISCONTINUED | OUTPATIENT
Start: 2020-11-29 | End: 2020-11-29 | Stop reason: SDUPTHER

## 2020-11-29 RX ADMIN — PROPOFOL 10 MG: 10 INJECTION, EMULSION INTRAVENOUS at 10:37

## 2020-11-29 RX ADMIN — SODIUM CHLORIDE 8 MG/HR: 9 INJECTION, SOLUTION INTRAVENOUS at 03:05

## 2020-11-29 RX ADMIN — SODIUM CHLORIDE, POTASSIUM CHLORIDE, SODIUM LACTATE AND CALCIUM CHLORIDE: 600; 310; 30; 20 INJECTION, SOLUTION INTRAVENOUS at 22:00

## 2020-11-29 RX ADMIN — SODIUM CHLORIDE 8 MG/HR: 9 INJECTION, SOLUTION INTRAVENOUS at 15:52

## 2020-11-29 RX ADMIN — PROPOFOL 50 MG: 10 INJECTION, EMULSION INTRAVENOUS at 10:21

## 2020-11-29 RX ADMIN — SODIUM BICARBONATE: 84 INJECTION, SOLUTION INTRAVENOUS at 03:05

## 2020-11-29 RX ADMIN — PROPOFOL 10 MG: 10 INJECTION, EMULSION INTRAVENOUS at 10:32

## 2020-11-29 RX ADMIN — CEFEPIME HYDROCHLORIDE 1000 MG: 1 INJECTION, POWDER, FOR SOLUTION INTRAMUSCULAR; INTRAVENOUS at 12:42

## 2020-11-29 RX ADMIN — LIDOCAINE HYDROCHLORIDE 60 MG: 20 INJECTION, SOLUTION EPIDURAL; INFILTRATION; INTRACAUDAL; PERINEURAL at 10:21

## 2020-11-29 RX ADMIN — PROPOFOL 10 MG: 10 INJECTION, EMULSION INTRAVENOUS at 10:25

## 2020-11-29 RX ADMIN — PROPOFOL 10 MG: 10 INJECTION, EMULSION INTRAVENOUS at 10:29

## 2020-11-29 RX ADMIN — MIDODRINE HYDROCHLORIDE 5 MG: 5 TABLET ORAL at 21:55

## 2020-11-29 RX ADMIN — PROPOFOL 10 MG: 10 INJECTION, EMULSION INTRAVENOUS at 10:42

## 2020-11-29 RX ADMIN — ATORVASTATIN CALCIUM 20 MG: 20 TABLET, FILM COATED ORAL at 21:55

## 2020-11-29 RX ADMIN — Medication 10 ML: at 09:06

## 2020-11-29 RX ADMIN — SODIUM CHLORIDE, POTASSIUM CHLORIDE, SODIUM LACTATE AND CALCIUM CHLORIDE: 600; 310; 30; 20 INJECTION, SOLUTION INTRAVENOUS at 09:20

## 2020-11-29 ASSESSMENT — PAIN SCALES - GENERAL
PAINLEVEL_OUTOF10: 0

## 2020-11-29 ASSESSMENT — ENCOUNTER SYMPTOMS
DIARRHEA: 0
SHORTNESS OF BREATH: 0
EYE REDNESS: 0
FACIAL SWELLING: 0
CONSTIPATION: 0
EYE DISCHARGE: 0
COUGH: 0
ABDOMINAL DISTENTION: 0
SHORTNESS OF BREATH: 0
ABDOMINAL PAIN: 0
PHOTOPHOBIA: 0
BLOOD IN STOOL: 0
VOMITING: 0
NAUSEA: 0
CHEST TIGHTNESS: 0

## 2020-11-29 NOTE — ANESTHESIA POSTPROCEDURE EVALUATION
Department of Anesthesiology  Postprocedure Note    Patient: Elham Elizalde  MRN: [id number]  YOB: 1940  Date of evaluation: 11/29/2020  Time:  10:48 AM     Procedure Summary     Date:  11/29/20 Room / Location:  [address]    Anesthesia Start:  3956 Anesthesia Stop:  8008    Procedures:       EGD CONTROL HEMORRHAGE (N/A Abdomen)      EGD BIOPSY (N/A Abdomen) Diagnosis:  (anemia)    Surgeon:  Shira Nesbitt MD Responsible Provider:  Masood Salguero MD    Anesthesia Type:  MAC ASA Status:  3 - Emergent          Anesthesia Type: MAC    Taylor Phase I:      Taylor Phase II:      Last vitals: Reviewed and per EMR flowsheets.        Anesthesia Post Evaluation    Patient location during evaluation: PACU  Patient participation: complete - patient participated  Level of consciousness: awake and alert  Airway patency: patent  Nausea & Vomiting: no nausea and no vomiting  Complications: no  Cardiovascular status: hemodynamically stable  Respiratory status: acceptable  Hydration status: stable

## 2020-11-29 NOTE — PROGRESS NOTES
Nursing reassessment completed. Afebrile. VSS. Atria Fib controlled rate 95. Patient denies pain. Assisted with bedpan. Voiding grisel/tea urine with some blood. No Bloody BM's. Starting her 2nd unit PRBC's. No acute distress. SR up x3. Andre light in reach. Bed at lowest position and wheels are locked.

## 2020-11-29 NOTE — CONSULTS
PULMONARY AND CRITICAL CARE MEDICINE CONSULT NOTE      Name: Kofi Forte  Sex: female  : 1940  MRN: [id number]  Admission Date: 2020  Admission Diagnosis: Renal failure [N19]  Renal failure [N19]  Date of ICU admission:     Reason for ICU admission: Acute GI bleed    HPI: Patient is a 79 y.o. female with past medical history significant for atrial fibrillation, on Coumadin who was initially admitted to the hospital for E. coli bacteremia and UTI with acute renal failure. She was transferred to ICU for acute GI bleed with drop of hemoglobin to 6.9. She is status post blood transfusion and the hemoglobin is improved to 9.3. She underwent EGD this morning that showed mild esophagitis with some erosion and small antral ulcer that required 4 clips and gastric biopsies. She is currently on Protonix drip. Creatinine is improving with IV fluids. She remains on cefepime for E. coli bacteremia. REVIEW OF SYSTEMS:   Constitutional symptoms: The patient denies fever, fatigue, night sweats, weight loss or weight gain. HEENT: No vision changes. No tinnitus, Denies sinus pain. No hoarseness, or dysphagia. Neck: Patient denies swelling in the neck. Cardiovascular: Denies chest pain, palpitation, syncope. Respiratory: Denies shortness of breath, cough, wheezing  Gastrointestinal: Denies nausea, abdominal pain or change in bowel function. Genitourinary: Denies burning in urine  Breasts: No masses or lumps in the breasts. Skin: No rashes or itching. Muskuloskeletal: Denies weakness or bone pain. Neurological: No headaches or seizures. Psychiatric: Denies mood swings or depression. Endocrine: Denies heat or cold intolerance or excessive thirst.  Hematological/Lymphatic: Denies easy bruising or lymph node swelling. Allergic/Immunological: No environmental allergies.     Past Medical History:   Diagnosis Date    A-fib [facility])     Atrial fibrillation ([facility] [address]     Hypercholesterolemia     Hypertension     Pelvic mass      Past Surgical History:   Procedure Laterality Date    APPENDECTOMY      CYSTOSCOPY Right 06/14/2018    retrograde pyelogram, ureteroscopy, and stent placement    HYSTERECTOMY, TOTAL ABDOMINAL  07/09/2018    robotic with BSO, omentectomy, pelvic mass removal, lymphnode dissection    PA CYSTO/URETERO W/LITHOTRIPSY &INDWELL STENT INSRT Right 10/31/2018    CYSTOSCOPY WITH RIGHT URETEROSCOPY HOLMIUM LASER LITHOTRIPSY STONE MANIPULATION STONE BASKET WITH RIGHT STENT EXCHANGE performed by Felicitas Knight MD at [address] History     Socioeconomic History    Marital status:       Spouse name: Not on file    Number of children: Not on file    Years of education: Not on file    Highest education level: Not on file   Occupational History    Not on file   Social Needs    Financial resource strain: Not on file    Food insecurity     Worry: Not on file     Inability: Not on file    Transportation needs     Medical: Not on file     Non-medical: Not on file   Tobacco Use    Smoking status: Never Smoker    Smokeless tobacco: Never Used   Substance and Sexual Activity    Alcohol use: No    Drug use: No    Sexual activity: Not Currently   Lifestyle    Physical activity     Days per week: Not on file     Minutes per session: Not on file    Stress: Not on file   Relationships    Social connections     Talks on phone: Not on file     Gets together: Not on file     Attends Faith service: Not on file     Active member of club or organization: Not on file     Attends meetings of clubs or organizations: Not on file     Relationship status: Not on file    Intimate partner violence     Fear of current or ex partner: Not on file     Emotionally abused: Not on file     Physically abused: Not on file     Forced sexual activity: Not on file   Other Topics Concern    Not on file   Social History Narrative    Not on file     Family History Problem Relation Age of Onset    Heart Disease Father      Allergies   Allergen Reactions    Penicillins      Patient cannot remember reaction    Sulfa Antibiotics        MEDICATIONS:     Scheduled Meds:     sodium chloride  20 mL Intravenous Once    cefepime  1,000 mg Intravenous Q24H    midodrine  5 mg Oral TID WC    PARoxetine  20 mg Oral Daily    atorvastatin  20 mg Oral Nightly    sodium chloride flush  10 mL Intravenous 2 times per day     Current Infusions:    lactated ringers 75 mL/hr at 11/29/20 0920    pantoprozole (PROTONIX) infusion 8 mg/hr (11/29/20 0305)       PRN meds:  sodium chloride flush, acetaminophen **OR** acetaminophen, polyethylene glycol, promethazine **OR** ondansetron    PHYSICAL EXAM:    BP (!) 142/67   Pulse 96   Temp 97 °F (36.1 °C) (Temporal)   Resp 15   Ht 5' 6\" (1.676 m)   Wt 164 lb 14.5 oz (74.8 kg)   SpO2 100%   BMI 26.62 kg/m²  I/O last 3 completed shifts: In: 3003 [I.V.:1403; Blood:1600]  Out: 800 [Urine:800] No intake/output data recorded. Intake/Output Summary (Last 24 hours) at 11/29/2020 1359  Last data filed at 11/29/2020 0305  Gross per 24 hour   Intake 2153 ml   Output 800 ml   Net 1353 ml       Constitutional: She is a 79y.o.-year-old who appears her stated age. She is alert and oriented x 3 and in no acute distress. HEENT: atraumatic, normocephalic, PERRL  Neck: Supple, no JVD  Cardiovascular: S1-S2, no murmurs  Respiratory: No crackles, no wheezing, good air movement. Gastrointestinal: Soft, nontender, positive bowel sounds  Genitourinary: Deferred. Musculoskeletal: No tenderness. No cyanosis, no edema  Skin: No jaundice, no rash  Psychiatric evaluation: Normal affect.   Patient answer questions appropriately  Hematological/Lymphatic: No lymphadenopathy  Neurological: Alert and oriented, grossly nonfocal.    LABS:    Recent Labs     11/27/20  1200 11/28/20  0741 11/28/20  1650 11/29/20  0329 11/29/20  1258   WBC 20.2* 15.2*  --   --   -- review of data including imaging and labs, discussions with other team members and physicians excluding procedures. Electronically signed by Damaso Calderon MD on 11/29/20 at 1:59 PM EST     This note was completed using dragon medical speech recognition software. Grammatical errors, random word insertions, pronoun errors and incomplete sentences are occasional consequences of this technology due to software limitations. If there are questions or concerns about the content of this note of information contained within the body of this dictation, they should be addressed with the provider for clarification.

## 2020-11-29 NOTE — PROGRESS NOTES
Nursing reassessment completed. Patient's Hgb/Hct shows improvement 9.3/28. 1. Potassium reduced to 4.7.  and Cr 3.5. INR 1.20. Incontinent of Bowel/Bladder. Black Tar stool. Some SOB with exertion. RR 19.  O2 100% on 4LO2NC. /54 (68). Atrial Fib 98. Afebrile 97.9. Assisted with repositioning. SR up x3. Call light in reach. Bed at lowest position and wheels locked. Bed alarm activated.

## 2020-11-29 NOTE — PROGRESS NOTES
Physician Progress Note      Griselda Lynne  [facility] #:                  [id number]  :                       1940  ADMIT DATE:       2020 11:37 AM  DISCH DATE:  Lee Ann Duran  PROVIDER #:        Tammi Scheuermann MD          QUERY TEXT:    Dear Dr. Palak Jennings,  Pt admitted with pneumonia. Pt noted to have severe sepsis in ED provider   notes. If possible, please document in the progress notes and discharge   summary if you are evaluating and /or treating any of the following: The medical record reflects the following:  Risk Factors: Age, multifocal pneumonia  Clinical Indicators: CXR with streaky bibasilar airspace opacities may relate   to atelectasis or multifocal pneumonia with atypical/viral pneumonia in the   differential.  Acute kidney injury. WBC 20.2  ANC 17.2  Lactic Acid 2.2  Treatment: Blood cultures, COVID test pending, Oxygen therapy, IV fluids per   sepsis bolus protocol, Antibiotics. Thank you. Vear Halsted RN [email or web address]  Options provided:  -- Sepsis, present on admission  -- Sepsis, not present on admission,  -- No Sepsis, only pneumonia  -- Sepsis was ruled out  -- Other - I will add my own diagnosis  -- Disagree - Not applicable / Not valid  -- Disagree - Clinically unable to determine / Unknown  -- Refer to Clinical Documentation Reviewer    PROVIDER RESPONSE TEXT:    This patient has sepsis which was present on admission.     Query created by: Mirella Gutierres on 2020 12:01 PM      Electronically signed by:  Tammi Scheuermann MD 2020 11:04 AM

## 2020-11-29 NOTE — BRIEF OP NOTE
Brief Postoperative Note      Patient: eCce Brandt  YOB: 1940  MRN: [id number]    Date of Procedure: 11/29/2020    Pre-Op Diagnosis: anemia, acute blood loss anemia          Procedure(s):  EGD CONTROL HEMORRHAGE  EGD BIOPSY    Surgeon(s):  Jorden Xie MD    Anesthesia: Monitor Anesthesia Care    Estimated Blood Loss (mL): Minimal    Complications: None    Specimens:   ID Type Source Tests Collected by Time Destination   A : bx gastric for HPylori Gastric Gastric SURGICAL PATHOLOGY Regi Knight RN 11/29/2020 1049      Findings:   Mild esophagitis. 3 cm HH with erosions, Small antral ulcers s/p 4 clips, s/p gastric biopsies. Blood in fundus, limiting endoscopic evaluation. Mild duodenitis. Incidental D2 diverticulum     Plans:   Await biopsies. IV PPI X 72 hours, Then switch to oral PPI BID X 8 weeks, followed by daily PPI thereafater. No NSAIDs. Repeat EGD in 8 weeks to ensure ulcer healing. Monitor H/H. If H/H stable, may start clears later tonight.      Electronically signed by Jorden Xie MD on 11/29/2020 at 10:54 AM

## 2020-11-29 NOTE — PROGRESS NOTES
Hospitalist Progress Note      PCP: Tatiana Yousif, APRN - CNP    Date of Admission: 11/27/2020    Chief Complaint: Dyspnea    Hospital Course:   Patient had EGD this morning  Denies any shortness of breath  Saturating 100 on 4 L oxygen  Denies any cough  No fevers  COVID-19 negative  Afebrile      Medications:  Reviewed      Exam:    /70   Pulse 87   Temp 97.9 °F (36.6 °C)   Resp 15   Ht 5' 6\" (1.676 m)   Wt 164 lb 14.5 oz (74.8 kg)   SpO2 100%   BMI 26.62 kg/m²     General appearance: No apparent distress, appears stated age and cooperative. HEENT: Pupils equal, round, and reactive to light. Conjunctivae/corneas clear. Neck: Supple, with full range of motion. No jugular venous distention. Trachea midline. Respiratory: Few bibasal crepitations  Cardiovascular: Regular rate and rhythm with normal S1/S2 without MURMURS, rubs or gallops. Abdomen: Soft, non-tender, non-distended with normal bowel sounds. Musculoskeletal: No clubbing, cyanosis or EDEMA bilaterally. Full range of motion without deformity. Skin: Skin color, texture, turgor normal.  No rashes or lesions. Neurologic:  Neurovascularly intact without any focal sensory/motor deficits.  Cranial nerves: II-XII intact, grossly non-focal.        Labs:   Recent Labs     11/27/20  1200 11/28/20  0741 11/28/20 1650 11/29/20 0329   WBC 20.2* 15.2*  --   --    HGB 14.4 11.0* 6.9* 9.3*   HCT 44.0 33.5* 21.5* 28.1*    165  --   --      Recent Labs     11/28/20  0741 11/28/20  1650 11/29/20  0329    139 138   K 5.2* 5.5* 4.7    109 107   CO2 16* 20* 23   BUN 88* 97* 101*   CREATININE 4.2* 3.9* 3.5*   CALCIUM 8.4 7.9* 8.0*   PHOS 3.8  --   --      Recent Labs     11/27/20  1200 11/28/20  0741 11/29/20  0329   AST 43* 41* 25   ALT 26 26 20   BILITOT 0.6 0.6 1.2*   ALKPHOS 136* 119 87     Recent Labs     11/27/20  1200 11/29/20  0329   INR 3.26* 1.20*     Recent Labs     11/27/20  1200 11/27/20  2046   TROPONINI 0.04* 0.01 Urinalysis:      Lab Results   Component Value Date    NITRU Negative 11/27/2020    WBCUA 114 11/27/2020    BACTERIA RARE 06/14/2018    RBCUA 11 11/27/2020    BLOODU MODERATE 11/27/2020    SPECGRAV 1.013 11/27/2020    GLUCOSEU Negative 11/27/2020       Radiology:  XR CHEST PORTABLE   Final Result   Minimal streaky bibasilar airspace opacities may relate to atelectasis or   multifocal pneumonia with atypical/viral pneumonia in the differential.      Stable cardiomegaly.          XR CHEST (2 VW)    (Results Pending)       Assessment/Plan:    Active Hospital Problems    Diagnosis Date Noted    Renal failure [N19] 11/27/2020       Acute Medical Issues Being Addressed:    79-year-old lady admitted with shortness of breath cough and GI bleed    Sepsis secondary to E. coli bacteremia probably intra-abdominal source due to bacterial translocation from GI bleed versus urinary tract infection  She had some cough and shortness of breath having reviewed the chest x-ray of not convinced it is a pneumonia  Repeat blood cultures  Discontinue azithromycin  Repeat chest x-ray two-view  For now we will continue cefepime until sensitivities are available  We will see what the urine analysis and culture shows we will probably get a CT abdomen and pelvis without contrast tomorrow    Acute upper GI bleed due to esophagitis and gastric antral ulcers  S/p EGD which showed mild esophagitis with some erosion  Small antral ulcer status for 4 clips and gastric biopsies  Some blood in the fundus  Mild duodenitis  Incidental D2 diverticulum  Continue pantoprazole drip for neck 72 hours    Anemia acute blood loss  S/p blood transfusion  Hemoglobin up to 9.3  Monitor hemoglobin every 8 hours  No other evidence of active bleeding    Acute kidney injury prerenal  Continue IV fluids  Creatinine trending down to 3.5  Nephrology following along    Hyponatremia secondary to hypovolemia resolved    Chronic atrial fibrillation  On diltiazem which is

## 2020-11-29 NOTE — ANESTHESIA PRE PROCEDURE
Department of Anesthesiology  Preprocedure Note       Name:  Pita Poe   Age:  79 y.o.  :  1940                                          MRN:  [id number]         Date:  2020      Surgeon: Papa Grier):  Mikki Lei MD    Procedure: Procedure(s):  EGD DIAGNOSTIC ONLY    Medications prior to admission:   Prior to Admission medications    Medication Sig Start Date End Date Taking? Authorizing Provider   warfarin (COUMADIN) 5 MG tablet 5 mg three days a week 2.5 the other. Patient taking differently: Take 2.5 mg by mouth daily 5 mg three days a week 2.5 the other.  10/29/20  Yes Kvng Lane MD   atenolol (TENORMIN) 100 MG tablet Take 1 tablet by mouth daily 20 Yes Kvng Lane MD   dilTIAZem (CARDIZEM) 60 MG tablet Take 0.5 tablets by mouth every 12 hours 20  Yes Kvng Lane MD   simvastatin (ZOCOR) 40 MG tablet Take 1 tablet by mouth nightly 20 Yes Kvng Lane MD   PARoxetine (PAXIL) 20 MG tablet Take 1 tablet by mouth daily 20  Yes Kvng Lane MD   Omega-3 Fatty Acids (FISH OIL PO) Take by mouth   Yes Historical Provider, MD   Multiple Vitamins-Minerals (MULTIVITAMIN PO) Take by mouth   Yes Historical Provider, MD       Current medications:    Current Facility-Administered Medications   Medication Dose Route Frequency Provider Last Rate Last Dose    lactated ringers infusion   Intravenous Continuous Ruchi Garcia MD        0.9 % sodium chloride bolus  20 mL Intravenous Once Leonardo King MD        pantoprazole (PROTONIX) 80 mg in sodium chloride 0.9 % 100 mL infusion  8 mg/hr Intravenous Continuous Leonardo King MD 10 mL/hr at 20 0305 8 mg/hr at 20 0305    cefepime (MAXIPIME) 1 g IVPB minibag  1,000 mg Intravenous Q24H Leonardo King MD   Stopped at 20 1702    midodrine (PROAMATINE) tablet 5 mg  5 mg Oral TID WC Deandra Taylor MD   5 mg at 20 1544    azithromycin (ZITHROMAX) 500 mg in D5W 250ml Vial Mate  500 mg Intravenous Q24H Kaylene Grey MD   Stopped at 11/28/20 1702    PARoxetine (PAXIL) tablet 20 mg  20 mg Oral Daily Kaylene Grey MD   20 mg at 11/28/20 1543    atorvastatin (LIPITOR) tablet 20 mg  20 mg Oral Nightly Kaylene Grey MD   20 mg at 11/27/20 2123    sodium chloride flush 0.9 % injection 10 mL  10 mL Intravenous 2 times per day Kaylene Grey MD        sodium chloride flush 0.9 % injection 10 mL  10 mL Intravenous PRN Kaylene Coma, MD       Albright acetaminophen (TYLENOL) tablet 650 mg  650 mg Oral Q6H PRN Kaylene MD Que        Or   Albright acetaminophen (TYLENOL) suppository 650 mg  650 mg Rectal Q6H PRN Kaylene Coma, MD        polyethylene glycol (GLYCOLAX) packet 17 g  17 g Oral Daily PRN Kaylene Grey MD        promethazine (PHENERGAN) tablet 12.5 mg  12.5 mg Oral Q6H PRN Kaylene Grey MD        Or    ondansetron (ZOFRAN) injection 4 mg  4 mg Intravenous Q6H PRN Kaylene Grey MD   4 mg at 11/28/20 1734       Allergies:     Allergies   Allergen Reactions    Penicillins      Patient cannot remember reaction    Sulfa Antibiotics        Problem List:    Patient Active Problem List   Diagnosis Code    Essential hypertension I10    Atrial fibrillation, chronic (HCC) I48.20    Severe sepsis (HCC) A41.9, R65.20    Hydroureteronephrosis N13.30    Pelvic mass R19.00    CHARISSE (acute kidney injury) ([facility] [address] N17.9    Acute metabolic encephalopathy D46.30    Mixed hyperlipidemia E78.2    Primary osteoarthritis of left knee M17.12    Effusion of left knee joint M25.462    Synovitis of left knee M65.9    Pelvic mass in female R19.00    Renal failure N19       Past Medical History:        Diagnosis Date    A-fib ([address]     Atrial fibrillation ([address]     Hypercholesterolemia     Hypertension     Pelvic mass        Past Surgical History:        Procedure Laterality Date    APPENDECTOMY      CYSTOSCOPY Right 06/14/2018    retrograde pyelogram, ureteroscopy, and stent placement    Tests:   Recent Labs     11/28/20  0213   POCGLU 117*       Coags:   Lab Results   Component Value Date    PROTIME 14.0 11/29/2020    PROTIME 3.3 07/08/2013    INR 1.20 11/29/2020    APTT 26.3 10/31/2018       HCG (If Applicable): No results found for: PREGTESTUR, PREGSERUM, HCG, HCGQUANT     ABGs: No results found for: PHART, PO2ART, RLR1TJZ, QJN6CEH, BEART, R6EABSVL     Type & Screen (If Applicable):  No results found for: LABABO, LABRH    Drug/Infectious Status (If Applicable):  No results found for: HIV, HEPCAB    COVID-19 Screening (If Applicable):   Lab Results   Component Value Date    COVID19 Not Detected 11/27/2020         Anesthesia Evaluation  Patient summary reviewed and Nursing notes reviewed no history of anesthetic complications:   Airway: Mallampati: I  TM distance: >3 FB   Neck ROM: full  Mouth opening: > = 3 FB Dental: normal exam         Pulmonary:   (+) pneumonia: unresolved,      (-) asthma and shortness of breath                           Cardiovascular:    (+) hypertension:, dysrhythmias: atrial fibrillation,     (-)  angina          Echocardiogram reviewed                  Neuro/Psych:      (-) CVA           GI/Hepatic/Renal:        (-) GERD and liver disease       Endo/Other:    (+) blood dyscrasia: anemia:., .    (-) diabetes mellitus, hypothyroidism               Abdominal:           Vascular:     - PVD. Anesthesia Plan      MAC     ASA 3 - emergent       Induction: intravenous. Anesthetic plan and risks discussed with patient.                       Darcy Ortez MD   11/29/2020

## 2020-11-29 NOTE — PROGRESS NOTES
Nursing assessment completed. Afebrile. Atrial Fib 108. Patient is on Coumadin for her AFib. RR 17.  BP 98/64.  100% on 5LO2NC. Reduced O2 to 4LO2NC. LS CTA diminished. Voiding bloody urine. Assisted up to bedside commode. Some SOB noted with getting up to bedside commode. Incontinent of liquid Brown-dark red BM in bed. Changed out her bed linens. Assisted back into bed. Will ask for johnson cath. Voided 300 cc urine dark with blood clots and sri dark blood. Reviewed plan of care. Started #20 ga left antecubutal fossa. Bicarb drip infusing through right arm PIV. Protonix drip infusing through left arm PIV. New IV started for blood products. SR up x3. Call light in reach. Bed in lowest position and wheels locked.

## 2020-11-29 NOTE — PROGRESS NOTES
Incentive Spirometry education and demonstration completed by Respiratory Therapy Yes      Response to education: Fair     Teaching Time: 5 minutes    Minimum Predicted Vital Capacity - 300 mL. Patient's Actual Vital Capacity - 593 mL. Turning over to Nursing for routine follow-up No.    Comments:  Will continue to  pt until goal is met    Electronically signed by Mayank Jacobo RCP on 11/29/2020 at 6:12 PM

## 2020-11-29 NOTE — PROGRESS NOTES
[facility] Renal ICU Hospital Note    Patient Active Problem List   Diagnosis    Essential hypertension    Atrial fibrillation, chronic (HCC)    Severe sepsis (HCC)    Hydroureteronephrosis    Pelvic mass    CHARISSE (acute kidney injury) ([address]    Acute metabolic encephalopathy    Mixed hyperlipidemia    Primary osteoarthritis of left knee    Effusion of left knee joint    Synovitis of left knee    Pelvic mass in female    Renal failure       Past Medical History:   has a past medical history of A-fib ([address] Atrial fibrillation ([address] Hypercholesterolemia, Hypertension, and Pelvic mass. Past Social History:   reports that she has never smoked. She has never used smokeless tobacco. She reports that she does not drink alcohol or use drugs. Subjective:  Appears more conversant today    Review of Systems   Constitutional: Positive for fatigue. Negative for activity change, appetite change, chills, fever and unexpected weight change. HENT: Negative for congestion and facial swelling. Eyes: Negative for photophobia, discharge and redness. Respiratory: Negative for cough, chest tightness and shortness of breath. Cardiovascular: Negative for chest pain, palpitations and leg swelling. Gastrointestinal: Negative for abdominal distention, abdominal pain, blood in stool, constipation, diarrhea, nausea and vomiting. Endocrine: Negative for cold intolerance, heat intolerance and polyuria. Genitourinary: Negative for decreased urine volume, difficulty urinating, flank pain and hematuria. Musculoskeletal: Negative for joint swelling and neck pain. Neurological: Negative for dizziness, seizures, syncope, speech difficulty, light-headedness and headaches. Hematological: Does not bruise/bleed easily. Psychiatric/Behavioral: Negative for agitation, confusion and hallucinations.        Objective:      /70   Pulse 87   Temp 97.9 °F (36.6 °C)   Resp 15   Ht 5' 6\" (1.676 m)   Wt 164 lb 14.5 oz (74.8 kg)   SpO2 100%   BMI 26.62 kg/m²     Wt Readings from Last 3 Encounters:   11/29/20 164 lb 14.5 oz (74.8 kg)   10/29/20 155 lb (70.3 kg)   04/30/20 160 lb (72.6 kg)       BP Readings from Last 3 Encounters:   11/29/20 131/70   11/29/20 131/70   10/29/20 138/84     Chest- rhonchi b/l  Heart-regular  Abd-soft  Ext- 1+ edema    Labs  Hemoglobin   Date Value Ref Range Status   11/29/2020 9.3 (L) 12.0 - 16.0 g/dL Final     Hematocrit   Date Value Ref Range Status   11/29/2020 28.1 (L) 36.0 - 48.0 % Final     WBC   Date Value Ref Range Status   11/28/2020 15.2 (H) 4.0 - 11.0 K/uL Final     Platelets   Date Value Ref Range Status   11/28/2020 165 135 - 450 K/uL Final     Lab Results   Component Value Date    CREATININE 3.5 (H) 11/29/2020     (HH) 11/29/2020     11/29/2020    K 4.7 11/29/2020     11/29/2020    CO2 23 11/29/2020       Assessment/Plan:  1. Acute kidney injury on CKD IIIA, baseline creatinine 1.2. Currently  Non-oliguric. Possible ATN. Creatinine is improving. No need for acute renal replacement therapy today, but would need close followup. Agree with LR.   2.  Hyperkalemia in the setting of decreased GFR and metabolic acidosis. Low potassium diet. Better. Follow with LR.   3.  Hyponatremia in the setting of hypotension. This is better. 4.  Low serum bicarbonate. Positive anion gap. Probably secondary to  lactic acidosis and renal impairment. Better. 5.  Relative hypotension. Better. Continue Midodrine. 6.  Pneumonia-now on Cefepime. Appreciate Med and ICU help. 7.  Possible GI bleed. GI is following. Currently on PPI. For EGD. Hgb signficantly lower. Transfuse prbc. 8.  COVID testing is negative    High risk. Discussed with nurse.      Herlinda Bennett MD

## 2020-11-30 LAB
ANION GAP SERPL CALCULATED.3IONS-SCNC: 8 MMOL/L (ref 3–16)
BASOPHILS ABSOLUTE: 0.1 K/UL (ref 0–0.2)
BASOPHILS RELATIVE PERCENT: 0.8 %
BUN BLDV-MCNC: 75 MG/DL (ref 7–20)
CALCIUM SERPL-MCNC: 8.6 MG/DL (ref 8.3–10.6)
CHLORIDE BLD-SCNC: 108 MMOL/L (ref 99–110)
CO2: 27 MMOL/L (ref 21–32)
CREAT SERPL-MCNC: 2.6 MG/DL (ref 0.6–1.2)
EOSINOPHILS ABSOLUTE: 0.4 K/UL (ref 0–0.6)
EOSINOPHILS RELATIVE PERCENT: 4.7 %
GFR AFRICAN AMERICAN: 21
GFR NON-AFRICAN AMERICAN: 18
GLUCOSE BLD-MCNC: 104 MG/DL (ref 70–99)
HCT VFR BLD CALC: 25.4 % (ref 36–48)
HCT VFR BLD CALC: 25.6 % (ref 36–48)
HEMOGLOBIN: 8.6 G/DL (ref 12–16)
HEMOGLOBIN: 8.6 G/DL (ref 12–16)
INR BLD: 1.07 (ref 0.86–1.14)
LYMPHOCYTES ABSOLUTE: 1.3 K/UL (ref 1–5.1)
LYMPHOCYTES RELATIVE PERCENT: 14.8 %
MCH RBC QN AUTO: 30.3 PG (ref 26–34)
MCHC RBC AUTO-ENTMCNC: 33.9 G/DL (ref 31–36)
MCV RBC AUTO: 89.3 FL (ref 80–100)
MONOCYTES ABSOLUTE: 1 K/UL (ref 0–1.3)
MONOCYTES RELATIVE PERCENT: 11.6 %
NEUTROPHILS ABSOLUTE: 6 K/UL (ref 1.7–7.7)
NEUTROPHILS RELATIVE PERCENT: 68.1 %
PDW BLD-RTO: 15.3 % (ref 12.4–15.4)
PLATELET # BLD: 125 K/UL (ref 135–450)
PMV BLD AUTO: 8.9 FL (ref 5–10.5)
POTASSIUM REFLEX MAGNESIUM: 4.1 MMOL/L (ref 3.5–5.1)
PROTHROMBIN TIME: 12.4 SEC (ref 10–13.2)
RBC # BLD: 2.84 M/UL (ref 4–5.2)
SODIUM BLD-SCNC: 143 MMOL/L (ref 136–145)
WBC # BLD: 8.8 K/UL (ref 4–11)

## 2020-11-30 PROCEDURE — 85025 COMPLETE CBC W/AUTO DIFF WBC: CPT

## 2020-11-30 PROCEDURE — 80048 BASIC METABOLIC PNL TOTAL CA: CPT

## 2020-11-30 PROCEDURE — 87040 BLOOD CULTURE FOR BACTERIA: CPT

## 2020-11-30 PROCEDURE — 36415 COLL VENOUS BLD VENIPUNCTURE: CPT

## 2020-11-30 PROCEDURE — 6360000002 HC RX W HCPCS: Performed by: INTERNAL MEDICINE

## 2020-11-30 PROCEDURE — 6370000000 HC RX 637 (ALT 250 FOR IP): Performed by: INTERNAL MEDICINE

## 2020-11-30 PROCEDURE — 2580000003 HC RX 258: Performed by: INTERNAL MEDICINE

## 2020-11-30 PROCEDURE — 6370000000 HC RX 637 (ALT 250 FOR IP): Performed by: FAMILY MEDICINE

## 2020-11-30 PROCEDURE — 85014 HEMATOCRIT: CPT

## 2020-11-30 PROCEDURE — 85610 PROTHROMBIN TIME: CPT

## 2020-11-30 PROCEDURE — 2580000003 HC RX 258: Performed by: FAMILY MEDICINE

## 2020-11-30 PROCEDURE — 2000000000 HC ICU R&B

## 2020-11-30 PROCEDURE — C9113 INJ PANTOPRAZOLE SODIUM, VIA: HCPCS | Performed by: INTERNAL MEDICINE

## 2020-11-30 PROCEDURE — 99223 1ST HOSP IP/OBS HIGH 75: CPT | Performed by: INTERNAL MEDICINE

## 2020-11-30 PROCEDURE — 99233 SBSQ HOSP IP/OBS HIGH 50: CPT | Performed by: INTERNAL MEDICINE

## 2020-11-30 PROCEDURE — 85018 HEMOGLOBIN: CPT

## 2020-11-30 RX ORDER — SODIUM CHLORIDE, SODIUM LACTATE, POTASSIUM CHLORIDE, CALCIUM CHLORIDE 600; 310; 30; 20 MG/100ML; MG/100ML; MG/100ML; MG/100ML
INJECTION, SOLUTION INTRAVENOUS CONTINUOUS
Status: DISCONTINUED | OUTPATIENT
Start: 2020-11-30 | End: 2020-12-04 | Stop reason: HOSPADM

## 2020-11-30 RX ORDER — PANTOPRAZOLE SODIUM 40 MG/1
40 TABLET, DELAYED RELEASE ORAL
Status: DISCONTINUED | OUTPATIENT
Start: 2020-12-01 | End: 2020-12-04 | Stop reason: HOSPADM

## 2020-11-30 RX ORDER — CIPROFLOXACIN 2 MG/ML
400 INJECTION, SOLUTION INTRAVENOUS EVERY 24 HOURS
Status: DISCONTINUED | OUTPATIENT
Start: 2020-11-30 | End: 2020-12-04 | Stop reason: HOSPADM

## 2020-11-30 RX ADMIN — PAROXETINE HYDROCHLORIDE 20 MG: 20 TABLET, FILM COATED ORAL at 10:03

## 2020-11-30 RX ADMIN — CIPROFLOXACIN 400 MG: 2 INJECTION, SOLUTION INTRAVENOUS at 21:02

## 2020-11-30 RX ADMIN — CEFTRIAXONE 2 G: 2 INJECTION, POWDER, FOR SOLUTION INTRAMUSCULAR; INTRAVENOUS at 12:04

## 2020-11-30 RX ADMIN — MIDODRINE HYDROCHLORIDE 5 MG: 5 TABLET ORAL at 10:03

## 2020-11-30 RX ADMIN — SODIUM CHLORIDE 8 MG/HR: 9 INJECTION, SOLUTION INTRAVENOUS at 10:03

## 2020-11-30 RX ADMIN — SODIUM CHLORIDE 8 MG/HR: 9 INJECTION, SOLUTION INTRAVENOUS at 00:15

## 2020-11-30 RX ADMIN — MIDODRINE HYDROCHLORIDE 5 MG: 5 TABLET ORAL at 17:57

## 2020-11-30 RX ADMIN — Medication 10 ML: at 21:03

## 2020-11-30 RX ADMIN — MIDODRINE HYDROCHLORIDE 5 MG: 5 TABLET ORAL at 12:04

## 2020-11-30 RX ADMIN — SODIUM CHLORIDE 8 MG/HR: 9 INJECTION, SOLUTION INTRAVENOUS at 21:05

## 2020-11-30 RX ADMIN — SODIUM CHLORIDE, POTASSIUM CHLORIDE, SODIUM LACTATE AND CALCIUM CHLORIDE: 600; 310; 30; 20 INJECTION, SOLUTION INTRAVENOUS at 17:58

## 2020-11-30 RX ADMIN — ATORVASTATIN CALCIUM 20 MG: 20 TABLET, FILM COATED ORAL at 21:02

## 2020-11-30 ASSESSMENT — ENCOUNTER SYMPTOMS
CONSTIPATION: 0
RHINORRHEA: 0
SHORTNESS OF BREATH: 1
PHOTOPHOBIA: 0
APNEA: 0
EYE DISCHARGE: 0
STRIDOR: 0
ABDOMINAL DISTENTION: 0
FACIAL SWELLING: 0
COUGH: 0
TROUBLE SWALLOWING: 0
EYE REDNESS: 0
ABDOMINAL PAIN: 0
DIARRHEA: 0
CHEST TIGHTNESS: 0
NAUSEA: 0
SHORTNESS OF BREATH: 0
CHOKING: 0
COLOR CHANGE: 0
VOMITING: 0
BLOOD IN STOOL: 0

## 2020-11-30 ASSESSMENT — PAIN SCALES - GENERAL
PAINLEVEL_OUTOF10: 0

## 2020-11-30 NOTE — PROGRESS NOTES
[facility] Renal ICU Hospital Note      Treatment plan update. Acute kidney injury. Severe. Slow improvement. Nonoliguric. Repeat creatinine is 2.6. Creatinine 3.5 on admission. Improving urine output. May have developed ATN. Hypotension. Improving. Continue midodrine    Multiple Electrolyte imbalance. Improving to baseline. Hyperkalemia. Improved. Hyponatremia. Improved. Metabolic Acidosis. Improving       Antibiotic dose reviewed. Appropriate for level of renal function. No indications for dialysis. Continue to monitor closely. Anemia levels are stable. High complexity. Discussed with RN    Patient Active Problem List   Diagnosis    Essential hypertension    Atrial fibrillation, chronic (HCC)    Severe sepsis (HCC)    Hydroureteronephrosis    Pelvic mass    [address]ISSE ([facility]) [address]    Acute metabolic encephalopathy    Mixed hyperlipidemia    Primary osteoarthritis of left knee    Effusion of left knee joint    Synovitis of left knee    Pelvic mass in female    Renal failure       Past Medical History:   has a past medical history of A-fib ([facility] Atrial fibrillation ([facility] Hypercholesterolemia, Hypertension, and Pelvic mass. Past Social History:   reports that she has never smoked. She has never used smokeless tobacco. She reports that she does not drink alcohol or use drugs. Subjective:  Appears more conversant today    Review of Systems   Constitutional: Positive for fatigue. Negative for activity change, appetite change, chills, fever and unexpected weight change. HENT: Negative for congestion and facial swelling. Eyes: Negative for photophobia, discharge and redness. Respiratory: Negative for cough, chest tightness and shortness of breath. Cardiovascular: Negative for chest pain, palpitations and leg swelling.    Gastrointestinal: Negative for abdominal distention, abdominal pain, blood in stool, constipation, diarrhea, nausea and vomiting. Endocrine: Negative for cold intolerance, heat intolerance and polyuria. Genitourinary: Negative for decreased urine volume, difficulty urinating, flank pain and hematuria. Musculoskeletal: Negative for joint swelling and neck pain. Neurological: Negative for dizziness, seizures, syncope, speech difficulty, light-headedness and headaches. Hematological: Does not bruise/bleed easily. Psychiatric/Behavioral: Negative for agitation, confusion and hallucinations. Objective:      /71   Pulse 91   Temp 97.4 °F (36.3 °C) (Temporal)   Resp 15   Ht 5' 6\" (1.676 m)   Wt 161 lb 13.1 oz (73.4 kg)   SpO2 96%   BMI 26.12 kg/m²     Wt Readings from Last 3 Encounters:   11/30/20 161 lb 13.1 oz (73.4 kg)   10/29/20 155 lb (70.3 kg)   04/30/20 160 lb (72.6 kg)       BP Readings from Last 3 Encounters:   11/30/20 116/71   11/29/20 129/65   10/29/20 138/84     Chest- rhonchi b/l  Heart-regular  Abd-soft  Ext- 1+ edema    Labs  Hemoglobin   Date Value Ref Range Status   11/30/2020 8.6 (L) 12.0 - 16.0 g/dL Final     Hematocrit   Date Value Ref Range Status   11/30/2020 25.4 (L) 36.0 - 48.0 % Final     WBC   Date Value Ref Range Status   11/30/2020 8.8 4.0 - 11.0 K/uL Final     Platelets   Date Value Ref Range Status   11/30/2020 125 (L) 135 - 450 K/uL Final     Lab Results   Component Value Date    CREATININE 3.5 (H) 11/29/2020     (HH) 11/29/2020     11/29/2020    K 4.7 11/29/2020     11/29/2020    CO2 23 11/29/2020       Assessment/Plan:  1. Acute kidney injury on CKD IIIA, baseline creatinine 1.2. Currently  Non-oliguric. Possible ATN. Creatinine is improving. No need for acute renal replacement therapy today, but would need close followup. Agree with LR.   2.  Hyperkalemia in the setting of decreased GFR and metabolic acidosis. Low potassium diet. Better. Follow with LR.   3.  Hyponatremia in the setting of hypotension. This is better. 4.  Low serum bicarbonate. Positive anion gap. Probably secondary to  lactic acidosis and renal impairment. Better. 5.  Relative hypotension. Better. Continue Midodrine. 6.  Pneumonia-now on Cefepime. Appreciate Med and ICU help. 7.  Possible GI bleed. GI is following. Currently on PPI. For EGD. Hgb signficantly lower. Transfuse prbc. 8.  COVID testing is negative    High risk. Discussed with nurse.      Kaya Peguero MD

## 2020-11-30 NOTE — PROGRESS NOTES
--  2.84*   HGB 11.0*   < > 9.4* 9.2* 8.6*   HCT 33.5*   < > 28.1* 27.7* 25.4*     --   --   --  125*   MCV 93.6  --   --   --  89.3   MCH 30.7  --   --   --  30.3   MCHC 32.8  --   --   --  33.9   RDW 14.3  --   --   --  15.3    < > = values in this interval not displayed. BMP:  Recent Labs     11/28/20  0741 11/28/20  1650 11/29/20  0329    139 138   K 5.2* 5.5* 4.7    109 107   CO2 16* 20* 23   BUN 88* 97* 101*   CREATININE 4.2* 3.9* 3.5*   CALCIUM 8.4 7.9* 8.0*   GLUCOSE 172* 157* 145*      ABG:  No results for input(s): PHART, WKZ4CVQ, PO2ART, JYH5TYD, H1NXSBLB, BEART in the last 72 hours. Cultures:    Abx:    Radiology Review:  Pertinent images / reports were reviewed as a part of this visit. Assessment:     1. Upper GI bleed  2. Acute blood loss anemia  3. Acute kidney injury  4. Sepsis secondary to E. coli bacteremia/UTI  5.  Atrial fibrillation    I have reviewed laboratories, medical records and images for this visit  Patient did require transfusion  Hemoglobin now 8.6  Hemodynamically stable  No obvious further bleeding  Plan is to continue Protonix drip until tomorrow morning

## 2020-11-30 NOTE — CONSULTS
Infectious Diseases   Consult Note        Admission Date: 11/27/2020  Hospital Day: Hospital Day: 4   Attending: Rosey Love MD  Date of service: 11/30/20     Reason for admission: Renal failure [N19]  Renal failure [N19]    Chief complaint/ Reason for consult: Sepsis, E. coli bacteremia    Microbiology:        I have reviewed allavailable micro lab data and cultures    · Blood culture (2/2) - collected on 11/27/2020: E. coli    Escherichia coli (2)     Antibiotic  Interpretation  YONATAN  Status     ampicillin  Resistant  >=32  mcg/mL      ceFAZolin  Sensitive  <=4  mcg/mL      cefepime  Sensitive  <=0.12  mcg/mL      cefTRIAXone  Sensitive  <=0.25  mcg/mL      ciprofloxacin  Sensitive  <=0.25  mcg/mL      ertapenem  Sensitive  <=0.12  mcg/mL      gentamicin  Sensitive  <=1  mcg/mL      levofloxacin  Sensitive  <=0.12  mcg/mL      piperacillin-tazobactam  Sensitive  <=4  mcg/mL      trimethoprim-sulfamethoxazole  Sensitive  <=20  mcg/mL              Antibiotics and immunizations:       Current antibiotics: All antibiotics and their doses were reviewed by me    Recent Abx Admin                   cefTRIAXone (ROCEPHIN) 2 g IVPB in D5W 50ml minibag (g) 2 g New Bag 11/30/20 1204                  Immunization History: All immunization history was reviewed by me today. There is no immunization history on file for this patient. Known drug allergies: All allergies were reviewed and updated    Allergies   Allergen Reactions    Penicillins      Patient cannot remember reaction    Sulfa Antibiotics        Social history:     Social History:  All social andepidemiologic history was reviewed and updated by me today as needed. · Tobacco use:   reports that she has never smoked. She has never used smokeless tobacco.  · Alcohol use:   reports no history of alcohol use. · Currently lives in: [facility]  ·  reports no history of drug use.        Assessment:     The patient is a 79 y.o. old female who  has a past medical history of A-fib [facility]), Atrial fibrillation ([address] Hypercholesterolemia, Hypertension, and Pelvic mass. with following problems:    · Sepsis on admission with bandemia, hypotension, lactic acidosis  · Gram-negative bacteremia with E. coli  · Acute respiratory failure with hypoxia  · Bilateral atypical infiltrates on chest x-ray-COVID-19 PCR negative on admission  · Acute kidney injury on chronic kidney disease  · Essential hypertension  · Chronic atrial fibrillation      Discussion:      The patient was admitted with severe sepsis, lactic acidosis, hypotension and acute renal failure. Currently on IV ceftriaxone for E. coli bacteremia. Had an EGD, showed mild esophagitis. Got blood transfusions for anemia of acute blood loss. Urinalysis on admission was concerning for UTI. Unfortunately, no urine culture available. I think the bacteremia was likely urinary source. Plan:     Diagnostic Workup:    · Agree with repeat blood culture  · Continue to follow fever curve, WBC count and blood cultures  · Follow up on liverand renal functions closely    Antimicrobials:    · E. coli isolated fluoroquinolone susceptible  · Will switch IV ceftriaxone to IV ciprofloxacin at renal adjusted dose of 400 mg every 24 hours  · Once renal function improves and she is able to swallow okay, can plan to switch her to oral ciprofloxacin at a renally adjusted dose  · If repeat blood culture is negative, plan to continue ciprofloxacin for 10 days until December 10, 2020  · We will follow up on the culture results and clinical progress and will make further recommendations accordingly. · Continue close vitals monitoring. · Continue oxygen support to maintain oxygen saturation above 92%  · Maintain good glycemic control. · Fall precautions. Aspiration precautions. · Continue to watch for new fever or diarrhea. · DVT prophylaxis. · Discussed all above with patient and RN.       Drug Monitoring:    · Continue serial monitoring for antibiotic toxicity as follows: CBC, CMP, QTc interval  · Continue to watch for following: new or worsening fever, hypotension, hives, lip swelling and redness or purulence at vascular access sites. I/v access Management:    · Continue to monitor i.v access sites for erythema, induration, discharge or tenderness. · As always, continue efforts to minimizetubes/lines/drains as clinically appropriate to reduce chances of line associated infections. Current isolation precautions:    Currently active isolation(s): Droplet Plus       Level of complexity of consult: High     Fluoroquinolone related instructions:     Patient instructed to watch for low or high blood sugars, muscle pains and ankle tendon pain while on Ciprofloxacin or Levofloxacin. Patient was advised to keep a sugar candy at all times as all fluoroquinolones have the potential of causing hypoglycemia. If these symptoms develops, patient was instructed to stop the antibiotic and call my office at [phone]. Use sunscreen when going in bright sun while on Ciprofloxacin or Levofloxacin as these antibiotics can cause photosensitivity. Take 1 hour before or 2 hour after dairy, calcium, iron, magnesium, aluminum or zinc.        Thanks for allowing me to participate in your patient's care. I will sign off today, but will be available to answer any further questions or concerns that may arise during patient's stay in the hospital.        Subjective:     Presenting complaint in ER:     Chief Complaint   Patient presents with    Shortness of Breath     pt states she has had increase SOB and cough for seveal days. dneies fever or pain        HPI: Moody Bills is a 79 y.o. female patient, who was seen at the request of Dr. Brad Camp MD.    History was obtained from chart review and the patient. The patient was admitted on 11/27/2020. I have been consulted to see the patient for above mentioned reason(s).     The patient has multiple medical comorbidities, and presented to the ER for cough and shortness of breath. The patient was noted to have elevated white cell count 20,200. She received IV vancomycin, ceftriaxone azithromycin in the ER and had blood cultures sent to the ER. 2 sets of blood cultures have grown E. coli. She is currently on IV ceftriaxone. I have been asked for my opinion for management for this patient. Past Medical History: All past medical history reviewed today. Past Medical History:   Diagnosis Date    A-fib [facility])     Atrial fibrillation ([address]     Hypercholesterolemia     Hypertension     Pelvic mass          Past Surgical History: All pastsurgical history was reviewed today. Past Surgical History:   Procedure Laterality Date    APPENDECTOMY      CYSTOSCOPY Right 06/14/2018    retrograde pyelogram, ureteroscopy, and stent placement    HYSTERECTOMY, TOTAL ABDOMINAL  07/09/2018    robotic with BSO, omentectomy, pelvic mass removal, lymphnode dissection    GA CYSTO/URETERO W/LITHOTRIPSY &INDWELL STENT INSRT Right 10/31/2018    CYSTOSCOPY WITH RIGHT URETEROSCOPY HOLMIUM LASER LITHOTRIPSY STONE MANIPULATION STONE BASKET WITH RIGHT STENT EXCHANGE performed by Angle Crooks MD at [address] [place] [address] 11/29/2020    EGD CONTROL HEMORRHAGE performed by Sujata Temple MD at [address] [place] [address] N/A 11/29/2020    EGD BIOPSY performed by Sujata Tempel MD at [address] [place] [address]         Family History: All family history was reviewed today. Problem Relation Age of Onset    Heart Disease Father          Medications: All current and past medications were reviewed. Medications Prior to Admission: warfarin (COUMADIN) 5 MG tablet, 5 mg three days a week 2.5 the other.  (Patient taking differently: Take 2.5 mg by mouth daily 5 mg three days a week 2.5 the other.)  atenolol (TENORMIN) 100 MG tablet, Take 1 tablet by mouth daily  dilTIAZem (CARDIZEM) 60 MG tablet, Take 0.5 tablets by mouth every 12 hours  simvastatin (ZOCOR) 40 MG tablet, Take 1 tablet by mouth nightly  PARoxetine (PAXIL) 20 MG tablet, Take 1 tablet by mouth daily  Omega-3 Fatty Acids (FISH OIL PO), Take by mouth  Multiple Vitamins-Minerals (MULTIVITAMIN PO), Take by mouth     [START ON 12/1/2020] pantoprazole  40 mg Oral BID AC    ciprofloxacin  400 mg Intravenous Q24H    sodium chloride  20 mL Intravenous Once    midodrine  5 mg Oral TID WC    PARoxetine  20 mg Oral Daily    atorvastatin  20 mg Oral Nightly    sodium chloride flush  10 mL Intravenous 2 times per day          REVIEW OF SYSTEMS:       Review of Systems   Constitutional: Positive for fatigue. Negative for chills, diaphoresis and unexpected weight change. HENT: Negative for congestion, ear discharge, ear pain, facial swelling, hearing loss, rhinorrhea and trouble swallowing. Eyes: Negative for photophobia, discharge, redness and visual disturbance. Respiratory: Positive for shortness of breath. Negative for apnea, cough, choking, chest tightness and stridor. Cardiovascular: Negative for chest pain and palpitations. Gastrointestinal: Negative for abdominal pain, blood in stool, diarrhea and nausea. Endocrine: Negative for polydipsia, polyphagia and polyuria. Genitourinary: Negative for difficulty urinating, dysuria, frequency, hematuria, menstrual problem and vaginal discharge. Musculoskeletal: Negative for arthralgias, joint swelling, myalgias and neck stiffness. Skin: Negative for color change and rash. Allergic/Immunologic: Negative for immunocompromised state. Neurological: Negative for dizziness, seizures, speech difficulty, light-headedness and headaches. Hematological: Negative for adenopathy. Psychiatric/Behavioral: Negative for agitation, hallucinations and suicidal ideas.           Objective:       PHYSICAL EXAM:      Vitals:   Vitals:    11/30/20 1100 11/30/20 11/30/20  1331   WBC 15.2*  --   --  8.8  --    RBC 3.58*  --   --  2.84*  --    HGB 11.0*   < > 9.2* 8.6* 8.6*   HCT 33.5*   < > 27.7* 25.4* 25.6*     --   --  125*  --    MCV 93.6  --   --  89.3  --    MCH 30.7  --   --  30.3  --    MCHC 32.8  --   --  33.9  --    RDW 14.3  --   --  15.3  --     < > = values in this interval not displayed. BMP:  Recent Labs     11/28/20  0741 11/28/20  1650 11/29/20  0329    139 138   K 5.2* 5.5* 4.7    109 107   CO2 16* 20* 23   BUN 88* 97* 101*   CREATININE 4.2* 3.9* 3.5*   CALCIUM 8.4 7.9* 8.0*   GLUCOSE 172* 157* 145*        Hepatic FunctionPanel:   Lab Results   Component Value Date    ALKPHOS 87 11/29/2020    ALT 20 11/29/2020    AST 25 11/29/2020    PROT 4.8 11/29/2020    BILITOT 1.2 11/29/2020    BILIDIR 0.4 06/14/2018    IBILI 0.3 06/14/2018    LABALBU 2.5 11/29/2020       CPK: No results found for: CKTOTAL  ESR:   Lab Results   Component Value Date    SEDRATE 77 (H) 06/17/2018     CRP:   Lab Results   Component Value Date    CRP 91.5 (H) 06/17/2018         Imaging: All pertinent images and reports for the current visit were reviewed by meduring this visit. XR CHEST PORTABLE   Final Result   New bilateral airspace disease, edema versus pneumonia. XR CHEST PORTABLE   Final Result   Minimal streaky bibasilar airspace opacities may relate to atelectasis or   multifocal pneumonia with atypical/viral pneumonia in the differential.      Stable cardiomegaly. Outside records:    Labs, Microbiology, Radiology and pertinent results from Care everywhere, if available, were reviewed as a part ofthe consultation.       Problem list:       Patient Active Problem List   Diagnosis Code    Essential hypertension I10    Chronic atrial fibrillation (HCC) I48.20    Severe sepsis (HCC) A41.9, R65.20    Hydroureteronephrosis N13.30    Pelvic mass R19.00    CHARISSE (acute kidney injury) ([facility] N17.9    Acute metabolic encephalopathy M06.07  Mixed hyperlipidemia E78.2    Primary osteoarthritis of left knee M17.12    Effusion of left knee joint M25.462    Synovitis of left knee M65.9    Pelvic mass in female R19.00    Renal failure N19    Multifocal pneumonia J18.9    Elevated troponin R77.8    Atypical pneumonia J18.9    E coli bacteremia R78.81, B96.20         Please note that this chart was generated using Dragon dictation software. Although every effort was made to ensure the accuracy of this automated transcription, some errors in transcription may have occurred inadvertently. If you may need any clarification, please do not hesitate to contact me through EPIC or at the phone number provided below with my electronic signature. Any pictures or media included in this note were obtained after taking informed verbal consent from the patient and with their approval to include those in the patient's medical record.       Bright Martinez MD, MPH  11/30/20, 6:06 PM [facility] Infectious Disease   [address] [place] [address], [address]  Office: [phone]  Fax: [phone]  Clinic days:  Tuesday & Thursday

## 2020-11-30 NOTE — PLAN OF CARE
Problem: Falls - Risk of:  Goal: Will remain free from falls  Description: Will remain free from falls  Outcome: Ongoing  SR up x3. Call light in reach. Bed in lowest position and wheels locked. Bed alarm activated. Patient instructed to call nurse for all her needs. Problem: Pain:  Goal: Pain level will decrease  Description: Pain level will decrease  Outcome: Ongoing  PRN pain medication is available. 2 warm blankets provided.

## 2020-11-30 NOTE — PROGRESS NOTES
Review:    XR CHEST PORTABLE   Final Result   New bilateral airspace disease, edema versus pneumonia. XR CHEST PORTABLE   Final Result   Minimal streaky bibasilar airspace opacities may relate to atelectasis or   multifocal pneumonia with atypical/viral pneumonia in the differential.      Stable cardiomegaly. S/p EGD 11/29  Mild esophagitis. 3 cm HH with erosions, Small antral ulcers s/p 4 clips, s/p gastric biopsies. Blood in fundus, limiting endoscopic evaluation. Mild duodenitis. Incidental D2 diverticulum         Assessment:   The patient is a 79 y.o. old female  with following problems:     Active Problems:    Renal failure  Resolved Problems:    * No resolved hospital problems. *     1. Multifocal pneumonia with E. Coli sepsis   2. Acute blood loss anemia. EGD 11/29 revealed antral ulcers, treated with clips. Gastric bx pending  3. Acute kidney injury, nephrology on board  4. AF on coumadin. INR 3.26 on admission, reversed with FFPs and Vit K     Recommendations:   Advanced diet to full liquids  IV PPI X 72 hours total and then oral PPI BID X 8 weeks and then daily thereafter   Monitor H/H Q6, transfuse to keep Hb>8.  If Hb stable, may resume anticoagulation tomorrow   Repeat EGD in 8 weeks to ensure ulcer healing     Olivier Demarco MD, MSc  [facility] [address]

## 2020-11-30 NOTE — PROGRESS NOTES
Recent Labs     11/27/20 2046   TROPONINI 0.01       Urinalysis:      Lab Results   Component Value Date    NITRU Negative 11/27/2020    WBCUA 114 11/27/2020    BACTERIA RARE 06/14/2018    RBCUA 11 11/27/2020    BLOODU MODERATE 11/27/2020    SPECGRAV 1.013 11/27/2020    GLUCOSEU Negative 11/27/2020       Radiology:  XR CHEST PORTABLE   Final Result   New bilateral airspace disease, edema versus pneumonia. XR CHEST PORTABLE   Final Result   Minimal streaky bibasilar airspace opacities may relate to atelectasis or   multifocal pneumonia with atypical/viral pneumonia in the differential.      Stable cardiomegaly.              Assessment/Plan:    Active Hospital Problems    Diagnosis Date Noted    Renal failure [N19] 11/27/2020       Acute Medical Issues Being Addressed:    79-year-old lady admitted with shortness of breath cough and GI bleed    Sepsis secondary to E. coli bacteremia probably intra-abdominal source due to bacterial translocation from GI bleed versus urinary tract infection  She had some cough and shortness of breath having reviewed the chest x-ray of not convinced it is a pneumonia  Repeat blood cultures pending  Discontinue azithromycin  Repeat chest x-ray showed some infiltrates  On IV ceftriaxone  Infectious disease consult    Acute upper GI bleed due to esophagitis and gastric antral ulcers  S/p EGD which showed mild esophagitis with some erosion  Small antral ulcer status for 4 clips and gastric biopsies  Some blood in the fundus  Mild duodenitis  Incidental D2 diverticulum  Continue pantoprazole drip for another 24 hours then change over to PPI IV oral twice daily    Anemia acute blood loss  S/p blood transfusion  JUANY globin is remained stable  We will discontinue every 8 hour monitoring  No evidence of active bleeding    Acute kidney injury prerenal  Continue IV fluids  With basic metabolic profile from today  800 mL of urine output in the last 24 hours  Nephrology following along    Hyponatremia secondary to hypovolemia resolved    Chronic atrial fibrillation  On diltiazem which is currently on hold  Was on Coumadin  INR reversed with some vitamin K  Monitor INR  We will discuss with GI regarding timing of starting anticoagulation once she is stable from the GI bleeding standpoint    DVT Prophylaxis: SCD for now  Diet: Diet NPO Effective Now  Code Status: Full Code      Dispo - once acute medical processes have resolved    Harvey Jalloh MD

## 2020-11-30 NOTE — PROGRESS NOTES
Nursing assessment completed. Resting quietly. Arouses easily. Neuro intact. Follows commands. Still passing black tarry loose stools. Incontinent of Bowel and Bladder. Removed external femal catheter due to BM migrating into her josue area. Provided bath and linen/gown change. VSS. Afebrile. Atrial Fib. Controlled rate. Denies pain. No acute distress at this time. SR up x3. Call light in reach. Bed in lowest position and wheels locked. Bed alarm activated.

## 2020-11-30 NOTE — PROGRESS NOTES
Nursing reassessment completed. Afebrile. VSS. Atrial Fib controlled rate. Denies pain. Assisted with repositioning. Arouses easily. Answers questions appropriately. Warm blanket provided. No acute distress at this time. SR up x3. Call light in reach.

## 2020-11-30 NOTE — PROGRESS NOTES
Nursing assessment completed. Afebrile. VSS. 100% 4LO2NC. Patient resting. Arouses easily and A&Ox4. Speech clear and appropriate. Skin warm pale dry and intact. LS CTA diminished. ABD soft +BSx4.  +pulses palpable. Denies pain at this time. Easily returns to sleep. She did have endoscopic procedure today and received conscious sedation. Patient reports she is comfortable. Turns ad yuly in bed. SR up x3. Call light in reach.

## 2020-12-01 LAB
A/G RATIO: 1 (ref 1.1–2.2)
ALBUMIN SERPL-MCNC: 2.5 G/DL (ref 3.4–5)
ALP BLD-CCNC: 74 U/L (ref 40–129)
ALT SERPL-CCNC: 20 U/L (ref 10–40)
ANION GAP SERPL CALCULATED.3IONS-SCNC: 8 MMOL/L (ref 3–16)
AST SERPL-CCNC: 22 U/L (ref 15–37)
BASOPHILS ABSOLUTE: 0.1 K/UL (ref 0–0.2)
BASOPHILS RELATIVE PERCENT: 0.6 %
BILIRUB SERPL-MCNC: 0.5 MG/DL (ref 0–1)
BLOOD CULTURE, ROUTINE: ABNORMAL
BUN BLDV-MCNC: 67 MG/DL (ref 7–20)
CALCIUM SERPL-MCNC: 8.4 MG/DL (ref 8.3–10.6)
CHLORIDE BLD-SCNC: 108 MMOL/L (ref 99–110)
CO2: 25 MMOL/L (ref 21–32)
CREAT SERPL-MCNC: 2.5 MG/DL (ref 0.6–1.2)
CULTURE, BLOOD 2: ABNORMAL
CULTURE, BLOOD 2: ABNORMAL
EOSINOPHILS ABSOLUTE: 0.3 K/UL (ref 0–0.6)
EOSINOPHILS RELATIVE PERCENT: 2.9 %
GFR AFRICAN AMERICAN: 22
GFR NON-AFRICAN AMERICAN: 18
GLOBULIN: 2.5 G/DL
GLUCOSE BLD-MCNC: 111 MG/DL (ref 70–99)
HCT VFR BLD CALC: 25.5 % (ref 36–48)
HEMOGLOBIN: 8.7 G/DL (ref 12–16)
INR BLD: 1.14 (ref 0.86–1.14)
LYMPHOCYTES ABSOLUTE: 1.5 K/UL (ref 1–5.1)
LYMPHOCYTES RELATIVE PERCENT: 14.4 %
MAGNESIUM: 1.7 MG/DL (ref 1.8–2.4)
MCH RBC QN AUTO: 31 PG (ref 26–34)
MCHC RBC AUTO-ENTMCNC: 34.2 G/DL (ref 31–36)
MCV RBC AUTO: 90.7 FL (ref 80–100)
MONOCYTES ABSOLUTE: 1.2 K/UL (ref 0–1.3)
MONOCYTES RELATIVE PERCENT: 12 %
NEUTROPHILS ABSOLUTE: 7.2 K/UL (ref 1.7–7.7)
NEUTROPHILS RELATIVE PERCENT: 70.1 %
ORGANISM: ABNORMAL
PDW BLD-RTO: 14.9 % (ref 12.4–15.4)
PLATELET # BLD: 150 K/UL (ref 135–450)
PMV BLD AUTO: 8.8 FL (ref 5–10.5)
POTASSIUM SERPL-SCNC: 4 MMOL/L (ref 3.5–5.1)
PROTHROMBIN TIME: 13.2 SEC (ref 10–13.2)
RBC # BLD: 2.81 M/UL (ref 4–5.2)
SODIUM BLD-SCNC: 141 MMOL/L (ref 136–145)
TOTAL PROTEIN: 5 G/DL (ref 6.4–8.2)
WBC # BLD: 10.2 K/UL (ref 4–11)

## 2020-12-01 PROCEDURE — 85025 COMPLETE CBC W/AUTO DIFF WBC: CPT

## 2020-12-01 PROCEDURE — 80053 COMPREHEN METABOLIC PANEL: CPT

## 2020-12-01 PROCEDURE — 97162 PT EVAL MOD COMPLEX 30 MIN: CPT

## 2020-12-01 PROCEDURE — 2000000000 HC ICU R&B

## 2020-12-01 PROCEDURE — 6360000002 HC RX W HCPCS: Performed by: INTERNAL MEDICINE

## 2020-12-01 PROCEDURE — 6370000000 HC RX 637 (ALT 250 FOR IP): Performed by: FAMILY MEDICINE

## 2020-12-01 PROCEDURE — 97116 GAIT TRAINING THERAPY: CPT

## 2020-12-01 PROCEDURE — 97530 THERAPEUTIC ACTIVITIES: CPT

## 2020-12-01 PROCEDURE — 99233 SBSQ HOSP IP/OBS HIGH 50: CPT | Performed by: INTERNAL MEDICINE

## 2020-12-01 PROCEDURE — 83735 ASSAY OF MAGNESIUM: CPT

## 2020-12-01 PROCEDURE — 1200000000 HC SEMI PRIVATE

## 2020-12-01 PROCEDURE — 6370000000 HC RX 637 (ALT 250 FOR IP): Performed by: INTERNAL MEDICINE

## 2020-12-01 PROCEDURE — 2580000003 HC RX 258: Performed by: INTERNAL MEDICINE

## 2020-12-01 PROCEDURE — 94760 N-INVAS EAR/PLS OXIMETRY 1: CPT

## 2020-12-01 PROCEDURE — 97165 OT EVAL LOW COMPLEX 30 MIN: CPT

## 2020-12-01 PROCEDURE — 85610 PROTHROMBIN TIME: CPT

## 2020-12-01 RX ORDER — WARFARIN SODIUM 2.5 MG/1
2.5 TABLET ORAL DAILY
Status: DISCONTINUED | OUTPATIENT
Start: 2020-12-01 | End: 2020-12-03

## 2020-12-01 RX ORDER — MAGNESIUM SULFATE 1 G/100ML
1 INJECTION INTRAVENOUS ONCE
Status: COMPLETED | OUTPATIENT
Start: 2020-12-01 | End: 2020-12-01

## 2020-12-01 RX ADMIN — MIDODRINE HYDROCHLORIDE 5 MG: 5 TABLET ORAL at 08:24

## 2020-12-01 RX ADMIN — SODIUM CHLORIDE, POTASSIUM CHLORIDE, SODIUM LACTATE AND CALCIUM CHLORIDE: 600; 310; 30; 20 INJECTION, SOLUTION INTRAVENOUS at 02:13

## 2020-12-01 RX ADMIN — PANTOPRAZOLE SODIUM 40 MG: 40 TABLET, DELAYED RELEASE ORAL at 15:41

## 2020-12-01 RX ADMIN — CIPROFLOXACIN 400 MG: 2 INJECTION, SOLUTION INTRAVENOUS at 17:27

## 2020-12-01 RX ADMIN — MAGNESIUM SULFATE HEPTAHYDRATE 1 G: 1 INJECTION, SOLUTION INTRAVENOUS at 09:30

## 2020-12-01 RX ADMIN — WARFARIN SODIUM 2.5 MG: 2.5 TABLET ORAL at 17:35

## 2020-12-01 RX ADMIN — ATORVASTATIN CALCIUM 20 MG: 20 TABLET, FILM COATED ORAL at 20:41

## 2020-12-01 RX ADMIN — PAROXETINE HYDROCHLORIDE 20 MG: 20 TABLET, FILM COATED ORAL at 08:24

## 2020-12-01 RX ADMIN — PANTOPRAZOLE SODIUM 40 MG: 40 TABLET, DELAYED RELEASE ORAL at 06:28

## 2020-12-01 ASSESSMENT — ENCOUNTER SYMPTOMS
BLOOD IN STOOL: 0
EYE DISCHARGE: 0
EYE REDNESS: 0
ABDOMINAL PAIN: 0
FACIAL SWELLING: 0
SHORTNESS OF BREATH: 0
NAUSEA: 0
DIARRHEA: 0
VOMITING: 0
CONSTIPATION: 0
COUGH: 0
CHEST TIGHTNESS: 0
PHOTOPHOBIA: 0
ABDOMINAL DISTENTION: 0

## 2020-12-01 ASSESSMENT — PAIN SCALES - GENERAL
PAINLEVEL_OUTOF10: 0

## 2020-12-01 NOTE — PROGRESS NOTES
Occupational Therapy   Occupational Therapy Initial Assessment  Date: 2020   Patient Name: Jef Jeong  MRN: [id number]     : 1940    Date of Service: 2020    Discharge Recommendations:  Jef Jeong scored a 18/24 on the AM-PAC ADL Inpatient form. Current research shows that an AM-PAC score of 18 or greater is typically associated with a discharge to the patient's home setting. Based on the patient's AM-PAC score, and their current ADL deficits, it is recommended that the patient have 2-3 sessions per week of Occupational Therapy at d/c to increase the patient's independence. At this time, this patient demonstrates the endurance and safety to discharge home with home health (home vs OP services) and a follow up treatment frequency of 2-3x/wk. Please see assessment section for further patient specific details. If patient discharges prior to next session this note will serve as a discharge summary. Please see below for the latest assessment towards goals. HOME HEALTH CARE: LEVEL 2 SOCIAL    - Initial home health evaluation to occur within 24-48 hours, in patient home   - Therapy to evaluate with goal of regaining prior level of functioning   - Therapy to evaluate if patient has [address] needs for personal care   -  evaluation within 24-48 hours, includes evaluation of resources and insurance to determine AL/IL/LTC/Medicaid options   - [place] [facility] Referral   - Lea Garcia to discuss home support and care needs post discharge within two weeks of discharge. PT expressing concerns about d/c home secondary to spouse being disabled and unable to assist pt. OT Equipment Recommendations  Equipment Needed: No    Assessment   Performance deficits / Impairments: Decreased ADL status; Decreased strength;Decreased high-level IADLs;Decreased endurance  Assessment: pt presents with decreased endurance at this time, pt fatigued and would benefit from ongoing skilled OT services in order to return to [facility]  Treatment Diagnosis: COVID-19  Prognosis: Good  Decision Making: Low Complexity  History: pt independent at baseline  Assistance / Modification: SBA. increased time  OT Education: Plan of Care;ADL Adaptive Strategies; Energy Conservation  Patient Education: eval, POC, discharge-pt independent with education  REQUIRES OT FOLLOW UP: Yes(RW, pt 5'6\")  Activity Tolerance  Activity Tolerance: Patient Tolerated treatment well  Activity Tolerance: pt presents with decreased endurance at this time, pt fatigued  Safety Devices  Safety Devices in place: Yes  Type of devices: All fall risk precautions in place;Nurse notified; Chair alarm in place; Left in chair;Call light within reach; Patient at risk for falls           Patient Diagnosis(es): The primary encounter diagnosis was Multifocal pneumonia. Diagnoses of Severe sepsis ([address] CHARISSE (acute kidney injury) ([address] and Elevated troponin were also pertinent to this visit. has a past medical history of A-fib [facility]), Atrial fibrillation ([address] Hypercholesterolemia, Hypertension, and Pelvic mass. has a past surgical history that includes Appendectomy; Cystocopy (Right, 06/14/2018); Hysterectomy, total abdominal (07/09/2018); pr cysto/uretero w/lithotripsy &indwell stent insrt (Right, 10/31/2018); Upper gastrointestinal endoscopy (N/A, 11/29/2020); and Upper gastrointestinal endoscopy (N/A, 11/29/2020). Treatment Diagnosis: COVID-19      Restrictions  Restrictions/Precautions  Restrictions/Precautions: Fall Risk, Isolation(high fall risk)  Position Activity Restriction  Other position/activity restrictions: Neftali Olmstead is a 79 y.o. female who presents to the emergency department with complaints of shortness of breath and cough that started on Monday. She no longer has cough but does still continue to feel short of breath. Denies any fever or pain. Denies productive cough.   Denies any known exposure to COVID-19. Does not feel at this time lightheaded, weak or dizzy. Denies chest pain. Denies any home oxygen use. Symptoms are worse with ambulation. Subjective   General  Chart Reviewed: Yes  Patient assessed for rehabilitation services?: Yes  Family / Caregiver Present: No  Diagnosis: COVID-19  Subjective  Subjective: pt supine upon arrival, vitals WFL. pt reporting increased weakness. concerned about discharging home secondary to spouse being disabled and unable to help. pt denies pain  Patient Currently in Pain: Denies  Pain Assessment  Pain Assessment: 0-10  Pain Level: 0  RASS Score: Alert and calm  Vital Signs  Temp: 97.2 °F (36.2 °C)  Temp Source: Temporal  Pulse: 116  Heart Rate Source: Monitor  Resp: 17  BP: 132/74  MAP (mmHg): 87  Level of Consciousness: Responds to Voice or New Confusion or Agitation  MEWS Score: 3  Patient Currently in Pain: Denies  Oxygen Therapy  SpO2: 99 %  Pulse Oximeter Device Mode: Continuous  O2 Device: None (Room air)  Social/Functional History  Social/Functional History  Lives With: Alone  Type of Home: House  Home Layout: One level, Laundry in basement  Home Access: Stairs to enter with rails  Entrance Stairs - Number of Steps: 2 EMIL. railing on steps for basement  Bathroom Shower/Tub: Walk-in shower(in basement)  Bathroom Toilet: Handicap height  Bathroom Equipment: Grab bars in shower, Shower chair  ADL Assistance: [facility]: Independent  Homemaking Responsibilities: Yes  Ambulation Assistance: Independent  Transfer Assistance: Independent  Active : Yes  Additional Comments: pt denies falls in the last 6 months. IPLOF and does not use AD normally.        Objective   Vision: Impaired  Vision Exceptions: Wears glasses for reading  Hearing: Within functional limits    Orientation  Overall Orientation Status: Within Normal Limits  Observation/Palpation  Posture: Fair  Observation: flat affect, small frame  Edema: swelling and bruises on R UE  Balance  Sitting Balance: Supervision  Standing Balance: Stand by assistance  Standing Balance  Time: ~3-4 minutes total  Activity: transfer, mobility in room ~10 ft. SBA, increased time, no LOB  Functional Mobility  Functional - Mobility Device: Rolling Walker  Activity: Other  Assist Level: Stand by assistance  ADL  Feeding: (pt reporting concerns for swallowing.  per chart  concerned with pt's swallowing as well. spoke with RN)  LE Dressing: Setup(donning gown)  Additional Comments: pt declined all other ADLs, johnson present  Tone RUE  RUE Tone: Normotonic  Tone LUE  LUE Tone: Normotonic  Coordination  Movements Are Fluid And Coordinated: Yes     Bed mobility  Supine to Sit: Stand by assistance  Scooting: Stand by assistance  Comment: increased time for bed mobility, cues needed  Transfers  Sit to stand: Contact guard assistance  Stand to sit: Contact guard assistance     Cognition  Overall Cognitive Status: WFL  Perception  Overall Perceptual Status: WFL     Sensation  Overall Sensation Status: WFL        LUE AROM (degrees)  LUE AROM : WFL  RUE AROM (degrees)  RUE AROM : WFL  LUE Strength  Gross LUE Strength: (breaks with resistence, B)                   Plan   Plan  Times per week: 3-5  Times per day: Daily  Current Treatment Recommendations: Strengthening, Functional Mobility Training, Endurance Training, Self-Care / ADL, Patient/Caregiver Education & Training, Safety Education & Training      AM-PAC Score        AM-[facility] Inpatient Daily Activity Raw Score: 18 (12/01/20 0950)  AM-PAC Inpatient ADL T-Scale Score : 38.66 (12/01/20 0950)  ADL Inpatient CMS 0-100% Score: 46.65 (12/01/20 0950)  ADL Inpatient CMS G-Code Modifier : CK (12/01/20 0950)    Goals  Short term goals  Time Frame for Short term goals: discharge  Short term goal 1: bed mobility mod I  Short term goal 2: functional mobility with RW mod I  Short term goal 3: UB/LB ADLs mod I  Short term goal 4: simple IADL task mod I  Short term goal 5:

## 2020-12-01 NOTE — PROGRESS NOTES
P Pulmonary and Critical Care    Follow Up Note    Subjective:   CHIEF COMPLAINT / HPI:   Chief Complaint   Patient presents with    Shortness of Breath     pt states she has had increase SOB and cough for seveal days. dneies fever or pain     Interval history: She sitting up in the chair this morning. Reports that she feels a little bit better. No new complaints. On room air    Past Medical History:    Reviewed; no changes    Social History:    Reviewed; no changes    REVIEW OF SYSTEMS:    CONSTITUTIONAL:  negative for fevers and chills  RESPIRATORY:  See HPI  CARDIOVASCULAR:  negative for chest pain, palpitations, edema  GASTROINTESTINAL:  negative for nausea, vomiting, diarrhea, constipation and abdominal pain    Objective:   PHYSICAL EXAM:        VITALS:  /74   Pulse 116   Temp 97.2 °F (36.2 °C) (Temporal)   Resp 17   Ht 5' 6\" (1.676 m)   Wt 159 lb 9.8 oz (72.4 kg)   SpO2 99%   BMI 25.76 kg/m²  on 2L NC    24HR INTAKE/OUTPUT:      Intake/Output Summary (Last 24 hours) at 12/1/2020 1031  Last data filed at 12/1/2020 0800  Gross per 24 hour   Intake 2236 ml   Output --   Net 2236 ml       CONSTITUTIONAL:  awake, alert,  no apparent distress, and appears stated age  LUNGS:  No increased work of breathing and clear to auscultation, no crackles or wheezes  CARDIOVASCULAR: S1 and S2 and no JVD  ABDOMEN:  normal bowel sounds, non-distended and non-tender to palpation  EXT: No edema, no calf tenderness. Pulses are present bilaterally. NEUROLOGIC:  Mental Status Exam:  Level of Alertness:   awake  Orientation:   person, place, time.  Non focal  SKIN:  normal skin color, texture, turgor, no redness, warmth, or swelling at IV sites    DATA:    CBC:  Recent Labs     11/30/20  0404 11/30/20  1331 12/01/20  0426   WBC 8.8  --  10.2   RBC 2.84*  --  2.81*   HGB 8.6* 8.6* 8.7*   HCT 25.4* 25.6* 25.5*   *  --  150   MCV 89.3  --  90.7   MCH 30.3  --  31.0   MCHC 33.9  --  34.2   RDW 15.3  --  14.9 BMP:  Recent Labs     11/29/20  0329 11/30/20  1803 12/01/20  0425    143 141   K 4.7 4.1 4.0    108 108   CO2 23 27 25   * 75* 67*   CREATININE 3.5* 2.6* 2.5*   CALCIUM 8.0* 8.6 8.4   GLUCOSE 145* 104* 111*      ABG:  No results for input(s): PHART, EZT5BWD, PO2ART, QTR5SHK, D5VLKWYG, BEART in the last 72 hours. Cultures:    Abx:    Radiology Review:  Pertinent images / reports were reviewed as a part of this visit. Assessment:     1. Upper GI bleed  2. Acute blood loss anemia  3. Acute kidney injury  4. Sepsis secondary to E. coli bacteremia/UTI  5. Atrial fibrillation    I have reviewed laboratories, medical records and images for this visit  Hemoglobin has remained stable  Has not required additional transfusion  Transition from Protonix drip to p.o. Can start a diet when okay with GI  Stop her midodrine  Growing E. coli from blood.   ID is started her on Cipro  At some point likely needs to resume Coumadin when okay with GI  I will sign off

## 2020-12-01 NOTE — PROGRESS NOTES
[facility] GI  Gastroenterology Progress Note    Kofi Forte is a 79 y.o. female patient. 1. Multifocal pneumonia    2. Severe sepsis ([facility]    3. CHARISSE (acute kidney injury) ([facility]    4. Elevated troponin        SUBJECTIVE:    No complaints   Feels well  Had a smear of green stool today per RN. ROS:  Cardiovascular ROS: no chest pain or dyspnea on exertion  Gastrointestinal ROS: see above  Respiratory ROS: no cough, shortness of breath, or wheezing    Physical    VITALS:  /74   Pulse 116   Temp 97.2 °F (36.2 °C) (Temporal)   Resp 17   Ht 5' 6\" (1.676 m)   Wt 159 lb 9.8 oz (72.4 kg)   SpO2 99%   BMI 25.76 kg/m²   TEMPERATURE:  Current - Temp: 97.2 °F (36.2 °C); Max - Temp  Av.5 °F (36.4 °C)  Min: 96.8 °F (36 °C)  Max: 99 °F (37.2 °C)    NAD  Irregularly irregular  Lungs CTA Bilaterally, normal effort  Abdomen soft, ND, NT, no HSM, Bowel sounds normal  AAOx3, No asterixis     Data      CBC:   Recent Labs     20  0404 20  1331 20  0426   WBC 8.8  --  10.2   RBC 2.84*  --  2.81*   HGB 8.6* 8.6* 8.7*   HCT 25.4* 25.6* 25.5*   *  --  150   MCV 89.3  --  90.7   MCH 30.3  --  31.0   MCHC 33.9  --  34.2   RDW 15.3  --  14.9        BMP:  Recent Labs     20  0329 20  1803 20  0425    143 141   K 4.7 4.1 4.0    108 108   CO2 23 27 25   * 75* 67*   CREATININE 3.5* 2.6* 2.5*   CALCIUM 8.0* 8.6 8.4   GLUCOSE 145* 104* 111*        Hepatic Function Panel:   Recent Labs     20  0329 20  0425   AST 25 22   ALT 20 20   BILITOT 1.2* 0.5   ALKPHOS 87 74       No results for input(s): LIPASE, AMYLASE in the last 72 hours. Recent Labs     20  0329 20  0404 20  0426   PROTIME 14.0* 12.4 13.2   INR 1.20* 1.07 1.14     No results for input(s): PTT in the last 72 hours. No results for input(s): OCCULTBLD in the last 72 hours.       Radiology Review:    XR CHEST PORTABLE   Final Result   New bilateral airspace disease, edema versus

## 2020-12-01 NOTE — CONSULTS
Clinical Pharmacy Note: Pharmacy to Dose Warfarin    Pharmacy consulted by Dr. Lana Roche to dose warfarin. Diego Santana is a 79 y.o. female  is receiving warfarin for indication: Afib. INR Goal Range: 2-3  Prior to admission warfarin dosing regimen: 2.5 mg daily  INR today:   Lab Results   Component Value Date    INR 1.14 12/01/2020       Assessment/Plan:  Per note from [facility] ok to resume warfarin today. Warfarin will resume at home dose of 2.5 mg daily. Daily INR is ordered. Pharmacy will continue to monitor and make adjustments to regimen as necessary.      Thank you for the consult,     Gem Salinas, PharmD, [address]   [id number]

## 2020-12-01 NOTE — PROGRESS NOTES
See flow sheet for VS. Remains in A-fib. Assess complete. See flow sheet. A/O. Lungs w/ fine crackles to bases and diminished. Bowel sounds hypoactive. Follows commands. HS medications administered per order. See emar. LAC PIV infiltrated. New 22 g in RFA. Patient incontinent of a large amount of urine and a small black soft stool. Brief, gown and pad changed. Jie care given. Discussed POC for this shift. Denies any other needs at this time. Will monitor. Bedside table and call light within reach.

## 2020-12-01 NOTE — PROGRESS NOTES
weakness, difficulty with gait. Prognosis: Good  Decision Making: Medium Complexity  PT Education: PT Role;Plan of Care; Functional Mobility Training;Equipment;Transfer Training  Patient Education: Pt verbalized understanding of POC and DC recommendations. REQUIRES PT FOLLOW UP: Yes  Activity Tolerance  Activity Tolerance: Patient limited by fatigue;Patient limited by endurance       Patient Diagnosis(es): The primary encounter diagnosis was Multifocal pneumonia. Diagnoses of Severe sepsis ([facility] CHARISSE (acute kidney injury) ([facility] [facility] and Elevated troponin were also pertinent to this visit. has a past medical history of A-fib [facility]), Atrial fibrillation ([facility] Hypercholesterolemia, Hypertension, and Pelvic mass. has a past surgical history that includes Appendectomy; Cystocopy (Right, 06/14/2018); Hysterectomy, total abdominal (07/09/2018); pr cysto/uretero w/lithotripsy &indwell stent insrt (Right, 10/31/2018); Upper gastrointestinal endoscopy (N/A, 11/29/2020); and Upper gastrointestinal endoscopy (N/A, 11/29/2020). Restrictions  Restrictions/Precautions  Restrictions/Precautions: Fall Risk, Isolation(high fall risk)  Position Activity Restriction  Other position/activity restrictions: Gibson Barnes is a 79 y.o. female who presents to the emergency department with complaints of shortness of breath and cough that started on Monday. She no longer has cough but does still continue to feel short of breath. Denies any fever or pain. Denies productive cough. Denies any known exposure to COVID-19. Does not feel at this time lightheaded, weak or dizzy. Denies chest pain. Denies any home oxygen use. Symptoms are worse with ambulation. Vision/Hearing        Subjective  General  Chart Reviewed: Yes  Patient assessed for rehabilitation services?: Yes  Response To Previous Treatment: Patient with no complaints from previous session.   Family / Caregiver Present: No  Diagnosis: GI bleed, renal failure  Follows Commands: Within Functional Limits  General Comment  Comments: Pt supine in bed upon arrival.  Subjective  Subjective: Pt denies pain, feeling dizzy upon sitting up but agreeable to getting up to a chair. Pain Screening  Patient Currently in Pain: Denies          Orientation  Orientation  Overall Orientation Status: Within Functional Limits  Social/Functional History  Social/Functional History  Lives With: Alone  Type of Home: House  Home Layout: One level, Laundry in basement  Home Access: Stairs to enter with rails  Entrance Stairs - Number of Steps: 2 EMIL. railing on steps for basement  Bathroom Shower/Tub: Walk-in shower(in basement)  Bathroom Toilet: Handicap height  Bathroom Equipment: Grab bars in shower, Shower chair  ADL Assistance: [address] [place] [address] Avenue: Independent  Homemaking Responsibilities: Yes  Ambulation Assistance: Independent  Transfer Assistance: Independent  Active : Yes  Additional Comments: pt denies falls in the last 6 months. IPLOF and does not use AD normally. Cognition        Objective          AROM RLE (degrees)  RLE AROM: WFL  AROM LLE (degrees)  LLE AROM : WFL        Sensation  Overall Sensation Status: WFL  Bed mobility  Supine to Sit: Stand by assistance  Scooting: Stand by assistance  Comment: Pt moving slowly due to dizziness. Pt sat EOB 5 min for vital sign, /92, O2 sats 100%, HR 120s-130s  Transfers  Sit to Stand: Contact guard assistance  Stand to sit: Contact guard assistance  Ambulation  Ambulation?: Yes  Ambulation 1  Surface: level tile  Device: Rolling Walker  Other Apparatus: (IV)  Assistance: Contact guard assistance  Gait Deviations: Slow Alisha;Decreased step length;Decreased step height  Distance: Pt amb 5 ft with RW and CGA. Comments: Pt continued with feeling dizzy. Declined further amb. Pt left seated in chair with needs in reach.   Stairs/Curb  Stairs?: No     Balance  Posture: Good  Sitting - Static: Good  Sitting - Dynamic:

## 2020-12-01 NOTE — ACP (ADVANCE CARE PLANNING)
Advance Care Planning     Advance Care Planning Activator (Inpatient)  Conversation Note      Date of ACP Conversation: 11/27/2020    Conversation Conducted with: Patient with Decision Making Capacity    ACP Activator: [facility] Decision Maker:     Current Designated Health Care Decision Maker:   Primary Decision Maker: christiano thomas - Child - [phone]    Secondary Decision Maker: Sara Cedillo - Child - [phone]    Care Preferences    Ventilation: \"If you were in your present state of health and suddenly became very ill and were unable to breathe on your own, what would your preference be about the use of a ventilator (breathing machine) if it were available to you? \"      Would the patient desire the use of ventilator (breathing machine)?: yes    \"If your health worsens and it becomes clear that your chance of recovery is unlikely, what would your preference be about the use of a ventilator (breathing machine) if it were available to you? \"     Would the patient desire the use of ventilator (breathing machine)?: No      Resuscitation  \"CPR works best to restart the heart when there is a sudden event, like a heart attack, in someone who is otherwise healthy. Unfortunately, CPR does not typically restart the heart for people who have serious health conditions or who are very sick. \"    \"In the event your heart stopped as a result of an underlying serious health condition, would you want attempts to be made to restart your heart (answer \"yes\" for attempt to resuscitate) or would you prefer a natural death (answer \"no\" for do not attempt to resuscitate)? \" yes       [x] Yes   [] No   Educated Patient / Tony Schwab regarding differences between Advance Directives and portable DNR orders.     Length of ACP Conversation in minutes:      Conversation Outcomes:  [x] ACP discussion completed  [] Existing advance directive reviewed with patient; no changes to patient's previously recorded wishes  [] New Advance Directive completed  [] Portable Do Not Rescitate prepared for Provider review and signature  [] POLST/POST/MOLST/MOST prepared for Provider review and signature      Follow-up plan:    [] Schedule follow-up conversation to continue planning  [] Referred individual to Provider for additional questions/concerns   [x] Advised patient/agent/surrogate to review completed ACP document and update if needed with changes in condition, patient preferences or care setting    [] This note routed to one or more involved healthcare providers      Patient has a living will - advised her to have daughter bring it in so we can put it on chart. Advised her to complete an official HCPOA.     Tadeo Díaz RN BSN  Case Management  [phone]

## 2020-12-01 NOTE — PROGRESS NOTES
Occupational Therapy   Occupational Therapy Initial Assessment  Date: 2020   Patient Name: Ashlee Austin  MRN: [id number]     : 1940    Date of Service: 2020    Discharge Recommendations:  Ashlee Austin scored a 18/24 on the AM-PAC ADL Inpatient form. Current research shows that an AM-PAC score of 18 or greater is typically associated with a discharge to the patient's home setting. Based on the patient's AM-PAC score, and their current ADL deficits, it is recommended that the patient have 2-3 sessions per week of Occupational Therapy at d/c to increase the patient's independence. At this time, this patient demonstrates the endurance and safety to discharge home with home health (home vs OP services) and a follow up treatment frequency of 2-3x/wk. Please see assessment section for further patient specific details. If patient discharges prior to next session this note will serve as a discharge summary. Please see below for the latest assessment towards goals. HOME HEALTH CARE: LEVEL 3 SAFETY     - Initial home health evaluation to occur within 24-48 hours, in patient home   - Therapy evaluations in home within 24-48 hours of discharge; including DME and home safety   - Frontload therapy 5 days, then 3x a week   - Therapy to evaluate if patient has [address] needs for personal care   -  evaluation within 24-48 hours, includes evaluation of resources and insurance to determine [place], [place], LTC, and Medicaid options          OT Equipment Recommendations  Equipment Needed: No    Assessment   Performance deficits / Impairments: Decreased ADL status; Decreased strength;Decreased high-level IADLs;Decreased endurance  Assessment: pt presents with decreased endurance at this time, pt fatigued and would benefit from ongoing skilled OT services in order to return to PLOF  Treatment Diagnosis: renal failure  Prognosis: Good  Decision Making: Low Complexity  History: pt independent at baseline  Assistance / Modification: CGA increased time  Patient Education: eval, POC, discharge-pt independent with education  REQUIRES OT FOLLOW UP: Yes(RW, pt 5'6\")  Activity Tolerance  Activity Tolerance: Patient Tolerated treatment well  Activity Tolerance: pt presents with decreased endurance at this time, pt fatigued. Safety Devices  Safety Devices in place: Yes  Type of devices: All fall risk precautions in place;Nurse notified; Chair alarm in place; Left in chair;Call light within reach; Patient at risk for falls           Patient Diagnosis(es): The primary encounter diagnosis was Multifocal pneumonia. Diagnoses of Severe sepsis ([facility] [address] CHARISSE (acute kidney injury) ([facility] [address] and Elevated troponin were also pertinent to this visit. has a past medical history of A-fib [facility]), Atrial fibrillation ([facility] [address] Hypercholesterolemia, Hypertension, and Pelvic mass. has a past surgical history that includes Appendectomy; Cystocopy (Right, 06/14/2018); Hysterectomy, total abdominal (07/09/2018); pr cysto/uretero w/lithotripsy &indwell stent insrt (Right, 10/31/2018); Upper gastrointestinal endoscopy (N/A, 11/29/2020); and Upper gastrointestinal endoscopy (N/A, 11/29/2020). Treatment Diagnosis: renal failure      Restrictions  Restrictions/Precautions  Restrictions/Precautions: Fall Risk, Isolation(high fall risk)  Position Activity Restriction  Other position/activity restrictions: Shana Richardson is a 79 y.o. female who presents to the emergency department with complaints of shortness of breath and cough that started on Monday. She no longer has cough but does still continue to feel short of breath. Denies any fever or pain. Denies productive cough. Denies any known exposure to COVID-19. Does not feel at this time lightheaded, weak or dizzy. Denies chest pain. Denies any home oxygen use. Symptoms are worse with ambulation.     Subjective   General  Chart Reviewed: Yes  Patient assessed for rehabilitation services?: Yes  Family / Caregiver Present: No  Diagnosis: renal failure  Subjective  Subjective: pt supine upon arrival, vitals WFL. agreeable to OT eval, denies pain  Patient Currently in Pain: Denies  Vital Signs  Patient Currently in Pain: Denies  Social/Functional History  Social/Functional History  Lives With: Alone  Type of Home: House  Home Layout: One level, Laundry in basement  Home Access: Stairs to enter with rails  Entrance Stairs - Number of Steps: 2 EMIL. railing on steps for basement  Bathroom Shower/Tub: Walk-in shower(in basement)  Bathroom Toilet: Handicap height  Bathroom Equipment: Grab bars in shower, Shower chair  ADL Assistance: [address] Responsibilities: Yes  Ambulation Assistance: Independent  Transfer Assistance: Independent  Active : Yes  Additional Comments: pt denies falls in the last 6 months. IPLOF and does not use AD normally. Objective   Vision: Impaired  Vision Exceptions: Wears glasses for reading  Hearing: Within functional limits    Orientation  Overall Orientation Status: Within Normal Limits  Observation/Palpation  Posture: Fair  Balance  Sitting Balance: Supervision  Standing Balance: Contact guard assistance  Standing Balance  Time: ~3 mins  Activity: transfer, increased HR ~130-140s with transfer. pt declined further mobility  ADL  Feeding: (pt reporting concerns for swallowing. per chart  concerned with pt's swallowing as well. spoke with RN)  Additional Comments: pt declined ADLs, encouraged to use BSC  Tone RUE  RUE Tone: Normotonic  Tone LUE  LUE Tone: Normotonic  Coordination  Movements Are Fluid And Coordinated: Yes     Bed mobility  Supine to Sit: Stand by assistance  Scooting: Stand by assistance  Comment: HR elevated ~130s-140s. pt reporting lightheadedness.  /92  Transfers  Sit to stand: Contact guard assistance  Stand to sit: Contact guard assistance     Cognition  Overall Cognitive Status:

## 2020-12-01 NOTE — PROGRESS NOTES
[facility] PROGRESS NOTE    12/1/2020 1:03 PM        Name: Jef Jeong . Admitted: 11/27/2020  Primary Care Provider: No primary care provider on file. (Tel: None)    Brief Course: Patient is an 58-year-old  female with history of atrial fibrillation on Coumadin, hypertension, hyperlipidemia who presented with shortness of breath and cough. Patient was noted to be hypoxic in ED and placed on oxygen supplementation. Labs showed acute renal failure with creatinine of 2 5. Chest x-ray suggestive of multifocal pneumonia. CC: Shortness of breath, cough  Subjective: Patient comfortable. No acute events overnight. Offers no complaints. She is aware of medical issues going on.     Reviewed interval ancillary notes    Current Medications  pantoprazole (PROTONIX) tablet 40 mg, BID AC  lactated ringers infusion, Continuous  ciprofloxacin (CIPRO) IVPB 400 mg, Q24H  0.9 % sodium chloride bolus, Once  PARoxetine (PAXIL) tablet 20 mg, Daily  atorvastatin (LIPITOR) tablet 20 mg, Nightly  sodium chloride flush 0.9 % injection 10 mL, 2 times per day  sodium chloride flush 0.9 % injection 10 mL, PRN  acetaminophen (TYLENOL) tablet 650 mg, Q6H PRN    Or  acetaminophen (TYLENOL) suppository 650 mg, Q6H PRN  polyethylene glycol (GLYCOLAX) packet 17 g, Daily PRN  promethazine (PHENERGAN) tablet 12.5 mg, Q6H PRN    Or  ondansetron (ZOFRAN) injection 4 mg, Q6H PRN        Objective:  /75   Pulse 103   Temp 97 °F (36.1 °C) (Temporal)   Resp 20   Ht 5' 6\" (1.676 m)   Wt 159 lb 9.8 oz (72.4 kg)   SpO2 98%   BMI 25.76 kg/m²     Intake/Output Summary (Last 24 hours) at 12/1/2020 1303  Last data filed at 12/1/2020 0800  Gross per 24 hour   Intake 2236 ml   Output --   Net 2236 ml      Wt Readings from Last 3 Encounters:   12/01/20 159 lb 9.8 oz (72.4 kg)   10/29/20 155 lb (70.3 kg)   04/30/20 160 lb (72.6 kg) recommendations. Leukocytosis, lactic acidosis:  Patient was started on IV antibiotics for multifocal pneumonia. Blood cultures came back positive for E. coli bacteremia. Was continued on IV ceftriaxone. ID was consulted on 11/30. Based on susceptibilities, IV antibiotics switched to ciprofloxacin. Repeat blood cultures from 11/30 are pending. As per ID, if repeat blood culture is negative, patient can be switched to oral ciprofloxacin for total of 10 days. Acute upper GI bleed:  Due to esophagitis and gastric antral ulcers. Patient underwent EGD which showed mild esophagitis with gastric erosion. Small antral ulcer and is status post 4 clips and gastric biopsies. Also noted to have some blood in fundus. Mild duodenitis and incidental D2 diverticulum noted on EGD. GI recommends PPI twice daily for 8 weeks and then daily thereafter. Hemoglobin has remained stable in last 24 hours. Okay to resume anticoagulation as per GI. Patient will need repeat EGD in 8 weeks to ensure ulcer healing. Chronic atrial fibrillation:  Rate controlled. Cardizem on hold. On Coumadin for anticoagulation which was held after patient came in with GI bleed. Okay to resume anticoagulation as per GI. Spoke to pharmacy. Coumadin to be dosed starting today based on daily INR. Hyperlipidemia: On statin.     IV Access: Peripheral IV  Villanueva: No  Diet: DIET GENERAL;  Code:Full Code  DVT PPX: Resuming Coumadin 12/1  Disposition: PT/OT to evaluate and treat, discharge in 1 to 2 days      Kaitlin Brown MD   12/1/2020 1:03 PM

## 2020-12-02 LAB
A/G RATIO: 1 (ref 1.1–2.2)
ALBUMIN SERPL-MCNC: 2.7 G/DL (ref 3.4–5)
ALP BLD-CCNC: 69 U/L (ref 40–129)
ALT SERPL-CCNC: 17 U/L (ref 10–40)
ANION GAP SERPL CALCULATED.3IONS-SCNC: 10 MMOL/L (ref 3–16)
AST SERPL-CCNC: 19 U/L (ref 15–37)
BASOPHILS ABSOLUTE: 0.1 K/UL (ref 0–0.2)
BASOPHILS RELATIVE PERCENT: 0.5 %
BILIRUB SERPL-MCNC: 0.6 MG/DL (ref 0–1)
BILIRUBIN URINE: NEGATIVE
BLOOD, URINE: ABNORMAL
BUN BLDV-MCNC: 46 MG/DL (ref 7–20)
CALCIUM SERPL-MCNC: 8.2 MG/DL (ref 8.3–10.6)
CHLORIDE BLD-SCNC: 107 MMOL/L (ref 99–110)
CLARITY: CLEAR
CO2: 24 MMOL/L (ref 21–32)
COLOR: YELLOW
CREAT SERPL-MCNC: 1.9 MG/DL (ref 0.6–1.2)
EOSINOPHIL,URINE: NORMAL
EOSINOPHILS ABSOLUTE: 0.3 K/UL (ref 0–0.6)
EOSINOPHILS RELATIVE PERCENT: 3 %
EPITHELIAL CELLS, UA: 1 /HPF (ref 0–5)
GFR AFRICAN AMERICAN: 31
GFR NON-AFRICAN AMERICAN: 25
GLOBULIN: 2.6 G/DL
GLUCOSE BLD-MCNC: 108 MG/DL (ref 70–99)
GLUCOSE URINE: NEGATIVE MG/DL
HCT VFR BLD CALC: 24.6 % (ref 36–48)
HEMOGLOBIN: 8.2 G/DL (ref 12–16)
HYALINE CASTS: 1 /LPF (ref 0–8)
INR BLD: 1.16 (ref 0.86–1.14)
KETONES, URINE: NEGATIVE MG/DL
LEUKOCYTE ESTERASE, URINE: ABNORMAL
LYMPHOCYTES ABSOLUTE: 1.8 K/UL (ref 1–5.1)
LYMPHOCYTES RELATIVE PERCENT: 16.2 %
MAGNESIUM: 1.8 MG/DL (ref 1.8–2.4)
MCH RBC QN AUTO: 30.4 PG (ref 26–34)
MCHC RBC AUTO-ENTMCNC: 33.3 G/DL (ref 31–36)
MCV RBC AUTO: 91.3 FL (ref 80–100)
MICROSCOPIC EXAMINATION: YES
MONOCYTES ABSOLUTE: 0.8 K/UL (ref 0–1.3)
MONOCYTES RELATIVE PERCENT: 7.1 %
NEUTROPHILS ABSOLUTE: 8.2 K/UL (ref 1.7–7.7)
NEUTROPHILS RELATIVE PERCENT: 73.2 %
NITRITE, URINE: NEGATIVE
PDW BLD-RTO: 14.5 % (ref 12.4–15.4)
PH UA: 6 (ref 5–8)
PLATELET # BLD: 177 K/UL (ref 135–450)
PMV BLD AUTO: 9.2 FL (ref 5–10.5)
POTASSIUM SERPL-SCNC: 4.2 MMOL/L (ref 3.5–5.1)
PROTEIN UA: ABNORMAL MG/DL
PROTHROMBIN TIME: 13.5 SEC (ref 10–13.2)
RBC # BLD: 2.69 M/UL (ref 4–5.2)
RBC UA: 15 /HPF (ref 0–4)
SODIUM BLD-SCNC: 141 MMOL/L (ref 136–145)
SPECIFIC GRAVITY UA: 1.01 (ref 1–1.03)
TOTAL PROTEIN: 5.3 G/DL (ref 6.4–8.2)
URINE REFLEX TO CULTURE: YES
URINE TYPE: ABNORMAL
UROBILINOGEN, URINE: 0.2 E.U./DL
WBC # BLD: 11.2 K/UL (ref 4–11)
WBC UA: 12 /HPF (ref 0–5)

## 2020-12-02 PROCEDURE — 6370000000 HC RX 637 (ALT 250 FOR IP): Performed by: FAMILY MEDICINE

## 2020-12-02 PROCEDURE — 2580000003 HC RX 258: Performed by: INTERNAL MEDICINE

## 2020-12-02 PROCEDURE — 94760 N-INVAS EAR/PLS OXIMETRY 1: CPT

## 2020-12-02 PROCEDURE — 1200000000 HC SEMI PRIVATE

## 2020-12-02 PROCEDURE — 85025 COMPLETE CBC W/AUTO DIFF WBC: CPT

## 2020-12-02 PROCEDURE — 85610 PROTHROMBIN TIME: CPT

## 2020-12-02 PROCEDURE — 6370000000 HC RX 637 (ALT 250 FOR IP): Performed by: INTERNAL MEDICINE

## 2020-12-02 PROCEDURE — 6360000002 HC RX W HCPCS: Performed by: INTERNAL MEDICINE

## 2020-12-02 PROCEDURE — 81001 URINALYSIS AUTO W/SCOPE: CPT

## 2020-12-02 PROCEDURE — 36415 COLL VENOUS BLD VENIPUNCTURE: CPT

## 2020-12-02 PROCEDURE — 87086 URINE CULTURE/COLONY COUNT: CPT

## 2020-12-02 PROCEDURE — 83735 ASSAY OF MAGNESIUM: CPT

## 2020-12-02 PROCEDURE — 80053 COMPREHEN METABOLIC PANEL: CPT

## 2020-12-02 PROCEDURE — 87205 SMEAR GRAM STAIN: CPT

## 2020-12-02 PROCEDURE — 99222 1ST HOSP IP/OBS MODERATE 55: CPT | Performed by: NURSE PRACTITIONER

## 2020-12-02 RX ORDER — MAGNESIUM SULFATE 1 G/100ML
1 INJECTION INTRAVENOUS ONCE
Status: COMPLETED | OUTPATIENT
Start: 2020-12-02 | End: 2020-12-02

## 2020-12-02 RX ADMIN — PAROXETINE HYDROCHLORIDE 20 MG: 20 TABLET, FILM COATED ORAL at 08:25

## 2020-12-02 RX ADMIN — WARFARIN SODIUM 2.5 MG: 2.5 TABLET ORAL at 18:21

## 2020-12-02 RX ADMIN — PANTOPRAZOLE SODIUM 40 MG: 40 TABLET, DELAYED RELEASE ORAL at 16:25

## 2020-12-02 RX ADMIN — PANTOPRAZOLE SODIUM 40 MG: 40 TABLET, DELAYED RELEASE ORAL at 06:09

## 2020-12-02 RX ADMIN — SODIUM CHLORIDE, POTASSIUM CHLORIDE, SODIUM LACTATE AND CALCIUM CHLORIDE: 600; 310; 30; 20 INJECTION, SOLUTION INTRAVENOUS at 06:07

## 2020-12-02 RX ADMIN — CIPROFLOXACIN 400 MG: 2 INJECTION, SOLUTION INTRAVENOUS at 18:21

## 2020-12-02 RX ADMIN — DILTIAZEM HYDROCHLORIDE 30 MG: 30 TABLET, FILM COATED ORAL at 20:27

## 2020-12-02 RX ADMIN — SODIUM CHLORIDE, POTASSIUM CHLORIDE, SODIUM LACTATE AND CALCIUM CHLORIDE: 600; 310; 30; 20 INJECTION, SOLUTION INTRAVENOUS at 20:28

## 2020-12-02 RX ADMIN — MAGNESIUM SULFATE HEPTAHYDRATE 1 G: 1 INJECTION, SOLUTION INTRAVENOUS at 08:27

## 2020-12-02 RX ADMIN — ATORVASTATIN CALCIUM 20 MG: 20 TABLET, FILM COATED ORAL at 20:27

## 2020-12-02 RX ADMIN — DILTIAZEM HYDROCHLORIDE 30 MG: 30 TABLET, FILM COATED ORAL at 08:24

## 2020-12-02 ASSESSMENT — ENCOUNTER SYMPTOMS
ABDOMINAL PAIN: 0
SHORTNESS OF BREATH: 0
VOMITING: 0
PHOTOPHOBIA: 0
FACIAL SWELLING: 0
EYE DISCHARGE: 0
CHEST TIGHTNESS: 0
EYE REDNESS: 0
DIARRHEA: 0
COUGH: 0
BLOOD IN STOOL: 0
ABDOMINAL DISTENTION: 0
CONSTIPATION: 0
NAUSEA: 0

## 2020-12-02 ASSESSMENT — PAIN SCALES - GENERAL: PAINLEVEL_OUTOF10: 0

## 2020-12-02 NOTE — PROGRESS NOTES
[facility] PROGRESS NOTE    12/2/2020 7:20 AM        Name: Pita Poe . Admitted: 11/27/2020  Primary Care Provider: No primary care provider on file. (Tel: None)    Brief Course: Patient is an 79-year-old  female with history of atrial fibrillation on Coumadin, hypertension, hyperlipidemia who presented with shortness of breath and cough. Patient was noted to be hypoxic in ED and placed on oxygen supplementation. Labs showed acute renal failure with creatinine of 2 5. Chest x-ray suggestive of multifocal pneumonia. CC: Shortness of breath, cough  Subjective: No acute events overnight. Patient is noted to be in A. fib with RVR and labile BPs. Patient is asymptomatic. Offers no complaints.     Reviewed interval ancillary notes    Current Medications  magnesium sulfate 1 g in dextrose 5% 100 mL IVPB, Once  warfarin (COUMADIN) tablet 2.5 mg, Daily  pantoprazole (PROTONIX) tablet 40 mg, BID AC  lactated ringers infusion, Continuous  ciprofloxacin (CIPRO) IVPB 400 mg, Q24H  0.9 % sodium chloride bolus, Once  PARoxetine (PAXIL) tablet 20 mg, Daily  atorvastatin (LIPITOR) tablet 20 mg, Nightly  sodium chloride flush 0.9 % injection 10 mL, 2 times per day  sodium chloride flush 0.9 % injection 10 mL, PRN  acetaminophen (TYLENOL) tablet 650 mg, Q6H PRN    Or  acetaminophen (TYLENOL) suppository 650 mg, Q6H PRN  polyethylene glycol (GLYCOLAX) packet 17 g, Daily PRN  promethazine (PHENERGAN) tablet 12.5 mg, Q6H PRN    Or  ondansetron (ZOFRAN) injection 4 mg, Q6H PRN        Objective:  /69   Pulse 111   Temp 98.3 °F (36.8 °C) (Temporal)   Resp 20   Ht 5' 6\" (1.676 m)   Wt 159 lb 2.8 oz (72.2 kg)   SpO2 94%   BMI 25.69 kg/m²     Intake/Output Summary (Last 24 hours) at 12/2/2020 0720  Last data filed at 12/1/2020 1856  Gross per 24 hour   Intake 720 ml   Output 400 ml   Net 320 ml      Wt Readings from Last 3 Encounters:   12/02/20 159 lb 2.8 oz (72.2 kg)   10/29/20 155 lb (70.3 kg)   04/30/20 160 lb (72.6 kg)       General appearance: Elderly white female, appears comfortable  Cardiovascular: Regular rhythm, normal S1, S2. No murmur. No edema in lower extremities  Respiratory: Not using accessory muscles. Good inspiratory effort. Clear to auscultation bilaterally, no wheeze or crackles. GI: Abdomen soft, no tenderness, not distended, normal bowel sounds  Musculoskeletal: No cyanosis in digits, neck supple  Neurology: CN 2-12 grossly intact. No speech or motor deficits  Psych: Normal affect. Alert and oriented in time, place and person  Extremity exam shows brisk capillary refill. Peripheral pulses are palpable in lower extremities     Labs and Tests:  CBC:   Recent Labs     11/30/20  0404 11/30/20  1331 12/01/20  0426 12/02/20  0423   WBC 8.8  --  10.2 11.2*   HGB 8.6* 8.6* 8.7* 8.2*   *  --  150 177     BMP:    Recent Labs     11/30/20  1803 12/01/20  0425 12/02/20  0423    141 141   K 4.1 4.0 4.2    108 107   CO2 27 25 24   BUN 75* 67* 46*   CREATININE 2.6* 2.5* 1.9*   GLUCOSE 104* 111* 108*     Hepatic:   Recent Labs     12/01/20  0425 12/02/20  0423   AST 22 19   ALT 20 17   BILITOT 0.5 0.6   ALKPHOS 74 69     XR CHEST PORTABLE   Final Result   New bilateral airspace disease, edema versus pneumonia. XR CHEST PORTABLE   Final Result   Minimal streaky bibasilar airspace opacities may relate to atelectasis or   multifocal pneumonia with atypical/viral pneumonia in the differential.      Stable cardiomegaly. Problem List  Active Problems:    Chronic atrial fibrillation (HCC)    Renal failure    Multifocal pneumonia    Elevated troponin    Atypical pneumonia    E coli bacteremia  Resolved Problems:    * No resolved hospital problems. *       Assessment & Plan:     A. fib with RVR, labile blood pressures:  Patient has history of chronic atrial fibrillation. Noted to be in RVR, rate in the range 100-120. Resumed Cardizem 30 mg p.o. twice daily with holding parameters. On Coumadin for anticoagulation which was held after patient came in with GI bleed. Okay to resume anticoagulation as per GI. Pharmacy consulted to dose Coumadin based on daily INR. Started on Coumadin on 12/1. CHARISSE on CKD:  Patient admitted with serum creatinine around 5. Improving renal function. Baseline serum creatinine at 1.6. Received IV fluids. Holding lisinopril and spironolactone. Nephrology consulted. Appreciate recommendations. Leukocytosis, lactic acidosis:  Patient was started on IV antibiotics for multifocal pneumonia. Blood cultures came back positive for E. coli bacteremia. Was initially started on IV ceftriaxone. ID was consulted on 11/30. Based on susceptibilities, IV antibiotics switched to ciprofloxacin. Repeat blood cultures from 11/30 are negative to date, final report is still pending. As per ID, if repeat blood culture is negative, patient can be switched to oral ciprofloxacin for total of 10 days. Acute upper GI bleed:  Due to esophagitis and gastric antral ulcers. Patient underwent EGD which showed mild esophagitis with gastric erosion. Small antral ulcer and is status post 4 clips and gastric biopsies. Also noted to have some blood in fundus. Mild duodenitis and incidental D2 diverticulum noted on EGD. GI recommends PPI twice daily for 8 weeks and then daily thereafter. Hemoglobin has remained stable in last 24 hours. Okay to resume anticoagulation as per GI. Patient will need repeat EGD in 8 weeks to ensure ulcer healing. Hyperlipidemia: On statin.     IV Access: Peripheral IV  Villanueva: No  Diet: DIET GENERAL;  Code:Full Code  DVT PPX: Resuming Coumadin 12/1  Disposition: PT/OT to evaluate and treat, discharge in 1 to 2 days      Rona Corey MD   12/2/2020 7:20 AM

## 2020-12-02 NOTE — PROGRESS NOTES
[facility] Renal ICU Hospital Note      Treatment plan update. Acute kidney injury. Severe. Slow improvement. Nonoliguric. Creatinine is 1.9   Yesterday creatinine is 2.5   Creatinine 3.5 on admission. Improving urine output. May have developed ATN. Hypotension. Improving. Midodrine has been discontinued    Multiple Electrolyte imbalance. Improving to baseline. Hyperkalemia. Improved. Hyponatremia. Improved. Metabolic Acidosis. Improving       Antibiotic dose reviewed. Appropriate for level of renal function. Cipro  mg renal adjusted dose    No indications for dialysis With improving kidney function    Continue to monitor closely. Anemia levels are stable. Volume status is stable. Continue IV fluids at current rate. Ringer lactate 75 mL/h. Continue to monitor for fluid overload. Daily weight. High complexity. Discussed with RN    Patient Active Problem List   Diagnosis    Essential hypertension    Chronic atrial fibrillation (HCC)    Severe sepsis (HCC)    Hydroureteronephrosis    Pelvic mass    CHARISSE (acute kidney injury) ([facility] [address]    Acute metabolic encephalopathy    Mixed hyperlipidemia    Primary osteoarthritis of left knee    Effusion of left knee joint    Synovitis of left knee    Pelvic mass in female    Renal failure    Multifocal pneumonia    Elevated troponin    Atypical pneumonia    E coli bacteremia     Portions of this note have been copied forward. I have reviewed and updated the HPI, history, medications, allergies, review of systems, physical exam, date, assessment, and plan of the note so that it reflects the evaluation and management of the patient by me on 12/2/2020    Past Medical History:   has a past medical history of A-fib [facility]), Atrial fibrillation ([address] Hypercholesterolemia, Hypertension, and Pelvic mass. Past Social History:   reports that she has never smoked.  She has never used smokeless tobacco. She reports that she does not drink alcohol or use drugs. Subjective:  Appears more conversant today    Review of Systems   Constitutional: Positive for fatigue. Negative for activity change, appetite change, chills, fever and unexpected weight change. HENT: Negative for congestion and facial swelling. Eyes: Negative for photophobia, discharge and redness. Respiratory: Negative for cough, chest tightness and shortness of breath. Cardiovascular: Negative for chest pain, palpitations and leg swelling. Gastrointestinal: Negative for abdominal distention, abdominal pain, blood in stool, constipation, diarrhea, nausea and vomiting. Endocrine: Negative for cold intolerance, heat intolerance and polyuria. Genitourinary: Negative for decreased urine volume, difficulty urinating, flank pain and hematuria. Musculoskeletal: Negative for joint swelling and neck pain. Neurological: Negative for dizziness, seizures, syncope, speech difficulty, light-headedness and headaches. Hematological: Does not bruise/bleed easily. Psychiatric/Behavioral: Negative for agitation, confusion and hallucinations.        Objective:      BP (!) 112/50   Pulse 114   Temp 98.8 °F (37.1 °C) (Temporal)   Resp 17   Ht 5' 6\" (1.676 m)   Wt 159 lb 2.8 oz (72.2 kg)   SpO2 96%   BMI 25.69 kg/m²     Wt Readings from Last 3 Encounters:   12/02/20 159 lb 2.8 oz (72.2 kg)   10/29/20 155 lb (70.3 kg)   04/30/20 160 lb (72.6 kg)       BP Readings from Last 3 Encounters:   12/02/20 (!) 112/50   11/29/20 129/65   10/29/20 138/84     Chest- rhonchi b/l  Heart-regular  Abd-soft  Ext- 1+ edema    Labs  Hemoglobin   Date Value Ref Range Status   12/02/2020 8.2 (L) 12.0 - 16.0 g/dL Final     Hematocrit   Date Value Ref Range Status   12/02/2020 24.6 (L) 36.0 - 48.0 % Final     WBC   Date Value Ref Range Status   12/02/2020 11.2 (H) 4.0 - 11.0 K/uL Final     Platelets   Date Value Ref Range Status   12/02/2020 177 135 - 450 K/uL Final     Lab Results   Component Value Date    CREATININE 1.9 (H) 12/02/2020    BUN 46 (H) 12/02/2020     12/02/2020    K 4.2 12/02/2020     12/02/2020    CO2 24 12/02/2020       Assessment/Plan:  1. Acute kidney injury on CKD IIIA, baseline creatinine 1.2. Currently  Non-oliguric. Possible ATN. Creatinine is improving. No need for acute renal replacement therapy today, but would need close followup. Agree with LR.   2.  Hyperkalemia in the setting of decreased GFR and metabolic acidosis. Low potassium diet. Better. Follow with LR.   3.  Hyponatremia in the setting of hypotension. This is better. 4.  Low serum bicarbonate. Positive anion gap. Probably secondary to  lactic acidosis and renal impairment. Better. 5.  Relative hypotension. Better. Continue Midodrine. 6.  Pneumonia-now on Cefepime. Appreciate Med and ICU help. 7.  Possible GI bleed. GI is following. Currently on PPI. For EGD. Hgb signficantly lower. Transfuse prbc. 8.  COVID testing is negative    High risk. Discussed with nurse.      [place] Bustillo MD

## 2020-12-02 NOTE — PROGRESS NOTES
[facility] Hospital Note      Treatment plan update. Acute kidney injury. Severe. Slow improvement. Nonoliguric. Repeat creatinine is 2.5   Creatinine 3.5 on admission. Improving urine output. May have developed ATN. Hypotension. Improving. Midodrine has been discontinued    Multiple Electrolyte imbalance. Improving to baseline. Hyperkalemia. Improved. Hyponatremia. Improved. Metabolic Acidosis. Improving       Antibiotic dose reviewed. Appropriate for level of renal function. Cipro  mg renal adjusted dose    No indications for dialysis. Continue to monitor closely. Anemia levels are stable. Volume status is stable. Continue IV fluids at current rate. High complexity. Discussed with RN    Patient Active Problem List   Diagnosis    Essential hypertension    Chronic atrial fibrillation (HCC)    Severe sepsis (HCC)    Hydroureteronephrosis    Pelvic mass    CHARISSE (acute kidney injury) ([address]    Acute metabolic encephalopathy    Mixed hyperlipidemia    Primary osteoarthritis of left knee    Effusion of left knee joint    Synovitis of left knee    Pelvic mass in female    Renal failure    Multifocal pneumonia    Elevated troponin    Atypical pneumonia    E coli bacteremia     Portions of this note have been copied forward. I have reviewed and updated the HPI, history, medications, allergies, review of systems, physical exam, date, assessment, and plan of the note so that it reflects the evaluation and management of the patient by me on 12/1/2020    Past Medical History:   has a past medical history of A-fib [facility]), Atrial fibrillation ([address] Hypercholesterolemia, Hypertension, and Pelvic mass. Past Social History:   reports that she has never smoked. She has never used smokeless tobacco. She reports that she does not drink alcohol or use drugs.     Subjective:  Appears more conversant today    Review of Systems   Constitutional: Positive for fatigue. Negative for activity change, appetite change, chills, fever and unexpected weight change. HENT: Negative for congestion and facial swelling. Eyes: Negative for photophobia, discharge and redness. Respiratory: Negative for cough, chest tightness and shortness of breath. Cardiovascular: Negative for chest pain, palpitations and leg swelling. Gastrointestinal: Negative for abdominal distention, abdominal pain, blood in stool, constipation, diarrhea, nausea and vomiting. Endocrine: Negative for cold intolerance, heat intolerance and polyuria. Genitourinary: Negative for decreased urine volume, difficulty urinating, flank pain and hematuria. Musculoskeletal: Negative for joint swelling and neck pain. Neurological: Negative for dizziness, seizures, syncope, speech difficulty, light-headedness and headaches. Hematological: Does not bruise/bleed easily. Psychiatric/Behavioral: Negative for agitation, confusion and hallucinations.        Objective:      /62   Pulse 107   Temp 98.6 °F (37 °C) (Temporal)   Resp 19   Ht 5' 6\" (1.676 m)   Wt 159 lb 9.8 oz (72.4 kg)   SpO2 98%   BMI 25.76 kg/m²     Wt Readings from Last 3 Encounters:   12/01/20 159 lb 9.8 oz (72.4 kg)   10/29/20 155 lb (70.3 kg)   04/30/20 160 lb (72.6 kg)       BP Readings from Last 3 Encounters:   12/01/20 102/62   11/29/20 129/65   10/29/20 138/84     Chest- rhonchi b/l  Heart-regular  Abd-soft  Ext- 1+ edema    Labs  Hemoglobin   Date Value Ref Range Status   12/01/2020 8.7 (L) 12.0 - 16.0 g/dL Final     Hematocrit   Date Value Ref Range Status   12/01/2020 25.5 (L) 36.0 - 48.0 % Final     WBC   Date Value Ref Range Status   12/01/2020 10.2 4.0 - 11.0 K/uL Final     Platelets   Date Value Ref Range Status   12/01/2020 150 135 - 450 K/uL Final     Lab Results   Component Value Date    CREATININE 2.5 (H) 12/01/2020    BUN 67 (H) 12/01/2020     12/01/2020    K 4.0 12/01/2020     12/01/2020    CO2 25 12/01/2020       Assessment/Plan:  1. Acute kidney injury on CKD IIIA, baseline creatinine 1.2. Currently  Non-oliguric. Possible ATN. Creatinine is improving. No need for acute renal replacement therapy today, but would need close followup. Agree with LR.   2.  Hyperkalemia in the setting of decreased GFR and metabolic acidosis. Low potassium diet. Better. Follow with LR.   3.  Hyponatremia in the setting of hypotension. This is better. 4.  Low serum bicarbonate. Positive anion gap. Probably secondary to  lactic acidosis and renal impairment. Better. 5.  Relative hypotension. Better. Continue Midodrine. 6.  Pneumonia-now on Cefepime. Appreciate Med and ICU help. 7.  Possible GI bleed. GI is following. Currently on PPI. For EGD. Hgb signficantly lower. Transfuse prbc. 8.  COVID testing is negative    High risk. Discussed with nurse.      Ashlee Segovia MD

## 2020-12-02 NOTE — PROGRESS NOTES
PM assessment complete and documented. Meds given per MAR. RA with sats >92%. No needs or concerns expressed. Will continue to monitor.

## 2020-12-02 NOTE — PROGRESS NOTES
No changes noted in assessment. Sleeping between care. Bed alarm on and call light within reach. No needs expressed. Will continue to monitor.

## 2020-12-02 NOTE — PROGRESS NOTES
Assisted pt to [facility]. Tolerated well. No changes noted in assessment. Resting between care. No other needs expressed. Will continue to monitor.

## 2020-12-02 NOTE — PROGRESS NOTES
Patient assessment completed and morning medications given. Vitals are stable. HR elevated. cardizem given po. Patient denies any pain or shortness of breath. Patient on RA. Will continue to monitor.

## 2020-12-02 NOTE — CONSULTS
[address]   Electrophysiology Nurse Practitioner  Consult    Date: 12/2/2020  Date of admission: 11/27/2020 11:37 AM  Reason for Admission: Renal failure [N19]  Renal failure [N19]    Consult Requesting Physician: Scotty Jo MD    -Reason for Consultation: Atrial fibrillation    Chief Complaint   Patient presents with    Shortness of Breath     pt states she has had increase SOB and cough for seveal days. dneies fever or pain       HISTORY OF PRESENT ILLNESS: History obtained from patient and medical record. Pita Poe is a 79 y.o. female with a past medical history of chronic atrial fibrillation, HTN, OA, and HLD. Pt has long standing history of atrial fibrillation, anti-coagulated on coumadin. She has followed by Dr. Kalina Gasca from our office for years. Pt presented to hospital with worsening dyspnea and cough. In the ER, she was found to be hypoxic, anemic, and in acute renal failure with a creatinine of 5. A CXR showed PNA, but her COVID testing was negative. Her hemoglobin dropped to 6.9 requiring blood transfusion. She underwent EGD (11/29/20) which showed mild esophagitis and small antral ulcers s/p 4 clips. Her kidney function improved with IVF. EP consulted due to atrial fibrillation with RVR. Pt denies any symptoms of atrial fibrillation. Denies any chest pain, SOB, dizziness, heart racing, or palpitations. She has been off her cardizem and atenolol since admission due to hypotension. Her coumadin was restarted last evening. Interval Hx: Today, she is being seen for consult for atrial fibrillation. She is feeling better day by day. She is in atrial fibrillation, rate mildly elevated. Her cardizem was resumed this morning. Her BP is stable and remains on the marginal side. No further GI bleeding. Patient seen and examined. Clinical notes reviewed. Telemetry reviewed. No new complaints today. No major events overnight.    Denies having chest pain, palpitations, shortness of breath, orthopnea, cough, or dizziness at the time of this visit. Allergies: Allergies   Allergen Reactions    Penicillins      Patient cannot remember reaction    Sulfa Antibiotics      Home Meds:  Prior to Visit Medications    Medication Sig Taking? Authorizing Provider   warfarin (COUMADIN) 5 MG tablet 5 mg three days a week 2.5 the other. Patient taking differently: Take 2.5 mg by mouth daily 5 mg three days a week 2.5 the other. Yes Eder Haas MD   atenolol (TENORMIN) 100 MG tablet Take 1 tablet by mouth daily Yes Eder Haas MD   dilTIAZem (CARDIZEM) 60 MG tablet Take 0.5 tablets by mouth every 12 hours Yes Eedr Haas MD   simvastatin (ZOCOR) 40 MG tablet Take 1 tablet by mouth nightly Yes Eder Haas MD   PARoxetine (PAXIL) 20 MG tablet Take 1 tablet by mouth daily Yes Eder Haas MD   Omega-3 Fatty Acids (FISH OIL PO) Take by mouth Yes Historical Provider, MD   Multiple Vitamins-Minerals (MULTIVITAMIN PO) Take by mouth Yes Historical Provider, MD      Past Medical History:  Past Medical History:   Diagnosis Date    [facility]     Atrial fibrillation [facility]     Hypercholesterolemia     Hypertension     Pelvic mass       Past Surgical History:    has a past surgical history that includes Appendectomy; Cystocopy (Right, 06/14/2018); Hysterectomy, total abdominal (07/09/2018); pr cysto/uretero w/lithotripsy &indwell stent insrt (Right, 10/31/2018); Upper gastrointestinal endoscopy (N/A, 11/29/2020); and Upper gastrointestinal endoscopy (N/A, 11/29/2020). Social History:  Reviewed. reports that she has never smoked. She has never used smokeless tobacco. She reports that she does not drink alcohol or use drugs. Family History:  Reviewed. family history includes Heart Disease in her father.      Review of System:  · Constitutional: Negative for fever, night sweats, chills, weight changes, or weakness  · Skin: Negative for rash, dry skin, pruritus, bruising, bleeding, blood clots, or changes in skin pigment  · HEENT: Negative for vision changes, ringing in the ears, sore throat, dysphagia, or swollen lymph nodes  · Respiratory: Reviewed in HPI  · Cardiovascular: Reviewed in HPI  · Gastrointestinal: Negative for abdominal pain, N/V/D, constipation, or black/tarry stools  · Genito-Urinary: Negative for dysuria, incontinence, urgency, or hematuria  · Musculoskeletal: Negative for joint swelling, muscle pain, or injuries  · Neurological/Psych: Negative for confusion, seizures, headaches, balance issues or TIA-like symptoms. No anxiety, depression, or insomnia    Physical Examination:  Vitals:    12/02/20 1215   BP:    Pulse: 106   Resp: 16   Temp:    SpO2: 97%      In: 240 [P.O.:240]  Out: -    Wt Readings from Last 3 Encounters:   12/02/20 159 lb 2.8 oz (72.2 kg)   10/29/20 155 lb (70.3 kg)   04/30/20 160 lb (72.6 kg)       Telemetry: Personally Reviewed  - Atrial fibrillation, rate 90-110s  · Constitutional: Cooperative and in no apparent distress, and appears well nourished  · Skin: Warm and pink; no pallor, cyanosis, bruising, or clubbing  · HEENT: Symmetric and normocephalic. PERRL, EOM intact. Conjunctiva pink with clear sclera. Mucus membranes pink and moist. Teeth intact. Thyroid smooth without nodules or goiter. · Cardiovascular: Mildly tachycardic rate and irregular rhythm. S1/S2 present without murmurs, rubs, or gallops. Peripheral pulses 2+, capillary refill < 3 seconds. No peripheral edema, no elevation of JVP  · Respiratory: Respirations symmetric and unlabored. Lungs clear to auscultation bilaterally, no wheezing, crackles, or rhonchi  · Gastrointestinal: Abdomen soft and rotund. Bowel sounds normoactive in all quadrants without tenderness or masses. · Musculoskeletal: Bilateral upper and lower extremity strength 5/5 with full ROM  · Neurologic/Psych: Awake and orientated to person, place and time.  Calm affect, appropriate mood    Pertinent labs, diagnostic, device, and imaging results reviewed as a part of this visit    Labs:  BMP:   Recent Labs     20  1803 20  0425 20  0423    141 141   K 4.1 4.0 4.2    108 107   CO2 27 25 24   BUN 75* 67* 46*   CREATININE 2.6* 2.5* 1.9*   MG  --  1.70* 1.80     Estimated Creatinine Clearance: 24 mL/min (A) (based on SCr of 1.9 mg/dL (H)). CBC:   Recent Labs     20  0404 20  1331 20  0426 20  0423   WBC 8.8  --  10.2 11.2*   HGB 8.6* 8.6* 8.7* 8.2*   HCT 25.4* 25.6* 25.5* 24.6*   MCV 89.3  --  90.7 91.3   *  --  150 177     Thyroid: No results found for: TSH, Y8MYMJS, C7LSLFS, THYROIDAB  Lipids:  Lab Results   Component Value Date    CHOL 164 04/15/2019    HDL 45 04/15/2019    TRIG 136 04/15/2019     LFTS:   Lab Results   Component Value Date    ALT 17 2020    AST 19 2020    ALKPHOS 69 2020    PROT 5.3 2020    AGRATIO 1.0 2020    BILITOT 0.6 2020     Cardiac Enzymes:   Lab Results   Component Value Date    TROPONINI 0.01 2020    TROPONINI 0.04 2020     Coags:   Lab Results   Component Value Date    PROTIME 13.5 2020    PROTIME 3.3 2013    INR 1.16 2020       EC20  Atrial fibrillation with T wave abnormality    ECHO:    -Normal left ventricle size, wall thickness, and systolic function with an   estimated ejection fraction of 65%.   -No regional wall motion abnormalities are seen. -Moderate mitral regurgitation.   -Moderate tricuspid regurgitation with a estimated RVSP of 51 mmHg. -The left atrium is dilated. -The right atrium is dilated. -Normal diastolic function. Stress Test: None    CXR: 20  New bilateral airspace disease, edema versus pneumonia.      Problem List:   Patient Active Problem List    Diagnosis Date Noted    Multifocal pneumonia     Elevated troponin     Atypical pneumonia     E coli bacteremia     Renal failure 2020    Pelvic mass in discussed the above stated plan and the patient verbalized understanding and agreed with the plan. Discussed plan with patient and nurse. Thank you for allowing to us to participate in the care of Papi Andino.     RICARDO Garcia  [facility]   Office: [phone]

## 2020-12-03 LAB
A/G RATIO: 0.8 (ref 1.1–2.2)
ALBUMIN SERPL-MCNC: 2.3 G/DL (ref 3.4–5)
ALP BLD-CCNC: 66 U/L (ref 40–129)
ALT SERPL-CCNC: 14 U/L (ref 10–40)
ANION GAP SERPL CALCULATED.3IONS-SCNC: 8 MMOL/L (ref 3–16)
AST SERPL-CCNC: 15 U/L (ref 15–37)
BASOPHILS ABSOLUTE: 0 K/UL (ref 0–0.2)
BASOPHILS RELATIVE PERCENT: 0.4 %
BILIRUB SERPL-MCNC: 0.6 MG/DL (ref 0–1)
BUN BLDV-MCNC: 30 MG/DL (ref 7–20)
CALCIUM SERPL-MCNC: 8.2 MG/DL (ref 8.3–10.6)
CHLORIDE BLD-SCNC: 105 MMOL/L (ref 99–110)
CO2: 25 MMOL/L (ref 21–32)
CREAT SERPL-MCNC: 2 MG/DL (ref 0.6–1.2)
EOSINOPHILS ABSOLUTE: 0.2 K/UL (ref 0–0.6)
EOSINOPHILS RELATIVE PERCENT: 2 %
GFR AFRICAN AMERICAN: 29
GFR NON-AFRICAN AMERICAN: 24
GLOBULIN: 2.9 G/DL
GLUCOSE BLD-MCNC: 116 MG/DL (ref 70–99)
HCT VFR BLD CALC: 22.6 % (ref 36–48)
HCT VFR BLD CALC: 23.9 % (ref 36–48)
HCT VFR BLD CALC: 26 % (ref 36–48)
HEMOGLOBIN: 7.5 G/DL (ref 12–16)
HEMOGLOBIN: 7.9 G/DL (ref 12–16)
HEMOGLOBIN: 8.4 G/DL (ref 12–16)
INR BLD: 1.19 (ref 0.86–1.14)
LYMPHOCYTES ABSOLUTE: 1.6 K/UL (ref 1–5.1)
LYMPHOCYTES RELATIVE PERCENT: 12.9 %
MAGNESIUM: 1.8 MG/DL (ref 1.8–2.4)
MCH RBC QN AUTO: 30.3 PG (ref 26–34)
MCHC RBC AUTO-ENTMCNC: 33.3 G/DL (ref 31–36)
MCV RBC AUTO: 91.1 FL (ref 80–100)
MONOCYTES ABSOLUTE: 0.7 K/UL (ref 0–1.3)
MONOCYTES RELATIVE PERCENT: 5.9 %
NEUTROPHILS ABSOLUTE: 9.7 K/UL (ref 1.7–7.7)
NEUTROPHILS RELATIVE PERCENT: 78.8 %
PDW BLD-RTO: 14.4 % (ref 12.4–15.4)
PLATELET # BLD: 199 K/UL (ref 135–450)
PMV BLD AUTO: 8.5 FL (ref 5–10.5)
POTASSIUM SERPL-SCNC: 3.8 MMOL/L (ref 3.5–5.1)
PROTHROMBIN TIME: 13.8 SEC (ref 10–13.2)
RBC # BLD: 2.48 M/UL (ref 4–5.2)
SODIUM BLD-SCNC: 138 MMOL/L (ref 136–145)
TOTAL PROTEIN: 5.2 G/DL (ref 6.4–8.2)
URINE CULTURE, ROUTINE: NORMAL
WBC # BLD: 12.4 K/UL (ref 4–11)

## 2020-12-03 PROCEDURE — 36415 COLL VENOUS BLD VENIPUNCTURE: CPT

## 2020-12-03 PROCEDURE — 6370000000 HC RX 637 (ALT 250 FOR IP): Performed by: NURSE PRACTITIONER

## 2020-12-03 PROCEDURE — 6370000000 HC RX 637 (ALT 250 FOR IP): Performed by: FAMILY MEDICINE

## 2020-12-03 PROCEDURE — 97530 THERAPEUTIC ACTIVITIES: CPT

## 2020-12-03 PROCEDURE — 6370000000 HC RX 637 (ALT 250 FOR IP): Performed by: INTERNAL MEDICINE

## 2020-12-03 PROCEDURE — 85018 HEMOGLOBIN: CPT

## 2020-12-03 PROCEDURE — 6360000002 HC RX W HCPCS: Performed by: INTERNAL MEDICINE

## 2020-12-03 PROCEDURE — 85610 PROTHROMBIN TIME: CPT

## 2020-12-03 PROCEDURE — 2580000003 HC RX 258: Performed by: INTERNAL MEDICINE

## 2020-12-03 PROCEDURE — 85014 HEMATOCRIT: CPT

## 2020-12-03 PROCEDURE — 99233 SBSQ HOSP IP/OBS HIGH 50: CPT | Performed by: NURSE PRACTITIONER

## 2020-12-03 PROCEDURE — 80053 COMPREHEN METABOLIC PANEL: CPT

## 2020-12-03 PROCEDURE — 2580000003 HC RX 258: Performed by: FAMILY MEDICINE

## 2020-12-03 PROCEDURE — 83735 ASSAY OF MAGNESIUM: CPT

## 2020-12-03 PROCEDURE — 1200000000 HC SEMI PRIVATE

## 2020-12-03 PROCEDURE — 85025 COMPLETE CBC W/AUTO DIFF WBC: CPT

## 2020-12-03 PROCEDURE — 97116 GAIT TRAINING THERAPY: CPT

## 2020-12-03 RX ORDER — WARFARIN SODIUM 2.5 MG/1
2.5 TABLET ORAL DAILY
Status: DISCONTINUED | OUTPATIENT
Start: 2020-12-03 | End: 2020-12-04 | Stop reason: HOSPADM

## 2020-12-03 RX ORDER — MAGNESIUM SULFATE 1 G/100ML
1 INJECTION INTRAVENOUS ONCE
Status: COMPLETED | OUTPATIENT
Start: 2020-12-03 | End: 2020-12-03

## 2020-12-03 RX ORDER — ATENOLOL 50 MG/1
50 TABLET ORAL DAILY
Status: DISCONTINUED | OUTPATIENT
Start: 2020-12-03 | End: 2020-12-04 | Stop reason: HOSPADM

## 2020-12-03 RX ADMIN — PANTOPRAZOLE SODIUM 40 MG: 40 TABLET, DELAYED RELEASE ORAL at 06:48

## 2020-12-03 RX ADMIN — Medication 10 ML: at 20:41

## 2020-12-03 RX ADMIN — SODIUM CHLORIDE, POTASSIUM CHLORIDE, SODIUM LACTATE AND CALCIUM CHLORIDE: 600; 310; 30; 20 INJECTION, SOLUTION INTRAVENOUS at 11:13

## 2020-12-03 RX ADMIN — DILTIAZEM HYDROCHLORIDE 30 MG: 30 TABLET, FILM COATED ORAL at 20:41

## 2020-12-03 RX ADMIN — DILTIAZEM HYDROCHLORIDE 30 MG: 30 TABLET, FILM COATED ORAL at 09:38

## 2020-12-03 RX ADMIN — PAROXETINE HYDROCHLORIDE 20 MG: 20 TABLET, FILM COATED ORAL at 09:38

## 2020-12-03 RX ADMIN — ATORVASTATIN CALCIUM 20 MG: 20 TABLET, FILM COATED ORAL at 20:41

## 2020-12-03 RX ADMIN — WARFARIN SODIUM 2.5 MG: 2.5 TABLET ORAL at 19:30

## 2020-12-03 RX ADMIN — MAGNESIUM SULFATE HEPTAHYDRATE 1 G: 1 INJECTION, SOLUTION INTRAVENOUS at 11:13

## 2020-12-03 RX ADMIN — ATENOLOL 50 MG: 50 TABLET ORAL at 09:38

## 2020-12-03 RX ADMIN — ACETAMINOPHEN 650 MG: 325 TABLET ORAL at 11:35

## 2020-12-03 RX ADMIN — CIPROFLOXACIN 400 MG: 2 INJECTION, SOLUTION INTRAVENOUS at 19:30

## 2020-12-03 RX ADMIN — PANTOPRAZOLE SODIUM 40 MG: 40 TABLET, DELAYED RELEASE ORAL at 16:54

## 2020-12-03 ASSESSMENT — PAIN SCALES - GENERAL
PAINLEVEL_OUTOF10: 0
PAINLEVEL_OUTOF10: 6
PAINLEVEL_OUTOF10: 0
PAINLEVEL_OUTOF10: 4
PAINLEVEL_OUTOF10: 0
PAINLEVEL_OUTOF10: 0

## 2020-12-03 ASSESSMENT — ENCOUNTER SYMPTOMS
EYE DISCHARGE: 0
DIARRHEA: 0
VOMITING: 0
BLOOD IN STOOL: 0
ABDOMINAL PAIN: 0
EYE REDNESS: 0
CHEST TIGHTNESS: 0
COUGH: 0
ABDOMINAL DISTENTION: 0
FACIAL SWELLING: 0
NAUSEA: 0
PHOTOPHOBIA: 0
CONSTIPATION: 0
SHORTNESS OF BREATH: 0

## 2020-12-03 NOTE — PROGRESS NOTES
[facility] PROGRESS NOTE    12/3/2020 8:39 AM        Name: Estella Peterson . Admitted: 11/27/2020  Primary Care Provider: No primary care provider on file. (Tel: None)    Brief Course: Patient is an 79-year-old  female with history of atrial fibrillation on Coumadin, hypertension, hyperlipidemia who presented with shortness of breath and cough. Patient was noted to be hypoxic in ED and placed on oxygen supplementation. Labs showed acute renal failure with creatinine of 2 5. Chest x-ray suggestive of multifocal pneumonia. CC: Shortness of breath, cough  Subjective: No acute events overnight. Noted to have drop in hemoglobin further to 7.5 today morning. No bloody bowel movements reported.   Reviewed interval ancillary notes    Current Medications  magnesium sulfate 1 g in dextrose 5% 100 mL IVPB, Once  dilTIAZem (CARDIZEM) tablet 30 mg, 2 times per day  warfarin (COUMADIN) tablet 2.5 mg, Daily  pantoprazole (PROTONIX) tablet 40 mg, BID AC  lactated ringers infusion, Continuous  ciprofloxacin (CIPRO) IVPB 400 mg, Q24H  0.9 % sodium chloride bolus, Once  PARoxetine (PAXIL) tablet 20 mg, Daily  atorvastatin (LIPITOR) tablet 20 mg, Nightly  sodium chloride flush 0.9 % injection 10 mL, 2 times per day  sodium chloride flush 0.9 % injection 10 mL, PRN  acetaminophen (TYLENOL) tablet 650 mg, Q6H PRN    Or  acetaminophen (TYLENOL) suppository 650 mg, Q6H PRN  polyethylene glycol (GLYCOLAX) packet 17 g, Daily PRN  promethazine (PHENERGAN) tablet 12.5 mg, Q6H PRN    Or  ondansetron (ZOFRAN) injection 4 mg, Q6H PRN        Objective:  /76   Pulse 110   Temp 98.3 °F (36.8 °C) (Temporal)   Resp 18   Ht 5' 6\" (1.676 m)   Wt 163 lb 5.8 oz (74.1 kg)   SpO2 94%   BMI 26.37 kg/m²     Intake/Output Summary (Last 24 hours) at 12/3/2020 0884  Last data filed at 12/2/2020 1822  Gross per 24 hour   Intake 720 ml Output --   Net 720 ml      Wt Readings from Last 3 Encounters:   12/03/20 163 lb 5.8 oz (74.1 kg)   10/29/20 155 lb (70.3 kg)   04/30/20 160 lb (72.6 kg)       General appearance: Elderly white female, appears comfortable  Cardiovascular: Regular rhythm, normal S1, S2. No murmur. No edema in lower extremities  Respiratory: Not using accessory muscles. Good inspiratory effort. Clear to auscultation bilaterally, no wheeze or crackles. GI: Abdomen soft, no tenderness, not distended, normal bowel sounds  Musculoskeletal: No cyanosis in digits, neck supple  Neurology: CN 2-12 grossly intact. No speech or motor deficits  Psych: Normal affect. Alert and oriented in time, place and person  Extremity exam shows brisk capillary refill. Peripheral pulses are palpable in lower extremities     Labs and Tests:  CBC:   Recent Labs     12/01/20 0426 12/02/20 0423 12/03/20  0424   WBC 10.2 11.2* 12.4*   HGB 8.7* 8.2* 7.5*    177 199     BMP:    Recent Labs     12/01/20 0425 12/02/20  0423 12/03/20  0424    141 138   K 4.0 4.2 3.8    107 105   CO2 25 24 25   BUN 67* 46* 30*   CREATININE 2.5* 1.9* 2.0*   GLUCOSE 111* 108* 116*     Hepatic:   Recent Labs     12/01/20 0425 12/02/20 0423 12/03/20  0424   AST 22 19 15   ALT 20 17 14   BILITOT 0.5 0.6 0.6   ALKPHOS 74 69 66     XR CHEST PORTABLE   Final Result   New bilateral airspace disease, edema versus pneumonia. XR CHEST PORTABLE   Final Result   Minimal streaky bibasilar airspace opacities may relate to atelectasis or   multifocal pneumonia with atypical/viral pneumonia in the differential.      Stable cardiomegaly. Problem List  Active Problems:    Chronic atrial fibrillation (HCC)    Renal failure    Multifocal pneumonia    Elevated troponin    Atypical pneumonia    E coli bacteremia  Resolved Problems:    * No resolved hospital problems. *       Assessment & Plan:     E. coli bacteremia:  ID was consulted on 11/30.   Based on

## 2020-12-03 NOTE — PROGRESS NOTES
Physical Therapy  Facility/Department: [facility] ICU  Daily Treatment Note  NAME: Martha Fraga  : 1940  MRN: [id number]    Date of Service: 12/3/2020    Discharge Recommendations: Martha Fraga scored a 18/24 on the AM-PAC short mobility form. Current research shows that an AM-PAC score of 18 or greater is typically associated with a discharge to the patient's home setting. Based on the patient's AM-PAC score and their current functional mobility deficits, it is recommended that the patient have 2-3 sessions per week of Physical Therapy at d/c to increase the patient's independence. At this time, this patient demonstrates the endurance and safety to discharge home with home services and a follow up treatment frequency of 2-3x/wk. Please see assessment section for further patient specific details. If patient discharges prior to next session this note will serve as a discharge summary. Please see below for the latest assessment towards goals. HOME HEALTH CARE: LEVEL 1 STANDARD    - Initial home health evaluation to occur within 24-48 hours, in patient home   - Therapy to evaluate with goal of regaining prior level of functioning   - Therapy to evaluate if patient has [address] needs for personal care       PT Equipment Recommendations  Equipment Needed: Yes  Mobility Devices: Rula Xie: Rolling    Assessment   Body structures, Functions, Activity limitations: Decreased functional mobility ; Decreased strength;Decreased endurance;Decreased balance  Assessment: The pt presents with decreased activity tolerance which impairs her ADLs and dynamic standing balance. Pt plans to return home with her daughter's assistance. Prognosis: Good  PT Education: PT Role;Plan of Care; Functional Mobility Training;Equipment;Transfer Training;General Safety;Gait Training  Patient Education: Pt verbalized understanding of POC and DC recommendations.   Barriers to Learning: none  REQUIRES PT FOLLOW UP: Yes  Activity Tolerance  Activity Tolerance: Patient Tolerated treatment well;Patient limited by endurance  Activity Tolerance:  bpm at rest, 115-130 bpm sitting EOB and again with gait     Patient Diagnosis(es): The primary encounter diagnosis was Multifocal pneumonia. Diagnoses of Severe sepsis ([facility] CHARISSE (acute kidney injury) ([facility] and Elevated troponin were also pertinent to this visit. has a past medical history of A-fib [facility]), Atrial fibrillation ([facility] Hypercholesterolemia, Hypertension, and Pelvic mass. has a past surgical history that includes Appendectomy; Cystocopy (Right, 06/14/2018); Hysterectomy, total abdominal (07/09/2018); pr cysto/uretero w/lithotripsy &indwell stent insrt (Right, 10/31/2018); Upper gastrointestinal endoscopy (N/A, 11/29/2020); and Upper gastrointestinal endoscopy (N/A, 11/29/2020). Restrictions  Restrictions/Precautions  Restrictions/Precautions: Fall Risk, Isolation(high fall risk)  Position Activity Restriction  Other position/activity restrictions: Papi Andino is a 79 y.o. female who presents to the emergency department with complaints of shortness of breath and cough that started on Monday. She no longer has cough but does still continue to feel short of breath. Denies any fever or pain. Denies productive cough. Denies any known exposure to COVID-19. Does not feel at this time lightheaded, weak or dizzy. Denies chest pain. Denies any home oxygen use. Symptoms are worse with ambulation. Subjective   General  Chart Reviewed: Yes  Response To Previous Treatment: Patient with no complaints from previous session.   Family / Caregiver Present: No  Subjective  Subjective: pt denied pain, pt stated she will be returning home with her daughter's assistance 24/7 initially, pt is not interested in DC to a SNF  General Comment  Comments: Pt supine in bed upon arrival.  Pain Screening  Patient Currently in Pain: Denies  Vital Signs  Patient Currently in Pain: Denies       Orientation  Orientation  Overall Orientation Status: Within Functional Limits     Objective   Bed mobility  Supine to Sit: Stand by assistance  Scooting: Stand by assistance  Transfers  Sit to Stand: Stand by assistance  Stand to sit: Stand by assistance  Ambulation  Ambulation?: Yes  Ambulation 1  Surface: level tile  Device: Rolling Walker  Assistance: Stand by assistance  Gait Deviations: Slow Alisha;Decreased step length;Decreased step height  Distance: 10', 14' x 2  Comments: pt required seated rest breaks due to fatigue with gait     Balance  Posture: Good  Sitting - Static: Good  Sitting - Dynamic: Good  Standing - Static: Good;-  Standing - Dynamic: Fair;+(SBA with RW)     Comment: Pt and PT discussed home safety and equipment for DC. Pt would benefit from using a RW. PT recommended pt plan to have seated rest breaks throughout her home as she ambulates. Dtr will be staying with the pt at [place]. PT educated the pt on the benefits of a w/c, pt does not feel she needs one at this time. AM-PAC Score  AM-PAC Inpatient Mobility Raw Score : 18 (12/03/20 1247)  AM-PAC Inpatient T-Scale Score : 43.63 (12/03/20 1247)  Mobility Inpatient CMS 0-100% Score: 46.58 (12/03/20 1247)  Mobility Inpatient CMS G-Code Modifier : CK (12/03/20 1247)          Goals  Short term goals  Time Frame for Short term goals: to be met by DC. Short term goal 1: Pt to perform bed mob with mod I. Short term goal 2: Pt to perform transfers with mod I. Short term goal 3: Pt to amb with AAD and supervision for 50 ft. Short term goal 4: Pt to am up 2 steps with SBA. Long term goals  Time Frame for Long term goals : LTGs=sTGs  Patient Goals   Patient goals : To get stronger, return home.   *no goals met    Plan    Plan  Times per week: 3-5x/week  Current Treatment Recommendations: Strengthening, Balance Training, Functional Mobility Training, Transfer Training, Endurance Training, Gait Training, Stair training, Home Exercise Program, Safety Education & Training, Patient/Caregiver Education & Training, Equipment Evaluation, Education, & procurement  Safety Devices  Type of devices:  All fall risk precautions in place, Call light within reach, Chair alarm in place, Gait belt, Patient at risk for falls, Left in chair, Nurse notified  Restraints  Initially in place: No     Therapy Time   Individual Concurrent Group Co-treatment   Time In 1217         Time Out 1246         Minutes 29         Timed Code Treatment Minutes: [address], [place] DPT [id number]

## 2020-12-03 NOTE — PROGRESS NOTES
Pharmacy to dose warfarin:     Discussed with Dr. Charbel Sahni. Hold warfarin [facility]. Placeholder entered on MAR.      Felipa Mcmillan, PharmD, [address]   [phone]

## 2020-12-03 NOTE — PROGRESS NOTES
Assisted to [facility]. No changes noted in assessment. Sleeping between care. No other needs expressed. Will continue to monitor.

## 2020-12-03 NOTE — PROGRESS NOTES
[address]   Electrophysiology Nurse Practitioner  Consult    Date: 12/3/2020  Date of admission: 11/27/2020 11:37 AM  Reason for Admission: Renal failure [N19]  Renal failure [N19]    Consult Requesting Physician: Lamin Reyna MD    -Reason for Consultation: Atrial fibrillation    Chief Complaint   Patient presents with    Shortness of Breath     pt states she has had increase SOB and cough for seveal days. dneies fever or pain       HISTORY OF PRESENT ILLNESS: History obtained from patient and medical record. Bárbara Johnson is a 79 y.o. female with a past medical history of chronic atrial fibrillation, HTN, OA, and HLD. Pt has long standing history of atrial fibrillation, anti-coagulated on coumadin. She has followed by Dr. Yanique Hollis from our office for years. Pt presented to hospital with worsening dyspnea and cough. In the ER, she was found to be hypoxic, anemic, and in acute renal failure with a creatinine of 5. A CXR showed PNA, but her COVID testing was negative. Her hemoglobin dropped to 6.9 requiring blood transfusion. She underwent EGD (11/29/20) which showed mild esophagitis and small antral ulcers s/p 4 clips. Her kidney function improved with IVF. EP consulted due to atrial fibrillation with RVR. Pt denies any symptoms of atrial fibrillation. Denies any chest pain, SOB, dizziness, heart racing, or palpitations. She has been off her cardizem and atenolol since admission due to hypotension. Her coumadin was restarted last evening. Interval Hx: Today, she is being seen for follow up. She remains in atrial fibrillation, her rate is slightly elevated. She denies any symptoms. Her BP is stable. Her H/H has dropped slightly from yesterday. Pt denies any issues with bleeding. Her kidney function remains elevated, but her BUN is trending down. She denies any issues when up in the room ambulating and does not want to be d/c to a SNF. Patient seen and examined. Clinical notes reviewed. Telemetry reviewed. No new complaints today. No major events overnight. Denies having chest pain, palpitations, shortness of breath, orthopnea, cough, or dizziness at the time of this visit. Allergies: Allergies   Allergen Reactions    Penicillins      Patient cannot remember reaction    Sulfa Antibiotics      Home Meds:  Prior to Visit Medications    Medication Sig Taking? Authorizing Provider   warfarin (COUMADIN) 5 MG tablet 5 mg three days a week 2.5 the other. Patient taking differently: Take 2.5 mg by mouth daily 5 mg three days a week 2.5 the other. Yes Lisa John MD   atenolol (TENORMIN) 100 MG tablet Take 1 tablet by mouth daily Yes Lisa John MD   dilTIAZem (CARDIZEM) 60 MG tablet Take 0.5 tablets by mouth every 12 hours Yes Lisa John MD   simvastatin (ZOCOR) 40 MG tablet Take 1 tablet by mouth nightly Yes Lisa John MD   PARoxetine (PAXIL) 20 MG tablet Take 1 tablet by mouth daily Yes Lisa John MD   Omega-3 Fatty Acids (FISH OIL PO) Take by mouth Yes Historical Provider, MD   Multiple Vitamins-Minerals (MULTIVITAMIN PO) Take by mouth Yes Historical Provider, MD      Past Medical History:  Past Medical History:   Diagnosis Date    [facility]     Atrial fibrillation ([facility]     Hypercholesterolemia     Hypertension     Pelvic mass       Past Surgical History:    has a past surgical history that includes Appendectomy; Cystocopy (Right, 06/14/2018); Hysterectomy, total abdominal (07/09/2018); pr cysto/uretero w/lithotripsy &indwell stent insrt (Right, 10/31/2018); Upper gastrointestinal endoscopy (N/A, 11/29/2020); and Upper gastrointestinal endoscopy (N/A, 11/29/2020). Social History:  Reviewed. reports that she has never smoked. She has never used smokeless tobacco. She reports that she does not drink alcohol or use drugs. Family History:  Reviewed. family history includes Heart Disease in her father.      Review of System:  · Constitutional: troponin     Atypical pneumonia     E coli bacteremia     Renal failure 11/27/2020    Pelvic mass in female 07/09/2018    Primary osteoarthritis of left knee     Effusion of left knee joint     Synovitis of left knee     Hydroureteronephrosis     Pelvic mass     [facility]     Acute metabolic encephalopathy     Mixed hyperlipidemia     Severe sepsis ([facility] [facility] 06/14/2018    Essential hypertension 09/22/2011    Chronic atrial fibrillation ([facility] [facility] 09/22/2011        Assessment and Plan:     1. Chronic permanent Atrial Fibrillation  - Currently in AF, rate 110s   ~ Likely secondary to infection/PNA    - On cardizem 30 mg BID   ~ Resume home BB: Atenolol 25 mg QD    - [id number] score:high ; [id number] Vasc score and anticoagulation discussed. High risk for stroke and thromboembolism. Anticoagulation is recommended. Risk of bleeding was discussed.  ~ On coumadin; INR 1.19. No need to bridge given recent bleed and anemia      - Poor candidate for invasive procedures (DCCV, ablation) given long standing nature of her atrial fibrillation    2. CHARISSE on CKD   - Improving    ~ Creatinine 2/BUN 30   - On IVF   - Monitor UO   - Nephrology following    3. Sepsis, PNA   - Stable, on room air   - On ABX   - Pulmonology following    4. Hypotension  - Improved   ~ Off midodrine  - Continue current meds  - Continue to monitor BP closely    5. GIB, Anemia   - EGD (11/29/20) which showed mild esophagitis and small antral ulcers s/p 4 clips    ~ Plans for repeat EGD in 8 weeks   - H/H trending back down    ~ 7.5/22.6   - No bleeding reported   - Recheck H/H this afternoon; if lower, would recommend an additional unit of blood to be given    6. Elevated Troponin   - Mild elevation on admit; likely secondary to CHARISSE and anemia   - No CP or SOB   - No need for ischemic work up at this time    All pertinent information and plan of care discussed with the EP physician.     All questions and concerns were addressed to the patient/family. Alternatives to my treatment were discussed. I have discussed the above stated plan and the patient verbalized understanding and agreed with the plan. Discussed plan with patient and nurse. Thank you for allowing to us to participate in the care of Giana Colon.     BRIGHT Miranda-CNP  [address]   Office: [phone]

## 2020-12-03 NOTE — PROGRESS NOTES
PM assessment complete and documented. Meds given per MAR. Assisted pt to [facility]. Lizeth fairly well. HR did increase to 140's. Bed alarm on with call light within reach. Denies other needs at present. Will continue to monitor.

## 2020-12-03 NOTE — PROGRESS NOTES
[facility] Renal ICU Hospital Note      Treatment plan update. Acute kidney injury. Severe. Slow improvement. Nonoliguric. Creatinine has plateaued at 2.0   2 days ago  creatinine is 2.5   Creatinine 3.5 on admission. Improving urine output. May have developed ATN. Hypotension. Improving. Midodrine has been discontinued    Anemia. Continue to monitor. Multiple Electrolyte imbalance. Improving to baseline. Hyperkalemia. Improved. Hyponatremia. Improved. Metabolic Acidosis. Improving       Antibiotic dose reviewed. Appropriate for level of renal function. Cipro  mg renal adjusted dose    No indications for dialysis With improving kidney function    Continue to monitor closely. Anemia levels are stable. Volume status is stable. Continue IV fluids at current rate. Ringer lactate 75 mL/h. Continue to monitor for fluid overload. Daily weight. High complexity. Discussed with RN    Patient Active Problem List   Diagnosis    Essential hypertension    Chronic atrial fibrillation (HCC)    Severe sepsis (HCC)    Hydroureteronephrosis    Pelvic mass    CHARISSE (acute kidney injury) ([address]    Acute metabolic encephalopathy    Mixed hyperlipidemia    Primary osteoarthritis of left knee    Effusion of left knee joint    Synovitis of left knee    Pelvic mass in female    Renal failure    Multifocal pneumonia    Elevated troponin    Atypical pneumonia    E coli bacteremia     Portions of this note have been copied forward. I have reviewed and updated the HPI, history, medications, allergies, review of systems, physical exam, date, assessment, and plan of the note so that it reflects the evaluation and management of the patient by me on 12/3/2020    Past Medical History:   has a past medical history of A-fib [facility]), Atrial fibrillation ([address] Hypercholesterolemia, Hypertension, and Pelvic mass.     Past Social History: reports that she has never smoked. She has never used smokeless tobacco. She reports that she does not drink alcohol or use drugs. Subjective:  Appears more conversant today    Review of Systems   Constitutional: Positive for fatigue. Negative for activity change, appetite change, chills, fever and unexpected weight change. HENT: Negative for congestion and facial swelling. Eyes: Negative for photophobia, discharge and redness. Respiratory: Negative for cough, chest tightness and shortness of breath. Cardiovascular: Negative for chest pain, palpitations and leg swelling. Gastrointestinal: Negative for abdominal distention, abdominal pain, blood in stool, constipation, diarrhea, nausea and vomiting. Endocrine: Negative for cold intolerance, heat intolerance and polyuria. Genitourinary: Negative for decreased urine volume, difficulty urinating, flank pain and hematuria. Musculoskeletal: Negative for joint swelling and neck pain. Neurological: Negative for dizziness, seizures, syncope, speech difficulty, light-headedness and headaches. Hematological: Does not bruise/bleed easily. Psychiatric/Behavioral: Negative for agitation, confusion and hallucinations.        Objective:      /77   Pulse 113   Temp 98.7 °F (37.1 °C) (Temporal)   Resp 20   Ht 5' 6\" (1.676 m)   Wt 163 lb 5.8 oz (74.1 kg)   SpO2 96%   BMI 26.37 kg/m²     Wt Readings from Last 3 Encounters:   12/03/20 163 lb 5.8 oz (74.1 kg)   10/29/20 155 lb (70.3 kg)   04/30/20 160 lb (72.6 kg)       BP Readings from Last 3 Encounters:   12/03/20 137/77   11/29/20 129/65   10/29/20 138/84     Chest- rhonchi b/l  Heart-regular  Abd-soft  Ext- 1+ edema    Labs  Hemoglobin   Date Value Ref Range Status   12/03/2020 8.4 (L) 12.0 - 16.0 g/dL Final     Hematocrit   Date Value Ref Range Status   12/03/2020 26.0 (L) 36.0 - 48.0 % Final     WBC   Date Value Ref Range Status   12/03/2020 12.4 (H) 4.0 - 11.0 K/uL Final     Platelets   Date Value Ref Range Status   12/03/2020 199 135 - 450 K/uL Final     Lab Results   Component Value Date    CREATININE 2.0 (H) 12/03/2020    BUN 30 (H) 12/03/2020     12/03/2020    K 3.8 12/03/2020     12/03/2020    CO2 25 12/03/2020       Assessment/Plan:  1. Acute kidney injury on CKD IIIA, baseline creatinine 1.2. Currently  Non-oliguric. Possible ATN. Creatinine is improving. No need for acute renal replacement therapy today, but would need close followup. Agree with LR.   2.  Hyperkalemia in the setting of decreased GFR and metabolic acidosis. Low potassium diet. Better. Follow with LR.   3.  Hyponatremia in the setting of hypotension. This is better. 4.  Low serum bicarbonate. Positive anion gap. Probably secondary to  lactic acidosis and renal impairment. Better. 5.  Relative hypotension. Better. Continue Midodrine. 6.  Pneumonia-now on Cefepime. Appreciate Med and ICU help. 7.  Possible GI bleed. GI is following. Currently on PPI. For EGD. Hgb signficantly lower. Transfuse prbc. 8.  COVID testing is negative    High risk. Discussed with nurse.      Kaya Peguero MD

## 2020-12-04 ENCOUNTER — TELEPHONE (OUTPATIENT)
Dept: FAMILY MEDICINE CLINIC | Age: 80
End: 2020-12-04

## 2020-12-04 VITALS
OXYGEN SATURATION: 96 % | HEIGHT: 66 IN | BODY MASS INDEX: 26.28 KG/M2 | SYSTOLIC BLOOD PRESSURE: 120 MMHG | RESPIRATION RATE: 25 BRPM | TEMPERATURE: 98 F | WEIGHT: 163.5 LBS | HEART RATE: 86 BPM | DIASTOLIC BLOOD PRESSURE: 56 MMHG

## 2020-12-04 PROBLEM — N19 RENAL FAILURE: Status: RESOLVED | Noted: 2020-11-27 | Resolved: 2020-12-04

## 2020-12-04 LAB
ANION GAP SERPL CALCULATED.3IONS-SCNC: 9 MMOL/L (ref 3–16)
BLOOD CULTURE, ROUTINE: NORMAL
BUN BLDV-MCNC: 28 MG/DL (ref 7–20)
CALCIUM SERPL-MCNC: 8.1 MG/DL (ref 8.3–10.6)
CHLORIDE BLD-SCNC: 105 MMOL/L (ref 99–110)
CO2: 25 MMOL/L (ref 21–32)
CREAT SERPL-MCNC: 2 MG/DL (ref 0.6–1.2)
GFR AFRICAN AMERICAN: 29
GFR NON-AFRICAN AMERICAN: 24
GLUCOSE BLD-MCNC: 119 MG/DL (ref 70–99)
HCT VFR BLD CALC: 24.2 % (ref 36–48)
HEMOGLOBIN: 8.1 G/DL (ref 12–16)
INR BLD: 1.26 (ref 0.86–1.14)
POTASSIUM REFLEX MAGNESIUM: 4.2 MMOL/L (ref 3.5–5.1)
PROTHROMBIN TIME: 14.6 SEC (ref 10–13.2)
SODIUM BLD-SCNC: 139 MMOL/L (ref 136–145)

## 2020-12-04 PROCEDURE — 85014 HEMATOCRIT: CPT

## 2020-12-04 PROCEDURE — 6370000000 HC RX 637 (ALT 250 FOR IP): Performed by: NURSE PRACTITIONER

## 2020-12-04 PROCEDURE — 6370000000 HC RX 637 (ALT 250 FOR IP): Performed by: FAMILY MEDICINE

## 2020-12-04 PROCEDURE — 36415 COLL VENOUS BLD VENIPUNCTURE: CPT

## 2020-12-04 PROCEDURE — 2580000003 HC RX 258: Performed by: FAMILY MEDICINE

## 2020-12-04 PROCEDURE — 6370000000 HC RX 637 (ALT 250 FOR IP): Performed by: INTERNAL MEDICINE

## 2020-12-04 PROCEDURE — 85610 PROTHROMBIN TIME: CPT

## 2020-12-04 PROCEDURE — 99233 SBSQ HOSP IP/OBS HIGH 50: CPT | Performed by: NURSE PRACTITIONER

## 2020-12-04 PROCEDURE — 2580000003 HC RX 258: Performed by: INTERNAL MEDICINE

## 2020-12-04 PROCEDURE — 80048 BASIC METABOLIC PNL TOTAL CA: CPT

## 2020-12-04 PROCEDURE — 85018 HEMOGLOBIN: CPT

## 2020-12-04 RX ORDER — CIPROFLOXACIN 250 MG/1
500 TABLET, FILM COATED ORAL DAILY
Qty: 10 TABLET | Refills: 0 | Status: SHIPPED | OUTPATIENT
Start: 2020-12-04 | End: 2020-12-14 | Stop reason: CLARIF

## 2020-12-04 RX ORDER — PANTOPRAZOLE SODIUM 40 MG/1
40 TABLET, DELAYED RELEASE ORAL
Qty: 30 TABLET | Refills: 1 | Status: SHIPPED | OUTPATIENT
Start: 2021-02-04 | End: 2020-12-14 | Stop reason: CLARIF

## 2020-12-04 RX ORDER — PANTOPRAZOLE SODIUM 40 MG/1
40 TABLET, DELAYED RELEASE ORAL
Qty: 60 TABLET | Refills: 1 | Status: SHIPPED | OUTPATIENT
Start: 2020-12-04 | End: 2021-01-19 | Stop reason: SDUPTHER

## 2020-12-04 RX ORDER — ATENOLOL 50 MG/1
50 TABLET ORAL DAILY
Qty: 30 TABLET | Refills: 3 | Status: SHIPPED | OUTPATIENT
Start: 2020-12-05 | End: 2021-08-04 | Stop reason: SDUPTHER

## 2020-12-04 RX ADMIN — DILTIAZEM HYDROCHLORIDE 30 MG: 30 TABLET, FILM COATED ORAL at 08:15

## 2020-12-04 RX ADMIN — Medication 10 ML: at 09:20

## 2020-12-04 RX ADMIN — SODIUM CHLORIDE, POTASSIUM CHLORIDE, SODIUM LACTATE AND CALCIUM CHLORIDE: 600; 310; 30; 20 INJECTION, SOLUTION INTRAVENOUS at 05:19

## 2020-12-04 RX ADMIN — PANTOPRAZOLE SODIUM 40 MG: 40 TABLET, DELAYED RELEASE ORAL at 05:13

## 2020-12-04 RX ADMIN — ATENOLOL 50 MG: 50 TABLET ORAL at 08:15

## 2020-12-04 RX ADMIN — PAROXETINE HYDROCHLORIDE 20 MG: 20 TABLET, FILM COATED ORAL at 08:15

## 2020-12-04 ASSESSMENT — ENCOUNTER SYMPTOMS
ABDOMINAL PAIN: 0
BLOOD IN STOOL: 0
NAUSEA: 0
SHORTNESS OF BREATH: 0
DIARRHEA: 0
EYE REDNESS: 0
CHEST TIGHTNESS: 0
CONSTIPATION: 0
ABDOMINAL DISTENTION: 0
COUGH: 0
FACIAL SWELLING: 0
PHOTOPHOBIA: 0
EYE DISCHARGE: 0
VOMITING: 0

## 2020-12-04 ASSESSMENT — PAIN SCALES - GENERAL
PAINLEVEL_OUTOF10: 0

## 2020-12-04 NOTE — FLOWSHEET NOTE
12/03/20 2148   Provider Notification   Reason for Communication Evaluate   Provider Name Kory   Provider Notification Physician   Method of Communication Secure Message   Response Waiting for response   Notification Time [id number] or Facility: F From: lAli [place] Idalmis-José Miguel RE: Rylie Fabiana 1940 RM: 6824 FYI pt hemoglobin was @ 7.5 given two unit of blood and level came up to 8.4 and at 1900 tonight went down again to 7.9. Will continue to monitor.  Need Callback: NO CALLBACK REQ 5C STEP DOWN  Unread

## 2020-12-04 NOTE — PROGRESS NOTES
Assessment complete. See flow sheet. Pain evaluation complete. No complaints of pain at this time. Discussed POC for this shift. Patient encouraged to use call light with any needs. Patient states understanding, call light in reach, bed alarm on. Will continue to monitor.

## 2020-12-04 NOTE — CARE COORDINATION
Discharge Note    [facility] services have been arranged through:    FACILITY:     [facility]  ADDRESS:   [address] [place] [address] [address] [facility] [place], [place] [address]   PHONE:        [phone]:              [phone]       Rolling walker provided at bedside by:    Name:  Chapis Trevizo  Phone:  [phone]  Fax:      [phone]        PCP appointment is being coordinated with:    JAYCEE Osorio [address], [facility]    Someone from office will contact patient with an appointment time    All discharge needs met by case management     Janett Camarena RN BSN  Case Management  [phone]

## 2020-12-04 NOTE — CARE COORDINATION
Discharge planning note:    Referral to [facility] for hhc services and to [facility] for wheeled walker. Anticipate discharge today after 2:30 pm when daughter gets off work.     Loyd Key RN BSN  Case Management  [phone]

## 2020-12-04 NOTE — DISCHARGE INSTR - COC
Continuity of Care Form    Patient Name: Argelia Wu   :  1940  MRN:  [id number]    Admit date:  2020  Discharge date:  ***    Code Status Order: Full Code   Advance Directives:   Advance Care Flowsheet Documentation       Date/Time Healthcare Directive Type of Healthcare Directive Copy in [address] Agent's Name Healthcare Agent's Phone Number    20 1010  No, patient does not have an advance directive for healthcare treatment -- -- -- -- --    20 1639  No, patient does not have an advance directive for healthcare treatment -- -- -- -- --            Admitting Physician:  Kaylene Grey MD  PCP: No primary care provider on file. Discharging Nurse: [facility] Unit/Room#: RNA-5000/8297-53  Discharging Unit Phone Number: ***    Emergency Contact:   Extended Emergency Contact Information  Primary Emergency Contact: christiano thomas  Home Phone: [phone]  Relation: Child  Secondary Emergency Contact: Paulo Elena  [employer] Phone: [phone]  Relation: Child   needed? No    Past Surgical History:  Past Surgical History:   Procedure Laterality Date    APPENDECTOMY      CYSTOSCOPY Right 2018    retrograde pyelogram, ureteroscopy, and stent placement    HYSTERECTOMY, TOTAL ABDOMINAL  2018    robotic with BSO, omentectomy, pelvic mass removal, lymphnode dissection    MT CYSTO/URETERO W/LITHOTRIPSY &INDWELL STENT INSRT Right 10/31/2018    CYSTOSCOPY WITH RIGHT URETEROSCOPY HOLMIUM LASER LITHOTRIPSY STONE MANIPULATION STONE BASKET WITH RIGHT STENT EXCHANGE performed by Chitra Dong MD at [facility] N/A 2020    EGD CONTROL HEMORRHAGE performed by Chandan Ray MD at [facility] 370 2020    EGD BIOPSY performed by Chandan Ray MD at [address] ENDOSCOPY       Immunization History:      There is no immunization history on file for this patient.     Active Problems:  Patient Active Problem List   Diagnosis Code    Essential hypertension I10    Chronic atrial fibrillation (HCC) I48.20    Severe sepsis (HCC) A41.9, R65.20    Hydroureteronephrosis N13.30    Pelvic mass R19.00    CHARISSE (acute kidney injury) ([facility] N17.9    Acute metabolic encephalopathy X48.97    Mixed hyperlipidemia E78.2    Primary osteoarthritis of left knee M17.12    Effusion of left knee joint M25.462    Synovitis of left knee M65.9    Pelvic mass in female R19.00    Renal failure N19    Multifocal pneumonia J18.9    Elevated troponin R77.8    Atypical pneumonia J18.9    E coli bacteremia R78.81, B96.20       Isolation/Infection:   Isolation            Droplet Plus          Patient Infection Status       Infection Onset Added Last Indicated Last Indicated By Review Planned Expiration Resolved Resolved By    None active    Resolved    COVID-19 Rule Out 11/27/20 11/27/20 11/27/20 COVID-19 (Ordered)   11/28/20 Rule-Out Test Resulted            Nurse Assessment:  Last Vital Signs: /67   Pulse 96   Temp 97.8 °F (36.6 °C) (Temporal)   Resp 15   Ht 5' 6\" (1.676 m)   Wt 163 lb 8 oz (74.2 kg)   SpO2 92%   BMI 26.39 kg/m²     Last documented pain score (0-10 scale): Pain Level: 0  Last Weight:   Wt Readings from Last 1 Encounters:   12/04/20 163 lb 8 oz (74.2 kg)     Mental Status:  {IP PT MENTAL STATUS:45710}    IV Access:  { SANTI[facility]O IV ACCESS:270705904}    Nursing Mobility/ADLs:  Walking   {[facility] DME OQWD:997016134}  Transfer  {[facility] DME YDDZ:045323481}  Bathing  {[facility] DME ASJP:496779439}  Dressing  {[facility] DME ILCV:449526158}  Toileting  {[facility] DME MIFS:352479354}  Feeding  {[facility] DME HERBIE:179792088}  Med Admin  {[facility] DME EYAM:400922775}  Med Delivery   { SANTIAGO MED Delivery:325501969}    Wound Care Documentation and Therapy:  Incision 07/09/18 Abdomen (Active)   Number of days: 884        Elimination:  Continence:   · Bowel: {YES / UQ:83016}  · Bladder: {YES / JY:46346}  Urinary Catheter: {Urinary Catheter:559305796}   Colostomy/Ileostomy/Ileal Conduit: {YES / WL:13859}       Date of Last BM: ***  No intake or output data in the 24 hours ending 20 1122  No intake/output data recorded.     Safety Concerns:     [employer] Safety Concerns:553156014}    Impairments/Disabilities:      [employer] Impairments/Disabilities:060885166}    Nutrition Therapy:  Current Nutrition Therapy:   [employer] Diet List:676446768}    Routes of Feeding: {CHP DME Other Feedings:814777602}  Liquids: {Slp liquid thickness:49088}  Daily Fluid Restriction: {CHP DME Yes amt example:414876471}  Last Modified Barium Swallow with Video (Video Swallowing Test): {Done Not Done CWBV:532351129}    Treatments at the Time of Hospital Discharge:   Respiratory Treatments: ***  Oxygen Therapy:  {Therapy; copd oxygen:49277}  Ventilator:    {[facility] Vent VDQJ:892652462}    Rehab Therapies: Physical Therapy, Occupational Therapy and Nursing  Weight Bearing Status/Restrictions: {[facility] Weight Bearin}  Other Medical Equipment (for information only, NOT a DME order):  {EQUIPMENT:179642894}  Other Treatments:   HOME HEALTH CARE: LEVEL 1 STANDARD    Home health agency to establish plan of care for patient over 60 day period   Nursing  Initial home SN evaluation visit to occur within 24-48 hours for:  medication management  VS and clinical assessment  S&S chronic disease exacerbation education + when to contact MD / NP  care coordination  Medication Reconciliation during 1st SN visit       PT/OT   Evaluate with goal of regaining prior level of functioning   OT to evaluate if patient has [address] needs for personal care      PCP Visit scheduled within 7 days of hospital discharge       Patient's personal belongings (please select all that are sent with patient):  {CHP DME Belongings:441338892}    RN SIGNATURE:  {Esignature:420186956}    CASE MANAGEMENT/SOCIAL WORK SECTION    Inpatient Status Date: ***    Readmission Risk Assessment Score:  Readmission Risk              Risk of Unplanned Readmission:        18           Discharging to Facility/ Agency     Name: [facility] will call for Appointment  Phone: [phone]  Fax: [phone]        Dialysis Facility (if applicable)   · Name:  · Address:  · Dialysis Schedule:  · Phone:  · Fax:    / signature: {Esignature:616847246}    PHYSICIAN SECTION    Prognosis: Fair    Condition at Discharge: Stable    Rehab Potential (if transferring to Rehab): Fair    Recommended Labs or Other Treatments After Discharge: 1 Susan Ag RN/PT/OT    Physician Certification: I certify the above information and transfer of Elham Elizalde  is necessary for the continuing treatment of the diagnosis listed and that she requires Home Care for greater 30 days.      Update Admission H&P: No change in H&P    PHYSICIAN SIGNATURE:  Electronically signed by Rosanne Simpson MD on 12/4/20 at 2:17 PM EST

## 2020-12-04 NOTE — PROGRESS NOTES
[facility]   Electrophysiology Nurse Practitioner  Consult    Date: 12/4/2020  Date of admission: 11/27/2020 11:37 AM  Reason for Admission: Renal failure [N19]  Renal failure [N19]    Consult Requesting Physician: Bambi Pratt MD    -Reason for Consultation: Atrial fibrillation    Chief Complaint   Patient presents with    Shortness of Breath     pt states she has had increase SOB and cough for seveal days. dneies fever or pain       HISTORY OF PRESENT ILLNESS: History obtained from patient and medical record. Arabella Henry is a 79 y.o. female with a past medical history of chronic atrial fibrillation, HTN, OA, and HLD. Pt has long standing history of atrial fibrillation, anti-coagulated on coumadin. She has followed by Dr. Kemi Harper from our office for years. Pt presented to hospital with worsening dyspnea and cough. In the ER, she was found to be hypoxic, anemic, and in acute renal failure with a creatinine of 5. A CXR showed PNA, but her COVID testing was negative. Her hemoglobin dropped to 6.9 requiring blood transfusion. She underwent EGD (11/29/20) which showed mild esophagitis and small antral ulcers s/p 4 clips. Her kidney function improved with IVF. EP consulted due to atrial fibrillation with RVR. Pt denies any symptoms of atrial fibrillation. Denies any chest pain, SOB, dizziness, heart racing, or palpitations. She has been off her cardizem and atenolol since admission due to hypotension. Her coumadin was restarted last evening. Interval Hx: Today, she is being seen for follow up. She feels well and would like to go home. Pt remains in atrial fibrillation and her HR is better controlled with the resumption of her home BB. Her BP has improved and is stable. She is up ambulating in the room without issue. We discussed need for close monitoring of her H/H given recent bleed. Patient seen and examined. Clinical notes reviewed. Telemetry reviewed. No new complaints today.  No major events overnight. Denies having chest pain, palpitations, shortness of breath, orthopnea, cough, or dizziness at the time of this visit. Allergies: Allergies   Allergen Reactions    Penicillins      Patient cannot remember reaction    Sulfa Antibiotics      Home Meds:  Prior to Visit Medications    Medication Sig Taking? Authorizing Provider   warfarin (COUMADIN) 5 MG tablet 5 mg three days a week 2.5 the other. Patient taking differently: Take 2.5 mg by mouth daily 5 mg three days a week 2.5 the other. Yes Estuardo Staley MD   atenolol (TENORMIN) 100 MG tablet Take 1 tablet by mouth daily Yes Estuardo Staley MD   dilTIAZem (CARDIZEM) 60 MG tablet Take 0.5 tablets by mouth every 12 hours Yes Estuardo Staley MD   simvastatin (ZOCOR) 40 MG tablet Take 1 tablet by mouth nightly Yes Estuardo Staley MD   PARoxetine (PAXIL) 20 MG tablet Take 1 tablet by mouth daily Yes Estuardo Staley MD   Omega-3 Fatty Acids (FISH OIL PO) Take by mouth Yes Historical Provider, MD   Multiple Vitamins-Minerals (MULTIVITAMIN PO) Take by mouth Yes Historical Provider, MD      Past Medical History:  Past Medical History:   Diagnosis Date    [facility]fib ([facility] [address]     Atrial fibrillation ([facility] [address]     Hypercholesterolemia     Hypertension     Pelvic mass       Past Surgical History:    has a past surgical history that includes Appendectomy; Cystocopy (Right, 06/14/2018); Hysterectomy, total abdominal (07/09/2018); pr cysto/uretero w/lithotripsy &indwell stent insrt (Right, 10/31/2018); Upper gastrointestinal endoscopy (N/A, 11/29/2020); and Upper gastrointestinal endoscopy (N/A, 11/29/2020). Social History:  Reviewed. reports that she has never smoked. She has never used smokeless tobacco. She reports that she does not drink alcohol or use drugs. Family History:  Reviewed. family history includes Heart Disease in her father.      Review of System:  · Constitutional: Negative for fever, night sweats, chills, weight changes, or weakness  · Skin: Negative for rash, dry skin, pruritus, bruising, bleeding, blood clots, or changes in skin pigment  · HEENT: Negative for vision changes, ringing in the ears, sore throat, dysphagia, or swollen lymph nodes  · Respiratory: Reviewed in HPI  · Cardiovascular: Reviewed in HPI  · Gastrointestinal: Negative for abdominal pain, N/V/D, constipation, or black/tarry stools  · Genito-Urinary: Negative for dysuria, incontinence, urgency, or hematuria  · Musculoskeletal: Negative for joint swelling, muscle pain, or injuries  · Neurological/Psych: Negative for confusion, seizures, headaches, balance issues or TIA-like symptoms. No anxiety, depression, or insomnia    Physical Examination:  Vitals:    12/04/20 0800   BP: 135/67   Pulse: 96   Resp: 15   Temp: 97.8 °F (36.6 °C)   SpO2: 92%      No intake/output data recorded. Wt Readings from Last 3 Encounters:   12/04/20 163 lb 8 oz (74.2 kg)   10/29/20 155 lb (70.3 kg)   04/30/20 160 lb (72.6 kg)       Telemetry: Personally Reviewed  - Atrial fibrillation, rate 90-110s  · Constitutional: Cooperative and in no apparent distress, and appears well nourished  · Skin: Warm and pink; no pallor, cyanosis, bruising, or clubbing  · HEENT: Symmetric and normocephalic. PERRL, EOM intact. Conjunctiva pink with clear sclera. Mucus membranes pink and moist. Teeth intact. Thyroid smooth without nodules or goiter. · Cardiovascular: Mildly tachycardic rate and irregular rhythm. S1/S2 present without murmurs, rubs, or gallops. Peripheral pulses 2+, capillary refill < 3 seconds. No peripheral edema, no elevation of JVP  · Respiratory: Respirations symmetric and unlabored. Lungs clear to auscultation bilaterally, no wheezing, crackles, or rhonchi  · Gastrointestinal: Abdomen soft and rotund. Bowel sounds normoactive in all quadrants without tenderness or masses.   · Musculoskeletal: Bilateral upper and lower extremity strength 5/5 with full ROM  · Neurologic/Psych: Awake and orientated to person, place and time. Calm affect, appropriate mood    Pertinent labs, diagnostic, device, and imaging results reviewed as a part of this visit    Labs:  BMP:   Recent Labs     20  0620    138 139   K 4.2 3.8 4.2    105 105   CO2 24 25 25   BUN 46* 30* 28*   CREATININE 1.9* 2.0* 2.0*   MG 1.80 1.80  --      Estimated Creatinine Clearance: 23 mL/min (A) (based on SCr of 2 mg/dL (H)). CBC:   Recent Labs     20  0423 12/03/20  0424 20  1138 20  1923 20   WBC 11.2* 12.4*  --   --   --    HGB 8.2* 7.5* 8.4* 7.9* 8.1*   HCT 24.6* 22.6* 26.0* 23.9* 24.2*   MCV 91.3 91.1  --   --   --     199  --   --   --      Thyroid: No results found for: TSH, A4ZQJUA, L1YDRWM, THYROIDAB  Lipids:  Lab Results   Component Value Date    CHOL 164 04/15/2019    HDL 45 04/15/2019    TRIG 136 04/15/2019     LFTS:   Lab Results   Component Value Date    ALT 14 2020    AST 15 2020    ALKPHOS 66 2020    PROT 5.2 2020    AGRATIO 0.8 2020    BILITOT 0.6 2020     Cardiac Enzymes:   Lab Results   Component Value Date    TROPONINI 0.01 2020    TROPONINI 0.04 2020     Coags:   Lab Results   Component Value Date    PROTIME 14.6 2020    PROTIME 3.3 2013    INR 1.26 2020       EC20  Atrial fibrillation with T wave abnormality    ECHO:    -Normal left ventricle size, wall thickness, and systolic function with an   estimated ejection fraction of 65%.   -No regional wall motion abnormalities are seen. -Moderate mitral regurgitation.   -Moderate tricuspid regurgitation with a estimated RVSP of 51 mmHg. -The left atrium is dilated. -The right atrium is dilated. -Normal diastolic function. Stress Test: None    CXR: 20  New bilateral airspace disease, edema versus pneumonia.      Problem List:   Patient Active Problem List    Diagnosis Date Noted    Multifocal pneumonia     Elevated troponin     Atypical pneumonia     E coli bacteremia     Renal failure 11/27/2020    Pelvic mass in female 07/09/2018    Primary osteoarthritis of left knee     Effusion of left knee joint     Synovitis of left knee     Hydroureteronephrosis     Pelvic mass     [facility]SE [facility]) [facility] [address]     Acute metabolic encephalopathy     Mixed hyperlipidemia     Severe sepsis ([facility] [address] 06/14/2018    Essential hypertension 09/22/2011    Chronic atrial fibrillation ([facility] [address] 09/22/2011        Assessment and Plan:     1. Chronic permanent Atrial Fibrillation  - Currently in AF, rate 89s   ~ Likely secondary to infection/PNA    - On cardizem 30 mg BID, atenolol 50 mg QD    - [id number] score:high ; [id number] Vasc score and anticoagulation discussed. High risk for stroke and thromboembolism. Anticoagulation is recommended. Risk of bleeding was discussed.  ~ On coumadin; INR 1.26. No need to bridge given recent bleed and anemia      - Poor candidate for invasive procedures (DCCV, ablation) given long standing nature of her atrial fibrillation    2. CHARISSE on CKD   - Stable    ~ Creatinine 2/BUN 28   - On IVF   - Monitor UO   - Nephrology following    3. Sepsis, PNA   - Stable, on room air   - On ABX   - Pulmonology following    4. Hypotension  - Improved   ~ Off midodrine  - Continue current meds  - Continue to monitor BP closely    5. GIB, Anemia   - EGD (11/29/20) which showed mild esophagitis and small antral ulcers s/p 4 clips    ~ Plans for repeat EGD in 8 weeks   - H/H stable, but low    ~ 8.1/24.2   - No bleeding reported   - Will need close monitoring as outpatient    6. Elevated Troponin   - Mild elevation on admit; likely secondary to CHARISSE and anemia   - No CP or SOB   - No need for ischemic work up at this time    [id number] Kristina Narvaez for d/c from EP standpoint. Will follow up in office in next month with NP. EP will sign off. Please call if there are any questions. Thank you for consult.      All pertinent information and plan of care discussed with the EP physician. All questions and concerns were addressed to the patient/family. Alternatives to my treatment were discussed. I have discussed the above stated plan and the patient verbalized understanding and agreed with the plan. Discussed plan with patient and nurse. Thank you for allowing to us to participate in the care of Killian Dong.     Kristi Odom, BRIGHT-CNP  [facility]   Office: [phone]

## 2020-12-04 NOTE — PLAN OF CARE
Problem: Falls - Risk of:  Goal: Will remain free from falls  Description: Will remain free from falls  Outcome: Ongoing  Goal: Absence of physical injury  Description: Absence of physical injury  Outcome: Ongoing     Problem: Pain:  Goal: Pain level will decrease  Description: Pain level will decrease  Outcome: Ongoing  Goal: Control of acute pain  Description: Control of acute pain  Outcome: Ongoing     Problem: Skin Integrity:  Goal: Will show no infection signs and symptoms  Description: Will show no infection signs and symptoms  Outcome: Ongoing  Goal: Absence of new skin breakdown  Description: Absence of new skin breakdown  Outcome: Ongoing     Problem: Musculor/Skeletal Functional Status  Goal: Highest potential functional level  Outcome: Ongoing  Goal: Absence of falls  Outcome: Ongoing     Problem: Nutrition  Goal: Optimal nutrition therapy  Outcome: Ongoing

## 2020-12-04 NOTE — PROGRESS NOTES
Pt shift assessment completed. Pt is alert and orient * 4. VSS WNL and denies any pain and need at this time. . Pt ambulate in room with one assist.Pt follows command. Pt respiration are regular and unlabored and on room air. Breath sounds diminished. Fluids infusing in t PIV. Scheduled medications given as ordered. Patient encouraged to use call light with any needs. Patient states understanding, call light in reach, bed alarm on. All safety checks in place and will continue to monitor pt.

## 2020-12-04 NOTE — DISCHARGE SUMMARY
[facility]    [facility] Demographics    Patient. Kofi Forte  Date of Birth. 1940  MRN. [id number]     Primary care provider. No primary care provider on file. (Tel: None)    Admit date: 11/27/2020    Discharge date (blank if same as Note Date): Note Date: 12/4/2020     Reason for Hospitalization. Chief Complaint   Patient presents with    Shortness of Breath     pt states she has had increase SOB and cough for seveal days. dneies fever or pain         Significant Findings. Active Problems:    Chronic atrial fibrillation (HCC)  Resolved Problems:    Renal failure    Multifocal pneumonia    Elevated troponin    Atypical pneumonia    E coli bacteremia       Problems and results from this hospitalization that need follow up. 1. Acute GI bleed  2. Atrial fibrillation    Significant test results and incidental findings. XR CHEST PORTABLE   Final Result   New bilateral airspace disease, edema versus pneumonia. XR CHEST PORTABLE   Final Result   Minimal streaky bibasilar airspace opacities may relate to atelectasis or   multifocal pneumonia with atypical/viral pneumonia in the differential.      Stable cardiomegaly. Invasive procedures and treatments. 1. None    Problem-based Hospital Course. Patient is an 69-year-old  female with history of atrial fibrillation on Coumadin, hypertension, hyperlipidemia who presented with shortness of breath and cough. Patient was noted to be hypoxic in ED and placed on oxygen supplementation. Labs showed acute renal failure with creatinine ~5. Chest x-ray suggestive of multifocal pneumonia. Hospitalization complicated by acute GI bleed, E.coli bacteremia, Afib with RVR and labile Bps. CHARISSE on CKD:  Patient's baseline serum creatinine is around 1.6. Admitted with serum creatinine 5. Received IV fluids since admission. Gradual improvement in renal function.   Serum creatinine of 2.0 today morning. Holding lisinopril and spironolactone. Nephrology consulted. E. coli bacteremia:  ID was consulted on 11/30. Based on susceptibilities, IV antibiotics switched to ciprofloxacin. Repeat blood cultures from 11/30 are negative to date, final report is still pending. As per ID, switched to oral ciprofloxacin for total of 10 days.   Anemia, acute upper GI bleed:  Earlier this admission, patient underwent EGD which showed mild esophagitis with gastric erosion, small antral ulcer and is status post 4 clips and gastric biopsies. Also noted to have some blood in fundus. Mild duodenitis and incidental D2 diverticulum noted on EGD. GI recommends PPI twice daily for 8 weeks and then daily thereafter. Okay to resume anticoagulation as per GI. Patient will need repeat EGD in 8 weeks to ensure ulcer healing. Hb level stable after resuming Coumadin. A. fib with RVR, labile blood pressures:  After Bps improved, resumed on Cardizem 30 mg p.o. twice daily with holding parameters. EP cardiology consulted. Initiated on atenolol starting 12/3. HR better controlled after initiation of atenolol. Resumed Coumadin on 12/1. F/u with EP cardiology in clinic as recommended.    Hyperlipidemia: On statin. Consults. IP CONSULT TO HOSPITALIST  IP CONSULT TO NEPHROLOGY  IP CONSULT TO PHARMACY  IP CONSULT TO GI  IP CONSULT TO PULMONOLOGY  IP CONSULT TO INFECTIOUS DISEASES  IP CONSULT TO CARDIOLOGY  IP CONSULT TO HOME CARE NEEDS    Physical examination on discharge day. BP (!) 120/56   Pulse 86   Temp 98 °F (36.7 °C) (Temporal)   Resp 25   Ht 5' 6\" (1.676 m)   Wt 163 lb 8 oz (74.2 kg)   SpO2 96%   BMI 26.39 kg/m²   General appearance: Elderly white female, appears comfortable  Cardiovascular: Regular rhythm, normal S1, S2. No murmur. No edema in lower extremities  Respiratory: Not using accessory muscles. Good inspiratory effort. Clear to auscultation bilaterally, no wheeze or crackles.    GI: Abdomen soft, no simvastatin 40 MG tablet  Commonly known as:  ZOCOR  Take 1 tablet by mouth nightly           Where to Get Your Medications      These medications were sent to [address] [place] [address] Mateo Shahid [phone] Rex Bryan [phone]  [facility] [address], [facility] [address]    Hours:  24-hours Phone:  [phone]   · atenolol 50 MG tablet  · ciprofloxacin 250 MG tablet  · pantoprazole 40 MG tablet  · pantoprazole 40 MG tablet         Discharge recommendations given to patient. Follow Up. pcp in 1 week, GI clinic in 2 months, EP cardiology outpt clinic in 2-4 weeks   Disposition. Home with home care  Activity. activity as tolerated  Diet: DIET GENERAL;  Dietary Nutrition Supplements: Standard High Calorie Oral Supplement      Spent 50 minutes in discharge process.     Signed:  Kaitlin Brown MD     12/4/2020 2:20 PM

## 2020-12-04 NOTE — PROGRESS NOTES
[facility] Renal ICU Hospital Note      Treatment plan update. Acute kidney injury. Severe. Slow improvement. Nonoliguric. Creatinine has plateaued at 2.0   2 days ago  creatinine is 2.5   Creatinine 3.5 on admission. Improving urine output. May have developed ATN. Hypotension. Improving. Midodrine has been discontinued    Anemia. Continue to monitor. Multiple Electrolyte imbalance. Improving to baseline. Hyperkalemia. Improved. Hyponatremia. Improved. Metabolic Acidosis. Improving       Antibiotic dose reviewed. Appropriate for level of renal function. Cipro  mg renal adjusted dose    No indications for dialysis With improving kidney function    Continue to monitor closely. Anemia levels are stable. Volume status is stable. Continue IV fluids at current rate. Ringer lactate 75 mL/h. Continue to monitor for fluid overload. Daily weight. High complexity. Discussed with RN    Patient Active Problem List   Diagnosis    Essential hypertension    Chronic atrial fibrillation (HCC)    Severe sepsis (HCC)    Hydroureteronephrosis    Pelvic mass    CHARISSE (acute kidney injury) ([address]    Acute metabolic encephalopathy    Mixed hyperlipidemia    Primary osteoarthritis of left knee    Effusion of left knee joint    Synovitis of left knee    Pelvic mass in female     Portions of this note have been copied forward. I have reviewed and updated the HPI, history, medications, allergies, review of systems, physical exam, date, assessment, and plan of the note so that it reflects the evaluation and management of the patient by me on 12/4/2020    Past Medical History:   has a past medical history of A-fib [facility]), Atrial fibrillation ([facility] [address] Hypercholesterolemia, Hypertension, and Pelvic mass. Past Social History:   reports that she has never smoked.  She has never used smokeless tobacco. She reports that she does not drink alcohol or use drugs. Subjective:  Appears more conversant today    Review of Systems   Constitutional: Positive for fatigue. Negative for activity change, appetite change, chills, fever and unexpected weight change. HENT: Negative for congestion and facial swelling. Eyes: Negative for photophobia, discharge and redness. Respiratory: Negative for cough, chest tightness and shortness of breath. Cardiovascular: Negative for chest pain, palpitations and leg swelling. Gastrointestinal: Negative for abdominal distention, abdominal pain, blood in stool, constipation, diarrhea, nausea and vomiting. Endocrine: Negative for cold intolerance, heat intolerance and polyuria. Genitourinary: Negative for decreased urine volume, difficulty urinating, flank pain and hematuria. Musculoskeletal: Negative for joint swelling and neck pain. Neurological: Negative for dizziness, seizures, syncope, speech difficulty, light-headedness and headaches. Hematological: Does not bruise/bleed easily. Psychiatric/Behavioral: Negative for agitation, confusion and hallucinations.        Objective:      BP (!) 120/56   Pulse 86   Temp 98 °F (36.7 °C) (Temporal)   Resp 25   Ht 5' 6\" (1.676 m)   Wt 163 lb 8 oz (74.2 kg)   SpO2 96%   BMI 26.39 kg/m²     Wt Readings from Last 3 Encounters:   12/04/20 163 lb 8 oz (74.2 kg)   10/29/20 155 lb (70.3 kg)   04/30/20 160 lb (72.6 kg)       BP Readings from Last 3 Encounters:   12/04/20 (!) 120/56   11/29/20 129/65   10/29/20 138/84     Chest- rhonchi b/l  Heart-regular  Abd-soft  Ext- 1+ edema    Labs  Hemoglobin   Date Value Ref Range Status   12/04/2020 8.1 (L) 12.0 - 16.0 g/dL Final     Hematocrit   Date Value Ref Range Status   12/04/2020 24.2 (L) 36.0 - 48.0 % Final     WBC   Date Value Ref Range Status   12/03/2020 12.4 (H) 4.0 - 11.0 K/uL Final     Platelets   Date Value Ref Range Status   12/03/2020 199 135 - 450 K/uL Final     Lab Results   Component Value Date CREATININE 2.0 (H) 12/04/2020    BUN 28 (H) 12/04/2020     12/04/2020    K 4.2 12/04/2020     12/04/2020    CO2 25 12/04/2020       Assessment/Plan:  1. Acute kidney injury on CKD IIIA, baseline creatinine 1.2. Currently  Non-oliguric. Possible ATN. Creatinine is improving. No need for acute renal replacement therapy today, but would need close followup. Agree with LR.   2.  Hyperkalemia in the setting of decreased GFR and metabolic acidosis. Low potassium diet. Better. Follow with LR.   3.  Hyponatremia in the setting of hypotension. This is better. 4.  Low serum bicarbonate. Positive anion gap. Probably secondary to  lactic acidosis and renal impairment. Better. 5.  Relative hypotension. Better. Continue Midodrine. 6.  Pneumonia-now on Cefepime. Appreciate Med and ICU help. 7.  Possible GI bleed. GI is following. Currently on PPI. For EGD. Hgb signficantly lower. Transfuse prbc. 8.  COVID testing is negative    High risk. Discussed with nurse.      Ashlee Segovia MD

## 2020-12-04 NOTE — PROGRESS NOTES
[facility] home care referral. Spoke with pt  re: home care plan of care/services. Agreeable. Demographic's verified. Will follow for home care. Addendum: Home care orders faxed to [facility].

## 2020-12-04 NOTE — PROGRESS NOTES
Reassessment completed, no significant change observed,patient denies any pain and  needs at this time, call light in reach, will continue to monitor.

## 2020-12-07 ENCOUNTER — TELEPHONE (OUTPATIENT)
Dept: CARDIOLOGY CLINIC | Age: 80
End: 2020-12-07

## 2020-12-07 NOTE — TELEPHONE ENCOUNTER
Left message on machine, she will need a INR check sooner than the 18th. Please check on Wed if possible.

## 2020-12-07 NOTE — TELEPHONE ENCOUNTER
HHN saw this patient yesterday . Can the [facility] wants to know id [id number] wants her INR  Checked before 12/18/20? Could she check her INR when she goes to see her next ?

## 2020-12-08 ENCOUNTER — TELEPHONE (OUTPATIENT)
Dept: INTERNAL MEDICINE CLINIC | Age: 80
End: 2020-12-08

## 2020-12-08 NOTE — TELEPHONE ENCOUNTER
[facility] Trans[facility] Initial Follow Up Call    Outreach made within 2 business days of discharge: Yes    Patient: Donald Robles Patient : 1940   MRN: [id number]  Reason for Admission: There are no discharge diagnoses documented for the most recent discharge. Discharge Date: 20       Spoke with: Patients home health nurse contacted PCP office.   HFU appt scheduled for 12/10    Discharge department/facility: [facility]    Scheduled appointment with PCP within 7-14 days    Follow Up  Future Appointments   Date Time Provider Felipe Mayer   12/10/2020 12:30 PM BRIGHT Puckett - CNP [facility]   2020 11:30 AM BRIGHT Julian CNP [facility]   2020 10:15 AM [facility]   2021  9:30 AM Roman Weller MD [place], [place]

## 2020-12-09 ENCOUNTER — ANTI-COAG VISIT (OUTPATIENT)
Dept: CARDIOLOGY CLINIC | Age: 80
End: 2020-12-09

## 2020-12-09 ENCOUNTER — TELEPHONE (OUTPATIENT)
Dept: CARDIOLOGY CLINIC | Age: 80
End: 2020-12-09

## 2020-12-09 LAB — INR BLD: 1.6

## 2020-12-09 NOTE — TELEPHONE ENCOUNTER
Coumadin/PT/INR    PT: 19.7    INR: 1.6      What dosage do you take daily? 5mg M-W-F then 2.5 all other days     Person calling in results:    Call back number?     Where do you have your blood drawn?    (lab/HHRN/Home Monitor)

## 2020-12-14 ENCOUNTER — HOSPITAL ENCOUNTER (OUTPATIENT)
Age: 80
Setting detail: SPECIMEN
Discharge: HOME | End: 2020-12-14
Payer: MEDICARE

## 2020-12-14 ENCOUNTER — OFFICE VISIT (OUTPATIENT)
Dept: FAMILY MEDICINE CLINIC | Age: 80
End: 2020-12-14
Payer: MEDICARE

## 2020-12-14 ENCOUNTER — ANTI-COAG VISIT (OUTPATIENT)
Dept: CARDIOLOGY CLINIC | Age: 80
End: 2020-12-14

## 2020-12-14 VITALS
TEMPERATURE: 97.8 F | BODY MASS INDEX: 24.75 KG/M2 | DIASTOLIC BLOOD PRESSURE: 88 MMHG | HEART RATE: 71 BPM | WEIGHT: 154 LBS | OXYGEN SATURATION: 94 % | HEIGHT: 66 IN | SYSTOLIC BLOOD PRESSURE: 128 MMHG

## 2020-12-14 LAB
ANION GAP SERPL CALCULATED.3IONS-SCNC: 10 MMOL/L (ref 3–16)
BASOPHILS ABSOLUTE: 0.1 K/UL (ref 0–0.2)
BASOPHILS RELATIVE PERCENT: 1.2 %
BUN BLDV-MCNC: 21 MG/DL (ref 7–20)
CALCIUM SERPL-MCNC: 9.1 MG/DL (ref 8.3–10.6)
CHLORIDE BLD-SCNC: 100 MMOL/L (ref 99–110)
CO2: 28 MMOL/L (ref 21–32)
CREAT SERPL-MCNC: 1.5 MG/DL (ref 0.6–1.2)
EOSINOPHILS ABSOLUTE: 0.2 K/UL (ref 0–0.6)
EOSINOPHILS RELATIVE PERCENT: 2.4 %
GFR AFRICAN AMERICAN: 40
GFR NON-AFRICAN AMERICAN: 33
GLUCOSE BLD-MCNC: 118 MG/DL (ref 70–99)
HCT VFR BLD CALC: 28.3 % (ref 36–48)
HEMOGLOBIN: 9.2 G/DL (ref 12–16)
INR BLD: 2.6
LYMPHOCYTES ABSOLUTE: 1.5 K/UL (ref 1–5.1)
LYMPHOCYTES RELATIVE PERCENT: 22.9 %
MCH RBC QN AUTO: 29.8 PG (ref 26–34)
MCHC RBC AUTO-ENTMCNC: 32.5 G/DL (ref 31–36)
MCV RBC AUTO: 91.5 FL (ref 80–100)
MONOCYTES ABSOLUTE: 0.5 K/UL (ref 0–1.3)
MONOCYTES RELATIVE PERCENT: 8.2 %
NEUTROPHILS ABSOLUTE: 4.2 K/UL (ref 1.7–7.7)
NEUTROPHILS RELATIVE PERCENT: 65.3 %
PDW BLD-RTO: 15.1 % (ref 12.4–15.4)
PLATELET # BLD: 484 K/UL (ref 135–450)
PMV BLD AUTO: 7.6 FL (ref 5–10.5)
POTASSIUM SERPL-SCNC: 3.9 MMOL/L (ref 3.5–5.1)
RBC # BLD: 3.09 M/UL (ref 4–5.2)
SODIUM BLD-SCNC: 138 MMOL/L (ref 136–145)
WBC # BLD: 6.4 K/UL (ref 4–11)

## 2020-12-14 PROCEDURE — 80048 BASIC METABOLIC PNL TOTAL CA: CPT

## 2020-12-14 PROCEDURE — 83550 IRON BINDING TEST: CPT

## 2020-12-14 PROCEDURE — 82728 ASSAY OF FERRITIN: CPT

## 2020-12-14 PROCEDURE — 99495 TRANSJ CARE MGMT MOD F2F 14D: CPT | Performed by: NURSE PRACTITIONER

## 2020-12-14 PROCEDURE — 36415 COLL VENOUS BLD VENIPUNCTURE: CPT

## 2020-12-14 PROCEDURE — 83540 ASSAY OF IRON: CPT

## 2020-12-14 PROCEDURE — 1111F DSCHRG MED/CURRENT MED MERGE: CPT | Performed by: NURSE PRACTITIONER

## 2020-12-14 PROCEDURE — 85025 COMPLETE CBC W/AUTO DIFF WBC: CPT

## 2020-12-14 ASSESSMENT — ENCOUNTER SYMPTOMS
DIARRHEA: 0
VOMITING: 0
SHORTNESS OF BREATH: 1
COUGH: 0
NAUSEA: 0

## 2020-12-14 ASSESSMENT — PATIENT HEALTH QUESTIONNAIRE - PHQ9
SUM OF ALL RESPONSES TO PHQ QUESTIONS 1-9: 1
SUM OF ALL RESPONSES TO PHQ QUESTIONS 1-9: 1
SUM OF ALL RESPONSES TO PHQ9 QUESTIONS 1 & 2: 1
SUM OF ALL RESPONSES TO PHQ QUESTIONS 1-9: 1
1. LITTLE INTEREST OR PLEASURE IN DOING THINGS: 0
2. FEELING DOWN, DEPRESSED OR HOPELESS: 1

## 2020-12-14 NOTE — PROGRESS NOTES
Post-Discharge Transitional Care Management Services or Hospital Follow Up      Estella Peterson   YOB: 1940    Date of Office Visit:  12/14/2020  Date of Hospital Admission: 11/27/20  Date of Hospital Discharge: 12/4/20  Readmission Risk Score(high >=14%. Medium >=10%):Readmission Risk Score: 18    Care management risk score Rising risk (score 2-5) and Complex Care (Scores >=6): 8     Non face to face  following discharge, date last encounter closed (first attempt may have been earlier): 12/8/2020  4:28 PM 12/8/2020  4:28 PM    Call initiated 2 business days of discharge: Yes     Patient Active Problem List   Diagnosis    Essential hypertension    [facility] fibrillation ([facility]    Severe sepsis ([facility]    Hydroureteronephrosis    Pelvic mass    CHARISSE (acute kidney injury) ([facility]    Acute metabolic encephalopathy    Mixed hyperlipidemia    Primary osteoarthritis of left knee    Effusion of left knee joint    Synovitis of left knee    Pelvic mass in female       Allergies   Allergen Reactions    Penicillins      Patient cannot remember reaction    Sulfa Antibiotics        Medications listed as ordered at the time of discharge from hospital   [facility] Medication Instructions JANIS:    Printed on:12/14/20 1240   Medication Information                      atenolol (TENORMIN) 50 MG tablet  Take 1 tablet by mouth daily             dilTIAZem (CARDIZEM) 60 MG tablet  Take 0.5 tablets by mouth every 12 hours             Multiple Vitamins-Minerals (MULTIVITAMIN PO)  Take by mouth             Omega-3 Fatty Acids (FISH OIL PO)  Take by mouth             pantoprazole (PROTONIX) 40 MG tablet  Take 1 tablet by mouth 2 times daily (before meals)             PARoxetine (PAXIL) 20 MG tablet  Take 1 tablet by mouth daily             simvastatin (ZOCOR) 40 MG tablet  Take 1 tablet by mouth nightly             warfarin (COUMADIN) 5 MG tablet  5 mg three days a week 2.5 the other. Medications marked \"taking\" at this time  Outpatient Medications Marked as Taking for the 12/14/20 encounter (Office Visit) with BRIGHT Ballard - CNP   Medication Sig Dispense Refill    atenolol (TENORMIN) 50 MG tablet Take 1 tablet by mouth daily 30 tablet 3    pantoprazole (PROTONIX) 40 MG tablet Take 1 tablet by mouth 2 times daily (before meals) 60 tablet 1    warfarin (COUMADIN) 5 MG tablet 5 mg three days a week 2.5 the other. (Patient taking differently: Take 2.5 mg by mouth daily 5 mg three days a week 2.5 the other.) 90 tablet 1    dilTIAZem (CARDIZEM) 60 MG tablet Take 0.5 tablets by mouth every 12 hours 90 tablet 3    simvastatin (ZOCOR) 40 MG tablet Take 1 tablet by mouth nightly 90 tablet 3    PARoxetine (PAXIL) 20 MG tablet Take 1 tablet by mouth daily 90 tablet 3    Omega-3 Fatty Acids (FISH OIL PO) Take by mouth      Multiple Vitamins-Minerals (MULTIVITAMIN PO) Take by mouth          Medications patient taking as of now reconciled against medications ordered at time of hospital discharge: Yes    Chief Complaint   Patient presents with    Follow-Up from [facility]     ED follow up from 12/8/20 and new pt     HPI    Inpatient course: Discharge summary reviewed- see chart. Interval history/Current status: Presents to clinic today for hospital follow up post hospitalization for 7 days - admitted for multifocal pneumonia - covid negative, but questionable. ICU for pneumonia and was found while hospitalized to have GI bleed and sepsis. Has been being followed by PT, OT, skilled nursing for protime and vitals. Has had one visit a piece - will be evaluating this week whether or not it should continue. Shower chair will be coming today. Help button this week most likely. Was set up with [facility] services when leaving the hospital.  Per patient today is feeling pretty good - 50% improvement since hospital discharge - does somewhat get SOB, but able to be active in house.   Has been working on making out to [employer] and back. Daughter is taking care of all meals - good appetite - back to baseline - not typically a big eater. Possibly will look into meals on wheels. Daughter is living with her currently until she is better. GI bleed - had EGD and clamping done of bleeding ulcerations, received blood due to Hgb dropping from 11 to 8, was doing well. Day before discharge Hgb down again to 7.9 - received more blood. Hgb at discharge 8.1. Has not had repeat draw since 12/4/2020. Follow up with GI scheduled for 1/12/2020 - will be virtual.    Afib - controlled in the hospital.  Has follow up scheduled in 2 days with cardiology. Last INR 1.6 - no dose change. Will be repeated today by home health nurse. Review of Systems   Constitutional: Negative for activity change, appetite change, chills, fatigue and fever. Respiratory: Positive for shortness of breath (intermittently with activity). Negative for cough. Cardiovascular: Negative for chest pain and palpitations. Gastrointestinal: Negative for diarrhea, nausea and vomiting. Vitals:    12/14/20 1036   BP: 128/88   Site: Left Upper Arm   Position: Sitting   Cuff Size: Large Adult   Pulse: 71   Temp: 97.8 °F (36.6 °C)   TempSrc: Temporal   SpO2: 94%   Weight: 154 lb (69.9 kg)   Height: 5' 6\" (1.676 m)     Body mass index is 24.86 kg/m². Wt Readings from Last 3 Encounters:   12/14/20 154 lb (69.9 kg)   12/04/20 163 lb 8 oz (74.2 kg)   10/29/20 155 lb (70.3 kg)     BP Readings from Last 3 Encounters:   12/14/20 128/88   12/04/20 (!) 120/56   11/29/20 129/65     Physical Exam  Constitutional:       General: She is not in acute distress. Appearance: She is well-developed. HENT:      Head: Normocephalic and atraumatic. Cardiovascular:      Rate and Rhythm: Normal rate. Rhythm irregularly irregular.       Heart sounds: Normal heart sounds, S1 normal and S2 normal.   Pulmonary:      Effort: Pulmonary effort is normal. No respiratory distress. Breath sounds: Normal breath sounds. Musculoskeletal:      Right lower le+ Pitting Edema present. Left lower le+ Pitting Edema present. Skin:     General: Skin is warm and dry. Neurological:      Mental Status: She is alert and oriented to person, place, and time. Psychiatric:         Thought Content: Thought content normal.         Judgment: Judgment normal.       Assessment/Plan:  1. Multifocal pneumonia  Overall improving. Lungs clear at visit today. Has continued with some intermittent SOB with activity. Building stamina. Should continue with home care services until safe to be in home independently. Will complete FMLA paperwork for daughter until this is possible. Would assume should significantly improve over the next few weeks. Daughter will need intermittent time off for appointments. - ME DISCHARGE MEDS RECONCILED W/ CURRENT OUTPATIENT MED LIST    2. Severe sepsis ([facility]    3. Chronic atrial fibrillation ([facility]  Continue follow up with Cardiology - appreciate their input and coumadin management. 4. Gastrointestinal hemorrhage associated with gastric ulcer  Repeat blood work - ordered STAT. Will follow weekly until stable. Keep follow up appointment with GI.  - CBC Auto Differential; Future  - Iron and TIBC; Future  - Ferritin; Future    5. CHARISSE (acute kidney injury) ([address]  Creatinine elevated - 2.0 when in hospital.  Will monitor. Possible referral to Nephrology if worsens.   - Basic Metabolic Panel;  Future      Medical Decision Making: high complexity

## 2020-12-15 LAB
FERRITIN: 157.2 NG/ML (ref 15–150)
IRON SATURATION: 10 % (ref 15–50)
IRON: 40 UG/DL (ref 37–145)
TOTAL IRON BINDING CAPACITY: 390 UG/DL (ref 260–445)

## 2020-12-16 ENCOUNTER — OFFICE VISIT (OUTPATIENT)
Dept: CARDIOLOGY CLINIC | Age: 80
End: 2020-12-16
Payer: MEDICARE

## 2020-12-16 VITALS
HEART RATE: 83 BPM | WEIGHT: 153.6 LBS | DIASTOLIC BLOOD PRESSURE: 83 MMHG | HEIGHT: 67 IN | OXYGEN SATURATION: 96 % | SYSTOLIC BLOOD PRESSURE: 126 MMHG | RESPIRATION RATE: 18 BRPM | BODY MASS INDEX: 24.11 KG/M2

## 2020-12-16 PROCEDURE — 1123F ACP DISCUSS/DSCN MKR DOCD: CPT | Performed by: NURSE PRACTITIONER

## 2020-12-16 PROCEDURE — 1090F PRES/ABSN URINE INCON ASSESS: CPT | Performed by: NURSE PRACTITIONER

## 2020-12-16 PROCEDURE — G8400 PT W/DXA NO RESULTS DOC: HCPCS | Performed by: NURSE PRACTITIONER

## 2020-12-16 PROCEDURE — 1111F DSCHRG MED/CURRENT MED MERGE: CPT | Performed by: NURSE PRACTITIONER

## 2020-12-16 PROCEDURE — 99214 OFFICE O/P EST MOD 30 MIN: CPT | Performed by: NURSE PRACTITIONER

## 2020-12-16 PROCEDURE — 1036F TOBACCO NON-USER: CPT | Performed by: NURSE PRACTITIONER

## 2020-12-16 PROCEDURE — G8420 CALC BMI NORM PARAMETERS: HCPCS | Performed by: NURSE PRACTITIONER

## 2020-12-16 PROCEDURE — 4040F PNEUMOC VAC/ADMIN/RCVD: CPT | Performed by: NURSE PRACTITIONER

## 2020-12-16 PROCEDURE — G8484 FLU IMMUNIZE NO ADMIN: HCPCS | Performed by: NURSE PRACTITIONER

## 2020-12-16 PROCEDURE — G8427 DOCREV CUR MEDS BY ELIG CLIN: HCPCS | Performed by: NURSE PRACTITIONER

## 2020-12-16 NOTE — PROGRESS NOTES
cystadenoma. Social History:  Social History     Socioeconomic History    Marital status:      Spouse name: Not on file    Number of children: Not on file    Years of education: Not on file    Highest education level: Not on file   Occupational History    Not on file   Social Needs    Financial resource strain: Not on file    Food insecurity     Worry: Not on file     Inability: Not on file    Transportation needs     Medical: Not on file     Non-medical: Not on file   Tobacco Use    Smoking status: Never Smoker    Smokeless tobacco: Never Used   Substance and Sexual Activity    Alcohol use: No    Drug use: No    Sexual activity: Not Currently   Lifestyle    Physical activity     Days per week: Not on file     Minutes per session: Not on file    Stress: Not on file   Relationships    Social connections     Talks on phone: Not on file     Gets together: Not on file     Attends [employer] service: Not on file     Active member of club or organization: Not on file     Attends meetings of clubs or organizations: Not on file     Relationship status: Not on file    Intimate partner violence     Fear of current or ex partner: Not on file     Emotionally abused: Not on file     Physically abused: Not on file     Forced sexual activity: Not on file   Other Topics Concern    Not on file   Social History Narrative    Not on file     Family History:  family history includes Heart Disease in her father. Allergies:  Penicillins and Sulfa antibiotics     Review of Systems:   · Constitutional: there has been no unanticipated weight loss. No change in energy or activity level   · Eyes: No visual changes   · ENT: No Headaches, hearing loss or vertigo. No mouth sores or sore throat. · Cardiovascular: Reviewed in HPI  · Respiratory: No cough or wheezing, no sputum production. No hematemesis. · Gastrointestinal: No abdominal pain, appetite loss, blood in stools.  No change in bowel or bladder habits. · Genitourinary: No nocturia, dysuria, trouble voiding  · Musculoskeletal:  No gait disturbance, weakness or joint complaints. · Integumentary: No rash or pruritis. · Neurological: No headache, change in muscle strength, numbness or tingling. No change in gait, balance, coordination, mood, affect, memory, mentation, behavior. · Psychiatric: No anxiety or depression  · Endocrine: No malaise or fever  · Hematologic/Lymphatic: No abnormal bruising or bleeding, blood clots or swollen lymph nodes. · Allergic/Immunologic: No nasal congestion or hives. Physical Examination:    Vitals:    12/16/20 1129   BP: 126/83   Site: Right Upper Arm   Position: Sitting   Cuff Size: Medium Adult   Pulse: 83   Resp: 18   SpO2: 96%   Weight: 153 lb 9.6 oz (69.7 kg)   Height: 5' 6.5\" (1.689 m)     Body mass index is 24.42 kg/m².      Wt Readings from Last 3 Encounters:   12/16/20 153 lb 9.6 oz (69.7 kg)   12/14/20 154 lb (69.9 kg)   12/04/20 163 lb 8 oz (74.2 kg)     BP Readings from Last 3 Encounters:   12/16/20 126/83   12/14/20 128/88   12/04/20 (!) 120/56      Constitutional and General Appearance:  appears stated age  Respiratory:  · Normal excursion and expansion without use of accessory muscles  · Resp Auscultation: Normal breath sounds without dullness  Cardiovascular:  · The apical impulses not displaced  · Heart is irregularly irregular in rhythm   · The carotid upstroke is normal, no bruit noted   · JVP is not elevated  · Peripheral pulses are symmetrical  · There is no clubbing, cyanosis of the extremities  · Trace edema   · Femoral Arteries: 2+ and equal  · Pedal Pulses: 2+ and equal   Abdomen:  · No masses or tenderness  · Normal bowel sounds  Neurological/Psychiatric:  · Alert and oriented x3  · Moves all extremities well  · Exhibits normal gait balance and coordination    Assessment:    Atrial fibrillation, chronic  EKG>atrial fibrillation, nonspecific T wave changes, HR 78bpm    Echo 6/18 normal LVEF 65% with URSZULA and increased RVSP of 51. Echo 4/13> Underlying rhythm is atrial fibrillation. Normal left ventricle size, wall thickness and systolic function with an estimated ejection fraction of 60%. No regional wall motion abnormalities are seen. There is severe bi atrial enlargement. There is mild mitral, pulmonic and trivial aorta as well as mild tricuspid regurgitation with RVSP estimated at 41.5 mmHg. HR controlled, remains on Coumadin, INR's thru our office. Essential hypertension, benign  Stable  Blood pressure 126/83, pulse 83, resp. rate 18, height 5' 6.5\" (1.689 m), weight 153 lb 9.6 oz (69.7 kg), SpO2 96 %. Mixed hypercholesterolemia  Stable, labs 11/3/20: ; TRIG 176; HDL 32; LDL 78    Carotid doppler 5/16> 16-49% bilateral   Carotid doppler 4/13> 61-47% MALINI, 7-89% LICA    CHARISSE  Repeat creatinine improved at 1.5 on 12/15/20        Plan:  No medication changes at this time. Keep appointment scheduled with Dr. Neil Ponce in April. Consider repeating carotid doppler at next appointment.

## 2020-12-22 ENCOUNTER — TELEPHONE (OUTPATIENT)
Dept: CARDIOLOGY CLINIC | Age: 80
End: 2020-12-22

## 2020-12-22 ENCOUNTER — ANTI-COAG VISIT (OUTPATIENT)
Dept: CARDIOLOGY CLINIC | Age: 80
End: 2020-12-22

## 2020-12-22 LAB — INR BLD: 5.3

## 2020-12-22 NOTE — TELEPHONE ENCOUNTER
JERRI  Spoke to [facility] hold today and Wed. She will need to be checked on Thur before 10. She was on 2.5 mg qd before she was in [facility]. They discharged her on 5 mg MWF and 2.5 mg all other days. She was therapeutic till today. Discussed with Juliette TAO that will be here on [address] when she rechecks. Also Hgb from last week is 9.2. N states she is not have any blood in her stool or urine.

## 2020-12-22 NOTE — TELEPHONE ENCOUNTER
Coumadin/PT/INR    PT:61    INR: 5.3      What dosage do you take daily? Person calling in results: Yessica [address] [place] [address] OF [place].    Call back number? [phone]    Where do you have your blood drawn?  Home today

## 2020-12-24 ENCOUNTER — ANTI-COAG VISIT (OUTPATIENT)
Dept: CARDIOLOGY CLINIC | Age: 80
End: 2020-12-24
Payer: MEDICARE

## 2020-12-24 ENCOUNTER — TELEPHONE (OUTPATIENT)
Dept: CARDIOLOGY CLINIC | Age: 80
End: 2020-12-24

## 2020-12-24 LAB — INR BLD: 4.6

## 2020-12-24 PROCEDURE — 85610 PROTHROMBIN TIME: CPT | Performed by: INTERNAL MEDICINE

## 2020-12-24 NOTE — TELEPHONE ENCOUNTER
Per Dr Dos Santos  until Sunday, then warfarin 2.5mg Sunday and Monday, check INR on Tuesday. Spoke w/ [facility] and gave instructions.  She verbalized understanding

## 2020-12-24 NOTE — TELEPHONE ENCOUNTER
Gallagher Nicky from [employer] called with a PT/INR result. PT 55.5, INR 4.6. Patient has held warfarin for 2 days.

## 2020-12-29 ENCOUNTER — TELEPHONE (OUTPATIENT)
Dept: FAMILY MEDICINE CLINIC | Age: 80
End: 2020-12-29

## 2020-12-29 ENCOUNTER — ANTI-COAG VISIT (OUTPATIENT)
Dept: FAMILY MEDICINE CLINIC | Age: 80
End: 2020-12-29

## 2020-12-29 LAB — INR BLD: 2.5

## 2020-12-29 NOTE — TELEPHONE ENCOUNTER
Franklin Rosario called to report patients INR 2.5 and P.T. is 29.8      Patients provider is out of office

## 2021-01-05 ENCOUNTER — HOSPITAL ENCOUNTER (OUTPATIENT)
Age: 81
Setting detail: SPECIMEN
Discharge: HOME | End: 2021-01-05
Payer: MEDICARE

## 2021-01-05 LAB
ANION GAP SERPL CALCULATED.3IONS-SCNC: 13 MMOL/L (ref 3–16)
BASOPHILS ABSOLUTE: 0.1 K/UL (ref 0–0.2)
BASOPHILS RELATIVE PERCENT: 0.9 %
BUN BLDV-MCNC: 19 MG/DL (ref 7–20)
CALCIUM SERPL-MCNC: 9.5 MG/DL (ref 8.3–10.6)
CHLORIDE BLD-SCNC: 102 MMOL/L (ref 99–110)
CO2: 27 MMOL/L (ref 21–32)
CREAT SERPL-MCNC: 1.8 MG/DL (ref 0.6–1.2)
EOSINOPHILS ABSOLUTE: 0.1 K/UL (ref 0–0.6)
EOSINOPHILS RELATIVE PERCENT: 1.9 %
GFR AFRICAN AMERICAN: 33
GFR NON-AFRICAN AMERICAN: 27
GLUCOSE BLD-MCNC: 149 MG/DL (ref 70–99)
HCT VFR BLD CALC: 35.9 % (ref 36–48)
HEMOGLOBIN: 11.4 G/DL (ref 12–16)
LYMPHOCYTES ABSOLUTE: 1.9 K/UL (ref 1–5.1)
LYMPHOCYTES RELATIVE PERCENT: 27.6 %
MCH RBC QN AUTO: 28.1 PG (ref 26–34)
MCHC RBC AUTO-ENTMCNC: 31.7 G/DL (ref 31–36)
MCV RBC AUTO: 88.6 FL (ref 80–100)
MONOCYTES ABSOLUTE: 0.5 K/UL (ref 0–1.3)
MONOCYTES RELATIVE PERCENT: 7.4 %
NEUTROPHILS ABSOLUTE: 4.3 K/UL (ref 1.7–7.7)
NEUTROPHILS RELATIVE PERCENT: 62.2 %
PDW BLD-RTO: 15.2 % (ref 12.4–15.4)
PLATELET # BLD: 299 K/UL (ref 135–450)
PMV BLD AUTO: 8.3 FL (ref 5–10.5)
POTASSIUM SERPL-SCNC: 4.4 MMOL/L (ref 3.5–5.1)
RBC # BLD: 4.06 M/UL (ref 4–5.2)
SODIUM BLD-SCNC: 142 MMOL/L (ref 136–145)
WBC # BLD: 6.9 K/UL (ref 4–11)

## 2021-01-05 PROCEDURE — 36415 COLL VENOUS BLD VENIPUNCTURE: CPT

## 2021-01-05 PROCEDURE — 80048 BASIC METABOLIC PNL TOTAL CA: CPT

## 2021-01-05 PROCEDURE — 85025 COMPLETE CBC W/AUTO DIFF WBC: CPT

## 2021-01-12 ENCOUNTER — ANTI-COAG VISIT (OUTPATIENT)
Dept: CARDIOLOGY CLINIC | Age: 81
End: 2021-01-12

## 2021-01-12 ENCOUNTER — TELEPHONE (OUTPATIENT)
Dept: CARDIOLOGY CLINIC | Age: 81
End: 2021-01-12

## 2021-01-12 LAB — INR BLD: 5.7

## 2021-01-12 NOTE — TELEPHONE ENCOUNTER
Coumadin/PT/INR    PT: 68.8    INR: 5.7      What dosage do you take daily? 5 mg Mon Wed Fri 2.5 mg all other days    Person calling in results: [address]    Call back number? [phone]    Where do you have your blood drawn?  Today home

## 2021-01-14 ENCOUNTER — ANTI-COAG VISIT (OUTPATIENT)
Dept: CARDIOLOGY CLINIC | Age: 81
End: 2021-01-14

## 2021-01-14 LAB — INR BLD: 4.3

## 2021-01-16 ENCOUNTER — TELEPHONE (OUTPATIENT)
Dept: FAMILY MEDICINE CLINIC | Age: 81
End: 2021-01-16

## 2021-01-16 NOTE — TELEPHONE ENCOUNTER
Lm for patient if interested in receiving the covid vaccine.   If so can call [phone] option 2

## 2021-01-18 ENCOUNTER — TELEPHONE (OUTPATIENT)
Dept: CARDIOLOGY CLINIC | Age: 81
End: 2021-01-18

## 2021-01-18 NOTE — TELEPHONE ENCOUNTER
Last time she was in the hospital she was given rx for pantoprazole . She did some reading on pantoprazole and found out that it effects your INR. Her INR has been high lately and she wants to know if [facility] can change her to something else ? She will wait to speak to [facility] tomorrow .

## 2021-01-19 ENCOUNTER — ANTI-COAG VISIT (OUTPATIENT)
Dept: CARDIOLOGY CLINIC | Age: 81
End: 2021-01-19
Payer: MEDICARE

## 2021-01-19 ENCOUNTER — TELEPHONE (OUTPATIENT)
Dept: CARDIOLOGY CLINIC | Age: 81
End: 2021-01-19

## 2021-01-19 LAB — INR BLD: 2.5

## 2021-01-19 PROCEDURE — 85610 PROTHROMBIN TIME: CPT | Performed by: INTERNAL MEDICINE

## 2021-01-19 RX ORDER — PANTOPRAZOLE SODIUM 40 MG/1
40 TABLET, DELAYED RELEASE ORAL
Qty: 60 TABLET | Refills: 1 | Status: CANCELLED | OUTPATIENT
Start: 2021-01-19

## 2021-01-19 RX ORDER — PANTOPRAZOLE SODIUM 40 MG/1
40 TABLET, DELAYED RELEASE ORAL
Qty: 60 TABLET | Refills: 1 | Status: SHIPPED | OUTPATIENT
Start: 2021-01-19 | End: 2021-02-22 | Stop reason: SDUPTHER

## 2021-01-19 NOTE — TELEPHONE ENCOUNTER
Kaushal Acosta home health care calling to let [facility] know she will be calling the pt. pls advise thank you

## 2021-01-19 NOTE — TELEPHONE ENCOUNTER
Coumadin/PT/INR    PT: 30.1    INR: 2.5      What dosage do you take daily? 2.5 mg daily    Person calling in results:  Shaka Ramirez    Call back number?  [phone] secure voice mail    Where do you have your blood drawn?    (lab/HHRN/Home Monitor)

## 2021-01-26 ENCOUNTER — TELEPHONE (OUTPATIENT)
Dept: CARDIOLOGY CLINIC | Age: 81
End: 2021-01-26

## 2021-01-26 ENCOUNTER — ANTI-COAG VISIT (OUTPATIENT)
Dept: CARDIOLOGY CLINIC | Age: 81
End: 2021-01-26

## 2021-01-26 LAB — INR BLD: 3.5

## 2021-01-26 NOTE — TELEPHONE ENCOUNTER
Coumadin/PT/INR    PT: 22.5    INR: 3.5      What dosage do you take daily? 2.5 mg daily    Person calling in results: Yaquelin    Call back number?  [phone]    Where do you have your blood drawn?    (lab/HHRN/Home Monitor)

## 2021-01-27 ENCOUNTER — TELEPHONE (OUTPATIENT)
Dept: CARDIOLOGY CLINIC | Age: 81
End: 2021-01-27

## 2021-02-03 ENCOUNTER — ANTI-COAG VISIT (OUTPATIENT)
Dept: CARDIOLOGY CLINIC | Age: 81
End: 2021-02-03
Payer: MEDICARE

## 2021-02-03 ENCOUNTER — OFFICE VISIT (OUTPATIENT)
Dept: PRIMARY CARE CLINIC | Age: 81
End: 2021-02-03
Payer: MEDICARE

## 2021-02-03 DIAGNOSIS — I48.20 ATRIAL FIBRILLATION, CHRONIC (HCC): ICD-10-CM

## 2021-02-03 DIAGNOSIS — Z20.828 EXPOSURE TO SARS-ASSOCIATED CORONAVIRUS: Primary | ICD-10-CM

## 2021-02-03 LAB
INR BLD: 2.6
SARS-COV-2: NOT DETECTED

## 2021-02-03 PROCEDURE — G8420 CALC BMI NORM PARAMETERS: HCPCS | Performed by: NURSE PRACTITIONER

## 2021-02-03 PROCEDURE — G8428 CUR MEDS NOT DOCUMENT: HCPCS | Performed by: NURSE PRACTITIONER

## 2021-02-03 PROCEDURE — 85610 PROTHROMBIN TIME: CPT | Performed by: INTERNAL MEDICINE

## 2021-02-03 PROCEDURE — 99211 OFF/OP EST MAY X REQ PHY/QHP: CPT | Performed by: NURSE PRACTITIONER

## 2021-02-03 NOTE — PROGRESS NOTES
Paul Ngo received a viral test for COVID-19. They were educated on isolation and quarantine as appropriate. For any symptoms, they were directed to seek care from their PCP, given contact information to establish with a doctor, directed to an urgent care or the emergency room.

## 2021-02-03 NOTE — PATIENT INSTRUCTIONS
You have received a viral test for COVID-19. Below is education on quarantine per the CDC guidelines. For any symptoms, seek care from your PCP, call [phone] to establish care with a doctor, or go directly to an urgent care or the emergency room. Test results will take 2-7 days and will be sent to you in your [employer] account. If you test positive, you will be contacted via phone. If you test negative, the ONLY communication will be through [address] GO TO [employer] AND SIGN UP FOR [employer]  (LOWER LEFT OF THE HOME PAGE)  No test is 100%. If you have symptoms, you should follow the guidance of quarantine as previously stated. You can still be contagious if you have symptoms. Your [facility] will reach out to you if you have a positive result. They will provide you with a return to work date and note. If you were tested for a pre-op, then you should remain in quarantine until your procedure. How do I know if I need to be in quarantine? If you live in a community where COVID-19 is or might be spreading (currently, that is virtually everywhere in the [facility])  Be alert for symptoms. Watch for fever, cough, shortness of breath, or other symptoms of COVID-19.  Take your temperature if symptoms develop.  Practice social distancing. Maintain 6 feet of distance from others and stay out of crowded places.  Follow CDC guidance if symptoms develop. If you feel healthy but:   Recently had close contact with a person with COVID-19 you need to Quarantine:   Stay home until 14 days after your last exposure.  Check your temperature twice a day and watch for symptoms of COVID-19.  If possible, stay away from people who are at higher-risk for getting very sick from COVID-19.   Stay Home and Monitor Your Health if you:   Have been diagnosed with COVID-19, or   Are waiting for test results, or   Have cough, fever, or shortness of breath, or symptoms of COVID-19      When You Can

## 2021-02-05 NOTE — PROGRESS NOTES
Patient _X__ reached   _____not reached-preop instructions left on voice mail_____________      DATE__2/16/21  ______ TIME__1030______ARRIVAL__0900  FEC_______      Nothing to eat or drink after midnight the night before,except for what the prep instructions call for. If you do not have the instructions or do not understand them please contact your doctors office. Follow any instructions your doctors office has given you including what medications to take the AM of your procedure and which ones to hold. You may use your inhalers. If you take a long acting insulin the terrence prior please cut the dose in half and take no diabetic medications that AM.Follow specific doctors office instructions regarding blood thinners and if they want you to hold and for how long. If you are on a blood thinner and have no instructions please contact the office and ask-CHECK COUMADIN    Dress comfortably,bring your insurance card,picture ID,and a complete list of medications, including supplements. You must have a responsible adult to stay with you during the procedure,drive you home and stay with you. [facility] phone number [phone] for any questions. OTHER INTRUCTIONS(if applicable)____take atenolol and diltiazem am of procedure_____________________________________________________      COVID TEST         __x___ Done  2/3/21___ where _[facility]  OK to use per ERNST Stafford R.N.___       _____ Scheduled ___ where ____       _____Other_________________      Emilia Gutierrez POLICY(subject to change)    There is a one visitor policy at [facility] for all surgeries and endoscopies. Whether the visitor can stay or will be asked to wait in the car will depend on the current policy and if social distancing can be maintained. The policy is subject to change at any time. Please make sure the visitor has a cell phone that is on,charged and able to accept calls, as this may be the way that the staff communicates with them. Pain management is NO VISITOR policyThe patients ride is expected to remain in the car with a cell phone for communication. If the ride is leaving the hospital grounds please make sure they are back in time for pickup. Have the patient inform the staff on arrival what their rides plans are while the patient is in the facility. At the MAIN there is one visitor allowed. Please note that the visitor policy is subject to change.

## 2021-02-23 RX ORDER — PANTOPRAZOLE SODIUM 40 MG/1
40 TABLET, DELAYED RELEASE ORAL
Qty: 60 TABLET | Refills: 6 | Status: SHIPPED | OUTPATIENT
Start: 2021-02-23 | End: 2021-08-30

## 2021-02-24 ENCOUNTER — ANTI-COAG VISIT (OUTPATIENT)
Dept: CARDIOLOGY CLINIC | Age: 81
End: 2021-02-24
Payer: MEDICARE

## 2021-02-24 DIAGNOSIS — I48.20 ATRIAL FIBRILLATION, CHRONIC (HCC): ICD-10-CM

## 2021-02-24 LAB — INR BLD: 4.7

## 2021-02-24 PROCEDURE — 85610 PROTHROMBIN TIME: CPT | Performed by: INTERNAL MEDICINE

## 2021-02-25 ENCOUNTER — OFFICE VISIT (OUTPATIENT)
Dept: PRIMARY CARE CLINIC | Age: 81
End: 2021-02-25
Payer: MEDICARE

## 2021-02-25 DIAGNOSIS — Z20.828 EXPOSURE TO SARS-ASSOCIATED CORONAVIRUS: Primary | ICD-10-CM

## 2021-02-25 PROCEDURE — 99211 OFF/OP EST MAY X REQ PHY/QHP: CPT | Performed by: NURSE PRACTITIONER

## 2021-02-25 PROCEDURE — G8428 CUR MEDS NOT DOCUMENT: HCPCS | Performed by: NURSE PRACTITIONER

## 2021-02-25 PROCEDURE — G8420 CALC BMI NORM PARAMETERS: HCPCS | Performed by: NURSE PRACTITIONER

## 2021-02-25 NOTE — PATIENT INSTRUCTIONS

## 2021-02-26 LAB — SARS-COV-2: NOT DETECTED

## 2021-03-02 ENCOUNTER — ANESTHESIA (OUTPATIENT)
Dept: ENDOSCOPY | Age: 81
End: 2021-03-02
Payer: MEDICARE

## 2021-03-02 ENCOUNTER — HOSPITAL ENCOUNTER (OUTPATIENT)
Age: 81
Setting detail: OUTPATIENT SURGERY
Discharge: HOME | End: 2021-03-02
Attending: INTERNAL MEDICINE | Admitting: INTERNAL MEDICINE
Payer: MEDICARE

## 2021-03-02 ENCOUNTER — ANESTHESIA EVENT (OUTPATIENT)
Dept: ENDOSCOPY | Age: 81
End: 2021-03-02
Payer: MEDICARE

## 2021-03-02 VITALS
DIASTOLIC BLOOD PRESSURE: 88 MMHG | RESPIRATION RATE: 17 BRPM | OXYGEN SATURATION: 100 % | SYSTOLIC BLOOD PRESSURE: 154 MMHG

## 2021-03-02 VITALS
OXYGEN SATURATION: 97 % | HEART RATE: 70 BPM | DIASTOLIC BLOOD PRESSURE: 79 MMHG | TEMPERATURE: 97.3 F | WEIGHT: 151 LBS | RESPIRATION RATE: 16 BRPM | SYSTOLIC BLOOD PRESSURE: 157 MMHG | HEIGHT: 67 IN | BODY MASS INDEX: 23.7 KG/M2

## 2021-03-02 LAB
APTT: 29.8 SEC (ref 24.2–36.2)
INR BLD: 1.46 (ref 0.86–1.14)
PROTHROMBIN TIME: 17 SEC (ref 10–13.2)

## 2021-03-02 PROCEDURE — 7100000010 HC PHASE II RECOVERY - FIRST 15 MIN: Performed by: INTERNAL MEDICINE

## 2021-03-02 PROCEDURE — 3700000000 HC ANESTHESIA ATTENDED CARE: Performed by: INTERNAL MEDICINE

## 2021-03-02 PROCEDURE — 85730 THROMBOPLASTIN TIME PARTIAL: CPT

## 2021-03-02 PROCEDURE — 2580000003 HC RX 258: Performed by: INTERNAL MEDICINE

## 2021-03-02 PROCEDURE — 85610 PROTHROMBIN TIME: CPT

## 2021-03-02 PROCEDURE — 3609012400 HC EGD TRANSORAL BIOPSY SINGLE/MULTIPLE: Performed by: INTERNAL MEDICINE

## 2021-03-02 PROCEDURE — 2500000003 HC RX 250 WO HCPCS: Performed by: NURSE ANESTHETIST, CERTIFIED REGISTERED

## 2021-03-02 PROCEDURE — 7100000011 HC PHASE II RECOVERY - ADDTL 15 MIN: Performed by: INTERNAL MEDICINE

## 2021-03-02 PROCEDURE — 88305 TISSUE EXAM BY PATHOLOGIST: CPT

## 2021-03-02 PROCEDURE — 6360000002 HC RX W HCPCS: Performed by: NURSE ANESTHETIST, CERTIFIED REGISTERED

## 2021-03-02 PROCEDURE — 2709999900 HC NON-CHARGEABLE SUPPLY: Performed by: INTERNAL MEDICINE

## 2021-03-02 PROCEDURE — 36415 COLL VENOUS BLD VENIPUNCTURE: CPT

## 2021-03-02 RX ORDER — LIDOCAINE HYDROCHLORIDE 20 MG/ML
INJECTION, SOLUTION INFILTRATION; PERINEURAL PRN
Status: DISCONTINUED | OUTPATIENT
Start: 2021-03-02 | End: 2021-03-02 | Stop reason: SDUPTHER

## 2021-03-02 RX ORDER — SODIUM CHLORIDE 9 MG/ML
INJECTION, SOLUTION INTRAVENOUS CONTINUOUS
Status: DISCONTINUED | OUTPATIENT
Start: 2021-03-02 | End: 2021-03-02 | Stop reason: HOSPADM

## 2021-03-02 RX ORDER — PROPOFOL 10 MG/ML
INJECTION, EMULSION INTRAVENOUS PRN
Status: DISCONTINUED | OUTPATIENT
Start: 2021-03-02 | End: 2021-03-02 | Stop reason: SDUPTHER

## 2021-03-02 RX ADMIN — PROPOFOL 30 MG: 10 INJECTION, EMULSION INTRAVENOUS at 11:00

## 2021-03-02 RX ADMIN — LIDOCAINE HYDROCHLORIDE 50 MG: 20 INJECTION, SOLUTION INFILTRATION; PERINEURAL at 10:57

## 2021-03-02 RX ADMIN — PROPOFOL 50 MG: 10 INJECTION, EMULSION INTRAVENOUS at 10:56

## 2021-03-02 RX ADMIN — SODIUM CHLORIDE: 9 INJECTION, SOLUTION INTRAVENOUS at 10:29

## 2021-03-02 RX ADMIN — SODIUM CHLORIDE: 9 INJECTION, SOLUTION INTRAVENOUS at 10:54

## 2021-03-02 ASSESSMENT — PAIN SCALES - GENERAL
PAINLEVEL_OUTOF10: 0

## 2021-03-02 ASSESSMENT — ENCOUNTER SYMPTOMS: SHORTNESS OF BREATH: 0

## 2021-03-02 NOTE — BRIEF OP NOTE
Brief Postoperative Note      Patient: Earlyne Fossa  YOB: 1940  MRN: [id number]    Date of Procedure: 3/2/2021    Pre-Op Diagnosis: GASTRIC ULCER K25.9         Procedure(s):  EGD BIOPSY    Surgeon(s):  Chelsea Man MD    Anesthesia: Monitor Anesthesia Care    Estimated Blood Loss (mL): Minimal    Complications: None    Specimens:   ID Type Source Tests Collected by Time Destination   A : Gastric Bx R/O H. Pylori Tissue Stomach SURGICAL PATHOLOGY Chelsea Man MD 3/2/2021 1058    B : Gastric polyp Bx R/O cancer Tissue Stomach SURGICAL PATHOLOGY Chelsea Man MD 3/2/2021 1058      Findings:   2-3 cm HH, nodular antral gastritis, sessile antral polypoid lesion about 7 mm, biopsied. Patchy gastritis, biopsied to evaluate for HP infection     Plans:  Await biopsies. Continue Pantoprazole 40 mg daily. Resume coumadin after 24 hours.      Electronically signed by Chelsea Man MD on 3/2/2021 at 11:08 AM

## 2021-03-02 NOTE — ANESTHESIA POSTPROCEDURE EVALUATION
Department of Anesthesiology  Postprocedure Note    Patient: Lanie Hernández  MRN: [id number]  YOB: 1940  Date of evaluation: 3/2/2021  Time:  11:12 AM     Procedure Summary     Date: 03/02/21 Room / Location: [address] [place] [address]    Anesthesia Start: 1054 Anesthesia Stop: 2116    Procedure: EGD BIOPSY (N/A Abdomen) Diagnosis: (GASTRIC ULCER K25.9)    Surgeons: Elizabeth Garcia MD Responsible Provider: Susan Win MD    Anesthesia Type: MAC ASA Status: 3          Anesthesia Type: MAC    Taylor Phase I: Taylor Score: 10    Taylor Phase II:      Last vitals: Reviewed and per EMR flowsheets.        Anesthesia Post Evaluation    Patient location during evaluation: PACU  Level of consciousness: awake  Airway patency: patent  Complications: no  Cardiovascular status: hemodynamically stable  Respiratory status: acceptable

## 2021-03-02 NOTE — ANESTHESIA PRE PROCEDURE
Department of Anesthesiology  Preprocedure Note       Name:  Noel Melendez   Age:  79 y.o.  :  1940                                          MRN:  [id number]         Date:  3/2/2021      Surgeon: Billy No):  Estella Valderrama MD    Procedure: Procedure(s):  EGD DIAGNOSTIC ONLY    Medications prior to admission:   Prior to Admission medications    Medication Sig Start Date End Date Taking? Authorizing Provider   pantoprazole (PROTONIX) 40 MG tablet Take 1 tablet by mouth 2 times daily (before meals) 21   Tung Dalton MD   atenolol (TENORMIN) 50 MG tablet Take 1 tablet by mouth daily 20   Tanesha Fermin MD   warfarin (COUMADIN) 5 MG tablet 5 mg three days a week 2.5 the other. Patient taking differently: Take 2.5 mg by mouth daily 5 mg three days a week 2.5 the other. 10/29/20   Tung Dalton MD   dilTIAZem (CARDIZEM) 60 MG tablet Take 0.5 tablets by mouth every 12 hours 20   Tung Dalton MD   simvastatin (ZOCOR) 40 MG tablet Take 1 tablet by mouth nightly 20  Tung Dalton MD   PARoxetine (PAXIL) 20 MG tablet Take 1 tablet by mouth daily 20   Tung Dalton MD   Omega-3 Fatty Acids (FISH OIL PO) Take by mouth    Historical Provider, MD   Multiple Vitamins-Minerals (MULTIVITAMIN PO) Take by mouth    Historical Provider, MD       Current medications:    Current Facility-Administered Medications   Medication Dose Route Frequency Provider Last Rate Last Admin    0.9 % sodium chloride infusion   Intravenous Continuous Estella Valderrama  mL/hr at 21 1029 New Bag at 21 1029       Allergies:     Allergies   Allergen Reactions    Penicillins      Patient cannot remember reaction    Sulfa Antibiotics        Problem List:    Patient Active Problem List   Diagnosis Code    Essential hypertension I10    Chronic atrial fibrillation (HCC) I48.20    Severe sepsis (HCC) A41.9, R65.20    Hydroureteronephrosis N13.30    Pelvic mass R19.00  CHARISSE (acute kidney injury) ([facility] [address] N17.9    Acute metabolic encephalopathy H66.78    Mixed hyperlipidemia E78.2    Primary osteoarthritis of left knee M17.12    Effusion of left knee joint M25.462    Synovitis of left knee M65.9    Pelvic mass in female R19.00       Past Medical History:        Diagnosis Date    A-fib [facility])     Atrial fibrillation ([facility] [address]     Hypercholesterolemia     Hypertension     Pelvic mass        Past Surgical History:        Procedure Laterality Date    APPENDECTOMY      CYSTOSCOPY Right 06/14/2018    retrograde pyelogram, ureteroscopy, and stent placement    HYSTERECTOMY, TOTAL ABDOMINAL  07/09/2018    robotic with BSO, omentectomy, pelvic mass removal, lymphnode dissection    DE CYSTO/URETERO W/LITHOTRIPSY &INDWELL STENT INSRT Right 10/31/2018    CYSTOSCOPY WITH RIGHT URETEROSCOPY HOLMIUM LASER LITHOTRIPSY STONE MANIPULATION STONE BASKET WITH RIGHT STENT EXCHANGE performed by Criss Vazquez MD at [facility] N/A 11/29/2020    EGD CONTROL HEMORRHAGE performed by Leyla Robertson MD at [address] [facility] N/A 11/29/2020    EGD BIOPSY performed by Leyla Robertson MD at [address] [facility] History:    Social History     Tobacco Use    Smoking status: Never Smoker    Smokeless tobacco: Never Used   Substance Use Topics    Alcohol use: No                                Counseling given: Not Answered      Vital Signs (Current):   Vitals:    02/05/21 1536   Weight: 151 lb (68.5 kg)   Height: 5' 6.5\" (1.689 m)                                              BP Readings from Last 3 Encounters:   12/16/20 126/83   12/14/20 128/88   12/04/20 (!) 120/56       NPO Status:                                                                                 BMI:   Wt Readings from Last 3 Encounters:   02/05/21 151 lb (68.5 kg)   12/16/20 153 lb 9.6 oz (69.7 kg)   12/14/20 154 lb (69.9 kg)     Body mass index is 24.01 kg/m². (-) GERD and liver disease       Endo/Other:    (+) : arthritis: OA., .    (-) diabetes mellitus, hypothyroidism               Abdominal:           Vascular:     - PVD. Anesthesia Plan      MAC     ASA 3       Induction: intravenous. Anesthetic plan and risks discussed with patient. Plan discussed with CRNA.                   Heydi Pablo MD   3/2/2021

## 2021-03-02 NOTE — PROGRESS NOTES
Discharge instructions reviewed with patient/responsible adult. All home medications have been reviewed, questions answered and patient verbalized understanding. Discharge instructions signed and copies given. Patient discharged ambulatory to home with belongings. Post-procedure education reviewed with patient and visitor, and they verbalized understanding.

## 2021-03-02 NOTE — H&P
Pre-operative History and Physical    Patient: Shin Odom  : 1940  Acct#:     History Obtained From:  patient    HISTORY OF PRESENT ILLNESS:    The patient is a 79 y.o. female for F/up EGD. She had esophagitis and gastric ulcers in 2020. Past Medical History:        Diagnosis Date    A-fib [facility])     Atrial fibrillation ([address]     Hypercholesterolemia     Hypertension     Pelvic mass      Past Surgical History:        Procedure Laterality Date    APPENDECTOMY      CYSTOSCOPY Right 2018    retrograde pyelogram, ureteroscopy, and stent placement    HYSTERECTOMY, TOTAL ABDOMINAL  2018    robotic with BSO, omentectomy, pelvic mass removal, lymphnode dissection    MS CYSTO/URETERO W/LITHOTRIPSY &INDWELL STENT INSRT Right 10/31/2018    CYSTOSCOPY WITH RIGHT URETEROSCOPY HOLMIUM LASER LITHOTRIPSY STONE MANIPULATION STONE BASKET WITH RIGHT STENT EXCHANGE performed by Criss Vazquez MD at [address] N/A 2020    EGD CONTROL HEMORRHAGE performed by Leyla Robertson MD at [address] N/A 2020    EGD BIOPSY performed by Leyla Robertson MD at [address]     Medications Prior to Admission:   No current facility-administered medications on file prior to encounter. Current Outpatient Medications on File Prior to Encounter   Medication Sig Dispense Refill    atenolol (TENORMIN) 50 MG tablet Take 1 tablet by mouth daily 30 tablet 3    warfarin (COUMADIN) 5 MG tablet 5 mg three days a week 2.5 the other.  (Patient taking differently: Take 2.5 mg by mouth daily 5 mg three days a week 2.5 the other.) 90 tablet 1    dilTIAZem (CARDIZEM) 60 MG tablet Take 0.5 tablets by mouth every 12 hours 90 tablet 3    simvastatin (ZOCOR) 40 MG tablet Take 1 tablet by mouth nightly 90 tablet 3    PARoxetine (PAXIL) 20 MG tablet Take 1 tablet by mouth daily 90 tablet 3    Omega-3 Fatty Acids (FISH OIL PO) Take by mouth      Multiple Vitamins-Minerals (MULTIVITAMIN PO) Take by mouth       Allergies:  Penicillins and Sulfa antibiotics    History of allergic reaction to anesthesia:  No    Social History:   U/R  Family History:   U/R    PHYSICAL EXAM:      Ht 5' 6.5\" (1.689 m)   Wt 151 lb (68.5 kg)   BMI 24.01 kg/m²  I        Heart:  Normal apical impulse, regular rate and rhythm, normal S1 and S2, no S3 or S4, and no murmur noted    Lungs:  No increased work of breathing, good air exchange, clear to auscultation bilaterally, no crackles or wheezing     Abdomen:  No scars, normal bowel sounds, soft, non-distended, non-tender, no masses palpated, no hepatosplenomegally      ASA Grade:  ASA 2 - Patient with mild systemic disease with no functional limitations    Mallampati Class:  Class I: Soft palate, uvula, fauces, pillars visible  __________  Class II: Soft palate, uvula, fauces visible  __________   Class III: Soft palate, base of uvula visible  _____X_____  Class IV: Hard palate only visible   __________      ASSESSMENT AND PLAN:    1. Patient is a 79 y.o. female here for EGD with deep sedation  2. Procedure options, risks and benefits reviewed with patient. Patient expresses understanding.       MD Pricilla Marcelino  03/02/21

## 2021-03-11 ENCOUNTER — ANTI-COAG VISIT (OUTPATIENT)
Dept: CARDIOLOGY CLINIC | Age: 81
End: 2021-03-11
Payer: MEDICARE

## 2021-03-11 DIAGNOSIS — I48.20 ATRIAL FIBRILLATION, CHRONIC (HCC): ICD-10-CM

## 2021-03-11 LAB — INR BLD: 2

## 2021-03-11 PROCEDURE — 85610 PROTHROMBIN TIME: CPT | Performed by: INTERNAL MEDICINE

## 2021-03-15 ENCOUNTER — OFFICE VISIT (OUTPATIENT)
Dept: FAMILY MEDICINE CLINIC | Age: 81
End: 2021-03-15
Payer: MEDICARE

## 2021-03-15 VITALS
BODY MASS INDEX: 24.77 KG/M2 | HEART RATE: 66 BPM | OXYGEN SATURATION: 99 % | SYSTOLIC BLOOD PRESSURE: 130 MMHG | WEIGHT: 155.8 LBS | DIASTOLIC BLOOD PRESSURE: 80 MMHG

## 2021-03-15 DIAGNOSIS — I10 ESSENTIAL HYPERTENSION: ICD-10-CM

## 2021-03-15 DIAGNOSIS — F34.1 DYSTHYMIA: ICD-10-CM

## 2021-03-15 DIAGNOSIS — E78.2 MIXED HYPERLIPIDEMIA: ICD-10-CM

## 2021-03-15 DIAGNOSIS — I48.20 CHRONIC ATRIAL FIBRILLATION (HCC): ICD-10-CM

## 2021-03-15 DIAGNOSIS — K25.4 GASTROINTESTINAL HEMORRHAGE ASSOCIATED WITH GASTRIC ULCER: Primary | ICD-10-CM

## 2021-03-15 PROCEDURE — 1090F PRES/ABSN URINE INCON ASSESS: CPT | Performed by: NURSE PRACTITIONER

## 2021-03-15 PROCEDURE — 99214 OFFICE O/P EST MOD 30 MIN: CPT | Performed by: NURSE PRACTITIONER

## 2021-03-15 PROCEDURE — G8427 DOCREV CUR MEDS BY ELIG CLIN: HCPCS | Performed by: NURSE PRACTITIONER

## 2021-03-15 PROCEDURE — 1123F ACP DISCUSS/DSCN MKR DOCD: CPT | Performed by: NURSE PRACTITIONER

## 2021-03-15 PROCEDURE — G8400 PT W/DXA NO RESULTS DOC: HCPCS | Performed by: NURSE PRACTITIONER

## 2021-03-15 PROCEDURE — G8420 CALC BMI NORM PARAMETERS: HCPCS | Performed by: NURSE PRACTITIONER

## 2021-03-15 PROCEDURE — 4040F PNEUMOC VAC/ADMIN/RCVD: CPT | Performed by: NURSE PRACTITIONER

## 2021-03-15 PROCEDURE — 1036F TOBACCO NON-USER: CPT | Performed by: NURSE PRACTITIONER

## 2021-03-15 PROCEDURE — G8484 FLU IMMUNIZE NO ADMIN: HCPCS | Performed by: NURSE PRACTITIONER

## 2021-03-15 ASSESSMENT — PATIENT HEALTH QUESTIONNAIRE - PHQ9
SUM OF ALL RESPONSES TO PHQ QUESTIONS 1-9: 1
1. LITTLE INTEREST OR PLEASURE IN DOING THINGS: 0
2. FEELING DOWN, DEPRESSED OR HOPELESS: 1
SUM OF ALL RESPONSES TO PHQ QUESTIONS 1-9: 1

## 2021-03-15 ASSESSMENT — ENCOUNTER SYMPTOMS
SHORTNESS OF BREATH: 0
VOMITING: 0
COUGH: 0
DIARRHEA: 0
NAUSEA: 0

## 2021-03-15 NOTE — PATIENT INSTRUCTIONS
Patient Education        Recovering From Depression: Care Instructions  Your Care Instructions     Taking good care of yourself is important as you recover from depression. In time, your symptoms will fade as your treatment takes hold. Do not give up. Instead, focus your energy on getting better. Your mood will improve. It just takes some time. Focus on things that can help you feel better, such as being with friends and family, eating well, and getting enough rest. But take things slowly. Do not do too much too soon. You will begin to feel better gradually. Follow-up care is a key part of your treatment and safety. Be sure to make and go to all appointments, and call your doctor if you are having problems. It's also a good idea to know your test results and keep a list of the medicines you take. How can you care for yourself at home? Be realistic  · If you have a large task to do, break it up into smaller steps you can handle, and just do what you can. · You may want to put off important decisions until your depression has lifted. If you have plans that will have a major impact on your life, such as marriage, divorce, or a job change, try to wait a bit. Talk it over with friends and loved ones who can help you look at the overall picture first.  · Reaching out to people for help is important. Do not isolate yourself. Let your family and friends help you. Find someone you can trust and confide in, and talk to that person. · Be patient, and be kind to yourself. Remember that depression is not your fault and is not something you can overcome with willpower alone. Treatment is important for depression, just like for any other illness. Feeling better takes time, and your mood will improve little by little. Stay active  · Stay busy and get outside. Take a walk, or try some other light exercise. · Talk with your doctor about an exercise program. Exercise can help with mild depression.   · Go to a movie or concert. Take part in a [employer] activity or other social gathering. Go to a [employer] game. · Ask a friend to have dinner with you. Take care of yourself  · Eat a balanced diet with plenty of fresh fruits and vegetables, whole grains, and lean protein. If you have lost your appetite, eat small snacks rather than large meals. · Avoid using illegal drugs or marijuana and drinking alcohol. Do not take medicines that have not been prescribed for you. They may interfere with medicines you may be taking for depression, or they may make your depression worse. · Take your medicines exactly as they are prescribed. You may start to feel better within 1 to 3 weeks of taking antidepressant medicine. But it can take as many as 6 to 8 weeks to see more improvement. If you have questions or concerns about your medicines, or if you do not notice any improvement by 3 weeks, talk to your doctor. · Continue to take your medicine after your symptoms improve. Taking your medicine for at least 6 months after you feel better can help keep you from getting depressed again. If this isn't the first time you have been depressed, your doctor may recommend you to take medicine even longer. · If you have any side effects from your medicine, tell your doctor. Many side effects are mild and will go away on their own after you have been taking the medicine for a few weeks. Some may last longer. Talk to your doctor if side effects are bothering you too much. You might be able to try a different medicine. · Continue counseling. It may help prevent depression from returning, especially if you've had multiple episodes of depression. Talk with your counselor if you are having a hard time attending your sessions or you think the sessions aren't working. Don't just stop going. · Get enough sleep. Talk to your doctor if you are having problems sleeping. · Avoid sleeping pills unless they are prescribed by the doctor treating your depression.  Sleeping instruction, always ask your healthcare professional. [address] any warranty or liability for your use of this information. Patient Education        DASH Diet: Care Instructions  Your Care Instructions     The DASH diet is an eating plan that can help lower your blood pressure. DASH stands for Dietary Approaches to Stop Hypertension. Hypertension is high blood pressure. The DASH diet focuses on eating foods that are high in calcium, potassium, and magnesium. These nutrients can lower blood pressure. The foods that are highest in these nutrients are fruits, vegetables, low-fat dairy products, nuts, seeds, and legumes. But taking calcium, potassium, and magnesium supplements instead of eating foods that are high in those nutrients does not have the same effect. The DASH diet also includes whole grains, fish, and poultry. The DASH diet is one of several lifestyle changes your doctor may recommend to lower your high blood pressure. Your doctor may also want you to decrease the amount of sodium in your diet. Lowering sodium while following the DASH diet can lower blood pressure even further than just the DASH diet alone. Follow-up care is a key part of your treatment and safety. Be sure to make and go to all appointments, and call your doctor if you are having problems. It's also a good idea to know your test results and keep a list of the medicines you take. How can you care for yourself at home? Following the DASH diet  · Eat 4 to 5 servings of fruit each day. A serving is 1 medium-sized piece of fruit, ½ cup chopped or canned fruit, 1/4 cup dried fruit, or 4 ounces (½ cup) of fruit juice. Choose fruit more often than fruit juice. · Eat 4 to 5 servings of vegetables each day. A serving is 1 cup of lettuce or raw leafy vegetables, ½ cup of chopped or cooked vegetables, or 4 ounces (½ cup) of vegetable juice. Choose vegetables more often than vegetable juice.   · Get 2 to 3 servings and lots of vegetable toppings. Try using tomatoes, squash, spinach, broccoli, carrots, cauliflower, and onions. ? Have a variety of cut-up vegetables with a low-fat dip as an appetizer instead of chips and dip. ? Sprinkle sunflower seeds or chopped almonds over salads. Or try adding chopped walnuts or almonds to cooked vegetables. ? Try some vegetarian meals using beans and peas. Add garbanzo or kidney beans to salads. Make burritos and tacos with mashed medley beans or black beans. Where can you learn more? Go to [email or web address] org and sign in to your Interview account. Enter B158 in the [facility] box to learn more about \"DASH Diet: Care Instructions. \"     If you do not have an account, please click on the \"Sign Up Now\" link. Current as of: August 31, 2020               Content Version: 12.8  © 2006-2021 Healthwise, [facility]. Care instructions adapted under license by [facility] ([facility]). If you have questions about a medical condition or this instruction, always ask your healthcare professional. [place] [address] any warranty or liability for your use of this information.

## 2021-03-15 NOTE — PROGRESS NOTES
differently: Take 40 mg by mouth daily ) 60 tablet 6    atenolol (TENORMIN) 50 MG tablet Take 1 tablet by mouth daily 30 tablet 3    warfarin (COUMADIN) 5 MG tablet 5 mg three days a week 2.5 the other. (Patient taking differently: Take 2.5 mg by mouth daily 5 mg three days a week 2.5 the other.) 90 tablet 1    dilTIAZem (CARDIZEM) 60 MG tablet Take 0.5 tablets by mouth every 12 hours 90 tablet 3    simvastatin (ZOCOR) 40 MG tablet Take 1 tablet by mouth nightly 90 tablet 3    PARoxetine (PAXIL) 20 MG tablet Take 1 tablet by mouth daily 90 tablet 3    Omega-3 Fatty Acids (FISH OIL PO) Take by mouth      Multiple Vitamins-Minerals (MULTIVITAMIN PO) Take by mouth       No current facility-administered medications for this visit. Review of Systems   Constitutional: Negative for activity change, appetite change, chills, fatigue and fever. Respiratory: Negative for cough and shortness of breath. Cardiovascular: Negative for chest pain and palpitations. Gastrointestinal: Negative for diarrhea, nausea and vomiting. Psychiatric/Behavioral: Positive for dysphoric mood. Past medical, surgical, family and social history were reviewed and updated with the patient. Objective:    /80 (Site: Right Upper Arm, Position: Sitting, Cuff Size: Medium Adult)   Pulse 66   Wt 155 lb 12.8 oz (70.7 kg)   SpO2 99%   BMI 24.77 kg/m²   Weight: 155 lb 12.8 oz (70.7 kg)     BP Readings from Last 3 Encounters:   03/15/21 130/80   03/02/21 (!) 157/79   03/02/21 (!) 154/88     Wt Readings from Last 3 Encounters:   03/15/21 155 lb 12.8 oz (70.7 kg)   02/05/21 151 lb (68.5 kg)   12/16/20 153 lb 9.6 oz (69.7 kg)       Physical Exam  Constitutional:       General: She is not in acute distress. Appearance: She is well-developed. HENT:      Head: Normocephalic and atraumatic. Cardiovascular:      Rate and Rhythm: Normal rate and regular rhythm.       Heart sounds: Normal heart sounds, S1 normal and S2 normal. Pulmonary:      Effort: Pulmonary effort is normal. No respiratory distress. Breath sounds: Normal breath sounds. Skin:     General: Skin is warm and dry. Neurological:      Mental Status: She is alert and oriented to person, place, and time. Psychiatric:         Thought Content: Thought content normal.         Judgment: Judgment normal.       Assessment/Plan    1. Gastrointestinal hemorrhage associated with gastric ulcer  Resolved. Completed follow up with GI. Call office for biopsy results. 2. Chronic atrial fibrillation ([address]  Continue follow up with Cardiology. 3. Mixed hyperlipidemia  Continue follow up with Cardiology. 4. Essential hypertension  Continue follow up with Cardiology. 5. Dysthymia  Doing okay. Continue to monitor. Advised patient and daughter if continues to be an issue after weather improves follow up with office for a medication adjustment. Mervin Allen was counseled regarding symptoms of current diagnosis, course and complications of disease if inadequately treated. Discussed side effects of medications, diagnosis, treatment options, and prognosis along with risks, benefits, complications, and alternatives of treatment including labs, imaging and other studies/treatment targets and goals. She verbalized understanding of instructions and counseling. Return in about 6 months (around 9/15/2021) for medicare annual wellness. Medical decision making of moderate complexity.

## 2021-04-13 ENCOUNTER — OFFICE VISIT (OUTPATIENT)
Dept: CARDIOLOGY CLINIC | Age: 81
End: 2021-04-13
Payer: MEDICARE

## 2021-04-13 ENCOUNTER — ANTI-COAG VISIT (OUTPATIENT)
Dept: CARDIOLOGY CLINIC | Age: 81
End: 2021-04-13

## 2021-04-13 VITALS
WEIGHT: 153 LBS | SYSTOLIC BLOOD PRESSURE: 160 MMHG | OXYGEN SATURATION: 97 % | HEIGHT: 66 IN | BODY MASS INDEX: 24.59 KG/M2 | DIASTOLIC BLOOD PRESSURE: 88 MMHG | HEART RATE: 86 BPM

## 2021-04-13 DIAGNOSIS — I65.23 BILATERAL CAROTID ARTERY STENOSIS: ICD-10-CM

## 2021-04-13 DIAGNOSIS — E78.2 MIXED HYPERLIPIDEMIA: ICD-10-CM

## 2021-04-13 DIAGNOSIS — I10 ESSENTIAL HYPERTENSION: Primary | ICD-10-CM

## 2021-04-13 DIAGNOSIS — I48.20 CHRONIC ATRIAL FIBRILLATION (HCC): ICD-10-CM

## 2021-04-13 LAB — INR BLD: 4

## 2021-04-13 PROCEDURE — 1090F PRES/ABSN URINE INCON ASSESS: CPT | Performed by: INTERNAL MEDICINE

## 2021-04-13 PROCEDURE — G8420 CALC BMI NORM PARAMETERS: HCPCS | Performed by: INTERNAL MEDICINE

## 2021-04-13 PROCEDURE — G8400 PT W/DXA NO RESULTS DOC: HCPCS | Performed by: INTERNAL MEDICINE

## 2021-04-13 PROCEDURE — 1123F ACP DISCUSS/DSCN MKR DOCD: CPT | Performed by: INTERNAL MEDICINE

## 2021-04-13 PROCEDURE — 4040F PNEUMOC VAC/ADMIN/RCVD: CPT | Performed by: INTERNAL MEDICINE

## 2021-04-13 PROCEDURE — 1036F TOBACCO NON-USER: CPT | Performed by: INTERNAL MEDICINE

## 2021-04-13 PROCEDURE — 99214 OFFICE O/P EST MOD 30 MIN: CPT | Performed by: INTERNAL MEDICINE

## 2021-04-13 PROCEDURE — G8427 DOCREV CUR MEDS BY ELIG CLIN: HCPCS | Performed by: INTERNAL MEDICINE

## 2021-04-13 NOTE — PROGRESS NOTES
[address]   Cardiac Evaluation      Patient: Leticia Lama  YOB: 1940  Date: 4/13/21    Chief Complaint   Patient presents with    Atrial Fibrillation    Hypertension    Hyperlipidemia     Referring provider: BRIGHT Castillo CNP    History of Present Illness:   Mrs. Kirby Cameron is here today for regular follow up of her AF, HTN, HLD. 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 She had surgery on 7/18. Hospital course complicated by increased HR of her AF which is chronic (due to her illness) requiring the addition of cardizem and her usual atenolol. Ms. iKrby Cameron was hospitalized 11/27/20-12/4/20 with afib with RVR, creatinine of 5.0, and acute upper GI bleed. EGD showed small antral ulcer, s/p 4 clips, biopsies taken. Blood was noted in fundus. GI OK'd AC with repeat EGD in 8 weeks. Repeat creatinine improved at 1.5 on 12/15/20/    Today, Ms. Kirby Cameron states she feels ok. She denies any chest pain, palpitations, LOPEZ, dizziness, or edema. Raymond Poole lives alone since her  passed. She takes care of her home and yard with help from neighbors. Past Medical History:   has a past medical history of A-fib [facility]), Atrial fibrillation ([facility] [address] Hypercholesterolemia, Hypertension, and Pelvic mass. Surgical History:   has a past surgical history that includes Appendectomy; Cystocopy (Right, 06/14/2018); Hysterectomy, total abdominal (07/09/2018); pr cysto/uretero w/lithotripsy &indwell stent insrt (Right, 10/31/2018); Upper gastrointestinal endoscopy (N/A, 11/29/2020); Upper gastrointestinal endoscopy (N/A, 11/29/2020); and Upper gastrointestinal endoscopy (N/A, 3/2/2021). Resection of ovarian serous cystadenoma. Social History:  Social History     Socioeconomic History    Marital status:       Spouse name: Not on file    Number of children: Not on file    Years of education: Not on file    Highest education level: Not on file   Occupational History    Not on file   Social Needs    Financial resource strain: Not on file    Food insecurity     Worry: Not on file     Inability: Not on file    Transportation needs     Medical: Not on file     Non-medical: Not on file   Tobacco Use    Smoking status: Never Smoker    Smokeless tobacco: Never Used   Substance and Sexual Activity    Alcohol use: No    Drug use: No    Sexual activity: Not Currently   Lifestyle    Physical activity     Days per week: Not on file     Minutes per session: Not on file    Stress: Not on file   Relationships    Social connections     Talks on phone: Not on file     Gets together: Not on file     Attends [employer] service: Not on file     Active member of club or organization: Not on file     Attends meetings of clubs or organizations: Not on file     Relationship status: Not on file    Intimate partner violence     Fear of current or ex partner: Not on file     Emotionally abused: Not on file     Physically abused: Not on file     Forced sexual activity: Not on file   Other Topics Concern    Not on file   Social History Narrative    Not on file     Family History:  family history includes Heart Disease in her father. Allergies:  Penicillins and Sulfa antibiotics     Review of Systems:   · Constitutional: there has been no unanticipated weight loss. No change in energy or activity level   · Eyes: No visual changes   · ENT: No Headaches, hearing loss or vertigo. No mouth sores or sore throat. · Cardiovascular: Reviewed in HPI  · Respiratory: No cough or wheezing, no sputum production. No hematemesis. · Gastrointestinal: No abdominal pain, appetite loss, blood in stools. No change in bowel or bladder habits. · Genitourinary: No nocturia, dysuria, trouble voiding  · Musculoskeletal:  No gait disturbance, weakness or joint complaints.   · Integumentary: No rash or

## 2021-04-23 ENCOUNTER — HOSPITAL ENCOUNTER (OUTPATIENT)
Dept: CARDIOLOGY | Age: 81
Discharge: HOME | End: 2021-04-23
Payer: MEDICARE

## 2021-04-23 DIAGNOSIS — I65.23 BILATERAL CAROTID ARTERY STENOSIS: ICD-10-CM

## 2021-04-23 PROCEDURE — 93880 EXTRACRANIAL BILAT STUDY: CPT

## 2021-04-23 RX ORDER — WARFARIN SODIUM 5 MG/1
TABLET ORAL
Qty: 60 TABLET | Refills: 2 | Status: ON HOLD | OUTPATIENT
Start: 2021-04-23 | End: 2022-05-31 | Stop reason: HOSPADM

## 2021-04-27 ENCOUNTER — ANTI-COAG VISIT (OUTPATIENT)
Dept: CARDIOLOGY CLINIC | Age: 81
End: 2021-04-27
Payer: MEDICARE

## 2021-04-27 DIAGNOSIS — I48.20 ATRIAL FIBRILLATION, CHRONIC (HCC): ICD-10-CM

## 2021-04-27 LAB — INR BLD: 1.5

## 2021-04-27 PROCEDURE — 85610 PROTHROMBIN TIME: CPT | Performed by: INTERNAL MEDICINE

## 2021-05-25 ENCOUNTER — ANTI-COAG VISIT (OUTPATIENT)
Dept: CARDIOLOGY CLINIC | Age: 81
End: 2021-05-25
Payer: MEDICARE

## 2021-05-25 DIAGNOSIS — I48.20 ATRIAL FIBRILLATION, CHRONIC (HCC): ICD-10-CM

## 2021-05-25 LAB — INR BLD: 3.2

## 2021-05-25 PROCEDURE — 85610 PROTHROMBIN TIME: CPT | Performed by: INTERNAL MEDICINE

## 2021-06-08 ENCOUNTER — ANTI-COAG VISIT (OUTPATIENT)
Dept: CARDIOLOGY CLINIC | Age: 81
End: 2021-06-08
Payer: MEDICARE

## 2021-06-08 LAB — INTERNATIONAL NORMALIZATION RATIO, POC: 2.7

## 2021-06-08 PROCEDURE — 85610 PROTHROMBIN TIME: CPT | Performed by: INTERNAL MEDICINE

## 2021-07-06 ENCOUNTER — ANTI-COAG VISIT (OUTPATIENT)
Dept: CARDIOLOGY CLINIC | Age: 81
End: 2021-07-06
Payer: MEDICARE

## 2021-07-06 LAB — INTERNATIONAL NORMALIZATION RATIO, POC: NORMAL

## 2021-07-06 PROCEDURE — 85610 PROTHROMBIN TIME: CPT | Performed by: INTERNAL MEDICINE

## 2021-07-20 ENCOUNTER — ANTI-COAG VISIT (OUTPATIENT)
Dept: CARDIOLOGY CLINIC | Age: 81
End: 2021-07-20
Payer: MEDICARE

## 2021-07-20 LAB — INTERNATIONAL NORMALIZATION RATIO, POC: 3.9

## 2021-07-20 PROCEDURE — 85610 PROTHROMBIN TIME: CPT | Performed by: INTERNAL MEDICINE

## 2021-07-20 RX ORDER — WARFARIN SODIUM 2.5 MG/1
2.5 TABLET ORAL DAILY
COMMUNITY
End: 2021-07-21 | Stop reason: SDUPTHER

## 2021-07-21 RX ORDER — WARFARIN SODIUM 2.5 MG/1
2.5 TABLET ORAL DAILY
Qty: 90 TABLET | Refills: 0 | Status: SHIPPED | OUTPATIENT
Start: 2021-07-21 | End: 2021-10-15

## 2021-07-23 ENCOUNTER — TELEPHONE (OUTPATIENT)
Dept: CARDIOLOGY CLINIC | Age: 81
End: 2021-07-23

## 2021-08-04 ENCOUNTER — ANTI-COAG VISIT (OUTPATIENT)
Dept: CARDIOLOGY CLINIC | Age: 81
End: 2021-08-04
Payer: MEDICARE

## 2021-08-04 LAB — INTERNATIONAL NORMALIZATION RATIO, POC: 6.2

## 2021-08-04 PROCEDURE — 85610 PROTHROMBIN TIME: CPT | Performed by: INTERNAL MEDICINE

## 2021-08-04 RX ORDER — ATENOLOL 50 MG/1
50 TABLET ORAL DAILY
Qty: 90 TABLET | Refills: 3 | Status: ON HOLD | OUTPATIENT
Start: 2021-08-04 | End: 2022-05-31 | Stop reason: HOSPADM

## 2021-08-04 RX ORDER — DILTIAZEM HYDROCHLORIDE 60 MG/1
30 TABLET, FILM COATED ORAL EVERY 12 HOURS SCHEDULED
Qty: 90 TABLET | Refills: 3 | Status: SHIPPED | OUTPATIENT
Start: 2021-08-04 | End: 2022-08-31

## 2021-08-04 RX ORDER — PAROXETINE HYDROCHLORIDE 20 MG/1
20 TABLET, FILM COATED ORAL DAILY
Qty: 90 TABLET | Refills: 3 | Status: SHIPPED | OUTPATIENT
Start: 2021-08-04 | End: 2021-10-12 | Stop reason: SDUPTHER

## 2021-08-04 RX ORDER — SIMVASTATIN 40 MG
40 TABLET ORAL NIGHTLY
Qty: 90 TABLET | Refills: 3 | Status: SHIPPED | OUTPATIENT
Start: 2021-08-04 | End: 2022-07-27

## 2021-08-06 ENCOUNTER — ANTI-COAG VISIT (OUTPATIENT)
Dept: CARDIOLOGY CLINIC | Age: 81
End: 2021-08-06
Payer: MEDICARE

## 2021-08-06 DIAGNOSIS — I48.20 CHRONIC ATRIAL FIBRILLATION (HCC): Primary | ICD-10-CM

## 2021-08-06 LAB — INTERNATIONAL NORMALIZATION RATIO, POC: 3.9

## 2021-08-06 PROCEDURE — 85610 PROTHROMBIN TIME: CPT | Performed by: INTERNAL MEDICINE

## 2021-08-17 ENCOUNTER — ANTI-COAG VISIT (OUTPATIENT)
Dept: CARDIOLOGY CLINIC | Age: 81
End: 2021-08-17
Payer: MEDICARE

## 2021-08-17 LAB — INTERNATIONAL NORMALIZATION RATIO, POC: 1.3

## 2021-08-17 PROCEDURE — 85610 PROTHROMBIN TIME: CPT | Performed by: INTERNAL MEDICINE

## 2021-08-30 RX ORDER — PANTOPRAZOLE SODIUM 40 MG/1
40 TABLET, DELAYED RELEASE ORAL DAILY
Qty: 90 TABLET | Refills: 3 | Status: SHIPPED | OUTPATIENT
Start: 2021-08-30

## 2021-08-31 ENCOUNTER — ANTI-COAG VISIT (OUTPATIENT)
Dept: CARDIOLOGY CLINIC | Age: 81
End: 2021-08-31
Payer: MEDICARE

## 2021-08-31 LAB — INTERNATIONAL NORMALIZATION RATIO, POC: 2.9

## 2021-08-31 PROCEDURE — 85610 PROTHROMBIN TIME: CPT | Performed by: INTERNAL MEDICINE

## 2021-09-15 ENCOUNTER — OFFICE VISIT (OUTPATIENT)
Dept: FAMILY MEDICINE CLINIC | Age: 81
End: 2021-09-15
Payer: MEDICARE

## 2021-09-15 VITALS
HEART RATE: 80 BPM | HEIGHT: 66 IN | WEIGHT: 152.2 LBS | SYSTOLIC BLOOD PRESSURE: 124 MMHG | BODY MASS INDEX: 24.46 KG/M2 | DIASTOLIC BLOOD PRESSURE: 82 MMHG | TEMPERATURE: 97.3 F | OXYGEN SATURATION: 98 %

## 2021-09-15 DIAGNOSIS — F34.1 DYSTHYMIA: ICD-10-CM

## 2021-09-15 DIAGNOSIS — K25.4 GASTROINTESTINAL HEMORRHAGE ASSOCIATED WITH GASTRIC ULCER: ICD-10-CM

## 2021-09-15 DIAGNOSIS — I10 ESSENTIAL HYPERTENSION: ICD-10-CM

## 2021-09-15 DIAGNOSIS — N17.9 AKI (ACUTE KIDNEY INJURY) (HCC): ICD-10-CM

## 2021-09-15 DIAGNOSIS — Z00.00 ENCOUNTER FOR ANNUAL WELLNESS EXAM IN MEDICARE PATIENT: Primary | ICD-10-CM

## 2021-09-15 DIAGNOSIS — E78.2 MIXED HYPERLIPIDEMIA: ICD-10-CM

## 2021-09-15 DIAGNOSIS — I48.20 CHRONIC ATRIAL FIBRILLATION (HCC): ICD-10-CM

## 2021-09-15 PROCEDURE — 1036F TOBACCO NON-USER: CPT | Performed by: NURSE PRACTITIONER

## 2021-09-15 PROCEDURE — G8427 DOCREV CUR MEDS BY ELIG CLIN: HCPCS | Performed by: NURSE PRACTITIONER

## 2021-09-15 PROCEDURE — G8400 PT W/DXA NO RESULTS DOC: HCPCS | Performed by: NURSE PRACTITIONER

## 2021-09-15 PROCEDURE — 4040F PNEUMOC VAC/ADMIN/RCVD: CPT | Performed by: NURSE PRACTITIONER

## 2021-09-15 PROCEDURE — 99214 OFFICE O/P EST MOD 30 MIN: CPT | Performed by: NURSE PRACTITIONER

## 2021-09-15 PROCEDURE — G0439 PPPS, SUBSEQ VISIT: HCPCS | Performed by: NURSE PRACTITIONER

## 2021-09-15 PROCEDURE — 1090F PRES/ABSN URINE INCON ASSESS: CPT | Performed by: NURSE PRACTITIONER

## 2021-09-15 PROCEDURE — 1123F ACP DISCUSS/DSCN MKR DOCD: CPT | Performed by: NURSE PRACTITIONER

## 2021-09-15 PROCEDURE — G8420 CALC BMI NORM PARAMETERS: HCPCS | Performed by: NURSE PRACTITIONER

## 2021-09-15 ASSESSMENT — PATIENT HEALTH QUESTIONNAIRE - PHQ9
SUM OF ALL RESPONSES TO PHQ QUESTIONS 1-9: 0
2. FEELING DOWN, DEPRESSED OR HOPELESS: 0
SUM OF ALL RESPONSES TO PHQ9 QUESTIONS 1 & 2: 0
SUM OF ALL RESPONSES TO PHQ QUESTIONS 1-9: 0
SUM OF ALL RESPONSES TO PHQ QUESTIONS 1-9: 0
1. LITTLE INTEREST OR PLEASURE IN DOING THINGS: 0

## 2021-09-15 NOTE — PATIENT INSTRUCTIONS
Seated Row with Resistance Band   TARGETED MUSCLES: Upper back, shoulder, and neck  WHAT YOU NEED: Resistance band and sturdy, armless chair    This exercise to strengthen upper back, shoulder, and neck muscles should make everyday activities such as raking and vacuuming easier. 1. Sit in a sturdy, armless chair with your feet flat on the floor, shoulder-width apart. 2. Place the center of the resistance band under both feet. Hold each end of the band with palms facing inward. 3. Relax your shoulders and extend your arms beside your legs. Breathe in slowly. 4. Breathe out slowly and pull both elbows back until your hands are at your hips. 5. Hold position for 1 second. 6. Breathe in as you slowly return your hands to the starting position. 7. Repeat 10-15 times  8. Rest; then repeat 10-15 more times. TIP  As you progress, use a heavier strength band. Chair Dip   TARGETED MUSCLES: Arm muscles  WHAT YOU NEED: Sturdy chair with armrests    This pushing motion will strengthen your arm muscles even if you are not able to lift yourself up off the chair. 9. Sit in a sturdy chair with armrests with your feet flat on the floor, shoulder-width apart. 10. Lean slightly forward; keep your back and shoulders straight. 11. Grasp arms of chair with your hands next to you. Breathe in slowly. 12. Breathe out and use your arms to push your body slowly off the chair. 13. Hold position for 1 second. 14. Breathe in as you slowly lower yourself back down. 15. Repeat 10-15 times. 16. Rest; then repeat 10-15 more times. Arm Curl with Resistance Band   WHAT YOU NEED: Resistance band and sturdy, armless chair  This variation of the Arm Curl uses a resistance band instead of weights. 17. Sit in a sturdy, armless chair with your feet flat on the floor, shoulder-width apart. 18. Place the center of the resistance band under both feet. Hold each end of the band with palms facing inward. Keep elbows at your sides.  Breathe in slowly. 19. Keep wrists straight and slowly breathe out as you bend your elbows and bring your hands toward your shoulders. 20. Hold the position for 1 second. 21. Breathe in as you slowly lower your arms. 22. Repeat 10-15 times. 23. Rest; then repeat 10-15 more times. Side Arm Raise   TARGETED MUSCLES: Shoulders  WHAT YOU NEED: Hand-held weights    This exercise will strengthen your shoulders and make lifting groceries easier. 24. You can do this exercise while standing or sitting in a sturdy, armless chair. 25. Keep your feet flat on the floor, shoulder-width apart. 26. Hold hand weights straight down at your sides with palms facing inward. Breathe in slowly. 27. Slowly breathe out as you raise both arms to the side, shoulder height. 28. Hold the position for 1 second. 29. Breathe in as you slowly lower your arms. 30. Repeat 10-15 times. 31. Rest; then repeat 10-15 more times  Overhead Arm Raise   TARGETED MUSCLES: Shoulders and arms  WHAT YOU NEED: Weighted objects or hand-held weights    This exercise will strengthen your shoulders and arms. It should make swimming and other activities such as lifting and carrying grandchildren easier. 32. You can do this exercise while standing or sitting in a sturdy, armless chair. 33. Keep your feet flat on the floor, shoulder-width apart. 29. Hold weights at your sides at shoulder height with palms facing forward. Breathe in slowly. 35. Slowly breathe out as you raise both arms up over your head keeping your elbows slightly bent. 36. Hold the position for 1 second. 40. Breathe in as you slowly lower your arms. 38. Repeat 10-15 times. 39. Rest; then repeat 10-15 more times. Toe Stand   TARGETED MUSCLES: Calves and ankles  WHAT YOU NEED: Sturdy chair    This exercise will help make walking easier by strengthening your calves and ankles. For an added challenge, you can modify the exercise to improve your balance.   40. Stand behind a sturdy chair, feet YOU NEED: Sturdy chair    Walking and climbing stairs are easier when you do both the Knee Curl and Leg Straightening exercises. For an added challenge, you can modify the exercise to improve your balance. 60. Stand behind a sturdy chair, holding on for balance. Lift one leg straight back without bending your knee or pointing your toes. Breathe in slowly. 61. Breathe out as you slowly bring your heel up toward your buttocks as far as possible. Bend only from your knee, and keep your hips still. The leg you are standing on should be slightly bent. 62. Hold position for 1 second. 63. Breathe in as you slowly lower your foot to the floor. 64. Repeat 10-15 times. 65. Repeat 10-15 times with other leg. 66. Repeat 10-15 more times with each leg. Side Leg Raise   TARGETED MUSCLES: Hips, thighs, and buttocks  WHAT YOU NEED: Sturdy chair    This exercise strengthens hips, thighs, and buttocks. For an added challenge, you can modify the exercise to improve your balance. 67. Stand behind a sturdy chair with feet slightly apart, holding on for balance. Breathe in slowly. 68. Breathe out and slowly lift one leg out to the side. Keep your back straight and your toes facing forward. The leg you are standing on should be slightly bent. 69. Hold position for 1 second. 70. Breathe in as you slowly lower your leg. 71. Repeat 10-15 times. 72. Repeat 10-15 times with other leg. 73. Repeat 10-15 more times with each leg. Back Leg Raise   TARGETED MUSCLES: Buttocks and lower back  WHAT YOU NEED: Sturdy chair    This exercise strengthens your buttocks and lower back. For an added challenge, you can modify the exercise to improve your balance. 74. Stand behind a sturdy chair, holding on for balance. Breathe in slowly. 75. Breathe out and slowly lift one leg straight back without bending your knee or pointing your toes. Try not to lean forward. The leg you are standing on should be slightly bent.   76. Hold position for 1 medicines. Problems caused by side effects from medicine are a common cause of falls. The more medicines you take, the greater your risk of falling.  -Talk to your doctor if you have dizzy spells.  -If your doctor suggests that you use a cane or a walker to help you walk, be sure to use it. This will give you extra stability when walking and will help you avoid falls.  -Dont smoke.  -Limit alcohol to no more than 2 drinks per day. -When you get out of bed in the morning or at night to use the bathroom, sit on the side of the bed for a few minutes before standing up. Your blood pressure takes some time to adjust when you sit up. It may be too low if you get up quickly. This can make you dizzy, and you might lose your balance and fall. Last Updated: November 2010\cb3 This article was contributed by: [email or web address]. org editorial staff    Patient Education        Recovering From Depression: Care Instructions  Your Care Instructions     Taking good care of yourself is important as you recover from depression. In time, your symptoms will fade as your treatment takes hold. Do not give up. Instead, focus your energy on getting better. Your mood will improve. It just takes some time. Focus on things that can help you feel better, such as being with friends and family, eating well, and getting enough rest. But take things slowly. Do not do too much too soon. You will begin to feel better gradually. Follow-up care is a key part of your treatment and safety. Be sure to make and go to all appointments, and call your doctor if you are having problems. It's also a good idea to know your test results and keep a list of the medicines you take. How can you care for yourself at home? Be realistic  · If you have a large task to do, break it up into smaller steps you can handle, and just do what you can. · You may want to put off important decisions until your depression has lifted.  If you have plans that will have a major been depressed, your doctor may recommend you to take medicine even longer. · If you have any side effects from your medicine, tell your doctor. Many side effects are mild and will go away on their own after you have been taking the medicine for a few weeks. Some may last longer. Talk to your doctor if side effects are bothering you too much. You might be able to try a different medicine. · Continue counseling. It may help prevent depression from returning, especially if you've had multiple episodes of depression. Talk with your counselor if you are having a hard time attending your sessions or you think the sessions aren't working. Don't just stop going. · Get enough sleep. Talk to your doctor if you are having problems sleeping. · Avoid sleeping pills unless they are prescribed by the doctor treating your depression. Sleeping pills may make you groggy during the day, and they may interact with other medicine you are taking. · If you have any other illnesses, such as diabetes, heart disease, or high blood pressure, make sure to continue with your treatment. Tell your doctor about all of the medicines you take, including those with or without a prescription. · If you or someone you know talks about suicide, self-harm, or feeling hopeless, get help right away. Call the [address] [place] [address] at 1-800-273-talk (4-101.502.1931) or text HOME to 223892 to access the Crisis Text Line. Consider saving these numbers in your phone. When should you call for help? Call 911 anytime you think you may need emergency care. For example, call if:    · You feel like hurting yourself or someone else.     · Someone you know has depression and is about to attempt or is attempting suicide. Call your doctor now or seek immediate medical care if:    · You hear voices.     · Someone you know has depression and:  ? Starts to give away his or her possessions. ? Uses illegal drugs or drinks alcohol heavily.   ? Talks or writes about death, including writing suicide notes or talking about guns, knives, or pills. ? Starts to spend a lot of time alone. ? Acts very aggressively or suddenly appears calm. Watch closely for changes in your health, and be sure to contact your doctor if:    · You do not get better as expected. Where can you learn more? Go to [email or web address] and sign in to your [employer] account. Enter J693 in the [employer] box to learn more about \"Recovering From Depression: Care Instructions. \"     If you do not have an account, please click on the \"Sign Up Now\" link. Current as of: September 23, 2020               Content Version: 12.9  © 5342-3998 Healthwise, Incorporated. Care instructions adapted under license by [facility] [place] ([facility]). If you have questions about a medical condition or this instruction, always ask your healthcare professional. [address] any warranty or liability for your use of this information.

## 2021-09-15 NOTE — PROGRESS NOTES
[facility] VISIT    Patient is here for their Medicare Annual Wellness Visit and chronic health conditions. Last eye exam: A few years prior   Last dental exam: Has dentures  Exercise: Walks  Diet: well balanced    How would you rate your overall health? : Good    Fall Risk 9/15/2021 12/14/2020   2 or more falls in past year? no no   Fall with injury in past year? no no       PHQ Scores 9/15/2021 3/15/2021 12/14/2020   PHQ2 Score 0 1 1   PHQ9 Score 0 1 1     Do you always wear a seat belt in the car?: Yes    Have you noted any problems with hearing?: No  Have you noted any vision problems?: No  Do you have concerns about your sexual health?: no  In the past month how much has pain been an issue for you?:  Not at all  In the past month have you had issues with anxiety, loneliness, irritability or fatigue:  A little bit- states  has passed so gets lonely at times. Do you take opioid medications even sometimes? No     Living Will: Yes,   Copy requested    Who lives at home with you: Alone  Do you have any services coming to your home (meals on wheels, home health, etc) ? : no    Do you need help with:  Using the phone:  No  Bathing: No  Dressing:  No  Toileting: No  Transportation:  No  Shopping: No  Preparing meals: No  Housework/Laundry: No  Medications: No  Money management: No    Does your home have:  Unsecured throw rugs: Yes:   Grab bars in bathroom: Yes  Walk in shower: Yes  Seat in shower: Yes  Lit pathways for night (nightlights): Yes    Memory:  Have you or anyone close to you expressed concerns about your memory: No    Knows:  Month: Yes  Day: Yes  Year: Yes  Day of Week: Yes  Able to Recall (orange, boat, pencil) : 1/3 orange     Patient history:   Patient's medications, allergies, past medical, surgical, social and family histories were reviewed and updated in the EHR under History. Care Team:  Patient's list of care team members was updated in EHR under the Snap Shot. Immunizations: Reviewed with patient. Health Maintenance Due   Topic Date Due    DEXA (modify frequency per FRAX score)  Never done    DTaP/Tdap/Td vaccine (1 - Tdap) 05/21/2006    Shingles Vaccine (2 of 3) 02/26/2008    Annual Wellness Visit (AWV)  Never done    Flu vaccine (1) 09/01/2021     CHRONIC CONDITIONS     HTN  Per patient well controlled. Checks blood pressure readings at home - typically runs in range, but fluctuates. Compliant with medications. Denies any side effects. Denies any hypotensive episodes. Denies any chest pain, heart palpitations, SOB w/exertion, leg swelling or headaches. Afib  On Warfarin - managed by Cardiology - checks INR every 4 weeks. Follows with Dr. Raymond Gonzales. HLD  Stable. Continue on current medication regimen. Denies any side effects. Last Lipid Nov 2020 - at goal.  Followed by Cardiology. CHARISSE  Last kidney function in Jan - okay - Cardiology monitors. Hx of GI bleed  Continues on Protonix. Depression  Stable. Compliant with medications. Denies any side effects. Physical Exam:    Body mass index is 24.57 kg/m². Vitals:    09/15/21 1031   BP: 124/82   Site: Right Upper Arm   Position: Sitting   Cuff Size: Medium Adult   Pulse: 80   Temp: 97.3 °F (36.3 °C)   SpO2: 98%   Weight: 152 lb 3.2 oz (69 kg)   Height: 5' 6\" (1.676 m)     Wt Readings from Last 3 Encounters:   09/15/21 152 lb 3.2 oz (69 kg)   04/13/21 153 lb (69.4 kg)   03/15/21 155 lb 12.8 oz (70.7 kg)     GENERAL:Alert and oriented x 4 NAD, no acute distress, normally developed and affect appropriate, well hydrated, well developed.   LUNG:clear to auscultation bilaterally with normal respiratory effort  CV: Normal heart sounds, regular rate and rhythm without murmurs  EXTREMETY: no loss of hair, no edema, normal pedal pulses bilaterally    Was the timed get up and go unsteady or longer than 20 seconds: No    Vision Screen for Initial Exam: not applicable    EKG for Initial Exam at 64 (): not applicable    AAA U/S screen for men 65-75 who smoked (): not applicable    Assessment/Plan:    Kevin Snow was seen today for medicare awv. Diagnoses and all orders for this visit:    Encounter for annual wellness exam in Medicare patient  Recommended screenings discussed and ordered if patient agreed  Recommended vaccinations discussed and ordered if patient agreed  Encouraged healthy diet   Encouraged regular exercise and maintaining a healthy weight    Medicare Safety Interventions: Home safety tips provided  Individualized [address] included in patient instructions and AVS    Chronic atrial fibrillation ([facility] [address]  Continue to follow with Cardiology. Essential hypertension  Continue to follow with Cardiology. Dysthymia  Stable. Continue on current medication regimen. Mixed hyperlipidemia  Continue to follow with Cardiology. CHARISSE (acute kidney injury) ([facility] [address]  Kidney function improved. Gastrointestinal hemorrhage associated with gastric ulcer  Continue on Protonix. Return in about 1 year (around 9/15/2022) for medicare annual wellness, chronic health conditions. Lexus Arroyo LPN, am scribing for and in the presence of BRIGHT Moreno CNP.  Electronically signed by Candy Varela LPN on 1/75/83 at 16:97 AM EDT

## 2021-09-28 ENCOUNTER — ANTI-COAG VISIT (OUTPATIENT)
Dept: CARDIOLOGY CLINIC | Age: 81
End: 2021-09-28

## 2021-09-28 LAB — INR BLD: 3.5

## 2021-10-11 NOTE — TELEPHONE ENCOUNTER
Med Refill    PARoxetine (PAXIL) 20 MG tablet   20 mg  90 tablet  Take 1 tablet by mouth daily    [facility]/pharmacy #[id number]- [facility] [address] [phone] Evelyne Rodriguez [phone]   [facility] [address], [facility]   Phone:  [phone]  Fax:  [phone]

## 2021-10-12 RX ORDER — PAROXETINE HYDROCHLORIDE 20 MG/1
20 TABLET, FILM COATED ORAL DAILY
Qty: 90 TABLET | Refills: 3 | Status: SHIPPED | OUTPATIENT
Start: 2021-10-12 | End: 2022-06-02

## 2021-10-15 RX ORDER — WARFARIN SODIUM 2.5 MG/1
TABLET ORAL
Qty: 90 TABLET | Refills: 0 | Status: SHIPPED | OUTPATIENT
Start: 2021-10-15 | End: 2022-01-10

## 2021-10-26 ENCOUNTER — OFFICE VISIT (OUTPATIENT)
Dept: CARDIOLOGY CLINIC | Age: 81
End: 2021-10-26
Payer: MEDICARE

## 2021-10-26 ENCOUNTER — ANTI-COAG VISIT (OUTPATIENT)
Dept: CARDIOLOGY CLINIC | Age: 81
End: 2021-10-26
Payer: MEDICARE

## 2021-10-26 VITALS
BODY MASS INDEX: 24.75 KG/M2 | DIASTOLIC BLOOD PRESSURE: 78 MMHG | HEIGHT: 66 IN | WEIGHT: 154 LBS | SYSTOLIC BLOOD PRESSURE: 134 MMHG | HEART RATE: 82 BPM | OXYGEN SATURATION: 95 %

## 2021-10-26 DIAGNOSIS — I10 ESSENTIAL HYPERTENSION: Primary | ICD-10-CM

## 2021-10-26 DIAGNOSIS — I48.20 CHRONIC ATRIAL FIBRILLATION (HCC): ICD-10-CM

## 2021-10-26 DIAGNOSIS — E78.2 MIXED HYPERLIPIDEMIA: ICD-10-CM

## 2021-10-26 LAB — INTERNATIONAL NORMALIZATION RATIO, POC: 2.8

## 2021-10-26 PROCEDURE — 99214 OFFICE O/P EST MOD 30 MIN: CPT | Performed by: INTERNAL MEDICINE

## 2021-10-26 PROCEDURE — 1036F TOBACCO NON-USER: CPT | Performed by: INTERNAL MEDICINE

## 2021-10-26 PROCEDURE — G8484 FLU IMMUNIZE NO ADMIN: HCPCS | Performed by: INTERNAL MEDICINE

## 2021-10-26 PROCEDURE — G8427 DOCREV CUR MEDS BY ELIG CLIN: HCPCS | Performed by: INTERNAL MEDICINE

## 2021-10-26 PROCEDURE — 1090F PRES/ABSN URINE INCON ASSESS: CPT | Performed by: INTERNAL MEDICINE

## 2021-10-26 PROCEDURE — G8400 PT W/DXA NO RESULTS DOC: HCPCS | Performed by: INTERNAL MEDICINE

## 2021-10-26 PROCEDURE — 85610 PROTHROMBIN TIME: CPT | Performed by: INTERNAL MEDICINE

## 2021-10-26 PROCEDURE — G8420 CALC BMI NORM PARAMETERS: HCPCS | Performed by: INTERNAL MEDICINE

## 2021-10-26 PROCEDURE — 4040F PNEUMOC VAC/ADMIN/RCVD: CPT | Performed by: INTERNAL MEDICINE

## 2021-10-26 PROCEDURE — 1123F ACP DISCUSS/DSCN MKR DOCD: CPT | Performed by: INTERNAL MEDICINE

## 2021-10-26 NOTE — PROGRESS NOTES
[facility]   Cardiac Evaluation      Patient: Aiden Gonzalez  YOB: 1940  Date: 10/26/21    Chief Complaint   Patient presents with    Atrial Fibrillation    Hypertension    Hyperlipidemia     Referring provider: BRIGHT Lowe CNP    History of Present Illness:   Mrs. Sanjay Lozano is here today for regular follow up of her AF, HTN, HLD. 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 She had surgery on 7/18. Hospital course complicated by increased HR of her AF which is chronic (due to her illness) requiring the addition of cardizem and her usual atenolol. Ms. Sanjay Lozano was hospitalized 11/27/20-12/4/20 with afib with RVR, creatinine of 5.0, and acute upper GI bleed. EGD showed small antral ulcer, s/p 4 clips, biopsies taken. Blood was noted in fundus. GI OK'd AC with repeat EGD in 8 weeks. Repeat creatinine improved at 1.5 on 12/15/20/    Today, Ms. Sanjay Lozano states she got her Moderna booster vaccine yesterday. She states her arm is sore, but no other side effects. Haley Hadley denies any chest pain, palpitations, LOPEZ, dizziness, or edema. Past Medical History:   has a past medical history of A-fib [facility]), Atrial fibrillation ([address] Hypercholesterolemia, Hypertension, and Pelvic mass. Surgical History:   has a past surgical history that includes Appendectomy; Cystocopy (Right, 06/14/2018); Hysterectomy, total abdominal (07/09/2018); pr cysto/uretero w/lithotripsy &indwell stent insrt (Right, 10/31/2018); Upper gastrointestinal endoscopy (N/A, 11/29/2020); Upper gastrointestinal endoscopy (N/A, 11/29/2020); and Upper gastrointestinal endoscopy (N/A, 3/2/2021). Resection of ovarian serous cystadenoma. Social History:  Social History     Socioeconomic History    Marital status:       Spouse name: Not on file    Number of children: Not on file    Years of education: Not on file    Highest education level: Not on file   Occupational History    Not on file   Tobacco Use    Smoking status: Never Smoker    Smokeless tobacco: Never Used   Vaping Use    Vaping Use: Never used   Substance and Sexual Activity    Alcohol use: No    Drug use: No    Sexual activity: Not Currently   Other Topics Concern    Not on file   Social History Narrative    Not on file     Social Determinants of Health     Financial Resource Strain:     Difficulty of Paying Living Expenses:    Food Insecurity:     Worried About Running Out of Food in the Last Year:     [address] in the Last Year:    Transportation Needs:     Lack of Transportation (Medical):  Lack of Transportation (Non-Medical):    Physical Activity:     Days of Exercise per Week:     Minutes of Exercise per Session:    Stress:     Feeling of Stress :    Social Connections:     Frequency of Communication with Friends and Family:     Frequency of Social Gatherings with Friends and Family:     Attends Mandaen Services:     Active Member of Clubs or Organizations:     Attends Club or Organization Meetings:     Marital Status:    Intimate Partner Violence:     Fear of Current or Ex-Partner:     Emotionally Abused:     Physically Abused:     Sexually Abused:      Family History:  family history includes Heart Disease in her father. Allergies:  Penicillins and Sulfa antibiotics     Review of Systems:   · Constitutional: there has been no unanticipated weight loss. No change in energy or activity level   · Eyes: No visual changes   · ENT: No Headaches, hearing loss or vertigo. No mouth sores or sore throat. · Cardiovascular: Reviewed in HPI  · Respiratory: No cough or wheezing, no sputum production. No hematemesis. · Gastrointestinal: No abdominal pain, appetite loss, blood in stools. No change in bowel or bladder habits.   · Genitourinary: No nocturia, dysuria, trouble voiding  · Musculoskeletal: estimated ejection fraction of 60%. No regional wall motion abnormalities are seen. There is severe bi atrial enlargement. There is mild mitral, pulmonic and trivial aorta as well as mild tricuspid regurgitation with RVSP estimated at 41.5 mmHg. HR controlled, remains on Coumadin, INR's thru our office. Essential hypertension, benign  stable    Mixed hypercholesterolemia  Stable, labs 11/3/20: ; TRIG 176; HDL 32; LDL 78  Carotid doppler 4/23/21>mild ds bilateral  Carotid doppler 5/16> 16-49% bilateral   Carotid doppler 4/13> 33-75% MALINI, 6-31% LICA    CHARISSE  Repeat creatinine improved at 1.5 on 12/15/20        Plan: repeat labs for lipids, CMP, CBC, and Iron/TIBC. Regular exercise encouraged. FU 6 months. Scribe's attestation: This note was scribed in the presence of Dr Gwendolyn Jara MD by Mary Kwon RN. The scribe's documentation has been prepared under my direction and personally reviewed by me in its entirety. I confirm that the note above accurately reflects all work, treatment, procedures, and medical decision making performed by me.

## 2021-11-01 LAB
A/G RATIO: 1.1 (ref 1–2)
ALBUMIN SERPL-MCNC: 3.8 G/DL (ref 3.5–5.7)
ALBUMIN/PROTEIN TOTAL, SER/PL: 7.3 G/DL (ref 6–8.3)
ALP BLD-CCNC: 66 U/L (ref 34–104)
ALT SERPL-CCNC: 14 U/L (ref 7–52)
ANION GAP 4: 10 MMOL/L (ref 7–16)
AST W/O P-5'-P: 27 U/L (ref 15–39)
BASOPHILS # BLD: 0.7 %
BASOPHILS ABSOLUTE: 0.1 K/UL (ref 0–0.1)
BILIRUB SERPL-MCNC: 0.6 MG/DL (ref 0.3–1)
BUN BLDV-MCNC: 21 MG/DL (ref 7–25)
CALCIUM SERPL-MCNC: 9.9 MG/DL (ref 8.6–10.2)
CHLORIDE BLD-SCNC: 102 MMOL/L (ref 98–107)
CHOLESTEROL, STONE: 162 MG/DL
CO2: 28 MMOL/L (ref 21–31)
CREATININE + EGFR PANEL: 1.6 MG/DL (ref 0.6–1.2)
EOSINOPHILS ABSOLUTE: 0.2 K/UL (ref 0–0.4)
EOSINOPHILS RELATIVE PERCENT: 2.4 %
GFR CALCULATED: 31 ML/MIN/1.73M2
GFR CALCULATED: 37 ML/MIN/1.73M2
GLOBULIN: 3.5 G/DL (ref 2.6–4.2)
GLUCOSE: 144 MG/DL (ref 74–109)
HDLC SERPL-MCNC: 36 MG/DL (ref 40–60)
HEMOGLOBIN URINE, QUAL: 14.8 G/DL (ref 12.1–15.8)
IRON SATURATION: 23 % (ref 20–40)
IRON: 98 UG/DL (ref 50–170)
LDL CHOLESTEROL CALCULATED: 80 MG/DL (ref 0–99)
LYMPHOCYTES # BLD: 29.5 %
LYMPHOCYTES ABSOLUTE: 2.6 K/UL (ref 0.8–3.6)
MCH RBC QN AUTO: 28.6 PG (ref 28.4–33.4)
MCHC RBC AUTO-ENTMCNC: 32.4 G/DL (ref 31.1–37)
MCV RBC AUTO: 88.4 FL (ref 85–99)
MONOCYTES # BLD: 6.1 %
MONOCYTES ABSOLUTE: 0.5 K/UL (ref 0.3–0.9)
NEUTROPHILS ABSOLUTE: 5.3 K/UL (ref 2–7.3)
NEUTROPHILS/100 LEUKOCYTES: 61.3 %
PDW BLD-RTO: 18.2 % (ref 11.7–15.2)
PLATELET COUNT MANUAL: 265 K/UL (ref 154–393)
POTASSIUM SERPL-SCNC: 5 MMOL/L (ref 3.5–5.3)
RBC # BLD: 5.18 M/UL (ref 3.86–5.17)
SODIUM BLD-SCNC: 140 MMOL/L (ref 136–145)
SR HEMATOCRIT: 45.8 % (ref 35.8–46.5)
TOTAL IRON BINDING CAPACITY: 418 UG/DL (ref 250–450)
TRIGL SERPL-MCNC: 230 MG/DL
UNSATURATED IRON BINDING CAPACITY: 320 UG/DL (ref 155–355)
VLDLC SERPL CALC-MCNC: 46 MG/DL (ref 0–40)
WBC BLOOD, MANUAL: 8.7 K/UL (ref 4–10.5)

## 2021-12-03 ENCOUNTER — ANTI-COAG VISIT (OUTPATIENT)
Dept: CARDIOLOGY CLINIC | Age: 81
End: 2021-12-03
Payer: MEDICARE

## 2021-12-03 LAB — INTERNATIONAL NORMALIZATION RATIO, POC: 2.9

## 2021-12-03 PROCEDURE — 85610 PROTHROMBIN TIME: CPT | Performed by: INTERNAL MEDICINE

## 2022-01-06 ENCOUNTER — ANTI-COAG VISIT (OUTPATIENT)
Dept: CARDIOLOGY CLINIC | Age: 82
End: 2022-01-06
Payer: MEDICARE

## 2022-01-06 LAB — INTERNATIONAL NORMALIZATION RATIO, POC: 2.5

## 2022-01-06 PROCEDURE — 85610 PROTHROMBIN TIME: CPT | Performed by: INTERNAL MEDICINE

## 2022-01-10 RX ORDER — WARFARIN SODIUM 2.5 MG/1
TABLET ORAL
Qty: 90 TABLET | Refills: 0 | Status: SHIPPED | OUTPATIENT
Start: 2022-01-10 | End: 2022-01-25

## 2022-01-25 RX ORDER — WARFARIN SODIUM 2.5 MG/1
TABLET ORAL
Qty: 90 TABLET | Refills: 0 | Status: ON HOLD | OUTPATIENT
Start: 2022-01-25 | End: 2022-05-31 | Stop reason: HOSPADM

## 2022-02-08 ENCOUNTER — ANTI-COAG VISIT (OUTPATIENT)
Dept: CARDIOLOGY CLINIC | Age: 82
End: 2022-02-08
Payer: MEDICARE

## 2022-02-08 LAB — INTERNATIONAL NORMALIZATION RATIO, POC: 1.9

## 2022-02-08 PROCEDURE — 85610 PROTHROMBIN TIME: CPT | Performed by: INTERNAL MEDICINE

## 2022-03-17 ENCOUNTER — ANTI-COAG VISIT (OUTPATIENT)
Dept: CARDIOLOGY CLINIC | Age: 82
End: 2022-03-17
Payer: MEDICARE

## 2022-03-17 LAB — INTERNATIONAL NORMALIZATION RATIO, POC: 1.6

## 2022-03-17 PROCEDURE — 85610 PROTHROMBIN TIME: CPT | Performed by: INTERNAL MEDICINE

## 2022-03-29 ENCOUNTER — ANTI-COAG VISIT (OUTPATIENT)
Dept: CARDIOLOGY CLINIC | Age: 82
End: 2022-03-29
Payer: MEDICARE

## 2022-03-29 LAB — INTERNATIONAL NORMALIZATION RATIO, POC: 1.8

## 2022-03-29 PROCEDURE — 85610 PROTHROMBIN TIME: CPT | Performed by: INTERNAL MEDICINE

## 2022-04-13 ENCOUNTER — ANTI-COAG VISIT (OUTPATIENT)
Dept: CARDIOLOGY CLINIC | Age: 82
End: 2022-04-13
Payer: MEDICARE

## 2022-04-13 ENCOUNTER — TELEPHONE (OUTPATIENT)
Dept: CARDIOLOGY CLINIC | Age: 82
End: 2022-04-13

## 2022-04-13 LAB — INTERNATIONAL NORMALIZATION RATIO, POC: 4.3

## 2022-04-13 PROCEDURE — 93793 ANTICOAG MGMT PT WARFARIN: CPT | Performed by: INTERNAL MEDICINE

## 2022-04-13 PROCEDURE — 85610 PROTHROMBIN TIME: CPT | Performed by: INTERNAL MEDICINE

## 2022-04-13 NOTE — TELEPHONE ENCOUNTER
Anabella Carpenter came in for INR. She states she had a nosebleed on Friday night that lasted for several hours so she went to [address] [place] [address]. Her INR was 3.0 at that time. she states she was told it was allergies. She had not had a nosebleed since and never had one before. Her INR today is 4.3, per Dr Martha Egan coumadin for 3 days and recheck 4/25/22. With just 1 nosebleed will not refer to ENT, will see what her next INR is. If she has another nosebleed she will call and let us know. Tho Mitchell spoke w/ patient and relayed instructions.

## 2022-04-21 ENCOUNTER — TELEPHONE (OUTPATIENT)
Dept: CARDIOLOGY CLINIC | Age: 82
End: 2022-04-21

## 2022-04-21 NOTE — TELEPHONE ENCOUNTER
Pt called stating she  rx from the pharmacy and she has never taken or seen the RX before cetirizine 10 mg, [facility]     Pls advise thank you     Anish Mccormick  [phone]

## 2022-04-21 NOTE — TELEPHONE ENCOUNTER
I spoke to pt and let her now that was from the ER when she went. I advised her to call her PCP if she needs a refill.

## 2022-05-28 ENCOUNTER — APPOINTMENT (OUTPATIENT)
Dept: CT IMAGING | Age: 82
DRG: 872 | End: 2022-05-28
Payer: MEDICARE

## 2022-05-28 ENCOUNTER — APPOINTMENT (OUTPATIENT)
Dept: GENERAL RADIOLOGY | Age: 82
DRG: 872 | End: 2022-05-28
Payer: MEDICARE

## 2022-05-28 ENCOUNTER — HOSPITAL ENCOUNTER (INPATIENT)
Age: 82
LOS: 3 days | Discharge: HOME HEALTH | DRG: 872 | End: 2022-05-31
Attending: EMERGENCY MEDICINE | Admitting: INTERNAL MEDICINE
Payer: MEDICARE

## 2022-05-28 DIAGNOSIS — R65.20 SEVERE SEPSIS (HCC): ICD-10-CM

## 2022-05-28 DIAGNOSIS — I48.91 ATRIAL FIBRILLATION WITH RVR (HCC): ICD-10-CM

## 2022-05-28 DIAGNOSIS — N39.0 URINARY TRACT INFECTION WITHOUT HEMATURIA, SITE UNSPECIFIED: Primary | ICD-10-CM

## 2022-05-28 DIAGNOSIS — A41.9 SEVERE SEPSIS (HCC): ICD-10-CM

## 2022-05-28 LAB
A/G RATIO: 1 (ref 1.1–2.2)
ALBUMIN SERPL-MCNC: 3.4 G/DL (ref 3.4–5)
ALP BLD-CCNC: 109 U/L (ref 40–129)
ALT SERPL-CCNC: 11 U/L (ref 10–40)
ANION GAP SERPL CALCULATED.3IONS-SCNC: 17 MMOL/L (ref 3–16)
AST SERPL-CCNC: 25 U/L (ref 15–37)
BACTERIA: ABNORMAL /HPF
BASOPHILS ABSOLUTE: 0.1 K/UL (ref 0–0.2)
BASOPHILS RELATIVE PERCENT: 0.9 %
BILIRUB SERPL-MCNC: 0.5 MG/DL (ref 0–1)
BILIRUBIN URINE: NEGATIVE
BLOOD, URINE: ABNORMAL
BUN BLDV-MCNC: 22 MG/DL (ref 7–20)
CALCIUM SERPL-MCNC: 9.2 MG/DL (ref 8.3–10.6)
CHLORIDE BLD-SCNC: 102 MMOL/L (ref 99–110)
CLARITY: CLEAR
CO2: 19 MMOL/L (ref 21–32)
COLOR: YELLOW
CREAT SERPL-MCNC: 1.6 MG/DL (ref 0.6–1.2)
EOSINOPHILS ABSOLUTE: 0.1 K/UL (ref 0–0.6)
EOSINOPHILS RELATIVE PERCENT: 0.8 %
EPITHELIAL CELLS, UA: 1 /HPF (ref 0–5)
GFR AFRICAN AMERICAN: 37
GFR NON-AFRICAN AMERICAN: 31
GLUCOSE BLD-MCNC: 154 MG/DL (ref 70–99)
GLUCOSE URINE: NEGATIVE MG/DL
HCT VFR BLD CALC: 39.5 % (ref 36–48)
HEMOGLOBIN: 12.4 G/DL (ref 12–16)
HYALINE CASTS: 3 /LPF (ref 0–8)
INFLUENZA A: NOT DETECTED
INFLUENZA B: NOT DETECTED
INR BLD: 1.29 (ref 0.87–1.14)
KETONES, URINE: 15 MG/DL
LACTIC ACID, SEPSIS: 2 MMOL/L (ref 0.4–1.9)
LACTIC ACID, SEPSIS: 2.7 MMOL/L (ref 0.4–1.9)
LEUKOCYTE ESTERASE, URINE: ABNORMAL
LIPASE: 31 U/L (ref 13–60)
LYMPHOCYTES ABSOLUTE: 2.3 K/UL (ref 1–5.1)
LYMPHOCYTES RELATIVE PERCENT: 27.3 %
MCH RBC QN AUTO: 27.1 PG (ref 26–34)
MCHC RBC AUTO-ENTMCNC: 31.3 G/DL (ref 31–36)
MCV RBC AUTO: 86.7 FL (ref 80–100)
MICROSCOPIC EXAMINATION: YES
MONOCYTES ABSOLUTE: 0.5 K/UL (ref 0–1.3)
MONOCYTES RELATIVE PERCENT: 5.8 %
NEUTROPHILS ABSOLUTE: 5.6 K/UL (ref 1.7–7.7)
NEUTROPHILS RELATIVE PERCENT: 65.2 %
NITRITE, URINE: POSITIVE
PDW BLD-RTO: 16.4 % (ref 12.4–15.4)
PH UA: 6.5 (ref 5–8)
PLATELET # BLD: 343 K/UL (ref 135–450)
PMV BLD AUTO: 7.7 FL (ref 5–10.5)
POTASSIUM REFLEX MAGNESIUM: 4.1 MMOL/L (ref 3.5–5.1)
PRO-BNP: 1024 PG/ML (ref 0–449)
PROTEIN UA: 30 MG/DL
PROTHROMBIN TIME: 16.1 SEC (ref 11.7–14.5)
RBC # BLD: 4.56 M/UL (ref 4–5.2)
RBC UA: 2 /HPF (ref 0–4)
SARS-COV-2 RNA, RT PCR: NOT DETECTED
SODIUM BLD-SCNC: 138 MMOL/L (ref 136–145)
SPECIFIC GRAVITY UA: 1.02 (ref 1–1.03)
TOTAL PROTEIN: 6.8 G/DL (ref 6.4–8.2)
TROPONIN: <0.01 NG/ML
URINE REFLEX TO CULTURE: YES
URINE TYPE: ABNORMAL
UROBILINOGEN, URINE: 1 E.U./DL
WBC # BLD: 8.5 K/UL (ref 4–11)
WBC UA: 115 /HPF (ref 0–5)

## 2022-05-28 PROCEDURE — 2580000003 HC RX 258: Performed by: INTERNAL MEDICINE

## 2022-05-28 PROCEDURE — 70450 CT HEAD/BRAIN W/O DYE: CPT

## 2022-05-28 PROCEDURE — 87077 CULTURE AEROBIC IDENTIFY: CPT

## 2022-05-28 PROCEDURE — 80053 COMPREHEN METABOLIC PANEL: CPT

## 2022-05-28 PROCEDURE — 6370000000 HC RX 637 (ALT 250 FOR IP): Performed by: INTERNAL MEDICINE

## 2022-05-28 PROCEDURE — 84439 ASSAY OF FREE THYROXINE: CPT

## 2022-05-28 PROCEDURE — 93005 ELECTROCARDIOGRAM TRACING: CPT | Performed by: EMERGENCY MEDICINE

## 2022-05-28 PROCEDURE — 36415 COLL VENOUS BLD VENIPUNCTURE: CPT

## 2022-05-28 PROCEDURE — 2500000003 HC RX 250 WO HCPCS: Performed by: EMERGENCY MEDICINE

## 2022-05-28 PROCEDURE — 85610 PROTHROMBIN TIME: CPT

## 2022-05-28 PROCEDURE — 81001 URINALYSIS AUTO W/SCOPE: CPT

## 2022-05-28 PROCEDURE — 87186 SC STD MICRODIL/AGAR DIL: CPT

## 2022-05-28 PROCEDURE — 99285 EMERGENCY DEPT VISIT HI MDM: CPT

## 2022-05-28 PROCEDURE — 6360000002 HC RX W HCPCS: Performed by: EMERGENCY MEDICINE

## 2022-05-28 PROCEDURE — 83880 ASSAY OF NATRIURETIC PEPTIDE: CPT

## 2022-05-28 PROCEDURE — 87636 SARSCOV2 & INF A&B AMP PRB: CPT

## 2022-05-28 PROCEDURE — 83690 ASSAY OF LIPASE: CPT

## 2022-05-28 PROCEDURE — 96375 TX/PRO/DX INJ NEW DRUG ADDON: CPT

## 2022-05-28 PROCEDURE — 1200000000 HC SEMI PRIVATE

## 2022-05-28 PROCEDURE — 96365 THER/PROPH/DIAG IV INF INIT: CPT

## 2022-05-28 PROCEDURE — 87086 URINE CULTURE/COLONY COUNT: CPT

## 2022-05-28 PROCEDURE — 84484 ASSAY OF TROPONIN QUANT: CPT

## 2022-05-28 PROCEDURE — 85025 COMPLETE CBC W/AUTO DIFF WBC: CPT

## 2022-05-28 PROCEDURE — 2580000003 HC RX 258: Performed by: EMERGENCY MEDICINE

## 2022-05-28 PROCEDURE — 87040 BLOOD CULTURE FOR BACTERIA: CPT

## 2022-05-28 PROCEDURE — 84443 ASSAY THYROID STIM HORMONE: CPT

## 2022-05-28 PROCEDURE — 83605 ASSAY OF LACTIC ACID: CPT

## 2022-05-28 PROCEDURE — 71045 X-RAY EXAM CHEST 1 VIEW: CPT

## 2022-05-28 RX ORDER — ATENOLOL 50 MG/1
100 TABLET ORAL DAILY
Status: DISCONTINUED | OUTPATIENT
Start: 2022-05-28 | End: 2022-05-31 | Stop reason: HOSPADM

## 2022-05-28 RX ORDER — SODIUM CHLORIDE 0.9 % (FLUSH) 0.9 %
5-40 SYRINGE (ML) INJECTION PRN
Status: DISCONTINUED | OUTPATIENT
Start: 2022-05-28 | End: 2022-05-31 | Stop reason: HOSPADM

## 2022-05-28 RX ORDER — ONDANSETRON 2 MG/ML
4 INJECTION INTRAMUSCULAR; INTRAVENOUS EVERY 6 HOURS PRN
Status: DISCONTINUED | OUTPATIENT
Start: 2022-05-28 | End: 2022-05-31 | Stop reason: HOSPADM

## 2022-05-28 RX ORDER — ATENOLOL 50 MG/1
100 TABLET ORAL DAILY
Status: DISCONTINUED | OUTPATIENT
Start: 2022-05-28 | End: 2022-05-28

## 2022-05-28 RX ORDER — ONDANSETRON 4 MG/1
4 TABLET, ORALLY DISINTEGRATING ORAL EVERY 8 HOURS PRN
Status: DISCONTINUED | OUTPATIENT
Start: 2022-05-28 | End: 2022-05-31 | Stop reason: HOSPADM

## 2022-05-28 RX ORDER — ONDANSETRON 2 MG/ML
8 INJECTION INTRAMUSCULAR; INTRAVENOUS ONCE
Status: DISCONTINUED | OUTPATIENT
Start: 2022-05-28 | End: 2022-05-28

## 2022-05-28 RX ORDER — ACETAMINOPHEN 325 MG/1
650 TABLET ORAL EVERY 6 HOURS PRN
Status: DISCONTINUED | OUTPATIENT
Start: 2022-05-28 | End: 2022-05-31 | Stop reason: HOSPADM

## 2022-05-28 RX ORDER — IBUPROFEN 200 MG
1 CAPSULE ORAL DAILY
COMMUNITY

## 2022-05-28 RX ORDER — ATORVASTATIN CALCIUM 10 MG/1
20 TABLET, FILM COATED ORAL NIGHTLY
Status: DISCONTINUED | OUTPATIENT
Start: 2022-05-28 | End: 2022-05-31 | Stop reason: HOSPADM

## 2022-05-28 RX ORDER — POLYETHYLENE GLYCOL 3350 17 G/17G
17 POWDER, FOR SOLUTION ORAL DAILY PRN
Status: DISCONTINUED | OUTPATIENT
Start: 2022-05-28 | End: 2022-05-31 | Stop reason: HOSPADM

## 2022-05-28 RX ORDER — ACETAMINOPHEN 650 MG/1
650 SUPPOSITORY RECTAL EVERY 6 HOURS PRN
Status: DISCONTINUED | OUTPATIENT
Start: 2022-05-28 | End: 2022-05-31 | Stop reason: HOSPADM

## 2022-05-28 RX ORDER — PANTOPRAZOLE SODIUM 40 MG/1
40 TABLET, DELAYED RELEASE ORAL
Status: DISCONTINUED | OUTPATIENT
Start: 2022-05-29 | End: 2022-05-31 | Stop reason: HOSPADM

## 2022-05-28 RX ORDER — SODIUM CHLORIDE, SODIUM LACTATE, POTASSIUM CHLORIDE, AND CALCIUM CHLORIDE .6; .31; .03; .02 G/100ML; G/100ML; G/100ML; G/100ML
30 INJECTION, SOLUTION INTRAVENOUS ONCE
Status: COMPLETED | OUTPATIENT
Start: 2022-05-28 | End: 2022-05-28

## 2022-05-28 RX ORDER — ATENOLOL 50 MG/1
50 TABLET ORAL DAILY
Status: DISCONTINUED | OUTPATIENT
Start: 2022-05-28 | End: 2022-05-28

## 2022-05-28 RX ORDER — WARFARIN SODIUM 5 MG/1
2.5 TABLET ORAL
Status: DISCONTINUED | OUTPATIENT
Start: 2022-05-28 | End: 2022-05-30

## 2022-05-28 RX ORDER — PAROXETINE HYDROCHLORIDE 20 MG/1
40 TABLET, FILM COATED ORAL DAILY
Status: DISCONTINUED | OUTPATIENT
Start: 2022-05-29 | End: 2022-05-31 | Stop reason: HOSPADM

## 2022-05-28 RX ORDER — WARFARIN SODIUM 2.5 MG/1
1.25 TABLET ORAL
Status: DISCONTINUED | OUTPATIENT
Start: 2022-05-30 | End: 2022-05-30

## 2022-05-28 RX ORDER — PAROXETINE HYDROCHLORIDE 20 MG/1
40 TABLET, FILM COATED ORAL DAILY
Status: DISCONTINUED | OUTPATIENT
Start: 2022-05-28 | End: 2022-05-28

## 2022-05-28 RX ORDER — DILTIAZEM HYDROCHLORIDE 5 MG/ML
10 INJECTION INTRAVENOUS ONCE
Status: COMPLETED | OUTPATIENT
Start: 2022-05-28 | End: 2022-05-28

## 2022-05-28 RX ORDER — PANTOPRAZOLE SODIUM 40 MG/1
40 TABLET, DELAYED RELEASE ORAL DAILY
Status: DISCONTINUED | OUTPATIENT
Start: 2022-05-28 | End: 2022-05-28

## 2022-05-28 RX ORDER — SODIUM CHLORIDE 0.9 % (FLUSH) 0.9 %
5-40 SYRINGE (ML) INJECTION EVERY 12 HOURS SCHEDULED
Status: DISCONTINUED | OUTPATIENT
Start: 2022-05-28 | End: 2022-05-31 | Stop reason: HOSPADM

## 2022-05-28 RX ORDER — SODIUM CHLORIDE 9 MG/ML
INJECTION, SOLUTION INTRAVENOUS PRN
Status: DISCONTINUED | OUTPATIENT
Start: 2022-05-28 | End: 2022-05-31 | Stop reason: HOSPADM

## 2022-05-28 RX ADMIN — DILTIAZEM HYDROCHLORIDE 30 MG: 30 TABLET, FILM COATED ORAL at 20:23

## 2022-05-28 RX ADMIN — SODIUM CHLORIDE, PRESERVATIVE FREE 10 ML: 5 INJECTION INTRAVENOUS at 20:23

## 2022-05-28 RX ADMIN — ATORVASTATIN CALCIUM 20 MG: 10 TABLET, FILM COATED ORAL at 20:23

## 2022-05-28 RX ADMIN — SODIUM CHLORIDE, POTASSIUM CHLORIDE, SODIUM LACTATE AND CALCIUM CHLORIDE 1000 ML: 600; 310; 30; 20 INJECTION, SOLUTION INTRAVENOUS at 15:08

## 2022-05-28 RX ADMIN — CEFEPIME HYDROCHLORIDE 2000 MG: 2 INJECTION, POWDER, FOR SOLUTION INTRAVENOUS at 13:42

## 2022-05-28 RX ADMIN — DILTIAZEM HYDROCHLORIDE 10 MG: 5 INJECTION, SOLUTION INTRAVENOUS at 13:39

## 2022-05-28 RX ADMIN — ATENOLOL 100 MG: 50 TABLET ORAL at 16:08

## 2022-05-28 RX ADMIN — WARFARIN SODIUM 2.5 MG: 5 TABLET ORAL at 17:19

## 2022-05-28 ASSESSMENT — PAIN - FUNCTIONAL ASSESSMENT: PAIN_FUNCTIONAL_ASSESSMENT: NONE - DENIES PAIN

## 2022-05-28 NOTE — PROGRESS NOTES
Patient admitted to room 3326 from ER. Oriented to room, call light, and floor policies. Plan of care reviewed with patient and family. Assessment completed. See Doc flow sheet. Enc to call with any needs or concerns. Will continue to monitor.

## 2022-05-28 NOTE — ACP (ADVANCE CARE PLANNING)
Advance Care Planning Note       Purpose of Encounter: Advanced care planning in light of hospitalization A. fib with RVR  Parties in attendance: :Elia Hernandez MD,  Decisional Capacity:Yes  Objective: Patient admitted with UTI and A. fib with RVR  Goals of Care Determinations: Patient wants full support including CPR, intubation, trach, PEG tube, dialysis   Code Status: Full  Time Spent on Advanced Planning Documents: 18 mins. Advanced Care Planning Documents: Completed advances directives, advanced directives in chart.       Electronically signed by Phu Herring MD on 5/28/2022 at 4:19 PM

## 2022-05-28 NOTE — ED PROVIDER NOTES
[address] [facility] PROVIDER NOTE    Patient Identification  Pt Name: Estella Peterson  MRN: [id number]  Sandra 1940  Date of evaluation: 5/28/2022  Provider: Regina Singh MD  PCP: BRIGHT Patel - CNP    Chief Complaint  Fatigue (arrived via [address] tw[address] from home d/t not feeling well that started approx 4 days ago, weakness, n/v, hx of afib, fall last week was not seen; SOB with exertion; 4mg of Zofran IV enroute)      HPI  (History provided by patient and EMS)  This is a 79 y.o. female who was brought in by EMS transportation for fatigue, malaise, nausea, and vomiting. Patient reports that she \"does not feel well\". Symptoms of been present and worsening for the last few days. She is also had associated nausea and vomiting, but denies hematochezia and hematemesis. She does report shortness of breath, but denies fever, chills, cough. She also denies associated chest pain. She states she fell approximately 1 week ago and hit her head, but she is uncertain if this is related to her current symptoms. She has not had dizziness or lightheadedness. She denies having any pain at this time    ROS  10 systems reviewed, pertinent positives/negatives per HPI otherwise noted to be negative.     I have reviewed the following nursing documentation:  Allergies: Penicillins and Sulfa antibiotics    Past medical history:   Past Medical History:   Diagnosis Date    A-fib ([address]     Atrial fibrillation ([facility]     Hypercholesterolemia     Hypertension     Pelvic mass      Past surgical history:   Past Surgical History:   Procedure Laterality Date    APPENDECTOMY      CYSTOSCOPY Right 06/14/2018    retrograde pyelogram, ureteroscopy, and stent placement    HYSTERECTOMY, TOTAL ABDOMINAL  07/09/2018    robotic with BSO, omentectomy, pelvic mass removal, lymphnode dissection    AK CYSTO/URETERO W/LITHOTRIPSY &INDWELL STENT INSRT Right 10/31/2018    CYSTOSCOPY WITH RIGHT URETEROSCOPY HOLMIUM LASER LITHOTRIPSY STONE MANIPULATION STONE BASKET WITH RIGHT STENT EXCHANGE performed by China Willard MD at [address] N/A 11/29/2020    EGD CONTROL HEMORRHAGE performed by Mane Vaughn MD at [facility] 11/29/2020    EGD BIOPSY performed by Mane Vaughn MD at [facility] 3/2/2021    EGD BIOPSY performed by Mane Vaughn MD at [facility] medications:   Previous Medications    ATENOLOL (TENORMIN) 50 MG TABLET    Take 1 tablet by mouth daily    CALCIUM CITRATE (CALCITRATE) 950 (200 CA) MG TABLET    Take 1 tablet by mouth daily    DILTIAZEM (CARDIZEM) 60 MG TABLET    Take 0.5 tablets by mouth every 12 hours    MULTIPLE VITAMINS-MINERALS (MULTIVITAMIN PO)    Take by mouth    OMEGA-3 FATTY ACIDS (FISH OIL PO)    Take by mouth    PANTOPRAZOLE (PROTONIX) 40 MG TABLET    Take 1 tablet by mouth daily    PAROXETINE (PAXIL) 20 MG TABLET    Take 1 tablet by mouth daily    SIMVASTATIN (ZOCOR) 40 MG TABLET    Take 1 tablet by mouth nightly    WARFARIN (COUMADIN) 2.5 MG TABLET    TAKE 1 TABLET BY MOUTH EVERY DAY    WARFARIN (COUMADIN) 5 MG TABLET    2.5 MG DAILY OR AS DIRECTED BY DOCTOR. Social history:  reports that she has never smoked. She has never used smokeless tobacco. She reports that she does not drink alcohol and does not use drugs. Family history:    Family History   Problem Relation Age of Onset    Heart Disease Father        Exam  ED Triage Vitals [05/28/22 1127]   BP Temp Temp Source Heart Rate Resp SpO2 Height Weight   (!) 161/90 98.4 °F (36.9 °C) Oral (!) 121 18 97 % 5' 6\" (1.676 m) 143 lb (64.9 kg)     Nursing note and vitals reviewed. Constitutional: Mild acute distress. Nontoxic. HENT:      Head: Normocephalic and atraumatic. Ears: External ears normal.      Nose: Nose normal.     Mouth: Membrane mucosa moist and pink. Eyes: Anicteric sclera.  No discharge. Neck: Supple. Trachea midline. Cardiovascular: Tachycardic with irregular rhythm; no murmurs, rubs, or gallops. Pulmonary/Chest: Effort normal. No respiratory distress. CTAB. No stridor. No wheezes. No rales. Abdominal: Soft. No distension. Non-tender to palpation. Musculoskeletal: Moves all extremities. No gross deformity. Neurological: Alert and oriented. Face symmetric. Speech is clear. Skin: Warm and dry. No rash. Psychiatric: Normal mood and affect. Behavior is normal.    EKG  The Ekg interpreted by me in the absence of a cardiologist shows. atrial fibrillation with a rate of 99  Axis is   Normal  QTc is  prolonged  Intervals and Durations are unremarkable. No specific ST-T wave changes appreciated. No evidence of acute ischemia. No significant change from prior EKG dated 11/27/2020      Radiology  CT HEAD WO CONTRAST   Final Result   No acute intracranial abnormality. XR CHEST PORTABLE   Final Result   Cardiomegaly with no acute airspace abnormality.              Labs  Results for orders placed or performed during the hospital encounter of 05/28/22   COVID-19 & Influenza Combo    Specimen: Nasopharyngeal Swab   Result Value Ref Range    SARS-CoV-2 RNA, RT PCR NOT DETECTED NOT DETECTED    INFLUENZA A NOT DETECTED NOT DETECTED    INFLUENZA B NOT DETECTED NOT DETECTED   CBC with Auto Differential   Result Value Ref Range    WBC 8.5 4.0 - 11.0 K/uL    RBC 4.56 4.00 - 5.20 M/uL    Hemoglobin 12.4 12.0 - 16.0 g/dL    Hematocrit 39.5 36.0 - 48.0 %    MCV 86.7 80.0 - 100.0 fL    MCH 27.1 26.0 - 34.0 pg    MCHC 31.3 31.0 - 36.0 g/dL    RDW 16.4 (H) 12.4 - 15.4 %    Platelets 779 908 - 726 K/uL    MPV 7.7 5.0 - 10.5 fL    Neutrophils % 65.2 %    Lymphocytes % 27.3 %    Monocytes % 5.8 %    Eosinophils % 0.8 %    Basophils % 0.9 %    Neutrophils Absolute 5.6 1.7 - 7.7 K/uL    Lymphocytes Absolute 2.3 1.0 - 5.1 K/uL    Monocytes Absolute 0.5 0.0 - 1.3 K/uL    Eosinophils Absolute 0.1 0.0 - 0.6 K/uL    Basophils Absolute 0.1 0.0 - 0.2 K/uL   Comprehensive Metabolic Panel w/ Reflex to MG   Result Value Ref Range    Sodium 138 136 - 145 mmol/L    Potassium reflex Magnesium 4.1 3.5 - 5.1 mmol/L    Chloride 102 99 - 110 mmol/L    CO2 19 (L) 21 - 32 mmol/L    Anion Gap 17 (H) 3 - 16    Glucose 154 (H) 70 - 99 mg/dL    BUN 22 (H) 7 - 20 mg/dL    CREATININE 1.6 (H) 0.6 - 1.2 mg/dL    GFR Non- 31 (A) >60    GFR  37 (A) >60    Calcium 9.2 8.3 - 10.6 mg/dL    Total Protein 6.8 6.4 - 8.2 g/dL    Albumin 3.4 3.4 - 5.0 g/dL    Albumin/Globulin Ratio 1.0 (L) 1.1 - 2.2    Total Bilirubin 0.5 0.0 - 1.0 mg/dL    Alkaline Phosphatase 109 40 - 129 U/L    ALT 11 10 - 40 U/L    AST 25 15 - 37 U/L   Lipase   Result Value Ref Range    Lipase 31.0 13.0 - 60.0 U/L   Urinalysis with Reflex to Culture    Specimen: Urine, clean catch   Result Value Ref Range    Color, UA Yellow Straw/Yellow    Clarity, UA Clear Clear    Glucose, Ur Negative Negative mg/dL    Bilirubin Urine Negative Negative    Ketones, Urine 15 (A) Negative mg/dL    Specific Gravity, UA 1.020 1.005 - 1.030    Blood, Urine TRACE (A) Negative    pH, UA 6.5 5.0 - 8.0    Protein, UA 30 (A) Negative mg/dL    Urobilinogen, Urine 1.0 <2.0 E.U./dL    Nitrite, Urine POSITIVE (A) Negative    Leukocyte Esterase, Urine MODERATE (A) Negative    Microscopic Examination YES     Urine Type not given upv     Urine Reflex to Culture Yes    Troponin   Result Value Ref Range    Troponin <0.01 <0.01 ng/mL   Brain Natriuretic Peptide   Result Value Ref Range    Pro-BNP 1,024 (H) 0 - 449 pg/mL   Lactate, Sepsis   Result Value Ref Range    Lactic Acid, Sepsis 2.7 (H) 0.4 - 1.9 mmol/L   Microscopic Urinalysis   Result Value Ref Range    Bacteria, UA 4+ (A) None Seen /HPF    Hyaline Casts, UA 3 0 - 8 /LPF    WBC,  (H) 0 - 5 /HPF    RBC, UA 2 0 - 4 /HPF    Epithelial Cells, UA 1 0 - 5 /HPF       Screenings  Is this patient to be included in the SEP-1 Core Measure due to severe sepsis or septic shock? Yes   SEP-1 CORE MEASURE DATA      Sepsis Criteria   Severe Sepsis Criteria   Septic Shock Criteria     Must be confirmed or suspected to move forward with diagnosis of sepsis. Must meet 2:    [] Temperature > 100.9 F (38.3 C)        or < 96.8 F (36 C)  [x] HR > 90  [x] RR > 20  [] WBC > 12 or < 4 or 10% bands      AND:      [x] Infection Confirmed or        Suspected. Must meet 1:    [x] Lactate > 2       or   [] Signs of Organ Dysfunction:    - SBP < 90 or MAP < 65  - Altered mental status  - Creatinine > 2 or increased from      baseline  - Urine Output < 0.5 ml/kg/hr  - Bilirubin > 2  - INR > 1.5 (not anticoagulated)  - Platelets < 565,401  - Acute Respiratory Failure as     evidenced by new need for NIPPV     or mechanical ventilation      [] No criteria met for Severe Sepsis. Must meet 1:    [] Lactate > 4        or   [] SBP < 90 or MAP < 65 for at        least two readings in the first        hour after fluid bolus        administration      [] Vasopressors initiated (if hypotension persists after fluid resuscitation)        [x] No criteria met for Septic Shock. No data found. No results for input(s): WBC, LACTA, CREATININE, BILITOT, INR, PLT in the last 72 hours. Time Sepsis Identified: 1300    Fluid Resuscitation Rational: at least 30mL/kg based on entered actual weight at time of triage    Repeat lactate level: improving    Reassessment Exam:   Not applicable. Patient does not have septic shock. MDM and ED Course  Patient reported to the emergency department stating she \"did not feel well\" she been having nausea and vomiting for a few days. Otherwise, her symptoms were generalized and nonspecific. She was tachycardic with a fibrillation and RVR. I treated this with Cardizem, after which her heart rate improved and is now controlled.   She is not febrile, but met criteria for sepsis given her tachypnea, tachycardia, and evidence of urinary tract infection. Initial lactic was also significantly elevated at 2.7. Chest x-ray and head CT were unremarkable. I ordered the head CT because patient reported falling approximately 1 week ago and hitting her head. I wanted to ensure she did not have signs of a subacute subdural.  This was fortunately not present. I treated her urosepsis with Cefepime. She has never developed hypotension and does not meet criteria for septic shock. I consulted Dr. Erin Munguia through [facility] for admission. She reviewed the patient's history, physical exam, labs, imaging studies, and emergency department course and has decided to admit Papi Andino for further evaluation and treatment. As I have deemed necessary from their history, physical, and studies, I have considered and evaluated Papi Andino for the following diagnoses:  ACUTE CORONARY SYNDROME, SEVERE CHRONIC OBSTRUCTIVE PULMONARY DISEASE, RESPIRATORY FAILURE/ARREST, ACUTE CONGESTIVE HEART FAILURE, CARDIAC TAMPONADE, PNEUMONIA, PNEUMOTHORAX, PERICARDITIS, PULMONARY EMBOLISM, AORTIC DISSECTION, ARDS    The total Critical Care time is 30 minutes which excludes separately billable procedures. Final Impression  1. Urinary tract infection without hematuria, site unspecified    2. Severe sepsis ([address]    3. Atrial fibrillation with RVR ([facility])        Blood pressure (!) 154/94, pulse (!) 106, temperature 98.4 °F (36.9 °C), temperature source Oral, resp. rate 22, height 5' 6\" (1.676 m), weight 143 lb (64.9 kg), SpO2 99 %. Disposition:  DISPOSITION Decision To Admit 05/28/2022 01:50:14 PM    This chart was generated using the [address] [place] [address] dictation system. I created this record but it may contain dictation errors given the limitations of this technology.        Fabiola Nayak MD  06/06/22 5255

## 2022-05-28 NOTE — PROGRESS NOTES
Clinical Pharmacy Note: Pharmacy to Dose Warfarin    Pharmacy consulted by Dr. Luis Manuel Corrigan to dose warfarin. Shana Richardson is a 79 y.o. female  is receiving warfarin for indication: afib. INR Goal Range: 2-3  Prior to admission warfarin dosing regimen: 1.25 MWF, 2.5 mg all other days. Managed OP by cardiology. INR today:   Lab Results   Component Value Date    INR 4.3 04/13/2022    INR 3.50 09/28/2021       Assessment/Plan:  INR is pending. Home dose is ordered. Plan to hold warfarin if INR on admission is >3.0. Daily INR is ordered. Pharmacy will continue to monitor and make adjustments to regimen as necessary.      Thank you for the consult,     Majo Layne, PharmD, [facility]

## 2022-05-28 NOTE — PROGRESS NOTES
During admission screening, I asked pt if she has ACP docs. She said she thinks she has a living will, but that it is [place] old she doesn't even remember what it says or where it is\". I asked pt if she would like to complete official ACP docs here and she declined.

## 2022-05-28 NOTE — H&P
HOSPITALISTS HISTORY AND PHYSICAL    2022 2:13 PM    Patient Information:  Gavin Perea is a 79 y.o. female [id number]  PCP:  BRIGHT Benavides CNP (Tel: [phone] )    Chief complaint:    Chief Complaint   Patient presents with    Fatigue     arrived via [address] [facility] from home d/t not feeling well that started approx 4 days ago, weakness, n/v, hx of afib, fall last week was not seen; SOB with exertion; 4mg of Zofran IV enroute        History of Present Illness:  Moody Bills is a 79 y.o. female  with PMHx of A. fib on Coumadin, hypertension, hyperlipidemia, GIB and CKD presented with fatigue. Patient reported that she has not been feeling well for the past 4 days and has been experiencing fatigue, headache, loss of appetite and intermittent shortness of breath on exertion. Patient reported that she fell last week but did not seek any medical attention. Patient denied any fever, chills, chest pain, palpitations, dizziness, orthopnea, PND, BLE edema, bowel or bladder dysfunction, sick contacts, recent travel or change in medications. On admission, patient was tachycardic& tachypneic and was noted to be in A. fib with RVR was given IV diltiazem. Initial diagnostic lab work showed creatinine elevated 1.6, lactic acid 2.7, A, bicarb: 19, BNP: 1,024. Urinalysis suggestive of UTI. CXR negative for pneumonia. CT head negative for any acute intracranial pathology. REVIEW OF SYSTEMS:   Detailed 12 point ROS obtained which were negative except what mentioned above in HPI    Past Medical History:   has a past medical history of A-fib ([facility] [place] [address] Atrial fibrillation ([facility] [address] Hypercholesterolemia, Hypertension, and Pelvic mass. Past Surgical History:   has a past surgical history that includes Appendectomy; Cystocopy (Right, 2018);  Hysterectomy, total abdominal (2018); pr cysto/uretero w/lithotripsy &indwell stent insrt (Right, 10/31/2018); Upper gastrointestinal endoscopy (N/A, 11/29/2020); Upper gastrointestinal endoscopy (N/A, 11/29/2020); and Upper gastrointestinal endoscopy (N/A, 3/2/2021). Medications:  No current facility-administered medications on file prior to encounter. Current Outpatient Medications on File Prior to Encounter   Medication Sig Dispense Refill    calcium citrate (CALCITRATE) 950 (200 Ca) MG tablet Take 1 tablet by mouth daily      warfarin (COUMADIN) 2.5 MG tablet TAKE 1 TABLET BY MOUTH EVERY DAY 90 tablet 0    PARoxetine (PAXIL) 20 MG tablet Take 1 tablet by mouth daily (Patient taking differently: Take 40 mg by mouth daily ) 90 tablet 3    pantoprazole (PROTONIX) 40 MG tablet Take 1 tablet by mouth daily 90 tablet 3    dilTIAZem (CARDIZEM) 60 MG tablet Take 0.5 tablets by mouth every 12 hours 90 tablet 3    simvastatin (ZOCOR) 40 MG tablet Take 1 tablet by mouth nightly 90 tablet 3    atenolol (TENORMIN) 50 MG tablet Take 1 tablet by mouth daily (Patient taking differently: Take 100 mg by mouth daily ) 90 tablet 3    warfarin (COUMADIN) 5 MG tablet 2.5 MG DAILY OR AS DIRECTED BY DOCTOR. 60 tablet 2    Omega-3 Fatty Acids (FISH OIL PO) Take by mouth      Multiple Vitamins-Minerals (MULTIVITAMIN PO) Take by mouth         Allergies: Allergies   Allergen Reactions    Penicillins      Patient cannot remember reaction    Sulfa Antibiotics         Social History:   reports that she has never smoked. She has never used smokeless tobacco. She reports that she does not drink alcohol and does not use drugs. Family History:  family history includes Heart Disease in her father. Physical Exam:  BP (!) 154/94   Pulse (!) 106   Temp 98.4 °F (36.9 °C) (Oral)   Resp 22   Ht 5' 6\" (1.676 m)   Wt 143 lb (64.9 kg)   SpO2 99%   BMI 23.08 kg/m²     General appearance: No apparent distress appears stated age and cooperative.   HEENT Normal cephalic, atraumatic without obvious deformity. Pupils equal, round, and reactive to light. Neck: Supple, No jugular venous distention/bruits. Trachea midline without thyromegaly  Lungs: Clear to auscultation, bilaterally without Rales/Wheezes/Rhonchi with good respiratory effort. Heart: Regular rate and rhythm with Normal S1/S2 without murmurs, rubs or gallops  Abdomen: Soft, non-tender or non-distended without rigidity or guarding and positive bowel sounds all four quadrants. Extremities: No clubbing, cyanosis, or edema bilaterally. Skin: Skin color, texture, turgor normal.  No rashes or lesions. Neurologic: Alert and oriented X 3. Cranial nerves: II-XII intact, grossly non-focal.  Psychiatry: Appropriate affect. Not agitated  Skin: Warm, dry, normal turgor, no rash  Brisk capillary refill, peripheral pulses palpable     Labs:  CBC:   Lab Results   Component Value Date    WBC 8.5 05/28/2022    RBC 4.56 05/28/2022    HGB 12.4 05/28/2022    HCT 39.5 05/28/2022    MCV 86.7 05/28/2022    MCH 27.1 05/28/2022    MCHC 31.3 05/28/2022    RDW 16.4 05/28/2022     05/28/2022    MPV 7.7 05/28/2022     BMP:    Lab Results   Component Value Date     05/28/2022    K 4.1 05/28/2022     05/28/2022    CO2 19 05/28/2022    BUN 22 05/28/2022    CREATININE 1.6 05/28/2022    CALCIUM 9.2 05/28/2022    GFRAA 37 05/28/2022    LABGLOM 31 05/28/2022    LABGLOM 41 06/28/2018    GLUCOSE 154 05/28/2022    GLUCOSE 144 11/01/2021     CT HEAD WO CONTRAST   Final Result   No acute intracranial abnormality. XR CHEST PORTABLE   Final Result   Cardiomegaly with no acute airspace abnormality. Discussed case  with ED physician    Problem List  Principal Problem:    Sepsis ([address]  Resolved Problems:    * No resolved hospital problems.  *        Assessment/Plan:     Severe sepsis likely secondary to UTI  Presented with tachycardia, tachypnea, lactic acidosis and CHARISSE  Status fluid resuscitation according to sepsis protocol. Urinalysis suggestive for UTI. Urine and blood cultures in process  Continue IV antibiotics awaiting cultures and sensitivities    UTI: UA suggestive of UTI. Continue IV antibiotics awaiting urine culture and sensitivity    A. fib with RVR: Likely secondary to sepsis  Status 1 dose of IV diltiazem  Cardiology consulted  Continue Coumadin, diltiazem and beta-blockers  INR: Subtherapeutic on admission. Pharmacy consulted to monitor Coumadin dosing  PT/INR daily and monitor on telemetry    Acute kidney injury on CKD stage III: Creatinine elevated to 1.6 on admission; baseline creatinine 1.2  Nephrology consulted: Continue IV fluids  Avoid nephrotoxin, daily weights, strict intake/ output  Monitor renal function closely    Anion gap metabolic acidosis likely secondary to lactic acidosis and CHARISSE  Continue IV fluids    Hypertension: Continue home medications and monitor BP closely    Hyperlipidemia: Continue statins    Hx of GI bleed, hemoglobin stable  Continue Protonix daily. Monitor CBC closely    DVT prophylaxis: Coumadin  Code status: Full    Admit as inpatient I anticipate hospitalization spanning more than two midnights for investigation and treatment of the above medically necessary diagnoses. Please note that some part of this chart was generated using Dragon dictation software. Although every effort was made to ensure the accuracy of this automated transcription, some errors in transcription may have occurred inadvertently. If you may need any clarification, please do not hesitate to contact me through [facility].        Yajaira Lopez MD    5/28/2022 2:13 PM

## 2022-05-28 NOTE — PLAN OF CARE
Problem: Discharge Planning  Goal: Discharge to home or other facility with appropriate resources  Outcome: Progressing  Flowsheets (Taken 5/28/2022 1727)  Discharge to home or other facility with appropriate resources:   Identify barriers to discharge with patient and caregiver   Arrange for needed discharge resources and transportation as appropriate   Identify discharge learning needs (meds, wound care, etc)   Arrange for interpreters to assist at discharge as needed   Refer to discharge planning if patient needs post-hospital services based on physician order or complex needs related to functional status, cognitive ability or social support system     Problem: Safety - Adult  Goal: Free from fall injury  Outcome: Progressing     Problem: ABCDS Injury Assessment  Goal: Absence of physical injury  Outcome: Progressing     Problem: Cardiovascular - Adult  Goal: Maintains optimal cardiac output and hemodynamic stability  Outcome: Progressing  Goal: Absence of cardiac dysrhythmias or at baseline  Outcome: Progressing     Problem: Skin/Tissue Integrity - Adult  Goal: Skin integrity remains intact  Outcome: Progressing  Goal: Oral mucous membranes remain intact  Outcome: Progressing     Problem: Musculoskeletal - Adult  Goal: Return mobility to safest level of function  Outcome: Progressing  Goal: Maintain proper alignment of affected body part  Outcome: Progressing  Goal: Return ADL status to a safe level of function  Outcome: Progressing     Problem: Gastrointestinal - Adult  Goal: Minimal or absence of nausea and vomiting  Outcome: Progressing  Goal: Maintains or returns to baseline bowel function  Outcome: Progressing  Goal: Maintains adequate nutritional intake  Outcome: Progressing  Goal: Establish and maintain optimal ostomy function  Outcome: Progressing     Problem: Genitourinary - Adult  Goal: Absence of urinary retention  Outcome: Progressing     Problem: Infection - Adult  Goal: Absence of infection at discharge  Outcome: Progressing     Problem: Metabolic/Fluid and Electrolytes - Adult  Goal: Electrolytes maintained within normal limits  Outcome: Progressing  Goal: Hemodynamic stability and optimal renal function maintained  Outcome: Progressing  Goal: Glucose maintained within prescribed range  Outcome: Progressing     Problem: Hematologic - Adult  Goal: Maintains hematologic stability  Outcome: Progressing     Problem: Respiratory - Adult  Goal: Achieves optimal ventilation and oxygenation  Outcome: Progressing

## 2022-05-29 LAB
ANION GAP SERPL CALCULATED.3IONS-SCNC: 12 MMOL/L (ref 3–16)
BUN BLDV-MCNC: 17 MG/DL (ref 7–20)
CALCIUM SERPL-MCNC: 8.6 MG/DL (ref 8.3–10.6)
CHLORIDE BLD-SCNC: 106 MMOL/L (ref 99–110)
CHOLESTEROL, TOTAL: 131 MG/DL (ref 0–199)
CO2: 24 MMOL/L (ref 21–32)
CREAT SERPL-MCNC: 1.4 MG/DL (ref 0.6–1.2)
EKG ATRIAL RATE: 104 BPM
EKG DIAGNOSIS: NORMAL
EKG Q-T INTERVAL: 362 MS
EKG QRS DURATION: 84 MS
EKG QTC CALCULATION (BAZETT): 464 MS
EKG R AXIS: 49 DEGREES
EKG T AXIS: -60 DEGREES
EKG VENTRICULAR RATE: 99 BPM
GFR AFRICAN AMERICAN: 44
GFR NON-AFRICAN AMERICAN: 36
GLUCOSE BLD-MCNC: 107 MG/DL (ref 70–99)
HDLC SERPL-MCNC: 30 MG/DL (ref 40–60)
INR BLD: 1.29 (ref 0.87–1.14)
LACTIC ACID: 1 MMOL/L (ref 0.4–2)
LDL CHOLESTEROL CALCULATED: 78 MG/DL
POTASSIUM SERPL-SCNC: 3.9 MMOL/L (ref 3.5–5.1)
PROTHROMBIN TIME: 16.1 SEC (ref 11.7–14.5)
SODIUM BLD-SCNC: 142 MMOL/L (ref 136–145)
T4 FREE: 1.3 NG/DL (ref 0.9–1.8)
TRIGL SERPL-MCNC: 113 MG/DL (ref 0–150)
TSH REFLEX: 4.5 UIU/ML (ref 0.27–4.2)
VLDLC SERPL CALC-MCNC: 23 MG/DL

## 2022-05-29 PROCEDURE — 80061 LIPID PANEL: CPT

## 2022-05-29 PROCEDURE — 93010 ELECTROCARDIOGRAM REPORT: CPT | Performed by: INTERNAL MEDICINE

## 2022-05-29 PROCEDURE — 6370000000 HC RX 637 (ALT 250 FOR IP): Performed by: INTERNAL MEDICINE

## 2022-05-29 PROCEDURE — 2580000003 HC RX 258: Performed by: INTERNAL MEDICINE

## 2022-05-29 PROCEDURE — 6360000002 HC RX W HCPCS: Performed by: INTERNAL MEDICINE

## 2022-05-29 PROCEDURE — 1200000000 HC SEMI PRIVATE

## 2022-05-29 PROCEDURE — 85610 PROTHROMBIN TIME: CPT

## 2022-05-29 PROCEDURE — 80048 BASIC METABOLIC PNL TOTAL CA: CPT

## 2022-05-29 PROCEDURE — 83605 ASSAY OF LACTIC ACID: CPT

## 2022-05-29 PROCEDURE — 99223 1ST HOSP IP/OBS HIGH 75: CPT | Performed by: INTERNAL MEDICINE

## 2022-05-29 RX ORDER — SODIUM CHLORIDE 9 MG/ML
INJECTION, SOLUTION INTRAVENOUS CONTINUOUS
Status: DISCONTINUED | OUTPATIENT
Start: 2022-05-29 | End: 2022-05-31

## 2022-05-29 RX ADMIN — SODIUM CHLORIDE, PRESERVATIVE FREE 10 ML: 5 INJECTION INTRAVENOUS at 08:15

## 2022-05-29 RX ADMIN — WARFARIN SODIUM 2.5 MG: 5 TABLET ORAL at 17:02

## 2022-05-29 RX ADMIN — PAROXETINE HYDROCHLORIDE 40 MG: 20 TABLET, FILM COATED ORAL at 08:15

## 2022-05-29 RX ADMIN — DILTIAZEM HYDROCHLORIDE 30 MG: 30 TABLET, FILM COATED ORAL at 08:15

## 2022-05-29 RX ADMIN — Medication 1000 MG: at 22:21

## 2022-05-29 RX ADMIN — ATORVASTATIN CALCIUM 20 MG: 10 TABLET, FILM COATED ORAL at 19:46

## 2022-05-29 RX ADMIN — ATENOLOL 100 MG: 50 TABLET ORAL at 08:15

## 2022-05-29 RX ADMIN — DILTIAZEM HYDROCHLORIDE 30 MG: 30 TABLET, FILM COATED ORAL at 19:46

## 2022-05-29 RX ADMIN — PANTOPRAZOLE SODIUM 40 MG: 40 TABLET, DELAYED RELEASE ORAL at 06:31

## 2022-05-29 RX ADMIN — SODIUM CHLORIDE: 9 INJECTION, SOLUTION INTRAVENOUS at 18:07
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
## 2022-05-29 NOTE — PROGRESS NOTES
Pt and family want a concern passed along to Dr. Niko Almeida (Dr. Niko Almeida is unavailable on [facility] at this time, so leaving note and telling receiving nurse this evening). Had significant epistaxis event in April w/ several large clots passed (pt's family has photos). Lasted from evening until next day, pt felt weak in bathroom and was found by family the following day (she had remained on bathroom floor overnight, conscious). Pt reports daily headaches, going on for several months now. Daughter states she took pt to hospital and was told by a provider that she has \"sinus issues\", prescribed zyrtec, but pt stopped taking zyrtec shortly after as she reported no change in symptoms. Pt & family worried something serious may have caused epistaxis and daily HA.

## 2022-05-29 NOTE — CONSULTS
MD Nicol Guardado MD Brendan Rice, MD                                  Office: [phone]                 Fax: [phone]          [address]                     NEPHROLOGY CONSULTATION NOTE:     PATIENT NAME: Tamara Merrill  : 1940  MRN: [id number]      Name:  Tamara Merrill Date/Time of Admission: 2022 11:19 AM    CSN: [id number] Attending Provider: Kathy Alves MD   Room[facility]Bed: [facility]9052/9597-91 : 1940 Age: 79 y.o. Reason for Nephrology consult :  Evaluation of patient with worsening creatinine level            History of Presenting complaint:       Tamara Merrill 79 y.o. Has been admitted with multiple complaints of generalized weakness which has been progressively getting worse for the past 1 week. There was also history of fall at [facility] a week ago. On admission patient has been treated for UTI with IV antibiotics. She is also being evaluated by cardiology team for tachycardia. Patient has a history of A. fib and was noted to have rapid heart rate. No hypotension at the time of admission. Nephrology consult for elevated creatinine of 1.6 and a BUN of 22. - Electrolytes were stable. Noted to have elevated BNP. .  I have reviewed the chest x-ray that shows mild cardiomegaly but no airspace disease or fluid overload. Current medications reviewed. On cefepime 2 g IV. Ceftriaxone 1000 mg IV. Patient has a history of chronic kidney disease. Stage IIIa with a baseline creatinine of 1.2      Medications reviewed. Medical records reviewed. Past Medical History :         Past Medical History:   Diagnosis Date    A-fib [facility])     Atrial fibrillation ([facility] [address]     Hypercholesterolemia     Hypertension     Pelvic mass        Medications  Which i have  Reviewed, also have reviewed home medications  Prior to Admission medications    Medication Sig Start Date End Date Taking?  Authorizing Provider   calcium citrate (CALCITRATE) 950 (200 Ca) MG tablet Take 1 tablet by mouth daily  Patient not taking: Reported on 5/28/2022    Historical Provider, MD   warfarin (COUMADIN) 2.5 MG tablet TAKE 1 TABLET BY MOUTH EVERY DAY 1/25/22   Anahi Chinchilla MD   PARoxetine (PAXIL) 20 MG tablet Take 1 tablet by mouth daily  Patient taking differently: Take 40 mg by mouth daily  10/12/21   Anahi Chinchilla MD   pantoprazole (PROTONIX) 40 MG tablet Take 1 tablet by mouth daily 8/30/21   Anahi Chinchilla MD   dilTIAZem (CARDIZEM) 60 MG tablet Take 0.5 tablets by mouth every 12 hours 8/4/21   Anahi Chinchilla MD   simvastatin (ZOCOR) 40 MG tablet Take 1 tablet by mouth nightly 8/4/21 8/4/22  Anahi Chinchilla MD   atenolol (TENORMIN) 50 MG tablet Take 1 tablet by mouth daily  Patient taking differently: Take 100 mg by mouth daily  8/4/21   Anahi Chinchilla MD   warfarin (COUMADIN) 5 MG tablet 2.5 MG DAILY OR AS DIRECTED BY DOCTOR. 4/23/21   Anahi Chinchilla MD   Omega-3 Fatty Acids (FISH OIL PO) Take by mouth    Historical Provider, MD   Multiple Vitamins-Minerals (MULTIVITAMIN PO) Take by mouth    Historical Provider, MD     Current Facility-Administered Medications: perflutren lipid microspheres (DEFINITY) injection 1.65 mg, 1.5 mL, IntraVENous, ONCE PRN  sodium chloride flush 0.9 % injection 5-40 mL, 5-40 mL, IntraVENous, 2 times per day  sodium chloride flush 0.9 % injection 5-40 mL, 5-40 mL, IntraVENous, PRN  0.9 % sodium chloride infusion, , IntraVENous, PRN  ondansetron (ZOFRAN-ODT) disintegrating tablet 4 mg, 4 mg, Oral, Q8H PRN **OR** ondansetron (ZOFRAN) injection 4 mg, 4 mg, IntraVENous, Q6H PRN  polyethylene glycol (GLYCOLAX) packet 17 g, 17 g, Oral, Daily PRN  acetaminophen (TYLENOL) tablet 650 mg, 650 mg, Oral, Q6H PRN **OR** acetaminophen (TYLENOL) suppository 650 mg, 650 mg, Rectal, Q6H PRN  atorvastatin (LIPITOR) tablet 20 mg, 20 mg, Oral, Nightly  PARoxetine (PAXIL) tablet 40 mg, 40 mg, Oral, Daily  pantoprazole (PROTONIX) tablet 40 mg, 40 mg, Oral, QAM AC  [START ON 5/30/2022] warfarin (COUMADIN) tablet 1.25 mg, 1.25 mg, Oral, Once per day on Mon Wed Fri **AND** warfarin (COUMADIN) tablet 2.5 mg, 2.5 mg, Oral, Once per day on Sun Tue Thu Sat  cefTRIAXone (ROCEPHIN) 1000 mg in sterile water 10 mL IV syringe, 1,000 mg, IntraVENous, Q24H  dilTIAZem (CARDIZEM) tablet 30 mg, 30 mg, Oral, 2 times per day  atenolol (TENORMIN) tablet 100 mg, 100 mg, Oral, Daily   sodium chloride           Allergies. Allergies   Allergen Reactions    Penicillins      Patient cannot remember reaction    Sulfa Antibiotics        Social History. Social History     Socioeconomic History    Marital status:      Spouse name: Not on file    Number of children: Not on file    Years of education: Not on file    Highest education level: Not on file   Occupational History    Not on file   Tobacco Use    Smoking status: Never Smoker    Smokeless tobacco: Never Used   Vaping Use    Vaping Use: Never used   Substance and Sexual Activity    Alcohol use: No    Drug use: No    Sexual activity: Not Currently   Other Topics Concern    Not on file   Social History Narrative    Not on file     Social Determinants of Health     Financial Resource Strain:     Difficulty of Paying Living Expenses: Not on file   Food Insecurity:     Worried About Running Out of Food in the Last Year: Not on file    [place] of Food in the Last Year: Not on file   Transportation Needs:     Lack of Transportation (Medical): Not on file    Lack of Transportation (Non-Medical):  Not on file   Physical Activity:     Days of Exercise per Week: Not on file    Minutes of Exercise per Session: Not on file   Stress:     Feeling of Stress : Not on file   Social Connections:     Frequency of Communication with Friends and Family: Not on file    Frequency of Social Gatherings with Friends and Family: Not on file    Attends Restorationism Services: Not on file   [employer] of [employer]s or Organizations: Not on file    Attends Club or Organization Meetings: Not on file    Marital Status: Not on file   Intimate Partner Violence:     Fear of Current or Ex-Partner: Not on file    Emotionally Abused: Not on file    Physically Abused: Not on file    Sexually Abused: Not on file   Housing Stability:     Unable to Pay for Housing in the Last Year: Not on file    Number of [place] in the Last Year: Not on file    Unstable Housing in the Last Year: Not on file       Family History    Family History   Problem Relation Age of Onset    Heart Disease Father        Review of Systems. I have reviewed in detail the 10 system review with admitting physician and other consultants on the case       PHYSICAL EXAMINATION. BP (!) 108/59   Pulse 68   Temp 97.1 °F (36.2 °C) (Axillary)   Resp 16   Ht 5' 6\" (1.676 m)   Wt 155 lb 3.2 oz (70.4 kg)   SpO2 96%   BMI 25.05 kg/m²     Intake/Output Summary (Last 24 hours) at 5/29/2022 1219  Last data filed at 5/29/2022 1024  Gross per 24 hour   Intake 60 ml   Output 150 ml   Net -90 ml       INTAKE/OUTPUT:  I/O last 3 completed shifts:  In: -   Out: 150 [Urine:150]  I/O this shift:  In: 60 [P.O.:60]  Out: -        Ill Appearing. mild respiratory distress. Awake alert   no hypotension  External exam of the ears and nose are normal  HENT: exam is normal  Eyes: Pupils are equal, round, and reactive to light. Lymph Nodes. No axillary or cervical lymph nodes are palpable. Neck. JVD not visible. No lymph nodes palpable. CVS.  Heart sounds are normal.Palpation of the heart is normal. No murmurs. No pericardial rub.  RS.dullness on percussion of the lower chest wall. Lung fields are clear   PA soft , bowel sounds are normal no distension and no tenderness to palpation. Skin No rash , No palpable nodules  Musculoskeletal: Normal range of motion. 1+ edema and no tenderness. CNS  No focal    Edematrace  More awake and alert.    All pulses are well felt      Labs reviewed by me       CBC:   Recent Labs     05/28/22  1140   WBC 8.5   HGB 12.4   HCT 39.5   MCV 86.7        BMP:   Recent Labs     05/28/22  1140 05/29/22  0424    142   K 4.1 3.9    106   CO2 19* 24   BUN 22* 17   CREATININE 1.6* 1.4*     Magnesium:   Lab Results   Component Value Date    MG 1.80 12/03/2020    MG 1.80 12/02/2020    MG 1.70 12/01/2020                    ASSESSMENT       Acute kidney injury-mild worsening-with decreased urine output. UTI with sepsis on IV antibiotics. A. fib with RVR. Hypertension. Well-controlled. Chronic kidney disease. Stage IIIa. Hypertensive kidney disease. PLAN         Acute kidney injury-on admission-creatinine is 1.6.  - Etiology likely multifactorial.  - Possible prerenal from mild volume depletion.  - May have developed ATN-high risk-admitted for UTI with sepsis and also has hypotension.  - Get a bedside bladder scan to exclude any bladder outlet obstruction. A. fib-with RVR-cardiology following.  - Borderline hypotension noted. - Heart rate much improved. UTI with sepsis on IV antibiotics. - Currently on Rocephin 1 g IV. Hypotension-improving since admission. .  - Medications include atenolol 100 mg once daily. Diltiazem 30 mg once daily.    - ACE inhibitor on hold. Chronic kidney disease. Stage IIIa with a baseline creatinine of 1.2.  - Etiology likely multifactorial.  - Possible related to hypertensive kidney disease. Medications reviewed. All nephrotoxic medications have been discontinued. Hold Ace inhibitors till renal recovery is complete. -  Work up for acute renal failure has been ordered which include:  Renal Panel study  CBC  Urine Analysis   Urine Electrolytes   U Protein : creatinine ratio  Urine for eosinophil smear exam.    Renal Ultrasound Scan     Daily weight   Strict I and O   Renal Diet   Low Na diet     Electrolytes reviewed. Electrolytes have remained stable and needs close monitoring   Magnesium levels  are stable. medications reviewed. Nephrotoxic medications discontinued. Discontinue ACE  Inhibitors. Antibiotic doses reviewed and changes made/ renal dosing. .  discontinue diuretics. continue IV fluids at current rate. Fluid Overload. Edema is stable  Monitor urine output and report if less than 50ml/hr. Renal functions and electrolytes need close monitoring due toWorsening Kidney function. Repeat chest xray in the morning. Treatment Plan discussed withpatient, treating team and RN. Treatment plan discussed with patient. and High risk. Thanks for the consult             Electronically Signed:  Mary Beth Cunningham MD 5/29/2022          Arterial Blood Gasses  No results for input(s): PH, PCO2, PO2 in the last 72 hours.     Invalid input(s): [id number], INSPIREDO2    UA:  Recent Labs     05/28/22  1157   COLORU Yellow   PHUR 6.5   WBCUA 115*   RBCUA 2   BACTERIA 4+*   CLARITYU Clear   SPECGRAV 1.020   LEUKOCYTESUR MODERATE*   UROBILINOGEN 1.0   BILIRUBINUR Negative   BLOODU TRACE*   GLUCOSEU Negative       LIVER PROFILE:   Recent Labs     05/28/22  1140   AST 25   ALT 11   LIPASE 31.0   BILITOT 0.5   ALKPHOS 109     PT/INR:    Lab Results   Component Value Date    PROTIME 16.1 05/29/2022    PROTIME 16.1 05/28/2022    PROTIME 17.0 03/02/2021    PROTIME 3.3 07/08/2013    PROTIME 3.7 06/25/2013    PROTIME 2.9 06/27/2012    INR 1.29 05/29/2022    INR 1.29 05/28/2022    INR 4.3 04/13/2022    INR 1.8 03/29/2022    INR 1.6 03/17/2022    INR 3.50 09/28/2021     PTT:    Lab Results   Component Value Date    APTT 29.8 03/02/2021    APTT 26.3 10/31/2018    APTT 26.3 07/09/2018     SHAY:  No results found for: ANATITER, SHAY        RADIOLOGY:    CT HEAD WO CONTRAST    Result Date: 5/28/2022  EXAMINATION: CT OF THE HEAD WITHOUT CONTRAST  5/28/2022 11:06 am TECHNIQUE: CT of the head was performed without the administration of intravenous contrast. Automated exposure control, iterative reconstruction, and/or weight based adjustment of the mA/kV was utilized to reduce the radiation dose to as low as reasonably achievable. COMPARISON: MRI brain 16 2018 HISTORY: ORDERING SYSTEM PROVIDED HISTORY: fall one week ago TECHNOLOGIST PROVIDED HISTORY: Has a \"code stroke\" or \"stroke alert\" been called? ->No Reason for exam:->fall one week ago Decision Support Exception - unselect if not a suspected or confirmed emergency medical condition->Emergency Medical Condition (MA) Reason for Exam: fall one week ago FINDINGS: BRAIN/VENTRICLES: There is no acute intracranial hemorrhage, mass effect or midline shift. No abnormal extra-axial fluid collection. The gray-white differentiation is maintained without evidence of an acute infarct. There is no evidence of hydrocephalus. Age-appropriate atrophy and small-vessel disease ischemic changes seen. ORBITS: The visualized portion of the orbits demonstrate no acute abnormality. SINUSES: The visualized paranasal sinuses and mastoid air cells demonstrate no acute abnormality. SOFT TISSUES/SKULL:  No acute abnormality of the visualized skull or soft tissues. No acute intracranial abnormality. XR CHEST PORTABLE    Result Date: 5/28/2022  EXAMINATION: ONE XRAY VIEW OF THE CHEST 5/28/2022 11:46 am COMPARISON: 11/29/2020 radiograph HISTORY: ORDERING SYSTEM PROVIDED HISTORY: \"feels unwell\" TECHNOLOGIST PROVIDED HISTORY: Reason for exam:->\"feels unwell\" Reason for Exam: Fatigue (arrived via [employer] [facility] from home d/t not feeling well that started approx 4 days ago, weakness, n/v, hx of afib, fall last week was not seen; SOB with exertion; 4mg of Zofran IV enroute) FINDINGS: The heart is enlarged. Mediastinum and pulmonary vascular markings are normal.  The lungs are clear. No significant skeletal finding in the chest.     Cardiomegaly with no acute airspace abnormality. Imaging Results.   Chest X Ray reviwed by me          Electronically Signed:  Jaimie Mckeon MD 5/29/2022

## 2022-05-29 NOTE — PLAN OF CARE
Problem: Discharge Planning  Goal: Discharge to home or other facility with appropriate resources  5/29/2022 1026 by Galina Wilson RN  Outcome: Progressing  5/29/2022 0041 by Sj Warner RN  Outcome: Progressing     Problem: Safety - Adult  Goal: Free from fall injury  5/29/2022 1026 by Galina Wilson RN  Outcome: Progressing  5/29/2022 0041 by Sj Warner RN  Outcome: Progressing  Note: Fall precautions in place, bed alarm on, nonskid foot wear applied, bed in lowest position, and call light within reach. Will continue to monitor. Problem: ABCDS Injury Assessment  Goal: Absence of physical injury  5/29/2022 1026 by Galina Wilson RN  Outcome: Progressing  5/29/2022 0041 by Sj Warner RN  Outcome: Progressing     Problem: Cardiovascular - Adult  Goal: Maintains optimal cardiac output and hemodynamic stability  5/29/2022 1026 by Galina Wilson RN  Outcome: Progressing  5/29/2022 0041 by Sj Warner RN  Outcome: Progressing  Note: VSS. Telemetry monitoring: Afib, rate controlled. PO Cardizem, anticoagulated on warfarin. Pharmacy to dose warfarin. Daily check INR. Cardiology consulted. Goal: Absence of cardiac dysrhythmias or at baseline  Outcome: Progressing     Problem: Skin/Tissue Integrity - Adult  Goal: Skin integrity remains intact  5/29/2022 1026 by Galina Wilson RN  Outcome: Progressing  5/29/2022 0041 by Sj Warner RN  Outcome: Progressing  Goal: Oral mucous membranes remain intact  Outcome: Progressing     Problem: Musculoskeletal - Adult  Goal: Return mobility to safest level of function  5/29/2022 1026 by Galina Wilson RN  Outcome: Progressing  5/29/2022 0041 by Sj Warner RN  Outcome: Progressing  Note: PT/OT consulted.   Goal: Maintain proper alignment of affected body part  Outcome: Progressing  Goal: Return ADL status to a safe level of function  Outcome: Progressing     Problem: Gastrointestinal - Adult  Goal: Minimal or absence of nausea and vomiting  Outcome: Progressing  Goal: Maintains or returns to baseline bowel function  Outcome: Progressing  Goal: Maintains adequate nutritional intake  Outcome: Progressing  Goal: Establish and maintain optimal ostomy function  Outcome: Progressing     Problem: Genitourinary - Adult  Goal: Absence of urinary retention  Outcome: Progressing     Problem: Infection - Adult  Goal: Absence of infection at discharge  5/29/2022 1026 by Samantha Schaeffer RN  Outcome: Progressing  5/29/2022 0041 by Rosa Simmons RN  Outcome: Progressing  Note: IV Abx. Bolus fluid given in ED. Encouraging intake. Nephrology consulted.      Problem: Metabolic/Fluid and Electrolytes - Adult  Goal: Electrolytes maintained within normal limits  5/29/2022 1026 by Samantha Schaeffer RN  Outcome: Progressing  5/29/2022 0041 by Rosa Simmons RN  Outcome: Progressing  Goal: Hemodynamic stability and optimal renal function maintained  Outcome: Progressing  Goal: Glucose maintained within prescribed range  Outcome: Progressing     Problem: Hematologic - Adult  Goal: Maintains hematologic stability  Outcome: Progressing     Problem: Respiratory - Adult  Goal: Achieves optimal ventilation and oxygenation  Outcome: Progressing

## 2022-05-29 NOTE — PROGRESS NOTES
Reviewing nephrologist's note from today, I noticed several recommended labs and new orders written in the \"Plan\" section, as provider wants to work pt up for acute renal failure. However, no new orders were placed by nephrology today. Harsh Guerrier seeking clarification. Update: Provider response as follows via [employer], \"Irena Thanks for pointing that out. I will enter them later today. They are routine labs for the morning.  Right now I am rounding in another hospital, so It will be late\"

## 2022-05-29 NOTE — PLAN OF CARE
Problem: Discharge Planning  Goal: Discharge to home or other facility with appropriate resources  5/29/2022 0041 by Araceli Soares RN  Outcome: Progressing     Problem: Safety - Adult  Goal: Free from fall injury  5/29/2022 0041 by Araceli Soares RN  Outcome: Progressing  Note: Fall precautions in place, bed alarm on, nonskid foot wear applied, bed in lowest position, and call light within reach. Will continue to monitor. Problem: ABCDS Injury Assessment  Goal: Absence of physical injury  5/29/2022 0041 by Araceli Soares RN  Outcome: Progressing     Problem: Cardiovascular - Adult  Goal: Maintains optimal cardiac output and hemodynamic stability  5/29/2022 0041 by Araceli Soares RN  Outcome: Progressing  Note: VSS. Telemetry monitoring: Afib, rate controlled. PO Cardizem, anticoagulated on warfarin. Pharmacy to dose warfarin. Daily check INR. Cardiology consulted. Problem: Skin/Tissue Integrity - Adult  Goal: Skin integrity remains intact  5/29/2022 0041 by Araceli Soares RN  Outcome: Progressing    Problem: Musculoskeletal - Adult  Goal: Return mobility to safest level of function  5/29/2022 0041 by Araceli Soares RN  Outcome: Progressing  Note: PT/OT consulted. Problem: Infection - Adult  Goal: Absence of infection at discharge  5/29/2022 0041 by Araceli Soares RN  Outcome: Progressing  Note: IV Abx. Bolus fluid given in ED. Encouraging intake. Nephrology consulted.

## 2022-05-29 NOTE — PROGRESS NOTES
HOSPITALISTS PROGRESS NOTE    2022 9:08 AM        Name: Pita Poe . Admitted: 2022  Primary Care Provider: BRIGHT Nash CNP (Tel: [phone])      Brief Course: This 79 y.o. female  with PMHx of A. fib on Coumadin, hypertension, hyperlipidemia, GIB and CKD presented with fatigueOn admission, patient was tachycardic& tachypneic and was noted to be in A. fib with RVR was given IV diltiazem. Initial diagnostic lab work showed creatinine elevated 1.6, lactic acid 2.7, A, bicarb: 19, BNP: 1,024. Urinalysis suggestive of UTI. CXR negative for pneumonia. CT head negative for any acute intracranial       Interval history:   Pt seen and examined today   Overnight events noted and interval ancillary notes  Remains in A. fib with HR around 73  Afebrile overnight, WBCs within normal limits, cultures in progress  Creatinine trended down to 1.4  denied any fevers, chills, chest pain, palpitations, cough, SOB, dizziness, urinary frequency urgency      Assessment & Plan:     Severe sepsis likely secondary to UTI  Presented with tachycardia, tachypnea, lactic acidosis and CHARISSE  Status fluid resuscitation according to sepsis protocol. Urinalysis suggestive for UTI. Urine and blood cultures in process  Continue IV antibiotics awaiting cultures and sensitivities     UTI: Urine culture growing > 100,000 E. coli: Continue IV ceftriaxone     A. fib with RVR: Controlled with HR between 70 to 80s  Status 1 dose of IV diltiazem  Cardiology on board: Appreciate the recs  Continue Coumadin, diltiazem and beta-blockers  INR: Subtherapeutic on admission.   Pharmacy consulted to monitor Coumadin dosing  PT/INR daily and monitor on telemetry     Acute kidney injury on CKD stage III: Creatinine elevated to 1.6 on admission; baseline creatinine 1.2  Nephrology consulted: Continue IV fluids  Avoid nephrotoxin, daily weights, strict intake/ output  Monitor renal function closely     Anion gap metabolic acidosis likely secondary to lactic acidosis and CHARISSE  Continue IV fluids     Hypertension: Continue home medications and monitor BP closely     Hyperlipidemia: Continue statins     Hx of GI bleed, hemoglobin stable  Continue Protonix daily. Monitor CBC closely     DVT prophylaxis: Coumadin  Code:Full Code  Diet: ADULT DIET; Regular    Disposition:     Current Medications  sodium chloride flush 0.9 % injection 5-40 mL, 2 times per day  sodium chloride flush 0.9 % injection 5-40 mL, PRN  0.9 % sodium chloride infusion, PRN  ondansetron (ZOFRAN-ODT) disintegrating tablet 4 mg, Q8H PRN   Or  ondansetron (ZOFRAN) injection 4 mg, Q6H PRN  polyethylene glycol (GLYCOLAX) packet 17 g, Daily PRN  acetaminophen (TYLENOL) tablet 650 mg, Q6H PRN   Or  acetaminophen (TYLENOL) suppository 650 mg, Q6H PRN  atorvastatin (LIPITOR) tablet 20 mg, Nightly  PARoxetine (PAXIL) tablet 40 mg, Daily  pantoprazole (PROTONIX) tablet 40 mg, QAM AC  [START ON 5/30/2022] warfarin (COUMADIN) tablet 1.25 mg, Once per day on Mon Wed Fri   And  warfarin (COUMADIN) tablet 2.5 mg, Once per day on Sun Tue Thu Sat  cefTRIAXone (ROCEPHIN) 1000 mg in sterile water 10 mL IV syringe, Q24H  dilTIAZem (CARDIZEM) tablet 30 mg, 2 times per day  atenolol (TENORMIN) tablet 100 mg, Daily        Objective:  /86   Pulse 73   Temp 98 °F (36.7 °C) (Oral)   Resp 16   Ht 5' 6\" (1.676 m)   Wt 155 lb 3.2 oz (70.4 kg)   SpO2 96%   BMI 25.05 kg/m²     Intake/Output Summary (Last 24 hours) at 5/29/2022 5553  Last data filed at 5/28/2022 2018  Gross per 24 hour   Intake --   Output 150 ml   Net -150 ml      Wt Readings from Last 3 Encounters:   05/29/22 155 lb 3.2 oz (70.4 kg)   10/26/21 154 lb (69.9 kg)   09/15/21 152 lb 3.2 oz (69 kg)       Physical Examination:   General appearance:  No apparent distress, appears stated age and cooperative. Eyes: Sclera clear.  Pupils equal.  ENT: Moist oral mucosa. Trachea midline, no adenopathy. Cardiovascular: Regular rhythm, normal S1, S2. No murmur. No edema in lower extremities  Respiratory:Not using accessory muscles. Good inspiratory effort. Clear to auscultation bilaterally, no wheeze or crackles. GI: Abdomen soft, no tenderness, not distended, normal bowel sounds  Musculoskeletal: normal ROM, No cyanosis in digits  Neurology: CN 2-12 grossly intact. No speech or motor deficits  Psych: Normal affect. Alert and oriented in time, place and person  Skin: Warm, dry, normal turgor    Labs and Tests:  CBC:   Recent Labs     05/28/22  1140   WBC 8.5   HGB 12.4        BMP:    Recent Labs     05/28/22  1140 05/29/22  0424    142   K 4.1 3.9    106   CO2 19* 24   BUN 22* 17   CREATININE 1.6* 1.4*   GLUCOSE 154* 107*     Hepatic:   Recent Labs     05/28/22  1140   AST 25   ALT 11   BILITOT 0.5   ALKPHOS 109     CT HEAD WO CONTRAST   Final Result   No acute intracranial abnormality. XR CHEST PORTABLE   Final Result   Cardiomegaly with no acute airspace abnormality. Problem List  Principal Problem:    Sepsis ([facility] [address]  Resolved Problems:    * No resolved hospital problems.  Yolanda Lama MD   5/29/2022 9:08 AM

## 2022-05-29 NOTE — PROGRESS NOTES
Pharmacy to Dose Warfarin    Pharmacy consulted to dose warfarin for afib. INR Goal: 2-3    INR today: 1.29    Assessment/Plan:  - INR remains subtherapeutic.   - Home dose is alternating 2.5/1.25 mg doses. Received 2.5 mg last night and will receive 2.5 mg again tonight. Pharmacy will continue to follow.     Dante Chavez, PharmD, [facility]

## 2022-05-30 LAB
ANION GAP SERPL CALCULATED.3IONS-SCNC: 12 MMOL/L (ref 3–16)
BASOPHILS ABSOLUTE: 0 K/UL (ref 0–0.2)
BASOPHILS RELATIVE PERCENT: 0.2 %
BUN BLDV-MCNC: 15 MG/DL (ref 7–20)
CALCIUM SERPL-MCNC: 8.5 MG/DL (ref 8.3–10.6)
CHLORIDE BLD-SCNC: 105 MMOL/L (ref 99–110)
CO2: 25 MMOL/L (ref 21–32)
CREAT SERPL-MCNC: 1.4 MG/DL (ref 0.6–1.2)
EOSINOPHILS ABSOLUTE: 0.1 K/UL (ref 0–0.6)
EOSINOPHILS RELATIVE PERCENT: 1.9 %
GFR AFRICAN AMERICAN: 44
GFR NON-AFRICAN AMERICAN: 36
GLUCOSE BLD-MCNC: 109 MG/DL (ref 70–99)
HCT VFR BLD CALC: 34.1 % (ref 36–48)
HEMOGLOBIN: 10.9 G/DL (ref 12–16)
INR BLD: 1.36 (ref 0.87–1.14)
LYMPHOCYTES ABSOLUTE: 1.8 K/UL (ref 1–5.1)
LYMPHOCYTES RELATIVE PERCENT: 24.8 %
MCH RBC QN AUTO: 27.6 PG (ref 26–34)
MCHC RBC AUTO-ENTMCNC: 32.1 G/DL (ref 31–36)
MCV RBC AUTO: 85.9 FL (ref 80–100)
MONOCYTES ABSOLUTE: 0.6 K/UL (ref 0–1.3)
MONOCYTES RELATIVE PERCENT: 7.7 %
NEUTROPHILS ABSOLUTE: 4.8 K/UL (ref 1.7–7.7)
NEUTROPHILS RELATIVE PERCENT: 65.4 %
ORGANISM: ABNORMAL
PDW BLD-RTO: 16 % (ref 12.4–15.4)
PLATELET # BLD: 308 K/UL (ref 135–450)
PMV BLD AUTO: 8.2 FL (ref 5–10.5)
POTASSIUM SERPL-SCNC: 4.4 MMOL/L (ref 3.5–5.1)
PRO-BNP: 2359 PG/ML (ref 0–449)
PROTHROMBIN TIME: 16.7 SEC (ref 11.7–14.5)
RBC # BLD: 3.97 M/UL (ref 4–5.2)
SODIUM BLD-SCNC: 142 MMOL/L (ref 136–145)
URINE CULTURE, ROUTINE: ABNORMAL
WBC # BLD: 7.4 K/UL (ref 4–11)

## 2022-05-30 PROCEDURE — 2580000003 HC RX 258: Performed by: INTERNAL MEDICINE

## 2022-05-30 PROCEDURE — 85025 COMPLETE CBC W/AUTO DIFF WBC: CPT

## 2022-05-30 PROCEDURE — 97530 THERAPEUTIC ACTIVITIES: CPT

## 2022-05-30 PROCEDURE — 6360000002 HC RX W HCPCS: Performed by: INTERNAL MEDICINE

## 2022-05-30 PROCEDURE — 97535 SELF CARE MNGMENT TRAINING: CPT

## 2022-05-30 PROCEDURE — 97162 PT EVAL MOD COMPLEX 30 MIN: CPT

## 2022-05-30 PROCEDURE — 99231 SBSQ HOSP IP/OBS SF/LOW 25: CPT | Performed by: INTERNAL MEDICINE

## 2022-05-30 PROCEDURE — 1200000000 HC SEMI PRIVATE

## 2022-05-30 PROCEDURE — 6370000000 HC RX 637 (ALT 250 FOR IP): Performed by: INTERNAL MEDICINE

## 2022-05-30 PROCEDURE — 85610 PROTHROMBIN TIME: CPT

## 2022-05-30 PROCEDURE — 80048 BASIC METABOLIC PNL TOTAL CA: CPT

## 2022-05-30 PROCEDURE — 97116 GAIT TRAINING THERAPY: CPT

## 2022-05-30 PROCEDURE — 36415 COLL VENOUS BLD VENIPUNCTURE: CPT

## 2022-05-30 PROCEDURE — 97166 OT EVAL MOD COMPLEX 45 MIN: CPT

## 2022-05-30 PROCEDURE — 83880 ASSAY OF NATRIURETIC PEPTIDE: CPT

## 2022-05-30 RX ORDER — WARFARIN SODIUM 2.5 MG/1
2.5 TABLET ORAL
Status: DISCONTINUED | OUTPATIENT
Start: 2022-05-30 | End: 2022-05-31 | Stop reason: HOSPADM

## 2022-05-30 RX ORDER — WARFARIN SODIUM 2.5 MG/1
1.25 TABLET ORAL
Status: DISCONTINUED | OUTPATIENT
Start: 2022-05-31 | End: 2022-05-31 | Stop reason: HOSPADM

## 2022-05-30 RX ADMIN — ATENOLOL 100 MG: 50 TABLET ORAL at 08:01

## 2022-05-30 RX ADMIN — Medication 1000 MG: at 20:22

## 2022-05-30 RX ADMIN — SODIUM CHLORIDE: 9 INJECTION, SOLUTION INTRAVENOUS at 20:17

## 2022-05-30 RX ADMIN — ATORVASTATIN CALCIUM 20 MG: 10 TABLET, FILM COATED ORAL at 20:21

## 2022-05-30 RX ADMIN — PANTOPRAZOLE SODIUM 40 MG: 40 TABLET, DELAYED RELEASE ORAL at 06:33

## 2022-05-30 RX ADMIN — DILTIAZEM HYDROCHLORIDE 30 MG: 30 TABLET, FILM COATED ORAL at 20:21

## 2022-05-30 RX ADMIN — DILTIAZEM HYDROCHLORIDE 30 MG: 30 TABLET, FILM COATED ORAL at 08:01

## 2022-05-30 RX ADMIN — PAROXETINE HYDROCHLORIDE 40 MG: 20 TABLET, FILM COATED ORAL at 08:01

## 2022-05-30 RX ADMIN — WARFARIN SODIUM 2.5 MG: 2.5 TABLET ORAL at 17:02

## 2022-05-30 RX ADMIN — SODIUM CHLORIDE, PRESERVATIVE FREE 10 ML: 5 INJECTION INTRAVENOUS at 20:22

## 2022-05-30 ASSESSMENT — PAIN SCALES - GENERAL: PAINLEVEL_OUTOF10: 0

## 2022-05-30 NOTE — PROGRESS NOTES
Pharmacy to Dose Warfarin    Pharmacy consulted to dose warfarin for afib. INR Goal: 2-3    INR today: 1.36    Assessment/Plan:  - Give 2.5 mg again tonight.  - Consider increased weekly dose upon d/c for patient. Pharmacy will continue to follow.     Andres Ledezma, PharmD  Pharmacy Resident   [id number]

## 2022-05-30 NOTE — PROGRESS NOTES
[facility] [address]   Phone: [phone]    Physical Therapy    [x] Initial Evaluation            [] Daily Treatment Note         [] Discharge Summary      Patient: Ruben Whitlock   : 1940   MRN: [id number]   Date of Service:  2022  Admitting Diagnosis: Sepsis [facility])  Current Admission Summary:    Fatigue       arrived via [address] tw[address] from home d/t not feeling well that started approx 4 days ago, weakness, n/v, hx of afib, fall last week was not seen; SOB with exertion; 4mg of Zofran IV enroute         History of Present Illness:  Laurie Dominguez is a 79 y. o. female  with PMHx of A. fib on Coumadin, hypertension, hyperlipidemia, GIB and CKD presented with fatigue. Patient reported that she has not been feeling well for the past 4 days and has been experiencing fatigue, headache, loss of appetite and intermittent shortness of breath on exertion. Patient reported that she fell last week but did not seek any medical attention. Patient denied any fever, chills, chest pain, palpitations, dizziness, orthopnea, PND, BLE edema, bowel or bladder dysfunction, sick contacts, recent travel or change in medications.  On admission, patient was tachycardic& tachypneic and was noted to be in A. fib with RVR was given IV diltiazem.  Initial diagnostic lab work showed creatinine elevated 1.6, lactic acid 2.7, A, bicarb: 19, BNP: 1,024.  Urinalysis suggestive of UTI. CXR negative for pneumonia. CT head negative for any acute intracranial pathology. Past Medical History:  has a past medical history of A-fib [facility]), Atrial fibrillation ([facility] [address] Hypercholesterolemia, Hypertension, and Pelvic mass. Past Surgical History:  has a past surgical history that includes Appendectomy; Cystocopy (Right, 2018); Hysterectomy, total abdominal (2018); pr cysto/uretero w/lithotripsy &indwell stent insrt (Right, 10/31/2018);  Upper gastrointestinal endoscopy (N/A, 2020); Upper gastrointestinal endoscopy (N/A, 11/29/2020); and Upper gastrointestinal endoscopy (N/A, 3/2/2021). Discharge Recommendations: home with PRN assist and [address] scored a 20/24 on the AM-PAC short mobility form. Current research shows that an AM-PAC score of 18 or greater is typically associated with a discharge to the patient's home setting. Based on the patient's AM-PAC score and their current functional mobility deficits, it is recommended that the patient have 2-3 sessions per week of Physical Therapy at d/c to increase the patient's independence. At this time, this patient demonstrates the endurance and safety to discharge home with HHPT and a follow up treatment frequency of 2-3x/wk. Please see assessment section for further patient specific details. If patient discharges prior to next session this note will serve as a discharge summary. Please see below for the latest assessment towards goals. DME Required For Discharge: no DME required at discharge  Precautions/Restrictions: high fall risk  Weight Bearing Restrictions: no restrictions  [] Right Upper Extremity  [] Left Upper Extremity [] Right Lower Extremity  [] Left Lower Extremity     Required Braces/Orthotics: no braces required   [] Right  [] Left  Positional Restrictions:no positional restrictions    Pre-Admission Information   Lives With: spouse                  Type of Home: house  Home Layout: one level  Home Access: 1 step to enter from garage and then 1 step into the kitchen step to enter with no handrail. Laundry in basement-- 8 steps with 1 handrail on right side. Bathroom Layout: Patient showers in the basement in the shower stall. Shower stall has grab bars, and shower chair with back. Patient has a bathtub on the main floor of house but does not use. Walker accessible.    Toilet Height: elevated height  Bathroom Equipment: grab bars in shower  Home Equipment: single point cane, alert equal B  Stair Mobility  Stair mobility not completed on this date. Comments:  Wheelchair Mobility:  No w/c mobility completed on this date. Comments:  Balance  Static Sitting Balance: good: independent with functional balance in unsupported position  Dynamic Sitting Balance: good: independent with functional balance in unsupported position  Static Standing Balance: fair (+): maintains balance at SBA/supervision without use of UE support  Dynamic Standing Balance: fair (+): maintains balance at SBA/supervision without use of UE support  Comments:    Other Therapeutic Interventions    Functional Outcomes  AM-PAC Inpatient Mobility Raw Score : 20              Cognition  WFL  Orientation:    alert and oriented x 4  Command Following:   [facility]    Education  Barriers To Learning: none  Patient Education: patient educated on goals, PT role and benefits, plan of care, general safety, functional mobility training, discharge recommendations  Learning Assessment:  patient verbalizes and demonstrates understanding    Assessment  Activity Tolerance: Poor activity tolerance as pt visibly out of breath with short distance ambulation. Required seated rest after ambulating 75 ft with ~ 5 min recovery. O2 ~94-95% on RA after ~ 4 min rest from ambulation. Impairments Requiring Therapeutic Intervention: decreased functional mobility, decreased strength, decreased endurance, decreased balance  Prognosis: good  Clinical Assessment: Pt was previously independent with all functional mobility, ADL's, and household tasks. Pt is below baseline function with significant fatigue after ambulating household distances while she was previously ambulating community distances. Pt would benefit from acute PT services to return to PLOF, dec fall risk, and maintain independence.   Safety Interventions: patient left in chair, chair alarm in place, call light within reach, gait belt, patient at risk for falls and nurse notified    Plan  Frequency: 3-5 x/per week  Current Treatment Recommendations: strengthening, balance training, functional mobility training, transfer training, gait training, stair training, endurance training and home exercise program    Goals  Patient Goals: to go home   Short Term Goals:  Time Frame: upon d/c  Patient will complete transfers at Independent   Patient will ambulate 150 ft with use of LRAD at Independent, modified independent  Patient will ascend/descend 6 stairs with (R) ascending handrail at [address] Time      Individual Group Co-treatment   Time In     1111   Time Out     1149   Minutes     38     Timed Code Treatment Minutes:   23   Total Treatment Minutes:  38       Electronically Signed By: Jay Davison, PT

## 2022-05-30 NOTE — PROGRESS NOTES
MD Ariana Vigil MD Anita Peasant, MD                                  Office: [phone]                 Fax: [phone]          [employer]                     NEPHROLOGY IN PATIENT PROGRESS NOTE:     PATIENT NAME: Cece Brandt  : 1940  MRN: [id number]            Subjective:       Doing better. Less hypotension. Improving urine output. Medications reviewed. IV fluids reviewed. Assessment and Plan    Assessment:     Acute kidney injury-mild worsening-with decreased urine output. UTI with sepsis on IV antibiotics. A. fib with RVR. Hypertension. Well-controlled. Chronic kidney disease. Stage IIIa. Hypertensive kidney disease.         Plan:       Acute kidney injury-on admission-creatinine is 1.6.  - Etiology likely multifactorial.  - Possible prerenal from mild volume depletion.  - May have developed ATN-high risk-admitted for UTI with sepsis and also has hypotension.  - Get a bedside bladder scan to exclude any bladder outlet obstruction. Renal functions improved since admission-repeat creatinine is 1.4 with much improved urine output.  - Etiology most likely prerenal from hypotension precipitated by A. fib and RVR and decreased kidney perfusion     A. fib-with RVR-cardiology following.  - Borderline hypotension noted. - Heart rate much improved. UTI with sepsis on IV antibiotics. - Currently on Rocephin 1 g IV.     Hypotension-improving since admission. .  - Medications include atenolol 100 mg once daily. Diltiazem 30 mg once daily. .  - Blood pressure trending upwards. - Continue to monitor.  - May need to increase diltiazem-bradycardia improved     - ACE inhibitor on hold.     Chronic kidney disease. Stage IIIa with a baseline creatinine of 1.2.  - Etiology likely multifactorial.  - Possible related to hypertensive kidney disease.     Medications reviewed. All nephrotoxic medications have been discontinued.   Hold Ace inhibitors till renal recovery is complete. Renal stable          EXAM  Vitals:    05/30/22 0745   BP: (!) 148/91   Pulse: 80   Resp: 16   Temp: 98.3 °F (36.8 °C)   SpO2: 93%       Intake/Output Summary (Last 24 hours) at 5/30/2022 1019  Last data filed at 5/30/2022 0948  Gross per 24 hour   Intake 1419.1 ml   Output 470 ml   Net 949.1 ml       ill appearing. mild respiratory distress. more awake and alert. no hypotension      Principal Problem:    Sepsis ([address]  Resolved Problems:    * No resolved hospital problems. *        Medications Reviewed by me   sodium chloride flush  5-40 mL IntraVENous 2 times per day    atorvastatin  20 mg Oral Nightly    PARoxetine  40 mg Oral Daily    pantoprazole  40 mg Oral QAM AC    warfarin  1.25 mg Oral Once per day on Mon Wed Fri    And    warfarin  2.5 mg Oral Once per day on Sun Tue Thu Sat    cefTRIAXone (ROCEPHIN) IV  1,000 mg IntraVENous Q24H    dilTIAZem  30 mg Oral 2 times per day    atenolol  100 mg Oral Daily      sodium chloride 75 mL/hr at 05/30/22 0449    sodium chloride         Data Review. Labs reviewed by me       CBC:   Recent Labs     05/28/22  1140 05/30/22  0506   WBC 8.5 7.4   HGB 12.4 10.9*   HCT 39.5 34.1*   MCV 86.7 85.9    308     BMP:   Recent Labs     05/28/22  1140 05/29/22  0424 05/30/22  0506    142 142   K 4.1 3.9 4.4    106 105   CO2 19* 24 25   BUN 22* 17 15   CREATININE 1.6* 1.4* 1.4*     Magnesium:   Lab Results   Component Value Date    MG 1.80 12/03/2020    MG 1.80 12/02/2020    MG 1.70 12/01/2020     Lab Results   Component Value Date    CREATININE 1.4 05/30/2022       Arterial Blood Gasses  No results for input(s): PH, PCO2, PO2 in the last 72 hours.     Invalid input(s): P1RSTHAPEIUZ, INSPIREDO2    UA:  Recent Labs     05/28/22  1157   COLORU Yellow   PHUR 6.5   WBCUA 115*   RBCUA 2   BACTERIA 4+*   CLARITYU Clear   SPECGRAV 1.020   LEUKOCYTESUR MODERATE*   UROBILINOGEN 1.0   BILIRUBINUR Negative   BLOODU TRACE* GLUCOSEU Negative       LIVER PROFILE:   Recent Labs     05/28/22  1140   AST 25   ALT 11   LIPASE 31.0   BILITOT 0.5   ALKPHOS 109     PT/INR:    Lab Results   Component Value Date    PROTIME 16.7 05/30/2022    PROTIME 16.1 05/29/2022    PROTIME 16.1 05/28/2022    PROTIME 3.3 07/08/2013    PROTIME 3.7 06/25/2013    PROTIME 2.9 06/27/2012    INR 1.36 05/30/2022    INR 1.29 05/29/2022    INR 1.29 05/28/2022     PTT:    Lab Results   Component Value Date    APTT 29.8 03/02/2021    APTT 26.3 10/31/2018    APTT 26.3 07/09/2018     SHAY:  No results found for: ANATITER, SHAY  CHEMISTRY COMMON GROUP :   Lab Results   Component Value Date    GLUCOSE 109 05/30/2022    GLUCOSE 144 11/01/2021     Recent Labs     05/28/22  1140 05/29/22  0424 05/30/22  0506   GLUCOSE 154* 107* 109*   CALCIUM 9.2 8.6 8.5         RADIOLOGY:        Imaging Results. Chest X Ray reviwed by me    Chest Xray Reviewed by me  Renal Ultrasound Reviewed by me    EKG reviewed by me.                 Electronically Signed: Vidya Diaz MD 5/30/2022 10:19 AM

## 2022-05-30 NOTE — PROGRESS NOTES
[address]   Progress Note  Cardiology    CC:  UTI, Afib    HPI:  She feels better. HR controlled    Medications/Labs all Reviewed    Lab Results   Component Value Date    WBC 7.4 05/30/2022    HGB 10.9 (L) 05/30/2022    HCT 34.1 (L) 05/30/2022    MCV 85.9 05/30/2022     05/30/2022     Lab Results   Component Value Date    CREATININE 1.4 (H) 05/30/2022    BUN 15 05/30/2022     05/30/2022    K 4.4 05/30/2022     05/30/2022    CO2 25 05/30/2022     Lab Results   Component Value Date    INR 1.36 (H) 05/30/2022    PROTIME 16.7 (H) 05/30/2022        PHYSICAL EXAM   BP (!) 148/91   Pulse 80   Temp 98.3 °F (36.8 °C) (Oral)   Resp 16   Ht 5' 6\" (1.676 m)   Wt 156 lb 14.4 oz (71.2 kg)   SpO2 93%   BMI 25.32 kg/m²      Respiratory:  · Resp Assessment: Normal respiratory effort  · Resp Auscultation: Clear to auscultation bilaterally   Cardiovascular:  · Auscultation: regular rate and irregular rhythm, normal S1S2, no murmur, rub or gallop  · Palpation:  Nl PMI  · JVP:  normal  · Extremities: No Edema  Abdomen:  · Soft, non-tender  · Normal bowel sounds  Extremities:  ·  No Cyanosis or Clubbing  Neurological/Psychiatric:  · Oriented to time, place, and person  · Non-anxious  Skin:   · Warm and dry    TELE; (tracings personally reviewed)  Afib, controlled      ASSESSMENT:     Afib:  Chronic. Rate currently controlled on atenolol 100 mg.  Continue  Continue anticoagulation  Watch INR with antibiotics  Stable     UTI:  On antibiotics per medicine     Plan:  Stable cardiac status  Will sign off   Call for questions        Karlie Montez MD, 5/30/2022 11:14 AM

## 2022-05-30 NOTE — PLAN OF CARE
Problem: Discharge Planning  Goal: Discharge to home or other facility with appropriate resources  Outcome: Progressing     Problem: Safety - Adult  Goal: Free from fall injury  Outcome: Progressing  Note: Fall precautions in place, bed alarm on, nonskid foot wear applied, bed in lowest position, and call light within reach. Will continue to monitor. Problem: ABCDS Injury Assessment  Goal: Absence of physical injury  Outcome: Progressing     Problem: Cardiovascular - Adult  Goal: Maintains optimal cardiac output and hemodynamic stability  Outcome: Progressing     Problem: Skin/Tissue Integrity - Adult  Goal: Skin integrity remains intact  Outcome: Progressing     Problem: Musculoskeletal - Adult  Goal: Return mobility to safest level of function  Outcome: Progressing     Problem: Gastrointestinal - Adult  Goal: Minimal or absence of nausea and vomiting  Outcome: Progressing     Problem: Infection - Adult  Goal: Absence of infection at discharge  Outcome: Progressing  Note: IV Abx. Problem: Metabolic/Fluid and Electrolytes - Adult  Goal: Electrolytes maintained within normal limits  Outcome: Progressing  Goal: Hemodynamic stability and optimal renal function maintained  Note: IVF infusing. Echo ordered. Nephrology and cardiology following.      Problem: Hematologic - Adult  Goal: Maintains hematologic stability  Outcome: Progressing

## 2022-05-30 NOTE — PROGRESS NOTES
[address] [facility],6[address] Msb Department   Phone: [phone]    Occupational Therapy    [x] Initial Evaluation            [] Daily Treatment Note         [] Discharge Summary      Patient: Cece Brandt   : 1940   MRN: [id number]   Date of Service:  2022    Admitting Diagnosis:  Sepsis [facility])  Current Admission Summary:    Fatigue       arrived via [address] from home d/t not feeling well that started approx 4 days ago, weakness, n/v, hx of afib, fall last week was not seen; SOB with exertion; 4mg of Zofran IV enroute         History of Present Illness:  Cece Brandt is a 79 y.o. female  with PMHx of A. fib on Coumadin, hypertension, hyperlipidemia, GIB and CKD presented with fatigue. Patient reported that she has not been feeling well for the past 4 days and has been experiencing fatigue, headache, loss of appetite and intermittent shortness of breath on exertion. Patient reported that she fell last week but did not seek any medical attention. Patient denied any fever, chills, chest pain, palpitations, dizziness, orthopnea, PND, BLE edema, bowel or bladder dysfunction, sick contacts, recent travel or change in medications. On admission, patient was tachycardic& tachypneic and was noted to be in A. fib with RVR was given IV diltiazem. Initial diagnostic lab work showed creatinine elevated 1.6, lactic acid 2.7, A, bicarb: 19, BNP: 1,024. Urinalysis suggestive of UTI. CXR negative for pneumonia. CT head negative for any acute intracranial pathology.       Past Medical History:  has a past medical history of A-fib [facility]), Atrial fibrillation ([facility] [address] Hypercholesterolemia, Hypertension, and Pelvic mass. Past Surgical History:  has a past surgical history that includes Appendectomy; Cystocopy (Right, 2018); Hysterectomy, total abdominal (2018); pr cysto/uretero w/lithotripsy &indwell stent insrt (Right, 10/31/2018);  Upper gastrointestinal endoscopy (N/A, 11/29/2020); Upper gastrointestinal endoscopy (N/A, 11/29/2020); and Upper gastrointestinal endoscopy (N/A, 3/2/2021). Patient stated had a severe nosebleed in April. Stated blood was all over the white carpet. Reports no other nosebleeds before or since. Discharge Recommendations: D/C to home with daughters to help with homemaking tasks, laundry in the basement, supervision with up/down basement steps to laundry and shower. DME Required For Discharge: no DME required at discharge    Precautions/Restrictions: high fall risk  Weight Bearing Restrictions: no restrictions  [] Right Upper Extremity  [] Left Upper Extremity [] Right Lower Extremity  [] Left Lower Extremity     Required Braces/Orthotics: no braces required   [] Right  [] Left  Positional Restrictions:no positional restrictions    Pre-Admission Information   Lives With: spouse    Type of Home: house  Home Layout: one level  Home Access: 1 step to enter from garage and then 1 step into the kitchen step to enter with no handrail. Laundry in basement-- 8 steps with 1 handrail on right side. Bathroom Layout: Patient showers in the basement in the shower stall. Shower stall has grab bars, and shower chair with back. Patient has a bathtub on the main floor of house but does not use. Walker accessible. Toilet Height: elevated height  Bathroom Equipment: grab bars in shower  Home Equipment: single point cane, alert button  Transfer Assistance: Independent without use of device  Ambulation Assistance:Independent without use of device  ADL Assistance: independent with all ADL's  IADL Assistance: independent with homemaking tasks  Active :        [x] Yes  [] No  Hand Dominance: [] Left  [x] Right  Current Employment: retired. Occupation:  and realtor  Hobbies: [address]: Patient had a fall 1 week ago at [place]-- stated felt lightheaded when checking out her groceries.  Patient stated \"next thing I knew I was on the floor and the life squad was there. \" Patient stated refused to go to the hospital and drove home. Examination   BP beginning of session-- 123/73, 93% O2 room air, 86 HR. After ambulation sats 95% O2 room air, HR in 70s. Vision:   Vision Corrective Device: wears glasses for reading  Hearing:   WFL  Perception:   WFL  Observation:   General Observation:  Patient c/o fatigue 75 feet and needed to take a sitting break, ambulated back to room (75 feet) and stated very tired. Posture:   Head forward, protracted shoulders  Sensation:   WFL  Proprioception:    WFL  Tone:   Normotonic  Coordination Testing:   WFL    ROM:   (B) UE AROM WFL  Strength:   (B) UE strength grossly WFL    Decision Making: medium complexity  Clinical Presentation: stable      Subjective  General: Patient supine in bed, reports fatigue from being in the bed a lot. No c/o pain. Patient reports is very independent at home. Pain: 0/10  Pain Interventions: not applicable        Activities of Daily Living  Basic Activities of Daily Living  Feeding: Independent  Grooming: Independent  Lower Extremity Dressing: setup assistance. Equipment: none  Toileting: supervision. Equipment: none  Toileting Comments: SBA commode transfers. Very fatigued from hallway walk  General Comments: Setup socks sitting EOB, setup to don pullup attends  Instrumental Activities of Daily Living  No IADL completed on this date. Functional Mobility  Bed Mobility  Supine to Sit: Independent  Scooting: Independent  Comments:  Transfers  Sit to stand transfer:stand by assistance  Bed / Chair Transfer: stand by assistance. Mobility technique: ambulating. Equipment utilized: no device  Toilet transfer: stand by assistance. Mobility technique: ambulating. Equipment utilized: standard toilet  Comments:  Functional Mobility:  Functional Mobility: no device. stand by assistance, contact guard assistance. Activity: to/from bathroom  75 feet in hallway x 2. Patient reported felt very fatigued and wanting to sit in waiting room chair. Patent stated would be too fatigued to walk a longer distance. Patient stated was just walking in [address] (pushed grocery cart) just last week    Other Therapeutic Interventions    Functional Outcomes Pita Poe scored a 22/24 on the AM-PAC ADL Inpatient form. Current research shows that an AM-PAC score of 18 or greater is typically associated with a discharge to the patient's home setting. Based on the patient's AM-PAC score, and their current ADL deficits, it is recommended that the patient have 2-3 sessions per week of Occupational Therapy at d/c to increase the patient's independence. At this time, this patient demonstrates the endurance and safety to discharge home with home OT and a follow up treatment frequency of 2-3x/wk. Please see assessment section for further patient specific details. If patient discharges prior to next session this note will serve as a discharge summary. Please see below for the latest assessment towards goals. AM-PAC Inpatient Daily Activity Raw Score: 22    HOME HEALTH CARE: LEVEL 3 SAFETY-- Patient lives alone and is very independent. Recommend home OT to increase ADLs and IADL endurance. Recommend daughter perform laundry in basement and grocery shopping until patient's endurance returns to baseline. Recommend supervision down the steps to basement where patient showers.  Patient aware of recommendations and in agreement      - Initial home health evaluation to occur within 24-48 hours, in patient home   - Therapy evaluations in home within 24-48 hours of discharge; including DME and home safety   - Frontload therapy 5 days, then 3x a week   - Therapy to evaluate if patient has [address] needs for personal care   -  evaluation within 24-48 hours, includes evaluation of resources and insurance to determine [place], [place], LTC, and Medicaid options     Cognition  [facility]  Orientation: alert and oriented x 4  Command Following:   [facility]     Education  Barriers To Learning: none  Patient Education: patient educated on goals, OT role and benefits, plan of care, discharge recommendations  Learning Assessment:  patient verbalizes and demonstrates understanding    Assessment  Activity Tolerance: Fair + (fatigued with ambulation)  Impairments Requiring Therapeutic Intervention: decreased functional mobility, decreased ADL status, decreased endurance, decreased IADL  Prognosis: excellent  Clinical Assessment: Patient is mildly decreased from baseline after being admitted for UTI. However patient had a fall at [address] 1 week prior. Reports decreased activity level and impaired endurance.  Recommend home OT and increase IADL support by family   Safety Interventions: patient left in chair, chair alarm in place, call light within reach, patient at risk for falls, sitter present and nurse notified    Plan  Frequency: 3-5 x/per week  Current Treatment Recommendations: functional mobility training, transfer training, endurance training, ADL/self-care training, IADL training and home management training    Goals  Patient Goals: Go home   Short Term Goals:  Time Frame: discharge    :Patient will complete lower body ADL at Independent   Patient will complete toileting at Independent   Patient will complete functional transfers at [address]   Patient will complete functional mobility at Independent   Patient to gather and transport IADL items at [facility]   Time In    1110   Time Out    1149   Minutes    39        Timed Code Treatment Minutes:    14  Total Treatment Minutes:  39       Electronically Signed By: Kisha Jalloh, [address] [place] [address]

## 2022-05-30 NOTE — PROGRESS NOTES
HOSPITALISTS PROGRESS NOTE    2022 9:02 AM        Name: Lucio Finnegan . Admitted: 2022  Primary Care Provider: BRIGHT Lowery CNP (Tel: [phone])      Brief Course: This 79 y.o. female  with PMHx of A. fib on Coumadin, hypertension, hyperlipidemia, GIB and CKD presented with fatigueOn admission, patient was tachycardic& tachypneic and was noted to be in A. fib with RVR was given IV diltiazem. Initial diagnostic lab work showed creatinine elevated 1.6, lactic acid 2.7, A, bicarb: 19, BNP: 1,024. Urinalysis suggestive of UTI. CXR negative for pneumonia. CT head negative for any acute intracranial       Interval history:     Pt seen and examined today. Overnight events noted, interval ancillary notes and labs reviewed. Remained in A. fib with heart rate controlled around 80s  Creatinine remained around 1.4  Afebrile, WBCs within normal limits  Patient reported that she is feeling fatigued today. Denied cough, SOB, chest pain, nausea, vomiting or abdominal pain      Assessment & Plan:     Severe sepsis likely secondary to UTI: Resolved  Presented with tachycardia, tachypnea, lactic acidosis and CHARISSE  Status fluid resuscitation according to sepsis protocol. Urinalysis suggestive for UTI. Urine and blood cultures in process  Continue IV antibiotics awaiting cultures and sensitivities     UTI: Urine culture growing > 100,000 E. coli: Continue IV ceftriaxone     A. fib with RVR: Controlled with HR between 70 to 80s  Status 1 dose of IV diltiazem  Cardiology on board: Appreciate their recs  Continue Coumadin, diltiazem and beta-blockers  INR: Subtherapeutic on admission.   Pharmacy consulted to monitor Coumadin dosing  PT/INR daily and monitor on telemetry     Acute kidney injury on CKD stage III: Likely prerenal 2/2 hypoperfusion in setting of sepsis/A. fib with RVR  Creatinine elevated to 1.6 on admission;( baseline 1.2) trended on two 1.4  Nephrology consulted: Continue IV fluids  Avoid nephrotoxin, daily weights, strict intake/ output  Monitor renal function closely     Anion gap metabolic acidosis likely secondary to lactic acidosis and CHARISSE: Resolved     Hypertension: Continue home medications and monitor BP closely     Hyperlipidemia: Continue statins     Hx of GI bleed, hemoglobin stable  Continue Protonix daily. Monitor CBC closely     DVT prophylaxis: Coumadin  Code:Full Code  Diet: ADULT DIET;  Regular    Disposition:     Current Medications  perflutren lipid microspheres (DEFINITY) injection 1.65 mg, ONCE PRN  0.9 % sodium chloride infusion, Continuous  sodium chloride flush 0.9 % injection 5-40 mL, 2 times per day  sodium chloride flush 0.9 % injection 5-40 mL, PRN  0.9 % sodium chloride infusion, PRN  ondansetron (ZOFRAN-ODT) disintegrating tablet 4 mg, Q8H PRN   Or  ondansetron (ZOFRAN) injection 4 mg, Q6H PRN  polyethylene glycol (GLYCOLAX) packet 17 g, Daily PRN  acetaminophen (TYLENOL) tablet 650 mg, Q6H PRN   Or  acetaminophen (TYLENOL) suppository 650 mg, Q6H PRN  atorvastatin (LIPITOR) tablet 20 mg, Nightly  PARoxetine (PAXIL) tablet 40 mg, Daily  pantoprazole (PROTONIX) tablet 40 mg, QAM AC  warfarin (COUMADIN) tablet 1.25 mg, Once per day on Mon Wed Fri   And  warfarin (COUMADIN) tablet 2.5 mg, Once per day on Sun Tue Thu Sat  cefTRIAXone (ROCEPHIN) 1000 mg in sterile water 10 mL IV syringe, Q24H  dilTIAZem (CARDIZEM) tablet 30 mg, 2 times per day  atenolol (TENORMIN) tablet 100 mg, Daily        Objective:  BP (!) 148/91   Pulse 80   Temp 98.3 °F (36.8 °C) (Oral)   Resp 16   Ht 5' 6\" (1.676 m)   Wt 156 lb 14.4 oz (71.2 kg)   SpO2 93%   BMI 25.32 kg/m²     Intake/Output Summary (Last 24 hours) at 5/30/2022 0902  Last data filed at 5/30/2022 0645  Gross per 24 hour   Intake 1179.1 ml   Output 470 ml   Net 709.1 ml      Wt Readings from Last 3 Encounters:   05/30/22 156 lb 14.4 oz (71.2 kg) 10/26/21 154 lb (69.9 kg)   09/15/21 152 lb 3.2 oz (69 kg)       Physical Examination:   General appearance:  No apparent distress, appears stated age and cooperative. Eyes: Sclera clear. Pupils equal.  ENT: Moist oral mucosa. Trachea midline, no adenopathy. Cardiovascular: Regular rhythm, normal S1, S2. No murmur. No edema in lower extremities  Respiratory:Not using accessory muscles. Good inspiratory effort. Clear to auscultation bilaterally, no wheeze or crackles. GI: Abdomen soft, no tenderness, not distended, normal bowel sounds  Musculoskeletal: normal ROM, No cyanosis in digits  Neurology: CN 2-12 grossly intact. No speech or motor deficits  Psych: Normal affect. Alert and oriented in time, place and person  Skin: Warm, dry, normal turgor    Labs and Tests:  CBC:   Recent Labs     05/28/22  1140   WBC 8.5   HGB 12.4        BMP:    Recent Labs     05/28/22  1140 05/29/22  0424 05/30/22  0506    142 142   K 4.1 3.9 4.4    106 105   CO2 19* 24 25   BUN 22* 17 15   CREATININE 1.6* 1.4* 1.4*   GLUCOSE 154* 107* 109*     Hepatic:   Recent Labs     05/28/22  1140   AST 25   ALT 11   BILITOT 0.5   ALKPHOS 109     CT HEAD WO CONTRAST   Final Result   No acute intracranial abnormality. XR CHEST PORTABLE   Final Result   Cardiomegaly with no acute airspace abnormality. Problem List  Principal Problem:    Sepsis ([address]  Resolved Problems:    * No resolved hospital problems.  Inocencia Pace MD   5/30/2022 9:02 AM

## 2022-05-31 VITALS
BODY MASS INDEX: 25.65 KG/M2 | RESPIRATION RATE: 16 BRPM | OXYGEN SATURATION: 93 % | HEART RATE: 82 BPM | DIASTOLIC BLOOD PRESSURE: 97 MMHG | HEIGHT: 66 IN | TEMPERATURE: 98.3 F | WEIGHT: 159.6 LBS | SYSTOLIC BLOOD PRESSURE: 161 MMHG

## 2022-05-31 LAB
ANION GAP SERPL CALCULATED.3IONS-SCNC: 9 MMOL/L (ref 3–16)
BASOPHILS ABSOLUTE: 0.1 K/UL (ref 0–0.2)
BASOPHILS RELATIVE PERCENT: 0.9 %
BUN BLDV-MCNC: 18 MG/DL (ref 7–20)
CALCIUM SERPL-MCNC: 8 MG/DL (ref 8.3–10.6)
CHLORIDE BLD-SCNC: 109 MMOL/L (ref 99–110)
CO2: 22 MMOL/L (ref 21–32)
CREAT SERPL-MCNC: 1.4 MG/DL (ref 0.6–1.2)
EOSINOPHILS ABSOLUTE: 0.2 K/UL (ref 0–0.6)
EOSINOPHILS RELATIVE PERCENT: 2.3 %
GFR AFRICAN AMERICAN: 44
GFR NON-AFRICAN AMERICAN: 36
GLUCOSE BLD-MCNC: 126 MG/DL (ref 70–99)
HCT VFR BLD CALC: 33.5 % (ref 36–48)
HEMOGLOBIN: 10.7 G/DL (ref 12–16)
INR BLD: 1.55 (ref 0.87–1.14)
LV EF: 63 %
LVEF MODALITY: NORMAL
LYMPHOCYTES ABSOLUTE: 2 K/UL (ref 1–5.1)
LYMPHOCYTES RELATIVE PERCENT: 26.2 %
MCH RBC QN AUTO: 27.5 PG (ref 26–34)
MCHC RBC AUTO-ENTMCNC: 31.9 G/DL (ref 31–36)
MCV RBC AUTO: 86.2 FL (ref 80–100)
MONOCYTES ABSOLUTE: 0.6 K/UL (ref 0–1.3)
MONOCYTES RELATIVE PERCENT: 7.3 %
NEUTROPHILS ABSOLUTE: 4.8 K/UL (ref 1.7–7.7)
NEUTROPHILS RELATIVE PERCENT: 63.3 %
PDW BLD-RTO: 16 % (ref 12.4–15.4)
PLATELET # BLD: 285 K/UL (ref 135–450)
PMV BLD AUTO: 7.9 FL (ref 5–10.5)
POTASSIUM SERPL-SCNC: 3.9 MMOL/L (ref 3.5–5.1)
PROTHROMBIN TIME: 18.6 SEC (ref 11.7–14.5)
RBC # BLD: 3.89 M/UL (ref 4–5.2)
SODIUM BLD-SCNC: 140 MMOL/L (ref 136–145)
WBC # BLD: 7.6 K/UL (ref 4–11)

## 2022-05-31 PROCEDURE — 80048 BASIC METABOLIC PNL TOTAL CA: CPT

## 2022-05-31 PROCEDURE — 93306 TTE W/DOPPLER COMPLETE: CPT

## 2022-05-31 PROCEDURE — 6370000000 HC RX 637 (ALT 250 FOR IP): Performed by: INTERNAL MEDICINE

## 2022-05-31 PROCEDURE — 2580000003 HC RX 258: Performed by: INTERNAL MEDICINE

## 2022-05-31 PROCEDURE — 85025 COMPLETE CBC W/AUTO DIFF WBC: CPT

## 2022-05-31 PROCEDURE — 85610 PROTHROMBIN TIME: CPT

## 2022-05-31 RX ORDER — ATENOLOL 100 MG/1
100 TABLET ORAL DAILY
Qty: 30 TABLET | Refills: 1 | Status: SHIPPED | OUTPATIENT
Start: 2022-06-01 | End: 2022-07-27 | Stop reason: SDUPTHER

## 2022-05-31 RX ORDER — WARFARIN SODIUM 2.5 MG/1
TABLET ORAL
Qty: 30 TABLET | Refills: 1 | Status: SHIPPED | OUTPATIENT
Start: 2022-05-31 | End: 2022-05-31

## 2022-05-31 RX ORDER — WARFARIN SODIUM 2.5 MG/1
TABLET ORAL
Qty: 30 TABLET | Refills: 1 | Status: SHIPPED | OUTPATIENT
Start: 2022-06-01 | End: 2022-06-02 | Stop reason: ALTCHOICE

## 2022-05-31 RX ORDER — WARFARIN SODIUM 2.5 MG/1
TABLET ORAL
Qty: 30 TABLET | Refills: 1 | Status: SHIPPED | OUTPATIENT
Start: 2022-05-31 | End: 2022-06-29

## 2022-05-31 RX ADMIN — DILTIAZEM HYDROCHLORIDE 30 MG: 30 TABLET, FILM COATED ORAL at 08:46

## 2022-05-31 RX ADMIN — PAROXETINE HYDROCHLORIDE 40 MG: 20 TABLET, FILM COATED ORAL at 08:46

## 2022-05-31 RX ADMIN — PANTOPRAZOLE SODIUM 40 MG: 40 TABLET, DELAYED RELEASE ORAL at 05:57

## 2022-05-31 RX ADMIN — ATENOLOL 100 MG: 50 TABLET ORAL at 08:46

## 2022-05-31 RX ADMIN — SODIUM CHLORIDE, PRESERVATIVE FREE 10 ML: 5 INJECTION INTRAVENOUS at 08:46

## 2022-05-31 ASSESSMENT — PAIN SCALES - GENERAL
PAINLEVEL_OUTOF10: 0
PAINLEVEL_OUTOF10: 0

## 2022-05-31 NOTE — PLAN OF CARE
Problem: Safety - Adult  Goal: Free from fall injury  Outcome: Progressing     Problem: ABCDS Injury Assessment  Goal: Absence of physical injury  Outcome: Progressing     Problem: Skin/Tissue Integrity - Adult  Goal: Skin integrity remains intact  Outcome: Progressing     Problem: Genitourinary - Adult  Goal: Absence of urinary retention  Outcome: Progressing

## 2022-05-31 NOTE — PROGRESS NOTES
Pt's daughter, Shari Christianson, told Dr. Bang Menjivar via phone that she can pick pt up at 1400. Dr. Bang Menjivar had conversation via phone in length with Shari Christianson regarding discharge. Echo to be completed prior to d/c per Dr. Bang Menjivar, echo sending for transport now.

## 2022-05-31 NOTE — CARE COORDINATION
Discharge Planning Note Re: [facility]     CM/SW noted consult for discharge planning. Chart reviewed. Noted recommendations for home health care. CM met with patient, Introduced self and explained role of CM and discharge planning. Patient is agreeable to home health on dc. [place] of choice list was provided with basic dialogue that supports the patient's individualized plan of care/goals, treatment preferences and shares the quality data associated with the providers. [x] Yes [] No.  Patient has reviewed the provided list and made selections. Referral made to [facility]. Pending [facility] order for  [x]RN [x]PT  [x]OT  [x]HHA  []SW  []  SLP    CM/SW will follow-up on referrals and provide any additional documentation necessary to facilitate placement. Pt accepted by [facility]. CM also provided pt information on [facility] for assistance with navigating LTC planning.      Electronically signed by Alexus Taylor RN on 5/31/2022 at 10:11 AM

## 2022-05-31 NOTE — PROGRESS NOTES
Data- discharge order received, pt verbalized agreement to discharge, needs for [facility] with [facility] for RN/PT/OT/HHA, SANTIAGO reviewed and signed by MD, completed by RN. Action- AVS prepared, discharge instructions prepared and given to pt and family, medication information packet given r/t NEW or CHANGED prescriptions, pt verbalized understanding further self-review. D/C instruction summary: Diet- reg, Activity- as tolerated, follow up with Primary Care Physician Rick Davis, [address] [place] [address] Road [phone] appointment to be scheduled for 4-5 days post-hospital stay, immunizations reviewed and N/A, medications prescriptions to be filled @ [facility] in [place]. Inpatient surgical procedural reviewed: N/A. Contact information provided to above agencies used. Response- Case Management/ reported faxing completed SANTIAGO and AVS to needed HHC/DME services stated above. Pt belongings gathered, IV removed, pt dressed in home clothes. Disposition is home with [place] [address] as stated above, transported with belongings, taken to lobby via w/c with staff, no complications. 1. WEIGHT: Admit Weight: 143 lb (64.9 kg) (05/28/22 1127)        Today  Weight: 159 lb 9.6 oz (72.4 kg) (05/31/22 0240)       2.  O2 SAT.: SpO2: 93 % (05/31/22 0845)

## 2022-05-31 NOTE — CARE COORDINATION
CM presented patient with  IMM (Important Message from Medicare) letter prior to discharge (w/in 48 hrs of dc). CM explained the right to appeal discharge and the process to follow. All questions answered. Copy of IMM provided to pt and pt signed CM copy. Date and time of signed IMM documented and placed on soft chart.

## 2022-05-31 NOTE — PROGRESS NOTES
Cortland Jeans, MD Catana Chill, MD Lorna Bradford, MD                                  Office: [phone]                 Fax: [phone]          "[employer]"                     NEPHROLOGY IN PATIENT PROGRESS NOTE:     PATIENT NAME: Katerine Nelson  : 1940  MRN: [id number]      Subjective:   No complaints. Assessment and Plan   Acute kidney injury-stable. Probably from prerenal azotemia vs. Early ATN. Electrolytes acceptable  UTI with sepsis on IV antibiotics. As per Medicine  A. fib with RVR. Rate controlled  Hypertension. On Atenolol and Diltiazem. BP has been variable. Follow as outpt. , if persist, can add ACEI. Chronic kidney disease. Stage IIIb. Now at baseline    Okay for discharge from renal perspective. EXAM  Vitals:    22 0845   BP: (!) 161/97   Pulse: 82   Resp: 16   Temp: 98.3 °F (36.8 °C)   SpO2: 93%       Intake/Output Summary (Last 24 hours) at 2022 1058  Last data filed at 2022 0630  Gross per 24 hour   Intake --   Output 500 ml   Net -500 ml     PE  HEENT-anicteric sclerae  Chest-clear  Heart-regular   Abd-soft  Ext-no edema    Principal Problem:    Sepsis ([address]  Resolved Problems:    * No resolved hospital problems. *        Medications Reviewed by me  [address] [place] [address] Ben warfarin  1.25 mg Oral Once per day on  Sat    And    warfarin  2.5 mg Oral Once per day on Sun     sodium chloride flush  5-40 mL IntraVENous 2 times per day    atorvastatin  20 mg Oral Nightly    PARoxetine  40 mg Oral Daily    pantoprazole  40 mg Oral QAM AC    cefTRIAXone (ROCEPHIN) IV  1,000 mg IntraVENous Q24H    dilTIAZem  30 mg Oral 2 times per day    atenolol  100 mg Oral Daily      sodium chloride         Data Review.     Labs reviewed by me       CBC:   Recent Labs     22  1140 22  0506 22  0418   WBC 8.5 7.4 7.6   HGB 12.4 10.9* 10.7*   HCT 39.5 34.1* 33.5*   MCV 86.7 85.9 86.2    308 285     BMP:   Recent Labs 05/29/22  0424 05/30/22  0506 05/31/22  0418    142 140   K 3.9 4.4 3.9    105 109   CO2 24 25 22   BUN 17 15 18   CREATININE 1.4* 1.4* 1.4*     Magnesium:   Lab Results   Component Value Date    MG 1.80 12/03/2020    MG 1.80 12/02/2020    MG 1.70 12/01/2020     Lab Results   Component Value Date    CREATININE 1.4 05/31/2022       Arterial Blood Gasses  No results for input(s): PH, PCO2, PO2 in the last 72 hours.     Invalid input(s): L1ECPCCUPRYP, INSPIREDO2    UA:  Recent Labs     05/28/22  1157   COLORU Yellow   PHUR 6.5   WBCUA 115*   RBCUA 2   BACTERIA 4+*   CLARITYU Clear   SPECGRAV 1.020   LEUKOCYTESUR MODERATE*   UROBILINOGEN 1.0   BILIRUBINUR Negative   BLOODU TRACE*   GLUCOSEU Negative       LIVER PROFILE:   Recent Labs     05/28/22  1140   AST 25   ALT 11   LIPASE 31.0   BILITOT 0.5   ALKPHOS 109     PT/INR:    Lab Results   Component Value Date    PROTIME 18.6 05/31/2022    PROTIME 16.7 05/30/2022    PROTIME 16.1 05/29/2022    PROTIME 3.3 07/08/2013    PROTIME 3.7 06/25/2013    PROTIME 2.9 06/27/2012    INR 1.55 05/31/2022    INR 1.36 05/30/2022    INR 1.29 05/29/2022     PTT:    Lab Results   Component Value Date    APTT 29.8 03/02/2021    APTT 26.3 10/31/2018    APTT 26.3 07/09/2018     SHAY:  No results found for: SHAY FRANCIS  CHEMISTRY COMMON GROUP :   Lab Results   Component Value Date    GLUCOSE 126 05/31/2022    GLUCOSE 144 11/01/2021     Recent Labs     05/28/22  1140 05/29/22  0424 05/30/22  0506 05/31/22  0418   GLUCOSE 154* 107* 109* 126*   CALCIUM 9.2 8.6 8.5 8.0*         Electronically Signed: Felipe Orosco MD 5/31/2022 10:58 AM

## 2022-05-31 NOTE — PLAN OF CARE
Problem: Discharge Planning  Goal: Discharge to home or other facility with appropriate resources  Outcome: Progressing     Problem: Safety - Adult  Goal: Free from fall injury  5/31/2022 1010 by Yariel Cox RN  Outcome: Progressing  5/31/2022 0300 by Kandis Davila RN  Outcome: Progressing     Problem: ABCDS Injury Assessment  Goal: Absence of physical injury  5/31/2022 1010 by Yariel Cox RN  Outcome: Progressing  5/31/2022 0300 by Kandis Davila RN  Outcome: Progressing     Problem: Cardiovascular - Adult  Goal: Maintains optimal cardiac output and hemodynamic stability  Outcome: Progressing  Goal: Absence of cardiac dysrhythmias or at baseline  Outcome: Progressing     Problem: Skin/Tissue Integrity - Adult  Goal: Skin integrity remains intact  5/31/2022 1010 by Yariel Cox RN  Outcome: Progressing  5/31/2022 0300 by Kandis Davila RN  Outcome: Progressing  Goal: Oral mucous membranes remain intact  Outcome: Progressing     Problem: Musculoskeletal - Adult  Goal: Return mobility to safest level of function  Outcome: Progressing  Goal: Maintain proper alignment of affected body part  Outcome: Progressing  Goal: Return ADL status to a safe level of function  Outcome: Progressing     Problem: Gastrointestinal - Adult  Goal: Minimal or absence of nausea and vomiting  Outcome: Progressing  Goal: Maintains or returns to baseline bowel function  Outcome: Progressing  Goal: Maintains adequate nutritional intake  Outcome: Progressing  Goal: Establish and maintain optimal ostomy function  Outcome: Progressing     Problem: Genitourinary - Adult  Goal: Absence of urinary retention  5/31/2022 1010 by Yariel Cox RN  Outcome: Progressing  5/31/2022 0300 by Kandis Davila RN  Outcome: Progressing     Problem: Infection - Adult  Goal: Absence of infection at discharge  Outcome: Progressing     Problem: Metabolic/Fluid and Electrolytes - Adult  Goal: Electrolytes maintained within normal limits  Outcome: Progressing  Goal: Hemodynamic stability and optimal renal function maintained  Outcome: Progressing  Goal: Glucose maintained within prescribed range  Outcome: Progressing     Problem: Hematologic - Adult  Goal: Maintains hematologic stability  Outcome: Progressing     Problem: Respiratory - Adult  Goal: Achieves optimal ventilation and oxygenation  Outcome: Progressing     Problem: Pain  Goal: Verbalizes/displays adequate comfort level or baseline comfort level  Outcome: Progressing

## 2022-05-31 NOTE — DISCHARGE INSTR - COC
Continuity of Care Form    Patient Name: Bolivar Singh   :  1940  MRN:  [id number]    Admit date:  2022  Discharge date:  2022    Code Status Order: Full Code   Advance Directives:      Admitting Physician:  Chrystal Bill MD  PCP: Simone Hernández, APRN - CNP    Discharging Nurse: Hubert Doss [facility] Unit/Room#: 9DW-8049/5232-58  Discharging Unit Phone Number: [phone]    Emergency Contact:   Extended Emergency Contact Information  Primary Emergency Contact: christiano thomas  Home Phone: [phone]  Relation: Child  Secondary Emergency Contact: [employer] Phone: [phone]  Relation: Child   needed? No    Past Surgical History:  Past Surgical History:   Procedure Laterality Date    APPENDECTOMY      CYSTOSCOPY Right 2018    retrograde pyelogram, ureteroscopy, and stent placement    HYSTERECTOMY, TOTAL ABDOMINAL  2018    robotic with BSO, omentectomy, pelvic mass removal, lymphnode dissection    [place] CYSTO/URETERO W/LITHOTRIPSY &INDWELL STENT INSRT Right 10/31/2018    CYSTOSCOPY WITH RIGHT URETEROSCOPY HOLMIUM LASER LITHOTRIPSY STONE MANIPULATION STONE BASKET WITH RIGHT STENT EXCHANGE performed by Ebony Fuentes MD at [address] [place] [address] 2020    EGD CONTROL HEMORRHAGE performed by Loi Lowery MD at [address] [place] [address] N/A 2020    EGD BIOPSY performed by Loi Lowery MD at [address] [place] [address] N/A 3/2/2021    EGD BIOPSY performed by Loi Lowery MD at [address] [facility] ENDOSCOPY       Immunization History: There is no immunization history for the selected administration types on file for this patient.     Active Problems:  Patient Active Problem List   Diagnosis Code    Essential hypertension I10    Chronic atrial fibrillation (HCC) I48.20    Severe sepsis (HCC) A41.9, R65.20    Hydroureteronephrosis N13.30    Pelvic mass R19.00    CHARISSE (acute kidney injury) ([facility] D77.1    Acute metabolic encephalopathy U20.02    Mixed hyperlipidemia E78.2    Primary osteoarthritis of left knee M17.12    Effusion of left knee joint M25.462    Synovitis of left knee M65.9    Pelvic mass in female R19.00    Dysthymia F34.1    Gastrointestinal hemorrhage associated with gastric ulcer K25.4    Sepsis ([facility] A41.9       Isolation/Infection:   Isolation            No Isolation          Patient Infection Status       Infection Onset Added Last Indicated Last Indicated By Review Planned Expiration Resolved Resolved By    None active    Resolved    COVID-19 (Rule Out) 05/28/22 05/28/22 05/28/22 COVID-19 & Influenza Combo (Ordered)   05/28/22 Rule-Out Test Resulted    COVID-19 (Rule Out) 11/27/20 11/27/20 11/27/20 COVID-19 (Ordered)   11/28/20 Rule-Out Test Resulted            Nurse Assessment:  Last Vital Signs: BP (!) 161/97   Pulse 82   Temp 98.3 °F (36.8 °C) (Oral)   Resp 16   Ht 5' 6\" (1.676 m)   Wt 159 lb 9.6 oz (72.4 kg)   SpO2 93%   BMI 25.76 kg/m²     Last documented pain score (0-10 scale): Pain Level: 0  Last Weight:   Wt Readings from Last 1 Encounters:   05/31/22 159 lb 9.6 oz (72.4 kg)     Mental Status:  oriented and alert    IV Access:  - None    Nursing Mobility/ADLs:  Walking   Independent  Transfer  Independent  Bathing  Independent  Dressing  Independent  Toileting  Independent  Feeding  Independent  Med Admin  Assisted  Med Delivery   whole    Wound Care Documentation and Therapy:  Incision 07/09/18 Abdomen (Active)   Number of days: 9306        Elimination:  Continence: Bowel: Yes  Bladder: Yes  Urinary Catheter: None   Colostomy/Ileostomy/Ileal Conduit: No       Date of Last BM: 5/28/2022    Intake/Output Summary (Last 24 hours) at 5/31/2022 0919  Last data filed at 5/31/2022 0630  Gross per 24 hour   Intake 240 ml   Output 500 ml   Net -260 ml     I/O last 3 completed shifts:   In: 1119.1 [P.O.:360; I.V.:759.1]  Out: 970 [Urine:970]    Safety Concerns: History of Falls (last 30 days) and At Risk for Falls    Impairments/Disabilities:      None    Nutrition Therapy:  Current Nutrition Therapy:   - Oral Diet:  General    Routes of Feeding: Oral  Liquids: Thin Liquids  Daily Fluid Restriction: no  Last Modified Barium Swallow with Video (Video Swallowing Test): not done    Treatments at the Time of Hospital Discharge:   Respiratory Treatments: none  Oxygen Therapy:  is not on home oxygen therapy. Ventilator:    - No ventilator support    Rehab Therapies: Physical Therapy and Occupational Therapy  Weight Bearing Status/Restrictions: No weight bearing restrictions  Other Medical Equipment (for information only, NOT a DME order):  bedside commode  Other Treatments: none    Patient's personal belongings (please select all that are sent with patient):  None    RN SIGNATURE:  Electronically signed by Tanner Lunsford RN on 5/31/22 at 9:21 AM EDT    CASE MANAGEMENT/SOCIAL WORK SECTION    Inpatient Status Date: 5/28/2022    Readmission Risk Assessment Score:  12  Readmission Risk              Risk of Unplanned Readmission:  15           Discharging to Facility/ [address] [place] [address]   Phone: [phone]  Fax: [phone]        / signature:   Electronically signed by Josias Olson RN on 5/31/2022 at 10:14 AM      PHYSICIAN SECTION    Prognosis: Good    Condition at Discharge: Stable    Rehab Potential (if transferring to Rehab): Good    Recommended Labs or Other Treatments After Discharge:   INR in 1 daysand call physician to adjust INR dose if needed     Physician Certification: I certify the above information and transfer of Saray Lozoya  is necessary for the continuing treatment of the diagnosis listed and that she requires Home Care for less 30 days.      Update Admission H&P: No change in H&P    PHYSICIAN SIGNATURE:  Electronically signed by Shereen Ramírez MD on 5/31/22 at 10:39 AM EDT

## 2022-05-31 NOTE — PROGRESS NOTES
Pt's daughter, Anne-Marie Gutierres, called me this morning to ask if pt would be discharging today. At the time of our call, d/c order was not yet placed. She stated that if pt did receive d/c order, she would \"question it\" as she still has several concerns for pt's health and wants certain \"tests\" addressed. Shortly after our call ended, Dr. Jo Shankar placed d/c order. I notified Dr. Jo Shankar of daughter's concerns. Dr. Jo Shankar agreed to call Anne-Marie Gutierres and discuss d/c with her. I did attempt to call Anne-Marie Gutierres and at least inform her of d/c order so she can plan for p/u, but she did not answer and her voicemail box is full.

## 2022-05-31 NOTE — PROGRESS NOTES
Physical Therapy/ Occupational Therapy  Serina Holman      Currently JACKIE for testing and then discharging home. Will follow-up if discharge plans change. Thanks, Naeem Rea PT, DPT [id number]  Hammad Ramirez.  [address] [place] [address]

## 2022-06-01 ENCOUNTER — TELEPHONE (OUTPATIENT)
Dept: FAMILY MEDICINE CLINIC | Age: 82
End: 2022-06-01

## 2022-06-01 ENCOUNTER — CARE COORDINATION (OUTPATIENT)
Dept: CASE MANAGEMENT | Age: 82
End: 2022-06-01

## 2022-06-01 ENCOUNTER — ANTI-COAG VISIT (OUTPATIENT)
Dept: CARDIOLOGY CLINIC | Age: 82
End: 2022-06-01

## 2022-06-01 ENCOUNTER — TELEPHONE (OUTPATIENT)
Dept: CARDIOLOGY CLINIC | Age: 82
End: 2022-06-01

## 2022-06-01 DIAGNOSIS — I48.20 CHRONIC ATRIAL FIBRILLATION (HCC): Primary | ICD-10-CM

## 2022-06-01 DIAGNOSIS — N30.01 ACUTE CYSTITIS WITH HEMATURIA: Primary | ICD-10-CM

## 2022-06-01 LAB
BLOOD CULTURE, ROUTINE: NORMAL
CULTURE, BLOOD 2: NORMAL

## 2022-06-01 RX ORDER — NITROFURANTOIN 25; 75 MG/1; MG/1
100 CAPSULE ORAL 2 TIMES DAILY
Qty: 14 CAPSULE | Refills: 0 | Status: SHIPPED | OUTPATIENT
Start: 2022-06-01 | End: 2022-06-08

## 2022-06-01 NOTE — TELEPHONE ENCOUNTER
----- Message from Jacy Strickland sent at 6/1/2022  8:34 AM EDT -----  Subject: Refill Request    QUESTIONS  Name of Medication? Other - UTI antibotic  Patient-reported dosage and instructions? as written  How many days do you have left? 0  Preferred Pharmacy? [id number]  Pharmacy phone number (if available)? [phone]  Additional Information for Provider? please call Becky Chapin at [phone] or   [phone] to let her know the medication has been ordered, the hospital   did not give the patient any medication for this, sees provider Kristofer  ---------------------------------------------------------------------------  --------------  [address]  What is the best way for the office to contact you? OK to leave message on   voicemail  Preferred Call Back Phone Number? [phone]  ---------------------------------------------------------------------------  --------------  SCRIPT ANSWERS  Relationship to Patient? Other  Representative Name? Becky Chapin  Is the Representative on the appropriate HIPAA document in Epic?  Yes

## 2022-06-01 NOTE — TELEPHONE ENCOUNTER
----- Message from Sylvia Baileycharlette sent at 6/1/2022  8:17 AM EDT -----  Subject: Hospital Follow Up    QUESTIONS  What hospital was the Patient Discharged from? [facility]  Date of Discharge? 2022-05-31  Discharge Location? Home  Reason for hospitalization as patient stated? UTI   What question does the patient have, if applicable? Patient needs to be   seen within 5 days and no \"No notes for follow up\"  ---------------------------------------------------------------------------  --------------  CALL BACK INFO  What is the best way for the office to contact you? Do not leave any   message, patient will call back for answer  Preferred Call Back Phone Number? [phone]  ---------------------------------------------------------------------------  --------------  SCRIPT ANSWERS  Relationship to Patient? Other  Representative Name? Reina Jimmie  Additional information verified (besides Name and Date of Birth)? Address  (Patient requests to see provider urgently. )? No  (Has the patient been discharged from the hospital within 2 business days   AND does not have a Telephone Encounter  Follow Up From [address] [place] [address]   documented in [address]?)?  Yes

## 2022-06-01 NOTE — TELEPHONE ENCOUNTER
Pt called the office to get instructions for her Warfarin dosing. She was discharged from the hospital today. Pt has a UTI and is supposed to be taking an antibiotic. PT has not received anything when she was D/C. Per Dr. Coker Read take 2.5 mg today and Wed. Call PCP office tomorrow if she does not get a phone call with about starting an antibiotic. INR today was 1.55.

## 2022-06-01 NOTE — TELEPHONE ENCOUNTER
Pt was started on Macrobid for 7 days. Per Dr. Bire Merrill no changes in dose at this time check PT/INR on Monday. Pt will need to go to the lab. I left a message for her daughter to call us back.

## 2022-06-02 ENCOUNTER — OFFICE VISIT (OUTPATIENT)
Dept: FAMILY MEDICINE CLINIC | Age: 82
End: 2022-06-02
Payer: MEDICARE

## 2022-06-02 ENCOUNTER — CARE COORDINATION (OUTPATIENT)
Dept: CASE MANAGEMENT | Age: 82
End: 2022-06-02

## 2022-06-02 VITALS
DIASTOLIC BLOOD PRESSURE: 84 MMHG | HEART RATE: 68 BPM | OXYGEN SATURATION: 97 % | SYSTOLIC BLOOD PRESSURE: 120 MMHG | BODY MASS INDEX: 25.18 KG/M2 | WEIGHT: 156 LBS | TEMPERATURE: 97.2 F

## 2022-06-02 DIAGNOSIS — N30.00 ACUTE CYSTITIS WITHOUT HEMATURIA: Primary | ICD-10-CM

## 2022-06-02 DIAGNOSIS — R65.20 SEVERE SEPSIS (HCC): ICD-10-CM

## 2022-06-02 DIAGNOSIS — A41.9 SEVERE SEPSIS (HCC): ICD-10-CM

## 2022-06-02 DIAGNOSIS — Z09 HOSPITAL DISCHARGE FOLLOW-UP: ICD-10-CM

## 2022-06-02 DIAGNOSIS — A41.9 SEPSIS, DUE TO UNSPECIFIED ORGANISM, UNSPECIFIED WHETHER ACUTE ORGAN DYSFUNCTION PRESENT (HCC): Primary | ICD-10-CM

## 2022-06-02 DIAGNOSIS — F34.1 DYSTHYMIA: ICD-10-CM

## 2022-06-02 PROCEDURE — 1111F DSCHRG MED/CURRENT MED MERGE: CPT | Performed by: NURSE PRACTITIONER

## 2022-06-02 PROCEDURE — 99214 OFFICE O/P EST MOD 30 MIN: CPT | Performed by: NURSE PRACTITIONER

## 2022-06-02 PROCEDURE — G8400 PT W/DXA NO RESULTS DOC: HCPCS | Performed by: NURSE PRACTITIONER

## 2022-06-02 PROCEDURE — 1123F ACP DISCUSS/DSCN MKR DOCD: CPT | Performed by: NURSE PRACTITIONER

## 2022-06-02 PROCEDURE — 1036F TOBACCO NON-USER: CPT | Performed by: NURSE PRACTITIONER

## 2022-06-02 PROCEDURE — G8417 CALC BMI ABV UP PARAM F/U: HCPCS | Performed by: NURSE PRACTITIONER

## 2022-06-02 PROCEDURE — G8427 DOCREV CUR MEDS BY ELIG CLIN: HCPCS | Performed by: NURSE PRACTITIONER

## 2022-06-02 PROCEDURE — 1090F PRES/ABSN URINE INCON ASSESS: CPT | Performed by: NURSE PRACTITIONER

## 2022-06-02 RX ORDER — WARFARIN SODIUM 2.5 MG/1
TABLET ORAL
Qty: 30 TABLET | Refills: 1 | Status: SHIPPED | OUTPATIENT
Start: 2022-06-02 | End: 2022-06-29

## 2022-06-02 RX ORDER — PAROXETINE HYDROCHLORIDE 20 MG/1
40 TABLET, FILM COATED ORAL DAILY
Qty: 90 TABLET | Refills: 0 | Status: SHIPPED | OUTPATIENT
Start: 2022-06-02 | End: 2022-06-06 | Stop reason: ALTCHOICE

## 2022-06-02 NOTE — PROGRESS NOTES
Ashlee Austin  : 1940  Encounter date: 2022    This shemar 79 y.o. female who presents with  Chief Complaint   Patient presents with    Follow-Up from Hospital       History of present illness:    HPI Pt is 79year old female for hospital follow up from - at [facility]. Pt initially seen for experiencing fatigue, headache, loss of appetite and intermittent shortness of breath on exertion. On admission, patient was tachycardic& tachypneic and was noted to be in A. fib with RVR was given IV diltiazem. Initial diagnostic lab work showed creatinine elevated 1.6, lactic acid 2.7, A, bicarb: 19, BNP: 1,024. Urinalysis suggestive of UTI. CXR negative for pneumonia. CT head negative for any acute intracranial pathology  Imaging-  CT Head  Impression   No acute intracranial abnormality     Chest xray-     Impression   Cardiomegaly with no acute airspace abnormality. PT admitted and treated for acute UTI with IV antibiotics- On cefepime 2 g IV. Ceftriaxone 1000 mg IV    Nephrology consult- Dr. Esa Edwards  Pt with CKD stage 3, CHARISSE- orders placed for bladder scan/ renal US for obstruction/ held ACE    Cardiololgy consult- Dr. Gretta Kerr  Hx afib  Controlled w/ atenolol  Continuous monitoring INR w/ ABX  Upcoming appt with Dr. Chikis Thornton 22    Pt discharged with macrobid 100 mg BID x 7 days    Pt is concerned about worsened depression, reports recently lost , lives alone, has been taking paxil 20 mg, would like trial increase 40 mg.    Current Outpatient Medications on File Prior to Visit   Medication Sig Dispense Refill    nitrofurantoin, macrocrystal-monohydrate, (MACROBID) 100 MG capsule Take 1 capsule by mouth 2 times daily for 7 days 14 capsule 0    atenolol (TENORMIN) 100 MG tablet Take 1 tablet by mouth daily 30 tablet 1    warfarin (COUMADIN) 2.5 MG tablet Take 1.25 on Tuesday , Thursday and Saturday.  30 tablet 1    calcium citrate (CALCITRATE) 950 (200 Ca) MG tablet Take 1 tablet by mouth daily       pantoprazole (PROTONIX) 40 MG tablet Take 1 tablet by mouth daily 90 tablet 3    dilTIAZem (CARDIZEM) 60 MG tablet Take 0.5 tablets by mouth every 12 hours 90 tablet 3    simvastatin (ZOCOR) 40 MG tablet Take 1 tablet by mouth nightly 90 tablet 3    Omega-3 Fatty Acids (FISH OIL PO) Take by mouth      Multiple Vitamins-Minerals (MULTIVITAMIN PO) Take by mouth       No current facility-administered medications on file prior to visit.       Allergies   Allergen Reactions    Penicillins      Patient cannot remember reaction    Sulfa Antibiotics      Past Medical History:   Diagnosis Date    [facility]     Atrial fibrillation ([facility]     Hypercholesterolemia     Hypertension     Pelvic mass       Past Surgical History:   Procedure Laterality Date    APPENDECTOMY      CYSTOSCOPY Right 06/14/2018    retrograde pyelogram, ureteroscopy, and stent placement    HYSTERECTOMY, TOTAL ABDOMINAL  07/09/2018    robotic with BSO, omentectomy, pelvic mass removal, lymphnode dissection    [place] CYSTO/URETERO W/LITHOTRIPSY &INDWELL STENT INSRT Right 10/31/2018    CYSTOSCOPY WITH RIGHT URETEROSCOPY HOLMIUM LASER LITHOTRIPSY STONE MANIPULATION STONE BASKET WITH RIGHT STENT EXCHANGE performed by Jannie Ruelas MD at [address] N/A 11/29/2020    EGD CONTROL HEMORRHAGE performed by Olivier Demarco MD at [address] N/A 11/29/2020    EGD BIOPSY performed by Olivier Demarco MD at [address] N/A 3/2/2021    EGD BIOPSY performed by Olivier Demarco MD at [facility]      Family History   Problem Relation Age of Onset    Heart Disease Father       Social History     Tobacco Use    Smoking status: Never Smoker    Smokeless tobacco: Never Used   Substance Use Topics    Alcohol use: No        Review of Systems    Objective:    /84 (Site: Left Upper Arm, Position: Sitting, Cuff Size: Medium Adult)   Pulse 68   Temp 97.2 °F (36.2 °C) (Infrared)   Wt 156 lb (70.8 kg)   SpO2 97%   BMI 25.18 kg/m²   Weight: 156 lb (70.8 kg)     BP Readings from Last 3 Encounters:   06/02/22 120/84   05/31/22 (!) 161/97   10/26/21 134/78     Wt Readings from Last 3 Encounters:   06/02/22 156 lb (70.8 kg)   05/31/22 159 lb 9.6 oz (72.4 kg)   10/26/21 154 lb (69.9 kg)     BMI Readings from Last 3 Encounters:   06/02/22 25.18 kg/m²   05/31/22 25.76 kg/m²   10/26/21 24.86 kg/m²       Physical Exam    Assessment/Plan    1. Acute cystitis without hematuria  Advised hydration  Continue ABX  Advised probiotics    2. Severe sepsis ([facility]      3. Hospital discharge follow-up  Reviewed physician notations, labs, imaging, EKG    4. Dysthymia  Controlled   - PARoxetine (PAXIL) 20 MG tablet; Take 2 tablets by mouth daily  Dispense: 90 tablet; Refill: 0      Time Spent on discharge is more than 30 minutes in the examination, evaluation, counseling and review of medications and discharge plan. Return in about 1 month (around 7/2/2022) for medication re-check. This dictation was generated by voice recognition computer software. Although all attempts are made to edit the dictation for accuracy, there may be errors in the transcription that are not intended.

## 2022-06-02 NOTE — CARE COORDINATION
[facility] Transitions Initial Follow Up Call:    Patient will follow up with PCP today 22. Patient reports that she is doing well. She denies any symptoms and is afebrile. Discussed discharge instructions and reviewed medications, 1111F completed. [facility] nurse was there yesterday and patient denies any questions or concerns at this time. CTN will continue with outreach follow up calls. Call within 2 business days of discharge: Yes    Patient: Juancho Croft Patient : 1940   MRN: [id number]  Reason for Admission: Severe sepsis due to UTI; Afib with RVR  Discharge Date: 22 RARS: Readmission Risk Score: 12.1 ( )      Last Discharge [facility]       Complaint Diagnosis Description Type Department Provider    22 Fatigue Urinary tract infection without hematuria, site unspecified . .. ED to Hosp-Admission (Discharged) (ADMITTED) APRYL[facility] 3A Keke Andre MD; Yasmin Acosta. .. Spoke with: Juancho Croft      Transitions of Care Initial Call    Was this an external facility discharge? No Discharge Facility:     Challenges to be reviewed by the provider   Additional needs identified to be addressed with provider: No  none             Method of communication with provider : none    Advance Care Planning:   Does patient have an Advance Directive: not on file; education provided. Care Transition Nurse contacted the patient by telephone to perform post hospital discharge assessment. Verified name and  with patient as identifiers. Provided introduction to self, and explanation of the CTN role. CTN reviewed discharge instructions, medical action plan and red flags with patient who verbalized understanding. Patient given an opportunity to ask questions and does not have any further questions or concerns at this time. Were discharge instructions available to patient? Yes.  Reviewed appropriate site of care based on symptoms and resources available to patient including: PCP  Urgent care clinics  [facility]  When to call 911. The patient agrees to contact the PCP office for questions related to their healthcare. Medication reconciliation was performed with patient, who verbalizes understanding of administration of home medications. Advised obtaining a 90-day supply of all daily and as-needed medications. Was patient discharged with a pulse oximeter? no    CTN provided contact information. Plan for follow-up call in 5-7 days based on severity of symptoms and risk factors. Plan for next call: symptom management-.  self management-.  follow up appointment-.           Non-face-to-face services provided:  Obtained and reviewed discharge summary and/or continuity of care documents    Care Transitions 24 Hour Call    Do you have a copy of your discharge instructions?: Yes  Do you have all of your prescriptions and are they filled?: Yes  Have you been contacted by a [address]?: No  Have you scheduled your follow up appointment?: Yes  How are you going to get to your appointment?: Car - family or friend to transport  Do you feel like you have everything you need to keep you well at home?: No  Are you an active caregiver in your home?: No  Care Transitions Interventions         Follow Up  Future Appointments   Date Time Provider Felipe Mayer   6/2/2022  4:30 PM Vinicio Davis APRN - NP [facility] [id number] - DYD   6/29/2022  3:30 PM MD Dave Jama   9/19/2022 10:30 AM Lolita Green APRN - CNP [id number] RN

## 2022-06-02 NOTE — TELEPHONE ENCOUNTER
I spoke to daughter and gave instructions.  [facility] Michele Velasco

## 2022-06-06 DIAGNOSIS — F34.1 DYSTHYMIA: ICD-10-CM

## 2022-06-06 LAB
COAGULATION TISSUE FACTOR INDUCED BLD TIME PT. COAG: 27.6 SECONDS (ref 10–12.8)
INR BLD: 2.4 (ref 0.9–1.1)

## 2022-06-06 RX ORDER — PAROXETINE HYDROCHLORIDE 40 MG/1
40 TABLET, FILM COATED ORAL DAILY
Qty: 30 TABLET | Refills: 3 | Status: SHIPPED | OUTPATIENT
Start: 2022-06-06 | End: 2022-06-21 | Stop reason: SDUPTHER

## 2022-06-07 ENCOUNTER — ANTI-COAG VISIT (OUTPATIENT)
Dept: CARDIOLOGY CLINIC | Age: 82
End: 2022-06-07

## 2022-06-07 ENCOUNTER — CARE COORDINATION (OUTPATIENT)
Dept: CASE MANAGEMENT | Age: 82
End: 2022-06-07

## 2022-06-07 NOTE — CARE COORDINATION
fever, chills, N/V and any difficulty breathing at this time. Denied chest pain and SOB. Denied difficulty with urination, BMs or appetite. Pt had a f/u with the APRN and it went well. Pt to continue abx and increase Paxil to 40 mg as pt is grieving the recent loss of her . Denied any other needs and concerns at this time. Advised pt to immediately report any worsening symptoms to the PCP. Patient verbalized understanding and agreed. Rachel Alves LPN, [facility]  PH: [phone]          Care Transitions Subsequent and Final Call    Schedule Follow Up Appointment with PCP: Completed  Subsequent and Final Calls  Do you have any ongoing symptoms?: No  Have your medications changed?: Yes  Patient Reports: Increase Paxil  Do you have any questions related to your medications?: No  Do you currently have any active services?: Yes  Are you currently active with any services?: Home Health  Do you have any needs or concerns that I can assist you with?: No  Identified Barriers: None  Care Transitions Interventions   Home Care Waiver: Completed Physical Therapy: Completed     Occupational Therapy: Completed     Other Interventions:            Follow Up  Future Appointments   Date Time Provider Felipe Mayer   6/21/2022 10:00 AM Brando Schaeffer [facility]   6/29/2022  3:30 PM Jodie Hay MD [facility]   7/11/2022 11:00 AM Robert Messier, APRN - CNP Roxianne Rubinstein - DYD   9/19/2022 10:30 AM Robert Messier, APRN - CNP Roxianne Rubinstein - DYD Cherisse Pen, LPN

## 2022-06-07 NOTE — DISCHARGE SUMMARY
Hospital Medicine Discharge Summary    Patient ID: Papi Andino      Patient's PCP: Dannie Joshi, APRN - CNP    Admit Date: 2022     Discharge Date: 2022     Admitting Physician: Rosemary Jarrell MD     Discharge Physician: Radha Goodrich MD        Active Hospital Problems    Diagnosis     Sepsis [facility]) [A41.9]      Priority: [facility] Course: This 79 y. o. female  with PMHx of A. fib on Coumadin, hypertension, hyperlipidemia, GIB and CKD presented with fatigueOn admission, patient was tachycardic& tachypneic and was noted to be in A. fib with RVR was given IV diltiazem.  Initial diagnostic lab work showed creatinine elevated 1.6, lactic acid 2.7, A, bicarb: 19, BNP: 1,024.  Urinalysis suggestive of UTI. CXR negative for pneumonia. CT head negative for any acute intracranial pathology     Severe sepsis likely secondary to UTI: Resolved  Presented with tachycardia, tachypnea, lactic acidosis and CHARISSE     UTI: Urine culture growing > 100,000 E. coli: Treated with IV antibiotics     A. fib with RVR: Controlled with HR between 70 to 80s  Converted into NSR after 1 dose of diltiazem bolus. Patient was instructed to continue coumadin, diltiazem and beta-blockers per cardiology recs. INR: Subtherapeutic on admission. Coumadin dose was adjusted and patient was instructed to obtain INR and follow with cardiology.     Acute kidney injury on CKD stage III: Likely prerenal 2/2 hypoperfusion in setting of sepsis/A. fib with RVR  Creatinine elevated to 1.6 on admission;( baseline 1.2) trended on two 1.4  Patient is of IV fluids during hospital stay and instructed to follow-up with nephrology     Anion gap metabolic acidosis likely secondary to lactic acidosis and CHARISSE: Resolved     Hypertension: Continue home medications and monitor BP closely     Hyperlipidemia: Continue statins     Hx of GI bleed, hemoglobin stable.  Continue Protonix daily.       Physical Exam Performed:     BP (!) 161/97 Pulse 82   Temp 98.3 °F (36.8 °C) (Oral)   Resp 16   Ht 5' 6\" (1.676 m)   Wt 159 lb 9.6 oz (72.4 kg)   SpO2 93%   BMI 25.76 kg/m²     General appearance:  No apparent distress, appears stated age and cooperative. Eyes: Sclera clear. Pupils equal.  ENT: Moist oral mucosa. Trachea midline, no adenopathy. Cardiovascular: Regular rhythm, normal S1, S2. No murmur. No edema in lower extremities  Respiratory:Not usingaccessory muscles. Good inspiratory effort. Clear to auscultation bilaterally, no wheeze or crackles. GI: Abdomen soft, no tenderness, not distended, normal bowel sounds  Musculoskeletal: Normal ROM, No cyanosis in digits. Neurology: CN 2-12 grossly intact. No speech or motor deficits  Psych: Normal affect. Alert and oriented in time, place and person  Skin: Warm, dry, normal turgor    Consults:     IP CONSULT TO CARDIOLOGY  IP CONSULT TO PHARMACY  IP CONSULT TO NEPHROLOGY  IP CONSULT TO HOME CARE NEEDS    Disposition: Home    Condition at Discharge: Stable     Discharge Instructions/Follow-up:   Follow up with your PCP within 4-5  days of discharge. Follow up with  Cardiology as instructed   Follow up with Nephrology as instructed   Obtain INR and follow up with cardiology to adjust coumadin dose if needed   Take all your medications as prescribed. Discharge Medications:     Discharge Medication List as of 5/31/2022  2:11 PM           Details   atenolol (TENORMIN) 100 MG tablet Take 1 tablet by mouth daily, Disp-30 tablet, R-1Normal      !! warfarin (COUMADIN) 2.5 MG tablet Take 1.25 on Tuesday , Thursday and Saturday. , Disp-30 tablet, R-1Normal      !! warfarin (COUMADIN) 2.5 MG tablet Take 2.5 mg on Monday, Wednesday , Friday and Sunday, Disp-30 tablet, R-1Normal       !! - Potential duplicate medications found. Please discuss with provider.            Details   calcium citrate (CALCITRATE) 950 (200 Ca) MG tablet Take 1 tablet by mouth dailyHistorical Med      PARoxetine (PAXIL) 20 MG tablet Take 1 tablet by mouth daily, Disp-90 tablet, R-3Normal      pantoprazole (PROTONIX) 40 MG tablet Take 1 tablet by mouth daily, Disp-90 tablet, R-3Normal      dilTIAZem (CARDIZEM) 60 MG tablet Take 0.5 tablets by mouth every 12 hours, Disp-90 tablet, R-3Normal      simvastatin (ZOCOR) 40 MG tablet Take 1 tablet by mouth nightly, Disp-90 tablet, R-3Normal      Omega-3 Fatty Acids (FISH OIL PO) Take by mouth      Multiple Vitamins-Minerals (MULTIVITAMIN PO) Take by mouth               The patient was seen and examined on day of discharge and this discharge summary is in conjunction with any daily progress note from day of discharge. Time Spent on discharge is 45 minutes  in the examination, evaluation, counseling and review of medications and discharge plan. Note that greater  than 30 minutes was spent in preparing discharge papers, discussing discharge with patient, medication review, etc.     Signed:    Kacie Lei MD   6/7/2022      Thank you BRIGHT Stokes CNP for the opportunity to be involved in this patient's care. If you have any questions or concerns please feel free to contact me at [phone].

## 2022-06-07 NOTE — PROGRESS NOTES
include additional clinical indicators in your documentation. ? Or   please document if the diagnosis of CHARISSE has been ruled out after further   study. The medical record reflects the following:  Risk Factors: Age, Sepsis, UTI w/E. Coli, CHARISSE on CKD 3a, HTN, Chronic AFib  Clinical Indicators: Per H&P 5/28/22 \"Acute kidney injury on CKD stage III:   Creatinine elevated to 1.6 on admission; baseline creatinine 1. 2. \"  Labs on   admit 5/28/22 CR 1.6  BUN 22 GFR 37  on Discharge 5/31/22 CR 1.4  BUN 18 GFR   44  Treatment: nephrology/Cardiology consults, iv LR Bolus, ivf NS, iv Maxipime,   po Cardizem, monitor labs    Defined by Kidney Disease Improving Global Outcomes (KDIGO) clinical practice   guideline for acute kidney injury:  -Increase in SCr by greater than or equal to 0.3 mg/dl within 48 hours; or  -Increase or decrease in SCr to greater than or equal to 1.5 times baseline,   which is known or presumed to have occurred within the prior 7 days; or  -Urine volume < 0.5ml/kg/h for 6 hours. Options provided:  -- Acute kidney injury evidenced by, Please document evidence as well as a   numerical baseline creatinine, if known.   -- Acute kidney injury ruled out after study  -- Other - I will add my own diagnosis  -- Disagree - Not applicable / Not valid  -- Disagree - Clinically unable to determine / Unknown  -- Refer to Clinical Documentation Reviewer    PROVIDER RESPONSE TEXT:    This patient has acute kidney injury as evidenced by -Increase in SCr by   greater than or equal to 0.3 mg/dl within 48 hours    Query created by: Coretta Lozano on 6/3/2022 8:04 AM      Electronically signed by:  Jahaira Mahoney MD 6/7/2022 12:26 PM

## 2022-06-14 ENCOUNTER — CARE COORDINATION (OUTPATIENT)
Dept: CASE MANAGEMENT | Age: 82
End: 2022-06-14

## 2022-06-14 NOTE — CARE COORDINATION
[facility] Transitions Follow Up Call    2022    Patient: Pranav Francisco  Patient : 1940   MRN: <[id number]>   Reason for Admission: SEPSIS   Discharge Date: 22 RARS: Readmission Risk Score: 12.1 ( )         Spoke with: Eda Jesus Transitions Subsequent and Final Call    Subsequent and Final Calls  Do you have any ongoing symptoms?: No  Have your medications changed?: No  Do you have any questions related to your medications?: No  Do you currently have any active services?: Yes  Are you currently active with any services?: Home Health  Do you have any needs or concerns that I can assist you with?: No  Identified Barriers: None  Care Transitions Interventions   Home Care Waiver: Completed Physical Therapy: Completed     Occupational Therapy: Completed     Other Interventions:         Care Transitions Follow Up Call    Challenges to be reviewed by the provider   Additional needs identified to be addressed with provider:    no    AMB CC Provider Discharge Needs: none      Method of communication with provider : none     Care Transition Nurse (CTN) contacted the patient by telephone to follow up after admission:  Verified name  with patient as identifier. Provided introduction to self, and explanation of the CTN role. Addressed changes since last contact:  home health care-[facility]  medications-    Discussed follow-up appointments. If no appointment was previously scheduled, appointment scheduling offered: No    Advance Care Planning:   Does patient have an Advance Directive: pt declined     CTN reviewed discharge instructions, medical action plan and red flags with patient and discussed any barriers to care and/or understanding of plan of care after discharge.      Discussed appropriate site of care based on symptoms and resources available to patient including:      PCP   Specialist   After hour contact number   Urgent [facility]y    When to call Kaylene Meier for reactivation or family member permission [phone]   Condition related references      The patient agrees to contact the PCP office for questions related to their healthcare. Patients top risk factors for readmission:   functional physical ability  lack of knowledge about disease  medical condition-   medication management    Interventions to address risk factors:   Education of patient/family/caregiver/guardian to support self-management-   Assessment and support for treatment adherence and medication management-      CTN provided contact information for future needs. Plan for f/u call in 7-10 days based on severity of symptoms and risk factors. Plan for next call:   symptom management  self-management  follow up appointment  medication management    SUMMARY  CTN spoke to the Pt who reports that she is doing well. She denies c/o fever, chills, nausea, vomiting, chest pain, SOB / DB, feeling lightheaded, dizziness, and heart palpitations / flutters. Pt denies issues with fluid intake, appetite, urination, and bowel habits. Pt confirms they have all meds and taking as prescribed. Pt denies any appts since last conversation with CTN and next appt is 6/29 with cardiology. HC continues to follow and saw Pt today and will f/u next week. From CDC: Are you at higher risk for severe illness?  Wash your hands often.  Avoid close contact (6 feet, which is about two arm lengths) with people who are sick.  Put distance between yourself and other people if COVID-19 is spreading in your community.  Clean and disinfect frequently touched surfaces.  Avoid all cruise travel and non-essential air travel.  Call your healthcare professional if you have concerns about COVID-19 and your underlying condition or if you are sick. Pt will take all meds as prescribed and schedule / keep doctor's appt.  CTC provided education on s/s that require medical attention and when to seek medical attention. CTC advised Pt of use urgent care or physician's 24 hr access line if assistance is needed after hours or on the weekend. Patient denies any needs or concerns and is agreeable with additional calls.     Follow Up  Future Appointments   Date Time Provider Felipe Mayer   6/21/2022 10:00 AM Geetha Allan [place] [facility]   6/29/2022  3:30 PM Jesi Marmolejo MD [facility]   7/11/2022 11:00 AM BRIGHT Moreno CNP   9/19/2022 10:30 AM BRIGHT Moreno CNP, RN

## 2022-06-17 ENCOUNTER — TELEPHONE (OUTPATIENT)
Dept: FAMILY MEDICINE CLINIC | Age: 82
End: 2022-06-17

## 2022-06-17 NOTE — TELEPHONE ENCOUNTER
Received plan of care/orders from [facility] [place] [facility]. Provider signed orders and has been faxed to home care agency.

## 2022-06-21 ENCOUNTER — NURSE ONLY (OUTPATIENT)
Dept: CARDIOLOGY CLINIC | Age: 82
End: 2022-06-21

## 2022-06-21 ENCOUNTER — TELEPHONE (OUTPATIENT)
Dept: FAMILY MEDICINE CLINIC | Age: 82
End: 2022-06-21

## 2022-06-21 ENCOUNTER — ANTI-COAG VISIT (OUTPATIENT)
Dept: CARDIOLOGY CLINIC | Age: 82
End: 2022-06-21
Payer: MEDICARE

## 2022-06-21 ENCOUNTER — CARE COORDINATION (OUTPATIENT)
Dept: CASE MANAGEMENT | Age: 82
End: 2022-06-21

## 2022-06-21 DIAGNOSIS — F34.1 DYSTHYMIA: ICD-10-CM

## 2022-06-21 LAB — INTERNATIONAL NORMALIZATION RATIO, POC: 1.5

## 2022-06-21 PROCEDURE — 93793 ANTICOAG MGMT PT WARFARIN: CPT | Performed by: INTERNAL MEDICINE

## 2022-06-21 PROCEDURE — 85610 PROTHROMBIN TIME: CPT | Performed by: INTERNAL MEDICINE

## 2022-06-21 RX ORDER — PAROXETINE HYDROCHLORIDE 40 MG/1
40 TABLET, FILM COATED ORAL DAILY
Qty: 90 TABLET | Refills: 3 | Status: SHIPPED | OUTPATIENT
Start: 2022-06-21 | End: 2022-07-11 | Stop reason: DRUGHIGH

## 2022-06-21 NOTE — CARE COORDINATION
Care Transitions Outreach Attempt    Call within 2 business days of discharge: Yes   Attempted to reach patient for transitions of care follow up. Unable to reach patient. Left HIPPA Compliant GARY. Rachel Alves LPN, [facility]: [phone]      Patient: Dez Walker Patient : 1940 MRN: [id number]    Last Discharge [employer]       Complaint Diagnosis Description Type Department Provider    22 Fatigue Urinary tract infection without hematuria, site unspecified . .. ED to Hosp-Admission (Discharged) (ADMITTED) [facility] Michelle Andrea MD; John Acosta. .. Was this an external facility discharge?  No Discharge Facility: [facility]    Noted following upcoming appointments from discharge chart review:   [facility] follow up appointment(s):   Future Appointments   Date Time Provider Felipe Mayer   2022  3:30 PM Jodie Hay MD [facility]   2022 11:00 AM Carlitos Messier, APRN - CNP [facility] [phone] - [place]   2022 10:30 AM Carlitos Messier, APRN - CNP [phone] - [place]     Non-[facility] follow up appointment(s):

## 2022-06-21 NOTE — TELEPHONE ENCOUNTER
Received plan of care/orders from [place] [facility] [place] [facility]. Provider signed orders and has been faxed to home care agency.

## 2022-06-23 ENCOUNTER — TELEPHONE (OUTPATIENT)
Dept: CARDIOLOGY CLINIC | Age: 82
End: 2022-06-23

## 2022-06-23 ENCOUNTER — CARE COORDINATION (OUTPATIENT)
Dept: CASE MANAGEMENT | Age: 82
End: 2022-06-23

## 2022-06-23 NOTE — TELEPHONE ENCOUNTER
José Manuel from PT called with B/P readings from today. 168/103  157/102  141/116  142/102- florencio check    Please call daughter if any med changes should be made.

## 2022-06-23 NOTE — CARE COORDINATION
[facility] Transitions Follow Up Call    2022    Patient: Estella Peterson  Patient : 1940   MRN: [id number]  Reason for Admission: Fatigue  Discharge Date: 22 RARS: Readmission Risk Score: 12.1 ( )         Spoke with: [address] Transitions Follow Up Call    Needs to be reviewed by the provider   Additional needs identified to be addressed with provider: No  [place] [facility]             Method of communication with provider : none      LPN Care Coordinator contacted the patient by telephone to follow up after admission on 22. Verified name and  with patient as identifiers. Addressed changes since last contact: none  Discussed follow-up appointments. If no appointment was previously scheduled, appointment scheduling offered: Yes. Is follow up appointment scheduled within 7 days of discharge? Yes. Advance Care Planning:   Does patient have an Advance Directive: Not on file  LPN CC reviewed discharge instructions, medical action plan and red flags with patient and discussed any barriers to care and/or understanding of plan of care after discharge. Discussed appropriate site of care based on symptoms and resources available to patient including: PCP  Specialist  Urgent care clinics  [facility]  When to call 911. The patient agrees to contact the PCP office for questions related to their healthcare. Patients top risk factors for readmission: ineffective coping  Interventions to address risk factors: Obtained and reviewed discharge summary and/or continuity of care documents      Non-[facility] follow up appointment(s):     LPN CC provided contact information for future needs. Plan for follow-up call in 5-7 days based on severity of symptoms and risk factors. Plan for next call: follow up appointment-How did it go with Cardio? This [facility] spoke with pt and pt stated that she is doing well. Patient denied any worsening symptoms.  Denied fever, chills, N/V and any difficulty breathing at this time. Denied chest pain but does have some SOB upon exertion, not worsening. Denied difficulty with urination, BMs or appetite. Pt doing well and has no issues and or concerns at this time. Advised pt to immediately report any worsening symptoms to the PCP. Patient verbalized understanding and agreed. Lauren Conway LPN, [facility]  PH: [phone]      Care Transitions Subsequent and Final Call    Subsequent and Final Calls  Do you have any ongoing symptoms?: No  Have your medications changed?: No  Do you have any questions related to your medications?: No  Are you currently active with any services?: Home Health  Care Transitions Interventions   Home Care Waiver: Completed Physical Therapy: Completed     Occupational Therapy: Completed     Other Interventions:            Follow Up  Future Appointments   Date Time Provider Felipe Mayer   6/29/2022  3:30 PM Tracy Santana MD [facility]   7/11/2022 11:00 AM BRIGHT Santos CNP   9/19/2022 10:30 AM BRIGHT Santos CNP, LPN

## 2022-06-24 NOTE — TELEPHONE ENCOUNTER
I spoke w/ Ivon Richardson and let her know Dr Kary Whitehead response. She verbalized understanding. I asked her to check b/p daily at noon and record readings for Dr Jelena Gomez to review at her appt.

## 2022-06-28 NOTE — PROGRESS NOTES
[facility]   Cardiac Evaluation      Patient: Javi Jara  YOB: 1940  Date: 6/29/22    Chief Complaint   Patient presents with    Atrial Fibrillation    Hypertension    Hyperlipidemia     Referring provider: BRIGHT Hairston CNP    History of Present Illness:   Ms. Sharyle King is here today for regular follow up of her AF, HTN, HLD. 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 She had surgery on 7/18. Hospital course complicated by increased HR of her AF which is chronic (due to her illness) requiring the addition of cardizem and her usual atenolol. Ms. Sharyle King was hospitalized 5/28/22 with severe sepsis likely 2nd to UTI. She had AF w/ RVR at the time. Susan Chase converted to NSR after 1 dose diltiazem bolus. Today, Ms Sharyle King is here with 2 daughters and 1 sister in law. Last week a physical therapist called stating Laurie's blood pressure was high. She has brought blood pressure readings with her mostly 130's/80's. Her INR's have been erratic recently. In April, 2022 Susan Chase had a nosebleed and was evaluated at [facility]. Laurie's daughter states she is short of breath with little exertion and when talking on the phone. She denies chest pain, palpitations, dizziness, or edema. Past Medical History:   has a past medical history of A-fib [facility]), Atrial fibrillation ([address] Hypercholesterolemia, Hypertension, and Pelvic mass. Surgical History:   has a past surgical history that includes Appendectomy; Cystocopy (Right, 06/14/2018); Hysterectomy, total abdominal (07/09/2018); pr cysto/uretero w/lithotripsy &indwell stent insrt (Right, 10/31/2018); Upper gastrointestinal endoscopy (N/A, 11/29/2020); Upper gastrointestinal endoscopy (N/A, 11/29/2020); and Upper gastrointestinal endoscopy (N/A, 3/2/2021). Resection of ovarian serous cystadenoma.      Social History:  Social History     Socioeconomic History    Marital status:      Spouse name: Not on file    Number of children: Not on file    Years of education: Not on file    Highest education level: Not on file   Occupational History    Not on file   Tobacco Use    Smoking status: Never Smoker    Smokeless tobacco: Never Used   Vaping Use    Vaping Use: Never used   Substance and Sexual Activity    Alcohol use: No    Drug use: No    Sexual activity: Not Currently   Other Topics Concern    Not on file   Social History Narrative    Not on file     Social Determinants of Health     Financial Resource Strain:     Difficulty of Paying Living Expenses: Not on file   Food Insecurity:     Worried About Running Out of Food in the Last Year: Not on file    Jaycob of Food in the Last Year: Not on file   Transportation Needs:     Lack of Transportation (Medical): Not on file    Lack of Transportation (Non-Medical): Not on file   Physical Activity:     Days of Exercise per Week: Not on file    Minutes of Exercise per Session: Not on file   Stress:     Feeling of Stress : Not on file   Social Connections:     Frequency of Communication with Friends and Family: Not on file    Frequency of Social Gatherings with Friends and Family: Not on file    Attends Yazidism Services: Not on file    Active Member of [address] [place] [address] [employer] or Organizations: Not on file    Attends Club or Organization Meetings: Not on file    Marital Status: Not on file   Intimate Partner Violence:     Fear of Current or Ex-Partner: Not on file    Emotionally Abused: Not on file    Physically Abused: Not on file    Sexually Abused: Not on file   Housing Stability:     Unable to Pay for Housing in the Last Year: Not on file    Number of [place] in the Last Year: Not on file    Unstable Housing in the Last Year: Not on file     Family History:  family history includes Heart Disease in her father.      Allergies:  Penicillins and Sulfa antibiotics     Review of Systems:   · Constitutional: there has been no unanticipated weight loss. No change in energy or activity level   · Eyes: No visual changes   · ENT: No Headaches, hearing loss or vertigo. No mouth sores or sore throat. · Cardiovascular: Reviewed in HPI  · Respiratory: No cough or wheezing, no sputum production. No hematemesis. · Gastrointestinal: No abdominal pain, appetite loss, blood in stools. No change in bowel or bladder habits. · Genitourinary: No nocturia, dysuria, trouble voiding  · Musculoskeletal:  No gait disturbance, weakness or joint complaints. · Integumentary: No rash or pruritis. · Neurological: No headache, change in muscle strength, numbness or tingling. No change in gait, balance, coordination, mood, affect, memory, mentation, behavior. · Psychiatric: No anxiety or depression  · Endocrine: No malaise or fever  · Hematologic/Lymphatic: No abnormal bruising or bleeding, blood clots or swollen lymph nodes. · Allergic/Immunologic: No nasal congestion or hives. Physical Examination:    Vitals:    06/29/22 1555   BP: 134/78   Site: Right Upper Arm   Position: Sitting   Cuff Size: Medium Adult   Pulse: 58   SpO2: 96%   Weight: 147 lb (66.7 kg)   Height: 5' 6\" (1.676 m)     Body mass index is 23.73 kg/m².      Wt Readings from Last 3 Encounters:   06/29/22 147 lb (66.7 kg)   06/02/22 156 lb (70.8 kg)   05/31/22 159 lb 9.6 oz (72.4 kg)     BP Readings from Last 3 Encounters:   06/29/22 134/78   06/02/22 120/84   05/31/22 (!) 161/97      Constitutional and General Appearance:  appears stated age  Respiratory:  · Normal excursion and expansion without use of accessory muscles  · Resp Auscultation: Normal breath sounds without dullness  Cardiovascular:  · The apical impulses not displaced  · Heart is irregularly irregular in rhythm   · The carotid upstroke is normal, no bruit noted   · JVP is not elevated  · Peripheral pulses are symmetrical  · There is no clubbing, cyanosis of the extremities  · Trace edema   · Femoral Arteries: 2+ and equal  · Pedal Pulses: 2+ and equal   Abdomen:  · No masses or tenderness  · Normal bowel sounds  Neurological/Psychiatric:  · Alert and oriented x3, appears depressed. · Moves all extremities well  · Exhibits normal gait balance and coordination    Assessment:    Atrial fibrillation, chronic  On Warfarin, will change to Eliquis 5mg BID    VAlvular heart disease - echo below was done during the recent hospitalization for sepsis. She has no clinical findings for chf today. No edema or rales and normal O2 sats. Echo 5/28/22:  EF 60-65%. No definitive regional wall motion abnormalities. Severe left atrial enlargement. There is moderate right atrial enlargement noted by visual inspection. There is mild aortic stenosis with a peak velocity of 2.09 m/s and a mean  pressure gradient of 10 mmHg. There is mild-moderate aortic insufficiency. Thickened mitral valve without evidence of stenosis. There is moderate-severe mitral regurgitation. There is severe tricuspid regurgitation with a RVSP estimation of 41 mmHg. There is mild-to-moderate pulmonic regurgitation. Indeterminate diastolic function. Echo 6/18 normal LVEF 65% with URSZULA and increased RVSP of 51. Echo 4/13> Underlying rhythm is atrial fibrillation. Normal left ventricle size, wall thickness and systolic function with an estimated ejection fraction of 60%. No regional wall motion abnormalities are seen. There is severe bi atrial enlargement.   There is mild mitral, pulmonic and trivial aorta as well as mild tricuspid regurgitation with RVSP estimated at 41.5 mmHg    Essential hypertension, benign  stable    Mixed hypercholesterolemia  Stable, labs 11/1/21: ; TRIG 230; HDL 36; LDL 80  Carotid doppler 4/23/21>mild ds bilateral  Carotid doppler 5/16> 16-49% bilateral   Carotid doppler 4/13> 64-26% MALINI, 3-31% LICA    CHARISSE  Creat 2/85/73: 1.4    Shortness of breath   O2 sats are unchanged with walking, 98%      PLAN: The pt is here today with 2 daughters and a sister in law with multiple questions. Will change Warfarin to Eliquis 5mg BID because of erratic INRs. (frequent uti's with ABX and ? Inability to understand dosage changes) . Stop Warfarin today, then start Eliquis Friday 7/1/22. Refer to ENT for history of epistaxis. Her daughter has asked for a handicapped sticker for dyspnea but the pt was able to walk in my parking lot with minimal dyspnea and O2 sats of 95-98%. The sister in law has asked why she cannot have a Watchman device. This is indicated for pts with indications for [facility] and frequent bleeding. The sister in law also wants to know why she doesn't have home O2. Her sats are normal with walking in my parking lot. Anabella Carpenter has had a single episode of epistaxis. Another daughter does not think Anabella Carpenter should drive and wants me to state she cannot drive. Anabella Carpenter does not agree with this decision and I suggested an objective evaluation of her driving skills such as done at [address] [place] [address]. I have referred her for an ENT evaluation since she had a severe episode of epistaxis last April and was only seen in the ER. The sister in law wanted her carotids checked but they were normal one year ago. Scribe's attestation: This note was scribed in the presence of Dr Neil Ponce MD by Stephen Torres RN. The scribe's documentation has been prepared under my direction and personally reviewed by me in its entirety. I confirm that the note above accurately reflects all work, treatment, procedures, and medical decision making performed by me.

## 2022-06-29 ENCOUNTER — OFFICE VISIT (OUTPATIENT)
Dept: CARDIOLOGY CLINIC | Age: 82
End: 2022-06-29
Payer: MEDICARE

## 2022-06-29 ENCOUNTER — ANTI-COAG VISIT (OUTPATIENT)
Dept: CARDIOLOGY CLINIC | Age: 82
End: 2022-06-29
Payer: MEDICARE

## 2022-06-29 VITALS
BODY MASS INDEX: 23.63 KG/M2 | HEIGHT: 66 IN | WEIGHT: 147 LBS | SYSTOLIC BLOOD PRESSURE: 134 MMHG | HEART RATE: 58 BPM | DIASTOLIC BLOOD PRESSURE: 78 MMHG | OXYGEN SATURATION: 96 %

## 2022-06-29 DIAGNOSIS — I10 ESSENTIAL HYPERTENSION: ICD-10-CM

## 2022-06-29 DIAGNOSIS — E78.2 MIXED HYPERLIPIDEMIA: ICD-10-CM

## 2022-06-29 DIAGNOSIS — Z87.898 H/O EPISTAXIS: ICD-10-CM

## 2022-06-29 DIAGNOSIS — I48.20 CHRONIC ATRIAL FIBRILLATION (HCC): Primary | ICD-10-CM

## 2022-06-29 LAB — INTERNATIONAL NORMALIZATION RATIO, POC: 1.9

## 2022-06-29 PROCEDURE — G8428 CUR MEDS NOT DOCUMENT: HCPCS | Performed by: INTERNAL MEDICINE

## 2022-06-29 PROCEDURE — 85610 PROTHROMBIN TIME: CPT | Performed by: INTERNAL MEDICINE

## 2022-06-29 PROCEDURE — 1090F PRES/ABSN URINE INCON ASSESS: CPT | Performed by: INTERNAL MEDICINE

## 2022-06-29 PROCEDURE — 1123F ACP DISCUSS/DSCN MKR DOCD: CPT | Performed by: INTERNAL MEDICINE

## 2022-06-29 PROCEDURE — 99214 OFFICE O/P EST MOD 30 MIN: CPT | Performed by: INTERNAL MEDICINE

## 2022-06-29 PROCEDURE — 1111F DSCHRG MED/CURRENT MED MERGE: CPT | Performed by: INTERNAL MEDICINE

## 2022-06-29 PROCEDURE — G8420 CALC BMI NORM PARAMETERS: HCPCS | Performed by: INTERNAL MEDICINE

## 2022-06-29 PROCEDURE — G8400 PT W/DXA NO RESULTS DOC: HCPCS | Performed by: INTERNAL MEDICINE

## 2022-06-29 PROCEDURE — 1036F TOBACCO NON-USER: CPT | Performed by: INTERNAL MEDICINE

## 2022-06-29 NOTE — PATIENT INSTRUCTIONS
STOP WARFARIN TODAY, START ELIQUIS 5MG TWICE DAILY ON Friday July 1    REFERRAL PLACED FOR DR Forest Rebolledo, ENT

## 2022-06-30 ENCOUNTER — TELEPHONE (OUTPATIENT)
Dept: CARDIOLOGY CLINIC | Age: 82
End: 2022-06-30

## 2022-06-30 ENCOUNTER — CARE COORDINATION (OUTPATIENT)
Dept: CASE MANAGEMENT | Age: 82
End: 2022-06-30

## 2022-06-30 RX ORDER — WARFARIN SODIUM 2.5 MG/1
TABLET ORAL
Qty: 30 TABLET | Refills: 1 | Status: CANCELLED | OUTPATIENT
Start: 2022-06-30

## 2022-06-30 NOTE — CARE COORDINATION
[facility] Transitions Follow Up Call    2022    Patient: Arabella Henry  Patient : 1940   MRN: [id number]  Reason for Admission: Fatigue, UTI  Discharge Date: 22 RARS: Readmission Risk Score: 12.1 ( )         Spoke with: [address] Transitions Follow Up Call    Needs to be reviewed by the provider   Additional needs identified to be addressed with provider: No  home health care-[facility], SN, PT and OT             Method of communication with provider : none      LPN Care Coordinator contacted the patient by telephone to follow up after admission on 22. Verified name and  with patient as identifiers. Addressed changes since last contact: medications-Eliquis added, Warfarin DCd  F/u with Cardio  Discussed follow-up appointments. If no appointment was previously scheduled, appointment scheduling offered: Yes. Is follow up appointment scheduled within 7 days of discharge? Yes. Advance Care Planning:   Does patient have an Advance Directive: Not on file    LPN CC reviewed discharge instructions, medical action plan and red flags with patient and discussed any barriers to care and/or understanding of plan of care after discharge. Discussed appropriate site of care based on symptoms and resources available to patient including: PCP  Specialist  Urgent care clinics  [facility]  When to call 911. The patient agrees to contact the PCP office for questions related to their healthcare. Patients top risk factors for readmission: ineffective coping  Interventions to address risk factors: Obtained and reviewed discharge summary and/or continuity of care documents      Non-[facility] follow up appointment(s):     LPN CC provided contact information for future needs. No further follow-up call indicated based on severity of symptoms and risk factors. Plan for next call: N/A     [facility] spoke with pt for final call and pt stated that she is doing well.  Patient denied any worsening symptoms. Denied fever, chills, N/V and any difficulty breathing at this time. Denied chest pain but does have some SOB upon exertion, not worsening. Denied difficulty with urination, BMs or appetite. Pt doing well and has no issues and or concerns at this time. Pt f/u wit Cardio and it went well. Pt now on Eliquis and Warfarin DCd. Advised pt to immediately report any worsening symptoms to the PCP. Patient verbalized understanding and agreed. Judith Leger LPN, [facility]  PH: [phone]            Care Transitions Subsequent and Final Call    Schedule Follow Up Appointment with PCP: Completed  Subsequent and Final Calls  Do you have any ongoing symptoms?: No  Have your medications changed?: Yes  Patient Reports: Eliquis added, Warfarin DCd  Do you have any questions related to your medications?: No  Do you currently have any active services?: Yes  Are you currently active with any services?: Home Health  Do you have any needs or concerns that I can assist you with?: No  Identified Barriers: None  Care Transitions Interventions   Home Care Waiver: Completed Physical Therapy: Completed     Occupational Therapy: Completed     Other Interventions:            Follow Up  Future Appointments   Date Time Provider Felipe Mayer   7/11/2022 11:00 AM BRIGHT Mcguire CNP   9/19/2022 10:30 AM BRIGHT Mcguire CNP   11/4/2022  1:45 PM Kayleigh Mejia MD [facility]       Judith Leger LPN

## 2022-06-30 NOTE — TELEPHONE ENCOUNTER
Jimmy Rosenberg, patient's daughter called, stating Eliquis is going to cost $600 for 90 day supply. She states Miguel Members cannot afford this. They are asking if she can try Xarelto or should she stay on Warfarin? Please call the daughter.

## 2022-07-01 ENCOUNTER — TELEPHONE (OUTPATIENT)
Dept: CARDIOLOGY CLINIC | Age: 82
End: 2022-07-01

## 2022-07-01 RX ORDER — WARFARIN SODIUM 2.5 MG/1
2.5 TABLET ORAL
COMMUNITY
End: 2022-07-13 | Stop reason: SDUPTHER

## 2022-07-01 NOTE — TELEPHONE ENCOUNTER
Laurie's daughter, Ida Reece called to clarify Laurie's warfarin dose. She states she sets up her pills and was unsure of the dosing. I clarified that Laurie's warfarin dose is 1.25mg Sun and Thurs, 2.5mg all other days. She verbalized understanding.

## 2022-07-11 ENCOUNTER — OFFICE VISIT (OUTPATIENT)
Dept: FAMILY MEDICINE CLINIC | Age: 82
End: 2022-07-11
Payer: MEDICARE

## 2022-07-11 ENCOUNTER — TELEPHONE (OUTPATIENT)
Dept: FAMILY MEDICINE CLINIC | Age: 82
End: 2022-07-11

## 2022-07-11 VITALS
WEIGHT: 145.4 LBS | BODY MASS INDEX: 23.47 KG/M2 | OXYGEN SATURATION: 97 % | TEMPERATURE: 97.3 F | SYSTOLIC BLOOD PRESSURE: 108 MMHG | HEART RATE: 90 BPM | DIASTOLIC BLOOD PRESSURE: 72 MMHG

## 2022-07-11 DIAGNOSIS — F41.9 ANXIETY: ICD-10-CM

## 2022-07-11 DIAGNOSIS — R41.3 MEMORY LOSS: ICD-10-CM

## 2022-07-11 DIAGNOSIS — I48.20 CHRONIC ATRIAL FIBRILLATION (HCC): Primary | ICD-10-CM

## 2022-07-11 DIAGNOSIS — R53.1 GENERALIZED WEAKNESS: ICD-10-CM

## 2022-07-11 DIAGNOSIS — F34.1 DYSTHYMIA: ICD-10-CM

## 2022-07-11 PROCEDURE — 1036F TOBACCO NON-USER: CPT | Performed by: NURSE PRACTITIONER

## 2022-07-11 PROCEDURE — 1123F ACP DISCUSS/DSCN MKR DOCD: CPT | Performed by: NURSE PRACTITIONER

## 2022-07-11 PROCEDURE — 99214 OFFICE O/P EST MOD 30 MIN: CPT | Performed by: NURSE PRACTITIONER

## 2022-07-11 PROCEDURE — G8420 CALC BMI NORM PARAMETERS: HCPCS | Performed by: NURSE PRACTITIONER

## 2022-07-11 PROCEDURE — 1090F PRES/ABSN URINE INCON ASSESS: CPT | Performed by: NURSE PRACTITIONER

## 2022-07-11 PROCEDURE — G8427 DOCREV CUR MEDS BY ELIG CLIN: HCPCS | Performed by: NURSE PRACTITIONER

## 2022-07-11 PROCEDURE — G8400 PT W/DXA NO RESULTS DOC: HCPCS | Performed by: NURSE PRACTITIONER

## 2022-07-11 RX ORDER — HYDROXYZINE PAMOATE 25 MG/1
25 CAPSULE ORAL 3 TIMES DAILY PRN
Qty: 60 CAPSULE | Refills: 0 | Status: SHIPPED | OUTPATIENT
Start: 2022-07-11 | End: 2022-08-10 | Stop reason: SDUPTHER

## 2022-07-11 RX ORDER — PAROXETINE HYDROCHLORIDE 20 MG/1
20 TABLET, FILM COATED ORAL DAILY
Qty: 90 TABLET | Refills: 0 | Status: SHIPPED | OUTPATIENT
Start: 2022-07-11 | End: 2022-10-07 | Stop reason: SDUPTHER

## 2022-07-11 ASSESSMENT — PATIENT HEALTH QUESTIONNAIRE - PHQ9
SUM OF ALL RESPONSES TO PHQ QUESTIONS 1-9: 5
3. TROUBLE FALLING OR STAYING ASLEEP: 1
SUM OF ALL RESPONSES TO PHQ QUESTIONS 1-9: 5
9. THOUGHTS THAT YOU WOULD BE BETTER OFF DEAD, OR OF HURTING YOURSELF: 0
4. FEELING TIRED OR HAVING LITTLE ENERGY: 1
6. FEELING BAD ABOUT YOURSELF - OR THAT YOU ARE A FAILURE OR HAVE LET YOURSELF OR YOUR FAMILY DOWN: 0
7. TROUBLE CONCENTRATING ON THINGS, SUCH AS READING THE NEWSPAPER OR WATCHING TELEVISION: 0
SUM OF ALL RESPONSES TO PHQ9 QUESTIONS 1 & 2: 0
SUM OF ALL RESPONSES TO PHQ QUESTIONS 1-9: 5
5. POOR APPETITE OR OVEREATING: 3
2. FEELING DOWN, DEPRESSED OR HOPELESS: 0
SUM OF ALL RESPONSES TO PHQ QUESTIONS 1-9: 5
10. IF YOU CHECKED OFF ANY PROBLEMS, HOW DIFFICULT HAVE THESE PROBLEMS MADE IT FOR YOU TO DO YOUR WORK, TAKE CARE OF THINGS AT HOME, OR GET ALONG WITH OTHER PEOPLE: 0
1. LITTLE INTEREST OR PLEASURE IN DOING THINGS: 0
8. MOVING OR SPEAKING SO SLOWLY THAT OTHER PEOPLE COULD HAVE NOTICED. OR THE OPPOSITE, BEING SO FIGETY OR RESTLESS THAT YOU HAVE BEEN MOVING AROUND A LOT MORE THAN USUAL: 0

## 2022-07-11 NOTE — LETTER
[facility] [address]  Phone: [phone]  Fax: [address], APRN - CNP         July 11, 2022     Patient: Gary Rodney   YOB: 1940   Date of Visit: 7/11/2022       To Whom It May Concern: It is my medical opinion that Nicholas Ngo requires a disability parking placard for the following reasons:  She cannot walk without assistance from another person or the use of an assistance device (cane, crutch, prosthetic device, wheelchair, etc.). Duration of need: 5 years    If you have any questions or concerns, please don't hesitate to call.     Sincerely,        BRIGHT Moreno CNP

## 2022-07-11 NOTE — TELEPHONE ENCOUNTER
Please start PA on:       hydrOXYzine pamoate (VISTARIL) 25 MG capsule        DX: F41.9      Key: [id number]

## 2022-07-11 NOTE — PROGRESS NOTES
Altagracia Kwon  : 1940  Encounter date: 2022    This shemar 79 y.o. female who presents with  Chief Complaint   Patient presents with    Follow-up     Dtr, sister in law- SOB pulm referral/ xray, headaches, nose bleeds, losing weight- not eating very well. History of present illness:    HPI   1. Presents to clinic today for follow up on chronic health conditions. Both daughter and sister in law present for visit. In general there is concern that patient is not doing well in regards to caring for herself in her home. Currently is living alone - did have home care after her hospital discharge, but that has been completed. Does have assistance for medication management with both daughters - set up pill organization - this recently started. Per daughter when they went to check her pill organizer it was Curriculet up\". Today her blood pressure is low. There is some question as to whether or not she was taking all of her medications consistently. She does have safety equipment in her home like shower chair/bars on toilet, no throw rugs. She has a help button that she doesn't wear or use. Her daughters have recently not allowed her to drive because of her currently condition - this happened about 6 weeks ago. Recently they set up cameras inside of patients home - this is monitored by her daughter. She did recently have a hospital follow up with Dr. Sg Davis and they attempted to switch her warfarin to Eliquis because they could not control her INR due most likely to her medication non compliance and changes in diet. Eliquis was too expensive, so they switched her back to the warfarin. 2. Concerned today in regards to ongoing SOB. Recent evaluation with Cardiology and home PT and was fine. Possibly in association with anxiety. Did recently increase her Paxil to 40mg approximately one month prior. Feels that is has worsened her fatigue - sleeping more since increasing the dosage.   After increase in medication dosage hasn't noticed any change in her behavior. After her  passed away about 3 years prior did struggle with depression initially, but things seemed to improve, has noticed a significant change in April of this year - has had a change in stress - male friend - Jose Pizarro - talks on the phone with him regularly - has only seen each other 3-4 times since April - both daughter and sister-in-law a little stressed with the relationship as this is something that was pursued 40 years ago and didn't end well as Hugo's daughter didn't approve of the relationship. Per patient she does not want to  him, but is interested in pursuing a sexual relationship with him which her daughter is not pleased about. Allergies   Allergen Reactions    Penicillins      Patient cannot remember reaction    Sulfa Antibiotics      Current Outpatient Medications   Medication Sig Dispense Refill    PARoxetine (PAXIL) 20 MG tablet Take 1 tablet by mouth daily 90 tablet 0    hydrOXYzine pamoate (VISTARIL) 25 MG capsule Take 1 capsule by mouth 3 times daily as needed for Anxiety 60 capsule 0    atenolol (TENORMIN) 100 MG tablet Take 1 tablet by mouth daily 30 tablet 1    calcium citrate (CALCITRATE) 950 (200 Ca) MG tablet Take 1 tablet by mouth daily       pantoprazole (PROTONIX) 40 MG tablet Take 1 tablet by mouth daily 90 tablet 3    dilTIAZem (CARDIZEM) 60 MG tablet Take 0.5 tablets by mouth every 12 hours 90 tablet 3    simvastatin (ZOCOR) 40 MG tablet Take 1 tablet by mouth nightly 90 tablet 3    Omega-3 Fatty Acids (FISH OIL PO) Take by mouth      Multiple Vitamins-Minerals (MULTIVITAMIN PO) Take by mouth      warfarin (COUMADIN) 2.5 MG tablet Take 1 tablet by mouth daily Take 2.5 mg by mouth 1/2 tab 2 days a week and 1 tab all other days. 30 tablet 3     No current facility-administered medications for this visit.       Review of Systems   Constitutional: Positive for activity change, appetite change and fatigue. Negative for chills and fever. Respiratory: Negative for cough and shortness of breath. Cardiovascular: Negative for chest pain and palpitations. Gastrointestinal: Negative for diarrhea, nausea and vomiting. Past medical, surgical, family and social history were reviewed and updated with the patient. Objective:    /72 (Site: Left Upper Arm, Position: Sitting, Cuff Size: Medium Adult)   Pulse 90   Temp 97.3 °F (36.3 °C)   Wt 145 lb 6.4 oz (66 kg)   SpO2 97%   BMI 23.47 kg/m²   Weight: 145 lb 6.4 oz (66 kg)     BP Readings from Last 3 Encounters:   07/11/22 108/72   06/29/22 134/78   06/02/22 120/84     Wt Readings from Last 3 Encounters:   07/11/22 145 lb 6.4 oz (66 kg)   06/29/22 147 lb (66.7 kg)   06/02/22 156 lb (70.8 kg)     Physical Exam  Constitutional:       General: She is not in acute distress. Appearance: She is well-developed. Comments: More disheveled than baseline. HENT:      Head: Normocephalic and atraumatic. Cardiovascular:      Rate and Rhythm: Normal rate. Rhythm irregularly irregular. Heart sounds: Normal heart sounds, S1 normal and S2 normal.   Pulmonary:      Effort: Pulmonary effort is normal. No respiratory distress. Breath sounds: Normal breath sounds. Skin:     General: Skin is warm and dry. Neurological:      Mental Status: She is alert and oriented to person, place, and time. Psychiatric:         Thought Content: Thought content normal.         Judgment: Judgment normal.       Assessment/Plan    1. Chronic atrial fibrillation (HCC)  Continue follow up with Dr. Marcelo Damian. I will send chart to Caryle Levin to see if there is assistance with Eliquis voucher.    - [address]     2. Generalized weakness  Concerning with patients recent decline. Provided with handicap placard. Will need to pass driving evaluation prior to getting behind the wheel.   She has also lost 10 lbs in the past month per chart review. Concerning as to whether or not she is eating regularly along with medication noncompliance. - [address]    3. Memory loss  Will continue to monitor. Given resources to follow up for evaluation.   - [address]    4. Dysthymia  Decrease Paxil to 20mg as increase dose may be contributing to her fatigue and not helping her mood. Monitor for continued side effects.   - PARoxetine (PAXIL) 20 MG tablet; Take 1 tablet by mouth daily  Dispense: 90 tablet; Refill: 0    5. Anxiety  Trial hydroxyzine for anxiety. This may be contributing to her SOB. Will continue to monitor. May consider follow up with counseling services. - PARoxetine (PAXIL) 20 MG tablet; Take 1 tablet by mouth daily  Dispense: 90 tablet; Refill: 0  - hydrOXYzine pamoate (VISTARIL) 25 MG capsule; Take 1 capsule by mouth 3 times daily as needed for Anxiety  Dispense: 60 capsule; Refill: 0     Eduar David was counseled regarding symptoms of current diagnosis, course and complications of disease if inadequately treated. Discussed side effects of medications, diagnosis, treatment options, and prognosis along with risks, benefits, complications, and alternatives of treatment including labs, imaging and other studies/treatment targets and goals. She verbalized understanding of instructions and counseling. Return in about 2 months (around 9/11/2022) for chronic health conditions. Medical decision making of moderate complexity.

## 2022-07-13 ENCOUNTER — ANTI-COAG VISIT (OUTPATIENT)
Dept: CARDIOLOGY CLINIC | Age: 82
End: 2022-07-13
Payer: MEDICARE

## 2022-07-13 ENCOUNTER — TELEPHONE (OUTPATIENT)
Dept: FAMILY MEDICINE CLINIC | Age: 82
End: 2022-07-13

## 2022-07-13 LAB — INTERNATIONAL NORMALIZATION RATIO, POC: 2.4

## 2022-07-13 PROCEDURE — 93793 ANTICOAG MGMT PT WARFARIN: CPT | Performed by: INTERNAL MEDICINE

## 2022-07-13 PROCEDURE — 85610 PROTHROMBIN TIME: CPT | Performed by: INTERNAL MEDICINE

## 2022-07-13 RX ORDER — WARFARIN SODIUM 2.5 MG/1
2.5 TABLET ORAL DAILY
Qty: 30 TABLET | Refills: 3 | Status: SHIPPED | OUTPATIENT
Start: 2022-07-13 | End: 2022-10-10

## 2022-07-13 ASSESSMENT — ENCOUNTER SYMPTOMS
NAUSEA: 0
SHORTNESS OF BREATH: 0
COUGH: 0
DIARRHEA: 0
VOMITING: 0

## 2022-07-13 NOTE — TELEPHONE ENCOUNTER
Patient's daughter calling to advise that they have received the prescription.  [facility] pharmacy is working on it right now

## 2022-07-13 NOTE — TELEPHONE ENCOUNTER
We sent the prescription to [facility]. Can resend if they haven't received.

## 2022-07-13 NOTE — TELEPHONE ENCOUNTER
Patient's daughter Jason Plants calling in regards to patient's prescription. States she was not able to get that medication and she is not sure why. States she will give the pharmacy a call and call us back when she finds out more information. She would like a call back at [phone].

## 2022-07-15 ENCOUNTER — CARE COORDINATION (OUTPATIENT)
Dept: CARE COORDINATION | Age: 82
End: 2022-07-15

## 2022-07-15 SDOH — ECONOMIC STABILITY: INCOME INSECURITY: IN THE LAST 12 MONTHS, WAS THERE A TIME WHEN YOU WERE NOT ABLE TO PAY THE MORTGAGE OR RENT ON TIME?: NO

## 2022-07-15 SDOH — HEALTH STABILITY: PHYSICAL HEALTH: ON AVERAGE, HOW MANY MINUTES DO YOU ENGAGE IN EXERCISE AT THIS LEVEL?: 0 MIN

## 2022-07-15 SDOH — ECONOMIC STABILITY: HOUSING INSECURITY: IN THE LAST 12 MONTHS, HOW MANY PLACES HAVE YOU LIVED?: 1

## 2022-07-15 SDOH — ECONOMIC STABILITY: TRANSPORTATION INSECURITY
IN THE PAST 12 MONTHS, HAS LACK OF TRANSPORTATION KEPT YOU FROM MEETINGS, WORK, OR FROM GETTING THINGS NEEDED FOR DAILY LIVING?: NO

## 2022-07-15 SDOH — ECONOMIC STABILITY: HOUSING INSECURITY
IN THE LAST 12 MONTHS, WAS THERE A TIME WHEN YOU DID NOT HAVE A STEADY PLACE TO SLEEP OR SLEPT IN A SHELTER (INCLUDING NOW)?: NO

## 2022-07-15 SDOH — ECONOMIC STABILITY: FOOD INSECURITY: WITHIN THE PAST 12 MONTHS, THE FOOD YOU BOUGHT JUST DIDN'T LAST AND YOU DIDN'T HAVE MONEY TO GET MORE.: NEVER TRUE

## 2022-07-15 SDOH — ECONOMIC STABILITY: FOOD INSECURITY: WITHIN THE PAST 12 MONTHS, YOU WORRIED THAT YOUR FOOD WOULD RUN OUT BEFORE YOU GOT MONEY TO BUY MORE.: NEVER TRUE

## 2022-07-15 SDOH — ECONOMIC STABILITY: TRANSPORTATION INSECURITY
IN THE PAST 12 MONTHS, HAS THE LACK OF TRANSPORTATION KEPT YOU FROM MEDICAL APPOINTMENTS OR FROM GETTING MEDICATIONS?: NO

## 2022-07-15 SDOH — HEALTH STABILITY: PHYSICAL HEALTH: ON AVERAGE, HOW MANY DAYS PER WEEK DO YOU ENGAGE IN MODERATE TO STRENUOUS EXERCISE (LIKE A BRISK WALK)?: 0 DAYS

## 2022-07-15 ASSESSMENT — LIFESTYLE VARIABLES
HOW OFTEN DO YOU HAVE A DRINK CONTAINING ALCOHOL: NEVER
HOW MANY STANDARD DRINKS CONTAINING ALCOHOL DO YOU HAVE ON A TYPICAL DAY: PATIENT DOES NOT DRINK

## 2022-07-15 ASSESSMENT — SOCIAL DETERMINANTS OF HEALTH (SDOH)
IN A TYPICAL WEEK, HOW MANY TIMES DO YOU TALK ON THE PHONE WITH FAMILY, FRIENDS, OR NEIGHBORS?: MORE THAN THREE TIMES A WEEK
HOW OFTEN DO YOU ATTENT MEETINGS OF THE CLUB OR ORGANIZATION YOU BELONG TO?: NEVER
DO YOU BELONG TO ANY CLUBS OR ORGANIZATIONS SUCH AS CHURCH GROUPS UNIONS, FRATERNAL OR ATHLETIC GROUPS, OR SCHOOL GROUPS?: NO
HOW HARD IS IT FOR YOU TO PAY FOR THE VERY BASICS LIKE FOOD, HOUSING, MEDICAL CARE, AND HEATING?: NOT HARD AT ALL
HOW OFTEN DO YOU ATTEND CHURCH OR RELIGIOUS SERVICES?: NEVER
HOW OFTEN DO YOU GET TOGETHER WITH FRIENDS OR RELATIVES?: MORE THAN THREE TIMES A WEEK

## 2022-07-15 NOTE — CARE COORDINATION
Ambulatory Care Coordination Note  7/15/2022  CM Risk Score: 8  Charlson 10 Year Mortality Risk Score: 100%     ACC: David May RN    Summary Note: [facility] made outreach to patient for enrollment into Care Management. Patient has agreed to work with [employer] for Care Management. Patient has given ACM to work with Hermelinda Fowler in future outreaches as well. ACM discussed HTN with patient. She continues to check her BP daily. Per patient she will sometimes miss checking it but never goes longer than a day without checking it. She states that her numbers fluctuate. ACM discussed s/sx to report regarding both low and high BP. She was advised to record readings and if she notices any concerns with readings too high or too low if she recognizes a trend in readings to report to PCP. She agreed with the plan. ACM discussed Fall risk with patient she gets up one to two times a night to use the restroom. Patient has a clear well lit path to the bathroom and is careful with ambulation. Per patient she feels steady on her feet and not concerned with falls at the present time. Hermelinda Fowler states that her home has been has safety features installed to help keep patient safe and home and prevent falls. Patient is currently being monitored by camera at this time by daughter and has not had any concerns reported. Patient has a medical alert necklace but does not wear it currently. Patient is aware of the need to wear it, and has promised her daughter that she will. [facility] will mail patient resources regarding fall safety to home address. ACM discussed eliquis with patient. Per patient she states that her and her daughters have talked and she will continue to take warfarin at this time due to cost of Eliquis. ACM advised patient that there may be programs that can help with affording medication. [facility] will discuss with Hermelinda Fowler during next outreach  ACM will continue to work with patient and her daughter and has arranged future follow up.  Both patient and Deyanira Luna have [facility] number to call with questions. Plan  F/U Fall safety  F/U Medications - Eliquis, Medpks? F/U resources for in home companions and assisted living resources         Ambulatory Care Coordination Assessment    Care Coordination Protocol  Referral from Primary Care Provider: No  Week 1 - Initial Assessment     Do you have all of your prescriptions and are they filled?: Yes  Barriers to medication adherence: Forgets to take  Are you able to afford your medications?: Yes  How often do you have trouble taking your medications the way you have been told to take them?: Sometimes I take them as prescribed. Do you have Home O2 Therapy?: No      Ability to seek help/take action for Emergent Urgent situations i.e. fire, crime, inclement weather or health crisis. : Independent  Ability to ambulate to restroom: Independent  Ability handle personal hygeine needs (bathing/dressing/grooming): Independent  Ability to manage Medications: Dependent  Ability to prepare Food Preparation: Independent  Ability to maintain home (clean home, laundry):  Independent  Ability to drive and/or has transportation: Needs Assistance  Ability to do shopping: Needs Assistance  Ability to manage finances: Needs Assistance  Is patient able to live independently?: Yes     Current Housing: Private Residence        Per the Fall Risk Screening, did the patient have 2 or more falls or 1 fall with injury in the past year?: No     Frequent urination at night?: Yes  Do you use rails/bars?: Yes  Do you have a non-slip tub mat?: Yes     Are you experiencing loss of meaning?: No  Are you experiencing loss of hope and peace?: No     Thinking about your patient's physical health needs, are there any symptoms or problems (risk indicators) you are unsure about that require further investigation?: Mild vague physical symptoms or problems; but do not impact on daily life or are not of concern to patient   Are the patients physical health problems impacting on their mental well-being?: No identified areas of concern   Are there any problems with your patients lifestyle behaviors (alcohol, drugs, diet, exercise) that are impacting on physical or mental well-being?: Some mild concern of potential negative impact on well-being   Do you have any other concerns about your patients mental well-being? How would you rate their severity and impact on the patient?: No identified areas of concern   How would you rate their home environment in terms of safety and stability (including domestic violence, insecure housing, neighbor harassment)?: Consistently safe, supportive, stable, no identified problems   How do daily activities impact on the patient's well-being? (include current or anticipated unemployment, work, caregiving, access to transportation or other): Some general dissatisfaction but no concern   How would you rate their social network (family, work, friends)?: Good participation with social networks   How would you rate their financial resources (including ability to afford all required medical care)?: Financially secure, resources adequate, no identified problems   How wells does the patient now understand their health and well-being (symptoms, signs or risk factors) and what they need to do to manage their health?: Reasonable to good understanding and already engages in managing health or is willing to undertake better management   How well do you think your patient can engage in healthcare discussions? (Barriers include language, deafness, aphasia, alcohol or drug problems, learning difficulties, concentration): Adequate communication, with or without minor barriers   Do other services need to be involved to help this patient?: Other care/services in place but not sufficient   Are current services involved with this patient well-coordinated?  (Include coordination with other services you are now recommendation): Required care/services in place with some coordination barriers   Suggested Interventions and Community Resources  Adult Day Program: Not Started     Fall Risk Prevention: In Process Palliative Care: Not Started   Senior Services: In Process                    Prior to Admission medications    Medication Sig Start Date End Date Taking? Authorizing Provider   warfarin (COUMADIN) 2.5 MG tablet Take 1 tablet by mouth daily Take 2.5 mg by mouth 1/2 tab 2 days a week and 1 tab all other days.  7/13/22   Gene Edge MD   PARoxetine (PAXIL) 20 MG tablet Take 1 tablet by mouth daily 7/11/22   BRIGHT Wall CNP   hydrOXYzine pamoate (VISTARIL) 25 MG capsule Take 1 capsule by mouth 3 times daily as needed for Anxiety 7/11/22 8/10/22  BRIGHT Wall CNP   atenolol (TENORMIN) 100 MG tablet Take 1 tablet by mouth daily 6/1/22   Malcolm Dykes MD   calcium citrate (CALCITRATE) 950 (200 Ca) MG tablet Take 1 tablet by mouth daily     Historical Provider, MD   pantoprazole (PROTONIX) 40 MG tablet Take 1 tablet by mouth daily 8/30/21   Gene Edge MD   dilTIAZem (CARDIZEM) 60 MG tablet Take 0.5 tablets by mouth every 12 hours 8/4/21   Gene Edge MD   simvastatin (ZOCOR) 40 MG tablet Take 1 tablet by mouth nightly 8/4/21 8/4/22  Gene Edge MD   Omega-3 Fatty Acids (FISH OIL PO) Take by mouth    Historical Provider, MD   Multiple Vitamins-Minerals (MULTIVITAMIN PO) Take by mouth    Historical Provider, MD       Future Appointments   Date Time Provider Felipe Mayer   8/2/2022  1:00 PM ROMINA Robins OT [facility]   8/9/2022 11:20 AM Erin MarchDO ROBERTS E[facility]   8/25/2022  9:30 AM SCHEDULE, Sunshine Barker [facility]   9/14/2022 11:30 AM Yisel Glassing, APRN - CNP [id number] - DYD   11/4/2022  1:45 PM MD Tiffanie Candelario     , ACC: Juan Cotton RN  CM Risk Score: 8  Charlson 10 Year Mortality Risk Score: 100%     Care Coordination Interventions    Referral from Primary Care Provider: No  Suggested Interventions and Community Resources  Adult Day Program: Not Started  Fall Risk Prevention: In Process  Palliative Care: Not Started  Senior Services:  In Process         ,   General Assessment    Do you have any symptoms that are causing concern?: No     , Care Coordination Episodes    Type: Amb Care Management  Episode: Complex Care   Noted: 7/15/2022, and 8

## 2022-07-15 NOTE — CARE COORDINATION
ACM received referral from PCP and phone call from patient's daughter regarding Care Management. OLIVIAM spoke with patient's daughter Fabi Boogie. She is interested in resources for in home services for patient as well as assisted living. Fabi Boogie made outreach to [facility] already and the services that may be available to patient are not what they are looking for at this time. 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 STEPHANIE will research resources and has follow up planned with Fabi Nathans at a later date/time. OLIVIAM will call patient to complete enrollment.

## 2022-07-18 ENCOUNTER — TELEPHONE (OUTPATIENT)
Dept: FAMILY MEDICINE CLINIC | Age: 82
End: 2022-07-18

## 2022-07-18 DIAGNOSIS — R06.02 SOB (SHORTNESS OF BREATH): Primary | ICD-10-CM

## 2022-07-18 NOTE — TELEPHONE ENCOUNTER
Patient's daughter calling with concerns. States that patient's breathing has gotten worse. States she does not think it has anything to do with her anxiety and they would like a referral to a pulmonologist. Daughter would like a call back at [phone]. Please advise provider out of the office.

## 2022-07-18 NOTE — TELEPHONE ENCOUNTER
Advised her daughter and gave her [facility] pulmonology phone number and referral sent too.

## 2022-07-20 ENCOUNTER — TELEPHONE (OUTPATIENT)
Dept: CARDIOLOGY CLINIC | Age: 82
End: 2022-07-20

## 2022-07-20 NOTE — TELEPHONE ENCOUNTER
Spoke w/ Kristofer Madison. She states Jazmyn Villarreal advised Dat Almanza to hold Diltiazem due to low b/p in the office, monitor b/p and call Dr Olive Duffy. She has not taken Diltiazem for 1 week. They do not have any blood pressure readings other than this morning, 134/84. She states they are going to fill her mediset and want to know if Diltiazem should be restarted. I advised Kristofer Madison that Dr Olive Duffy is going to want them to check Laurie's b/p daily at noon, record readings, and provide those before making med changes. She states Dat Almanza does not always check her b/p, but Kristofer Madison or Laurie's other daughter can start checking it.

## 2022-07-20 NOTE — TELEPHONE ENCOUNTER
Laurie's daughter Vernon called in this afternoon wanting to talk to Dr. Celena Pagan. Vernon said Laurie's PCP Dr. Danii Escamilla had Sadia Wadsworth hold her Diltiazem, which she has been holding since a week ago Monday because her BP was low. While on the phone with Vernon she read me Dr. Whitten Abts after visit summary, and it stated hold Diltiazem that night, monitor BP, and send BP reading to Dr. Celena Pagan. Vernon is wanting to know when Sadia Wadsworth should start back on the Diltiazem? Vernon said Dr. Danii Escamilla is on vacation.      7/20/22 am 134/84  (That's the only reading they have)    You can reach Vernon at #[phone] or [phone]

## 2022-07-21 ENCOUNTER — TELEPHONE (OUTPATIENT)
Dept: CARDIOLOGY CLINIC | Age: 82
End: 2022-07-21

## 2022-07-21 NOTE — TELEPHONE ENCOUNTER
I spoke w/ Freddie Cho and let her know Dr Parish Hastings response.  She will call in 1 week with b/p readings

## 2022-07-22 ENCOUNTER — CARE COORDINATION (OUTPATIENT)
Dept: CARE COORDINATION | Age: 82
End: 2022-07-22

## 2022-07-22 NOTE — CARE COORDINATION
STEPHANEI received call from patient's daughter requesting list of  resources for in home visits and information regarding assisted living. STEPHANIE emailed resources to [email or web address]  Copied email:      Here are a few  services that may be available to you for in home services. Please keep in mind that [facility] ([facility]) does not endorse any specific agency nor can we speak for the quality of their work. I have listed some options below. Please call the numbers provided to discuss services offered, availability and pricing. Hopefully this may help to get you started. [facility] [phone]  Visiting [facility] [phone]  Right at [address] [place] [address] [phone]    For assisted living facilities www. [email or web address]. org is a resource that may aid in locating a facility that is right for your mom. STEPHANIE also provided www. [email or web address]. org website during outreach and advised that STEPHANIE was unable to suggest assisted living facilities but she could use the website as a resource and to call with any questions. STEPHANIE will follow up with patient at a later date/time.

## 2022-07-26 NOTE — TELEPHONE ENCOUNTER
Medication Refill    Medication needing refilled:  atenolol (TENORMIN) 100 MG     Dosage of the medication:    How are you taking this medication (QD, BID, TID, QID, PRN): 1 tablet by mouth daily    30 or 90 day supply called in: 90 day supply    When will you run out of your medication:    Which Pharmacy are we sending the medication to?: [facility]/pharmacy #[id number]- [place], [place] [address] [address] [phone] Evelyne Rodrigeuz [phone]   [facility] [address], [place] [address]   Phone:  [phone]  Fax:  [phone]

## 2022-07-27 RX ORDER — SIMVASTATIN 40 MG
TABLET ORAL
Qty: 90 TABLET | Refills: 3 | Status: SHIPPED | OUTPATIENT
Start: 2022-07-27

## 2022-07-27 RX ORDER — ATENOLOL 100 MG/1
100 TABLET ORAL DAILY
Qty: 90 TABLET | Refills: 1 | Status: SHIPPED | OUTPATIENT
Start: 2022-07-27

## 2022-07-27 NOTE — TELEPHONE ENCOUNTER
Refill was requested from pharmacy. Patient is up to date with appointments and lab work. 5/22/2022 Lipids done. 6/29/2022 Ov with [facility]    11/4/2022 Next OV.

## 2022-08-01 ENCOUNTER — OFFICE VISIT (OUTPATIENT)
Dept: PULMONOLOGY | Age: 82
End: 2022-08-01
Payer: MEDICARE

## 2022-08-01 VITALS — HEART RATE: 79 BPM | OXYGEN SATURATION: 93 %

## 2022-08-01 DIAGNOSIS — I27.20 PULMONARY HYPERTENSION (HCC): ICD-10-CM

## 2022-08-01 DIAGNOSIS — I34.0 MITRAL VALVE INSUFFICIENCY, UNSPECIFIED ETIOLOGY: ICD-10-CM

## 2022-08-01 DIAGNOSIS — R06.02 SOB (SHORTNESS OF BREATH): ICD-10-CM

## 2022-08-01 DIAGNOSIS — I48.20 CHRONIC ATRIAL FIBRILLATION (HCC): Primary | ICD-10-CM

## 2022-08-01 PROCEDURE — 1090F PRES/ABSN URINE INCON ASSESS: CPT | Performed by: INTERNAL MEDICINE

## 2022-08-01 PROCEDURE — 99204 OFFICE O/P NEW MOD 45 MIN: CPT | Performed by: INTERNAL MEDICINE

## 2022-08-01 PROCEDURE — G8420 CALC BMI NORM PARAMETERS: HCPCS | Performed by: INTERNAL MEDICINE

## 2022-08-01 PROCEDURE — G8427 DOCREV CUR MEDS BY ELIG CLIN: HCPCS | Performed by: INTERNAL MEDICINE

## 2022-08-01 PROCEDURE — 1036F TOBACCO NON-USER: CPT | Performed by: INTERNAL MEDICINE

## 2022-08-01 PROCEDURE — 1123F ACP DISCUSS/DSCN MKR DOCD: CPT | Performed by: INTERNAL MEDICINE

## 2022-08-01 PROCEDURE — G8400 PT W/DXA NO RESULTS DOC: HCPCS | Performed by: INTERNAL MEDICINE

## 2022-08-01 ASSESSMENT — ENCOUNTER SYMPTOMS
NAUSEA: 0
SINUS PRESSURE: 0
DIARRHEA: 0
CONSTIPATION: 0
ABDOMINAL PAIN: 0

## 2022-08-01 NOTE — PROGRESS NOTES
[facility]  Consult Note  Celia Kimball MD       [facility]   YOB: 1940    Date of Visit:  8/1/2022    Assessment/Plan:  1. SOB (shortness of breath)  Could be multifactorial  I reviewed chest imaging which is chest x-ray from May  This is clear  CT scan from 2018 showed no interstitial lung disease or emphysema. She did have cardiomegaly. PFT and CT chest    2. Pulmonary hypertension (HCC)  RVSP was in the 40s  Could be a contributor to her dyspnea    3. Mitral valve insufficiency, unspecified etiology  Last echo revealed moderate to severe mitral regurgitation. Could also be a contributor    4. Chronic atrial fibrillation (HCC)  Has been in A. fib for a long time  Follows with cardiology    5. Chronic hypoxemic respiratory failure  Patient's oxygen saturation was 93% at rest.  She falls to 86% walking a short distance on room air  She would benefit from supplemental oxygen with exertion and sleep  She is mobile within the home and would benefit from a portable oxygen concentrator    Follow-up in 1 month    Chief Complaint   Patient presents with    Shortness of Breath     Referred by [facility] Sx's started back in May        HPI  Patient presents with a chief complaint of shortness of breath its been going on for the last several months. She had a hospitalization around May of this year related to sepsis from a kidney stone. She has had trouble with breathing ever since then. She has really only minimal associated cough. Exertion is the main modifying factor. She is short of breath when she walks to her mailbox. She is sometimes short of breath when she gets stressed. She had to stop and rest on the way from the lobby to the office today. She does have a history of chronic A. fib. She also has some degree of pulmonary hypertension and mitral insufficiency on recent echocardiogram.  She does follow with cardiology.   She does not have supplemental oxygen. She has had no inhaled medication. She is a lifetime non-smoker with minimal secondhand smoke exposure. Review of Systems  Review of Systems   Constitutional:  Negative for fatigue and fever. HENT:  Negative for congestion and sinus pressure. Eyes:  Negative for visual disturbance. Cardiovascular:  Negative for chest pain and palpitations. Gastrointestinal:  Negative for abdominal pain, constipation, diarrhea and nausea. Genitourinary:  Negative for difficulty urinating. Musculoskeletal:  Negative for arthralgias. Skin:  Negative for rash. Neurological:  Negative for dizziness and light-headedness. Hematological:  Does not bruise/bleed easily. Psychiatric/Behavioral:  Negative for behavioral problems. History  I have reviewed past medical, surgical, social and family history. This is documented elsewhere in themedical record. Physical Exam:  Physical Exam  Constitutional:       General: She is not in acute distress. Appearance: She is well-developed. HENT:      Head: Normocephalic and atraumatic. Eyes:      Comments: Nasal mucosa, teeth and gums and oropharynx are clear. Neck:      Thyroid: No thyromegaly (There are no other neck masses noted. ). Vascular: No JVD. Trachea: No tracheal deviation. Cardiovascular:      Rate and Rhythm: Normal rate and regular rhythm. Heart sounds: Normal heart sounds. No murmur heard. Pulmonary:      Effort: Pulmonary effort is normal. No respiratory distress. Breath sounds: Normal breath sounds. No wheezing or rales. Chest:      Chest wall: No tenderness. Abdominal:      General: Bowel sounds are normal. There is no distension. Palpations: Abdomen is soft. Mass: There is no hepatosplenomegaly. Tenderness: There is no abdominal tenderness. Musculoskeletal:         General: No tenderness (Muscle strength is normal and there is no obvious atrophy). Cervical back: Neck supple. Lymphadenopathy:      Cervical: No cervical adenopathy. Skin:     General: Skin is warm and dry. Findings: No rash. Psychiatric:      Comments: Oriented to person place and time       Allergies   Allergen Reactions    Penicillins      Patient cannot remember reaction    Sulfa Antibiotics      Prior to Visit Medications    Medication Sig Taking? Authorizing Provider   atenolol (TENORMIN) 100 MG tablet Take 1 tablet by mouth in the morning. Yes Andrés Carlton MD   simvastatin (ZOCOR) 40 MG tablet TAKE 1 TABLET BY MOUTH EVERY DAY AT NIGHT Yes Andrés Carlton MD   warfarin (COUMADIN) 2.5 MG tablet Take 1 tablet by mouth daily Take 2.5 mg by mouth 1/2 tab 2 days a week and 1 tab all other days. Yes Andrés Carlton MD   PARoxetine (PAXIL) 20 MG tablet Take 1 tablet by mouth daily Yes BRIGHT Potter CNP   hydrOXYzine pamoate (VISTARIL) 25 MG capsule Take 1 capsule by mouth 3 times daily as needed for Anxiety  Patient taking differently: Take 25 mg by mouth in the morning and at bedtime Yes BRIGHT Potter CNP   calcium citrate (CALCITRATE) 950 (200 Ca) MG tablet Take 1 tablet by mouth daily  Yes Historical Provider, MD   pantoprazole (PROTONIX) 40 MG tablet Take 1 tablet by mouth daily Yes Andrés Carlton MD   Omega-3 Fatty Acids (FISH OIL PO) Take by mouth Yes Historical Provider, MD   Multiple Vitamins-Minerals (MULTIVITAMIN PO) Take by mouth Yes Historical Provider, MD   dilTIAZem (CARDIZEM) 60 MG tablet Take 0.5 tablets by mouth every 12 hours  Patient not taking: Reported on 8/1/2022  Andrés Carlton MD       Vitals:    08/01/22 1649   Pulse: 79   SpO2: 93%     There is no height or weight on file to calculate BMI.      Wt Readings from Last 3 Encounters:   07/11/22 145 lb 6.4 oz (66 kg)   06/29/22 147 lb (66.7 kg)   06/02/22 156 lb (70.8 kg)     BP Readings from Last 3 Encounters:   07/11/22 108/72   06/29/22 134/78   06/02/22 120/84        Social History     Tobacco Use   Smoking Status Never   Smokeless Tobacco Never

## 2022-08-02 ENCOUNTER — HOSPITAL ENCOUNTER (OUTPATIENT)
Dept: OCCUPATIONAL THERAPY | Age: 82
Setting detail: THERAPIES SERIES
Discharge: HOME | End: 2022-08-02
Payer: MEDICARE

## 2022-08-02 PROCEDURE — 97165 OT EVAL LOW COMPLEX 30 MIN: CPT

## 2022-08-02 PROCEDURE — 97537 COMMUNITY/WORK REINTEGRATION: CPT

## 2022-08-02 NOTE — PROGRESS NOTES
Occupational Therapy  Name: Lizbet Ryan  1940  Date: 8/2/2022  12:58 PM      Time In: 1300  Time Out: 1500  Billed Units: 8  CPT 00713: OT Low Eval units _1__  CPT 77086: OT Work Conditioning  units __7__  Diagnosis: memory loss, generalized weakness  Prior Level of Functioning: Lives alone. Does not cook due to being by herself. She heats up microwave meals. Independent with ADL's. Family assist with medications due to pt may have been mixing up medications. ___  Seizure free for 1 year: yes  Hearing Aids: no  Glasses/contacts: reading  Mobility Status: No AD  Is client able to transfer into car: yes  License # [id number]  Restrictions on License: corrective lenses  Expiration date: 11/25/22  Last time client drove: Stopped driving around [facility] Day due to hospitalization and family concerns. Car Make/Model and transmission type: [employer], automatic  Driving Habits: Frequency: 2-3x/week  Night: yes  Snow: no  Highway: yes ? Traffic tickets in last 5 years: no  Accidents in last 5 years: no  Explain:    VISION:  Acuity: ([facility] law =20/40 night driving; 25/48 day driving): ________with/without corrective lenses  Peripheral field: ([address] [place] [address] requires 70? visual field on both sides of a fixation point for a non-restricted license or 39? on the other side)  INTACT  IMPAIRED  Color Vision:  INTACT       Saccades: (ability to rapidly change fixation from one point in the visual field to another)    INTACT  Pursuits: (the continued fixation of a moving object)    INTACT  Depth Perception:    INTACT      COGNITION:  Trail Making Test Parts A & B (involve scanning, speed of mental processing and visual motor sequencing. Trail Making Part B involves switching cognitive sets too)  Trail Making Part A:   _______48.22____seconds (Norm 60 seconds)  Trail Making Part B:   ______Stopped at 5 minutes.   Difficulty with following pattern_____seconds (Norm 180 seconds)  *Clock Drawing Test:  optional (used to assess

## 2022-08-02 NOTE — PROGRESS NOTES
8/2/2022    Dear BRIGHT Rosales-JAYCEE,      Isaías Pham (MR# [id number]) was seen by Occupational Therapy on 8/2/2022 for a s Evaluation at [facility].  As you know, Ms. Clau Fung suffers from memory loss, and generalized weakness. She wants to resume driving to be independent in community mobility and leisure skills. Ms. Clau Fung passed tests for visual acuity, saccades, pursuits, depth perception, color vision, peripheral vision and recognition of traffic signs and signals. She was administered the Trails Making Tests Part A and B which are cognitive tests that involve scanning, speed of mental processing, visual motor sequencing, as well as switching cognitive sets. On Part A, she scored within normal limits with 48.22 seconds over a norm of 60 seconds and on Part B, she was unable to complete due to difficulty with following the pattern. She was administered the Clock Drawing Test which measured short-term memory, visual perception, visuospatial skills, selective attention, and executive skills. She had 3 errors which is within normal limits. She demonstrated functional physical capacity for driving. Behind the wheel, Ms. Clau Fung was able to manipulate all vehicle controls. She required cues to locate the brake in the vehicle and then to apply the brake to place the vehicle in drive. She demonstrated difficulty with maintaining her speed as she required repeated cues throughout the evaluation. She demonstrated decreased alice positioning as she was driving on the lines and on one occasion was driving directly over the alice marker and did not correct until cued as she was unaware. Based on the results of this evaluation, it appears that Ms. Clau Fung is NOT a suitable candidate to resume driving. Family reports her cognition has been worse since her hospitalization and the she is going to start using oxygen.   If her cognition improves as a result from improved medical

## 2022-08-02 NOTE — PROGRESS NOTES
Outpatient Occupational Therapy  [] [facility] [address] PAULA HOLLIDAY   Phone: [phone]   Fax: [phone]   [x] [facility]  Phone: [phone]   Fax: [phone]  [] Tami [place]   Phone: [phone]   Fax: [phone]      To: RICARDO Bean,      Patient: Rhoda Rodriguez  : 1940  MRN: [id number]  Evaluation Date: 2022      Diagnosis Information:            Occupational Therapy Certification/Re-Certification Form  Dear RICARDO Bean,  The following patient has been evaluated for occupational therapy services and for therapy to continue, Medicare requires monthly physician review of the treatment plan. Please review the attached evaluation and/or summary of the patient's plan of care, and verify that you agree therapy should continue by signing the attached document and sending it back to our office. Plan of Care/Treatment to date:  [] Therapeutic Exercise   [] Modalities:  [] Therapeutic Activity    [] Ultrasound  [] Electrical Stimulation   [] Activities of Daily Living    [] Fluidotherapy [] Kinesiotaping  [] Neuromuscular Re-education   [] Iontophoresis [] Coldpack/hotpack   [] Instruction in HEP     [] Contrast Bath  [] Manual Therapy     Other:  [] Aquatic Therapy     [x] Cease driving. appropriate to be evaluated in the future if cognition                improves as a result of improvement with medical status. Frequency/Duration:  # Days per week: [x] 1 day # Weeks: [x] 1 week [] 5 weeks      [] 2 days   [] 2 weeks [] 6 weeks     [] 3 days   [] 3 weeks [] 7 weeks     [] 4 days   [] 4 weeks [] 8 weeks    Rehab Potential: [] excellent [] good [x] fair  [] poor       Electronically signed by:  ANGELES Rios, RADHA      If you have any questions or concerns, please don't hesitate to call.   Thank you for your referral.      Physician Signature:________________________________Date:__________________  By signing above, therapists plan is approved by physician

## 2022-08-03 ENCOUNTER — CARE COORDINATION (OUTPATIENT)
Dept: CARE COORDINATION | Age: 82
End: 2022-08-03

## 2022-08-03 NOTE — CARE COORDINATION
Ambulatory Care Coordination Note  8/3/2022    ACC: Galvin Felty, RN    Summary Note: AC made outreach to patient's daughter Fabi Boogie for Care Management follow up. ACM was advised that patient had her driving test yesterday. It was determined that she is no longer able to safely drive. Patient was upset about the results. Per Fabi Boogie the instructor discussed hazards of her driving and implications if she was to drive and harm someone. Fabi Boogie has the car keys with her so patient does not have access to driving at this time. Patient had appointment with pulmonologist. Patient will start on home oxygen at 2 L. Fabi Boogie is currently waiting for Medicare approval. Fabi Boogie will call AC if she has any complications with getting oxygen for patient. Fabi Boogie received the [employer] of  services. She has made outreaches but has been unsuccessful. She states that she has been told repeatedly that \"nobody wants to work\". She is frustrated but feels that even with her and her sister returning to work, together they will be able to provide the care their mom needs at this time. [facility] has been contacted and will be visiting patient in home today from 1-4. They can provide in home companion for up to 8 hours a week but unfortunately there is a lengthy wait list. Per Fabi Boogie eventually assisted living may be the only option but for right now she believes patient is ok to remain home. They continuously monitor her for safety, health conditions and provide needed care at this time. There were no present s/sx of concern and no questions for ACM. [facility] has arranged for follow up at a later date time. Plan  F/U fall safety  F/U O2  F/U  services  Discuss graduation  Lab Results       None            Care Coordination Interventions    Referral from Primary Care Provider: No  Suggested Interventions and Community Resources  Adult Day Program: Not Started  Fall Risk Prevention: In Process  Medi Set or Pill Pack:  In Process  Palliative Care: Not Started  Senior Services: In Process (Comment: has appointment today from 1-4)          Goals Addressed                   This Visit's Progress     Community Resource Goal   No change     I will work with ACM to investigate in home companion services and provide resources for making decisions for assisted living. Barriers: financial, overwhelmed by complexity of regimen, stress, time constraints, and lack of education  Plan for overcoming my barriers: will work with ACM, daughters, and PCP  Confidence: 8/10  Anticipated Goal Completion Date: 10/15/2022       Self Monitoring   No change     Blood Pressure - I will take my blood pressure as directed - Daily  I will notify my provider of any trends of increasing or decreasing blood pressures over a month period of time. I will notify my provider of any changes in blood pressure associated with symptoms of dizziness, falls, passing out, headache, confusion/change in mental status. None Recently Recorded    Barriers: overwhelmed by complexity of regimen and stress  Plan for overcoming my barriers: will work with Caroline Maxin, daughters and PCP  Confidence: 8/10  Anticipated Goal Completion Date: 10/15/2022                Prior to Admission medications    Medication Sig Start Date End Date Taking? Authorizing Provider   atenolol (TENORMIN) 100 MG tablet Take 1 tablet by mouth in the morning. 7/27/22   Jose Li MD   simvastatin (ZOCOR) 40 MG tablet TAKE 1 TABLET BY MOUTH EVERY DAY AT NIGHT 7/27/22   Jose Li MD   warfarin (COUMADIN) 2.5 MG tablet Take 1 tablet by mouth daily Take 2.5 mg by mouth 1/2 tab 2 days a week and 1 tab all other days.  7/13/22   Jose Li MD   PARoxetine (PAXIL) 20 MG tablet Take 1 tablet by mouth daily 7/11/22   BRIGHT Preston - CNP   hydrOXYzine pamoate (VISTARIL) 25 MG capsule Take 1 capsule by mouth 3 times daily as needed for Anxiety  Patient taking differently: Take 25 mg by mouth in the morning and at bedtime 7/11/22 8/10/22  BRIGHT Becker CNP   calcium citrate (CALCITRATE) 950 (200 Ca) MG tablet Take 1 tablet by mouth daily     Historical Provider, MD   pantoprazole (PROTONIX) 40 MG tablet Take 1 tablet by mouth daily 8/30/21   Lavinia Tamez MD   dilTIAZem (CARDIZEM) 60 MG tablet Take 0.5 tablets by mouth every 12 hours  Patient not taking: Reported on 8/1/2022 8/4/21   Lavinia Tamez MD   Omega-3 Fatty Acids (FISH OIL PO) Take by mouth    Historical Provider, MD   Multiple Vitamins-Minerals (MULTIVITAMIN PO) Take by mouth    Historical Provider, MD       Future Appointments   Date Time Provider Felipe Leyla   8/9/2022 11:20 AM DO ARMANDO Alston ENT [facility]   8/25/2022  9:30 AM SCHEDULE, Cassie Morrison [place] [facility]   8/30/2022  4:30 PM Karen Bennett MD PULM & CC [facility]   9/14/2022 11:30 AM Radu Stevie, APRN - CNP [facility] [id number] - DYD   11/4/2022  1:45 PM MD Joby Spencer [facility]   ,   General Assessment    Do you have any symptoms that are causing concern?: No     , and Care Coordination Episodes    Type: Amb Care Management  Episode: Complex Care   Noted: 7/15/2022

## 2022-08-09 ENCOUNTER — TELEPHONE (OUTPATIENT)
Dept: CARDIOLOGY CLINIC | Age: 82
End: 2022-08-09

## 2022-08-09 ENCOUNTER — OFFICE VISIT (OUTPATIENT)
Dept: ENT CLINIC | Age: 82
End: 2022-08-09
Payer: MEDICARE

## 2022-08-09 VITALS — HEART RATE: 89 BPM | TEMPERATURE: 97.1 F | DIASTOLIC BLOOD PRESSURE: 91 MMHG | SYSTOLIC BLOOD PRESSURE: 151 MMHG

## 2022-08-09 DIAGNOSIS — R04.0 EPISTAXIS: ICD-10-CM

## 2022-08-09 DIAGNOSIS — Z78.9 ON SUPPLEMENTAL OXYGEN BY NASAL CANNULA: ICD-10-CM

## 2022-08-09 DIAGNOSIS — H61.23 BILATERAL IMPACTED CERUMEN: ICD-10-CM

## 2022-08-09 DIAGNOSIS — J34.89 NASAL SEPTAL PERFORATION: Primary | ICD-10-CM

## 2022-08-09 DIAGNOSIS — J34.89 NASAL MUCOSA DRY: ICD-10-CM

## 2022-08-09 DIAGNOSIS — Z79.01 ANTICOAGULATED BY ANTICOAGULATION TREATMENT: ICD-10-CM

## 2022-08-09 PROCEDURE — G8420 CALC BMI NORM PARAMETERS: HCPCS | Performed by: STUDENT IN AN ORGANIZED HEALTH CARE EDUCATION/TRAINING PROGRAM

## 2022-08-09 PROCEDURE — 1123F ACP DISCUSS/DSCN MKR DOCD: CPT | Performed by: STUDENT IN AN ORGANIZED HEALTH CARE EDUCATION/TRAINING PROGRAM

## 2022-08-09 PROCEDURE — G8400 PT W/DXA NO RESULTS DOC: HCPCS | Performed by: STUDENT IN AN ORGANIZED HEALTH CARE EDUCATION/TRAINING PROGRAM

## 2022-08-09 PROCEDURE — 1036F TOBACCO NON-USER: CPT | Performed by: STUDENT IN AN ORGANIZED HEALTH CARE EDUCATION/TRAINING PROGRAM

## 2022-08-09 PROCEDURE — G8427 DOCREV CUR MEDS BY ELIG CLIN: HCPCS | Performed by: STUDENT IN AN ORGANIZED HEALTH CARE EDUCATION/TRAINING PROGRAM

## 2022-08-09 PROCEDURE — 99204 OFFICE O/P NEW MOD 45 MIN: CPT | Performed by: STUDENT IN AN ORGANIZED HEALTH CARE EDUCATION/TRAINING PROGRAM

## 2022-08-09 PROCEDURE — 69210 REMOVE IMPACTED EAR WAX UNI: CPT | Performed by: STUDENT IN AN ORGANIZED HEALTH CARE EDUCATION/TRAINING PROGRAM

## 2022-08-09 PROCEDURE — 1090F PRES/ABSN URINE INCON ASSESS: CPT | Performed by: STUDENT IN AN ORGANIZED HEALTH CARE EDUCATION/TRAINING PROGRAM

## 2022-08-09 PROCEDURE — 31231 NASAL ENDOSCOPY DX: CPT | Performed by: STUDENT IN AN ORGANIZED HEALTH CARE EDUCATION/TRAINING PROGRAM

## 2022-08-09 RX ORDER — ECHINACEA PURPUREA EXTRACT 125 MG
2 TABLET ORAL 4 TIMES DAILY
Qty: 1 EACH | Refills: 3 | Status: SHIPPED | OUTPATIENT
Start: 2022-08-09

## 2022-08-09 NOTE — PATIENT INSTRUCTIONS
DR. Freeman Wang NOSEBLEED INSTRUCTIONS:    - Stop your home nasal spray! Obtain nasal saline spray over the counter. Use spray nasal saline spray or nasal saline gel spray multiple times a day (up to 5-6 times throughout the day) in each nostril to help with nasal mucosal moisturization for the next 3 weeks. You can get this over the counter. You cannot over use this!  - Place antibiotic ointment (I.e. polysporin, triple antiobiotic ointment, bactroban) to inside of both nostrils and along the nasal septum twice daily for next 3 weeks then as needed for moisturization.  - Consider humidification machine in the bedroom to help with nasal moisturization  - Avoid nasal trauma nose picking, harsh nasal blowing, rubbing nose after blowing! If you need to blow your nose blow very gently and do not harshly wipe nose afterwards. - Talk to Dr. Nisa Krishnan about humidifying your oxygen at your next appointment with him  - In case of another nosebleed: First spray AFRIN (can obtain over the counter) in affected nostril, then saturate a cottonball with Afrin and place it on the affected side and then placing unrelenting digital pressure for at least 15 minutes. After this take the cottonball allowed and reinspected. If still bleeding please repeat. If Bleeding does not stop after 1 hour or you lose a large amount of blood go to emergency department. - Get your lab testing done before your next appointment. This is part of the work-up for Marley's Granulomatosis (AKA Granulomatosis with polyangiitis).

## 2022-08-09 NOTE — PROGRESS NOTES
Take 0.5 tablets by mouth every 12 hours (Patient not taking: No sig reported) 90 tablet 3     No current facility-administered medications for this visit. Review of Systems     REVIEW OF SYSTEMS    See HPI Above    PhysicalExam     Vitals:    08/09/22 1115   BP: (!) 151/91   Site: Right Upper Arm   Position: Sitting   Cuff Size: Medium Adult   Pulse: 89   Temp: 97.1 °F (36.2 °C)   TempSrc: Temporal       PHYSICAL EXAM  BP (!) 151/91 (Site: Right Upper Arm, Position: Sitting, Cuff Size: Medium Adult)   Pulse 89   Temp 97.1 °F (36.2 °C) (Temporal)     GENERAL: No acute distress, alert and oriented  EYES: EOMI, Anti-icteric  NOSE: On anterior rhinoscopy there is a dime size nasal septal perforation along the anterior nasal septum. Anterior to this perforation the nasal septum is deviated to the right. There is dried blood within the nasal passage. See endoscopic procedure note below. EARS: Normal external appearance; on portable otomicroscopy with cerumen impaction, see procedure note below. Pneumatic otoscopy: Bilateral tympanic membranes mobile pneumatic otoscopy  FACE: HB 1/6 bilaterally, symmetric appearing, sensation equal bilaterally  ORAL CAVITY: No masses or lesions visualized or palpated, uvula is midline, moist mucous membranes, no oropharyngeal masses or oropharyngeal obstruction  NECK: Normal range of motion, no thyromegaly, trachea is midline, no palpable lymphadenopathy or neck masses, no crepitus  NEURO: Cranial Nerves 2, 3, 4, 5, 6, 7, 11, 12 grossly intact bilaterally     I have performed a head and neck physical exam personally or was physically present during the key or critical portions of the service. Procedure     Nasal Endoscopy, Diagnostic (05927)    Pre-op: Nasal septal perforation, epistaxis  Post-op: Same same  Procedure: Nasal endoscopy    After obtaining verbal consent from the patient 2% lidocaine with afrin was sprayed into the nasal cavities.   After allowing a time for anesthesia, a 0-degree rigid nasal endoscope was used to examine the nasal septum, turbinates, and mucosal tissue. The middle meatus and sphenoethmoid recess was examined bilaterally. There were no complications. Pertinent findings include: There was a dime size nasal septal perforation along the caudal nasal septum. Anterior to this perforation the nasal septum was deviated to the right. There was some bloody crusting along the posterior aspect of the perforation edges without active bleeding. Bilateral sphenoethmoidal recesses and middle middle meatus were patent without polyps or masses. No intranasal masses noted. *The patient tolerated the procedure well without complication. I attest that I was present for and did the entire procedure myself. Procedure: Debridement of cerumen impaction using instrumentation    Pre-op: Cerumen impaction of the bilateral external auditory canals. Post op: Same  Procedure : Debridement of cerumen of bilateral external auditory canals  Surgeon: Dr. Austin Weller DO  Estimated Blood Loss: None    Description of Procedure:    After obtaining verbal consent, the patient was placed in the examination chair in the reclined position.      -Right ear: External auditory canal with occluding cerumen limiting visualization of the tympanic membrane which was removed with use of #7 and #5 Greenlandic suction, alligator forceps, and cerumen loop curet. After successful removal of cerumen, the right tympanic membrane was visualized and without significant retractions or cholesteatoma, no middle ear effusions.   -Left ear: External auditory canal with occluding cerumen limiting visualization of the tympanic membrane which was removed with use of #7 and #5 Greenlandic suction, alligator forceps, and cerumen loop curet. After successful removal of cerumen, the left tympanic membrane was visualized and without significant retractions or cholesteatoma, no middle ear effusions.      * Patient tolerated the procedure well with no complications      Data/Imaging Review     Lab Results   Component Value Date    INR 2.4 07/13/2022    INR 1.9 06/29/2022    INR 1.5 06/21/2022    PROTIME 18.6 (H) 05/31/2022    PROTIME 16.7 (H) 05/30/2022    PROTIME 16.1 (H) 05/29/2022     Lab Results   Component Value Date/Time     05/31/2022 04:18 AM    K 3.9 05/31/2022 04:18 AM    K 4.1 05/28/2022 11:40 AM     05/31/2022 04:18 AM    CO2 22 05/31/2022 04:18 AM    BUN 18 05/31/2022 04:18 AM    CREATININE 1.4 05/31/2022 04:18 AM    GLUCOSE 126 05/31/2022 04:18 AM    GLUCOSE 144 11/01/2021 09:00 AM    CALCIUM 8.0 05/31/2022 04:18 AM      Lab Results   Component Value Date    WBC 7.6 05/31/2022    HGB 10.7 (L) 05/31/2022    HCT 33.5 (L) 05/31/2022    MCV 86.2 05/31/2022     05/31/2022         Assessment and Plan     1. Nasal septal perforation  -Lab orders placed for Wegener's work-up. Follow-up  - MPO/SC-3(ANCA) ABS; Future  - C-Reactive Protein Inflammatory; Future  - Sedimentation Rate; Future  - CBC; Future    2. Epistaxis  -Epistaxis likely secondary to underlying nasal septal perforation, anticoagulated status, dry nasal mucosa. Recently started on supplemental oxygen via nasal cannula which will likely dry out her nose even more. She has a follow-up appointment with her pulmonologist in the coming weeks and I have recommended humidification of her supplemental oxygen.  -Stop medicated nasal sprays as this can worsen dryness and is likely to provide benefit given nasal septal perforation.  -Antibiotic ointment inside of both nostrils along the nasal septum twice daily for the next 3 weeks then as needed  -Frequent use of nasal saline sprays or gel multiple times a day  -Consider humidification in the bedroom to help with moisturization  -Avoid nasal trauma including nose picking, harsh nasal blowing, rubbing nose after blowing.  -Epistaxis instructions given in case of future bleeding.     3. Nasal mucosa dry  4. Anticoagulated by anticoagulation treatment  -See above    5. On supplemental oxygen by nasal cannula  -See above    6. Bilateral impacted cerumen  - Cerumen debrided in the office today  - Avoid Q-tip and self instrumentation of ears  - Hydrogen peroxide or Debrox (OTC) drops as needed or once every other week to help break up and soften wax  - Follow-up as needed for repeat debridement      Follow Up     Return in about 6 weeks (around 9/20/2022). Tracy Preston   Department of Otolaryngology/Head & Neck Surgery  8/9/22    Medical Decision Making: The following items were considered in medical decision making:  Independent review of images  Review / order clinical lab tests  Review / order radiology tests  Decision to obtain old records    This note was generated completely or in part utilizing Dragon dictation speech recognition software. Occasionally, words are mistranscribed and despite editing, the text may contain inaccuracies due to incorrect word recognition. If further clarification is needed please contact the office at [phone].

## 2022-08-09 NOTE — TELEPHONE ENCOUNTER
Bp readings    7/24 112/71  7/25 111/71  7/26 135/95   7/27  118/85  7/28 128/81  7/29  106/75      7/30 12/75 8/2  131/85  8/3 133/91  8/4  134/90  8/6   117/80      Please call Jimmy Rosenberg back at [phone]

## 2022-08-09 NOTE — TELEPHONE ENCOUNTER
I spoke to Noy her daughter. These are her B/P readings since holding the Diltiazem. Angelina Alfonso is also on Oxygen now. She saw Dr Avril Fernandez and she qualified for oxygen. I let her know Dr. Joana Sauceda will review these readings and we will call her if there are any changes.

## 2022-08-10 DIAGNOSIS — F41.9 ANXIETY: ICD-10-CM

## 2022-08-10 RX ORDER — HYDROXYZINE PAMOATE 25 MG/1
25 CAPSULE ORAL 3 TIMES DAILY PRN
Qty: 60 CAPSULE | Refills: 0 | Status: SHIPPED | OUTPATIENT
Start: 2022-08-10 | End: 2022-09-15 | Stop reason: SDUPTHER

## 2022-08-10 NOTE — TELEPHONE ENCOUNTER
hydrOXYzine pamoate (VISTARIL) 25 MG capsule 60 capsule 0 7/11/2022 8/10/2022    Sig - Route:  Take 1 capsule by mouth 3 times daily as needed for Anxiety - Oral      [facility] on [facility] in chart    Please advise  [phone]

## 2022-08-10 NOTE — TELEPHONE ENCOUNTER
Medication:   Requested Prescriptions     Pending Prescriptions Disp Refills    hydrOXYzine pamoate (VISTARIL) 25 MG capsule 60 capsule 0     Sig: Take 1 capsule by mouth 3 times daily as needed for Anxiety      Last Filled:      Patient Phone Number: [phone] (home) [phone] (work)    Last appt: 7/11/2022   Next appt: 9/14/2022    Last OARRS: No flowsheet data found. PDMP Monitoring:    Last PDMP Aminata Thorpe as Reviewed [facility]):  Review User Review Instant Review Result          Preferred Pharmacy:   [facility]/pharmacy [address] [place] [address] Mateo Garner - P [phone] - F [phone]  [facility] Mateo [place] [address]  Phone: [phone] Fax: [phone]

## 2022-08-16 ENCOUNTER — HOSPITAL ENCOUNTER (OUTPATIENT)
Dept: CT IMAGING | Age: 82
Discharge: HOME | End: 2022-08-16
Payer: MEDICARE

## 2022-08-16 ENCOUNTER — HOSPITAL ENCOUNTER (OUTPATIENT)
Dept: PULMONOLOGY | Age: 82
Discharge: HOME | End: 2022-08-16
Payer: MEDICARE

## 2022-08-16 VITALS — OXYGEN SATURATION: 97 % | HEART RATE: 93 BPM | RESPIRATION RATE: 16 BRPM

## 2022-08-16 DIAGNOSIS — I48.20 CHRONIC ATRIAL FIBRILLATION (HCC): ICD-10-CM

## 2022-08-16 DIAGNOSIS — I27.20 PULMONARY HYPERTENSION (HCC): ICD-10-CM

## 2022-08-16 DIAGNOSIS — R06.02 SOB (SHORTNESS OF BREATH): ICD-10-CM

## 2022-08-16 PROCEDURE — 6370000000 HC RX 637 (ALT 250 FOR IP): Performed by: INTERNAL MEDICINE

## 2022-08-16 PROCEDURE — 94760 N-INVAS EAR/PLS OXIMETRY 1: CPT

## 2022-08-16 PROCEDURE — 94726 PLETHYSMOGRAPHY LUNG VOLUMES: CPT

## 2022-08-16 PROCEDURE — 94060 EVALUATION OF WHEEZING: CPT

## 2022-08-16 PROCEDURE — 94729 DIFFUSING CAPACITY: CPT

## 2022-08-16 PROCEDURE — 94200 LUNG FUNCTION TEST (MBC/MVV): CPT

## 2022-08-16 PROCEDURE — 71250 CT THORAX DX C-: CPT

## 2022-08-16 RX ORDER — ALBUTEROL SULFATE 90 UG/1
4 AEROSOL, METERED RESPIRATORY (INHALATION) ONCE
Status: COMPLETED | OUTPATIENT
Start: 2022-08-16 | End: 2022-08-16

## 2022-08-16 RX ADMIN — Medication 4 PUFF: at 16:59

## 2022-08-16 NOTE — TELEPHONE ENCOUNTER
Please call and tell them that her BPs re normal without diltiazem and she doesn't need this medication at this time.

## 2022-08-17 NOTE — PROCEDURES
Pulmonary Function Testing      Patient name:  Kedar Diaz     [address] [place] [address] Unit #:   [id number]   Date of test: 8/16/2022  Date of interpretation:   8/17/2022    Ms. Kedar Diaz is a 79y.o. year-old non smoker. The spirometry data were acceptable and reproducible. Spirometry:  Flow volume loops were restricted. The FEV-1/FVC ratio was normal. The      FEV-1 was 1.41 liters (65% of predicted), which was moderately decreased. The FVC was 1.72 liters (60% of predicted), which was decreased. Response to inhaled bronchodilators (albuterol) was not performed. Lung volumes:  Lung volumes were tested by plethysmography. The total lung capacity was 4.05 liters (76% of predicted), which was decreased. The residual volume was 2.08 liters (91% of predicted), which was normal. The ratio of residual volume to total lung capacity (RV/TLC) was 118%, which was normal. Specific airway resistance was normal.    Diffusion capacity was found to be decreased. Interpretation:  Restrictive ventilatory defect with reduced diffusion capacity.

## 2022-08-25 ENCOUNTER — ANTI-COAG VISIT (OUTPATIENT)
Dept: CARDIOLOGY CLINIC | Age: 82
End: 2022-08-25
Payer: MEDICARE

## 2022-08-25 LAB — INTERNATIONAL NORMALIZATION RATIO, POC: 1.6

## 2022-08-25 PROCEDURE — 85610 PROTHROMBIN TIME: CPT | Performed by: INTERNAL MEDICINE

## 2022-08-25 PROCEDURE — 93793 ANTICOAG MGMT PT WARFARIN: CPT | Performed by: INTERNAL MEDICINE

## 2022-08-25 RX ORDER — PANTOPRAZOLE SODIUM 40 MG/1
TABLET, DELAYED RELEASE ORAL
Qty: 90 TABLET | Refills: 3 | OUTPATIENT
Start: 2022-08-25

## 2022-08-30 ENCOUNTER — OFFICE VISIT (OUTPATIENT)
Dept: PULMONOLOGY | Age: 82
End: 2022-08-30
Payer: MEDICARE

## 2022-08-30 ENCOUNTER — CARE COORDINATION (OUTPATIENT)
Dept: CARE COORDINATION | Age: 82
End: 2022-08-30

## 2022-08-30 VITALS — OXYGEN SATURATION: 97 %

## 2022-08-30 DIAGNOSIS — I34.0 MITRAL VALVE INSUFFICIENCY, UNSPECIFIED ETIOLOGY: ICD-10-CM

## 2022-08-30 DIAGNOSIS — R06.02 SOB (SHORTNESS OF BREATH): Primary | ICD-10-CM

## 2022-08-30 DIAGNOSIS — J96.11 CHRONIC HYPOXEMIC RESPIRATORY FAILURE (HCC): ICD-10-CM

## 2022-08-30 DIAGNOSIS — I27.20 PULMONARY HYPERTENSION (HCC): ICD-10-CM

## 2022-08-30 DIAGNOSIS — R91.1 PULMONARY NODULE: ICD-10-CM

## 2022-08-30 PROCEDURE — 99214 OFFICE O/P EST MOD 30 MIN: CPT | Performed by: INTERNAL MEDICINE

## 2022-08-30 PROCEDURE — G8400 PT W/DXA NO RESULTS DOC: HCPCS | Performed by: INTERNAL MEDICINE

## 2022-08-30 PROCEDURE — 1036F TOBACCO NON-USER: CPT | Performed by: INTERNAL MEDICINE

## 2022-08-30 PROCEDURE — 1123F ACP DISCUSS/DSCN MKR DOCD: CPT | Performed by: INTERNAL MEDICINE

## 2022-08-30 PROCEDURE — G8420 CALC BMI NORM PARAMETERS: HCPCS | Performed by: INTERNAL MEDICINE

## 2022-08-30 PROCEDURE — 1090F PRES/ABSN URINE INCON ASSESS: CPT | Performed by: INTERNAL MEDICINE

## 2022-08-30 PROCEDURE — G8427 DOCREV CUR MEDS BY ELIG CLIN: HCPCS | Performed by: INTERNAL MEDICINE

## 2022-08-30 ASSESSMENT — ENCOUNTER SYMPTOMS
CONSTIPATION: 0
SINUS PRESSURE: 0
DIARRHEA: 0
ABDOMINAL PAIN: 0
NAUSEA: 0

## 2022-08-30 NOTE — PROGRESS NOTES
[facility]  Consult Note  Cheikh Greenwood MD       Lorena Friedman   YOB: 1940    Date of Visit:  8/30/2022    Assessment/Plan:  1. SOB (shortness of breath)/pulmonary nodule  Multifactorial  CT chest was reviewed with the patient and her family today. My interpretation is that she does have some evidence of fibrosis at the lung bases though this is relatively mild. No obvious emphysema, no focal infiltrate. She does have a pulmonary nodule which is 12 mm. Should have CT imaging in about 6 months. Doubt that she will do this as she has not been very amenable to any of our suggestions. 2. Pulmonary hypertension (HCC)  RVSP was in the 40s  Could be a contributor to her dyspnea    3. Mitral valve insufficiency, unspecified etiology  Last echo revealed moderate to severe mitral regurgitation. Could also be a contributor    4. Chronic atrial fibrillation (HCC)  Has been in A. fib for a long time  Follows with cardiology    5. Chronic hypoxemic respiratory failure  She was hypoxemic when she came to the office today  Had her POC but did not charge the battery  Not wearing oxygen when she is at home either  Had a fairly long discussion with her about the importance of supplemental oxygen in her situation but I doubt that she wears oxygen. Follow-up in 6 months    No chief complaint on file. HPI  Patient presents with a chief complaint of shortness of breath its been going on for the last several months. She had a hospitalization around May of this year related to sepsis from a kidney stone. She has had trouble with breathing ever since then. She has really only minimal associated cough. Exertion is the main modifying factor. She is short of breath when she walks to her mailbox. She is sometimes short of breath when she gets stressed. She had to stop and rest on the way from the lobby to the office today.   She does have a history of chronic A. fib. She also has some degree of pulmonary hypertension and mitral insufficiency on recent echocardiogram.  She does follow with cardiology. At her last visit, we set her up with supplemental oxygen. However, it seems that she is not wearing it. Her family describes her as being pretty stubborn when it comes to that. She does only be seen in public with it on. She is also not wearing it when she sleeps at night. She does admit that her sleep is restless when she does not wear the oxygen. Review of Systems  Review of Systems   Constitutional:  Negative for fatigue and fever. HENT:  Negative for congestion and sinus pressure. Eyes:  Negative for visual disturbance. Cardiovascular:  Negative for chest pain and palpitations. Gastrointestinal:  Negative for abdominal pain, constipation, diarrhea and nausea. Genitourinary:  Negative for difficulty urinating. Musculoskeletal:  Negative for arthralgias. Skin:  Negative for rash. Neurological:  Negative for dizziness and light-headedness. Hematological:  Does not bruise/bleed easily. Psychiatric/Behavioral:  Negative for behavioral problems. History  I have reviewed past medical, surgical, social and family history. This is documented elsewhere in themedical record. Physical Exam:  Physical Exam  Constitutional:       General: She is not in acute distress. Appearance: She is well-developed. HENT:      Head: Normocephalic and atraumatic. Eyes:      Comments: Nasal mucosa, teeth and gums and oropharynx are clear. Neck:      Thyroid: No thyromegaly (There are no other neck masses noted. ). Vascular: No JVD. Trachea: No tracheal deviation. Cardiovascular:      Rate and Rhythm: Normal rate and regular rhythm. Heart sounds: Normal heart sounds. No murmur heard. Pulmonary:      Effort: Pulmonary effort is normal. No respiratory distress. Breath sounds: Normal breath sounds. No wheezing or rales. Chest:      Chest wall: No tenderness. Abdominal:      General: Bowel sounds are normal. There is no distension. Palpations: Abdomen is soft. Mass: There is no hepatosplenomegaly. Tenderness: There is no abdominal tenderness. Musculoskeletal:         General: No tenderness (Muscle strength is normal and there is no obvious atrophy). Cervical back: Neck supple. Lymphadenopathy:      Cervical: No cervical adenopathy. Skin:     General: Skin is warm and dry. Findings: No rash. Psychiatric:      Comments: Oriented to person place and time       Allergies   Allergen Reactions    Penicillins      Patient cannot remember reaction    Sulfa Antibiotics      Prior to Visit Medications    Medication Sig Taking? Authorizing Provider   hydrOXYzine pamoate (VISTARIL) 25 MG capsule Take 1 capsule by mouth 3 times daily as needed for Anxiety  BRIGHT Ragsdale CNP   sodium chloride (OCEAN) 0.65 % nasal spray 2 sprays by Nasal route in the morning and 2 sprays at noon and 2 sprays in the evening and 2 sprays before bedtime. Orlando Valle DO   mupirocin (BACTROBAN) 2 % ointment Apply to inside of both nostrils twice daily. Orlando Valle DO   atenolol (TENORMIN) 100 MG tablet Take 1 tablet by mouth in the morning. Gorden Lesches, MD   simvastatin (ZOCOR) 40 MG tablet TAKE 1 TABLET BY MOUTH EVERY DAY AT NIGHT  Gorden Lesches, MD   warfarin (COUMADIN) 2.5 MG tablet Take 1 tablet by mouth daily Take 2.5 mg by mouth 1/2 tab 2 days a week and 1 tab all other days.   Gorden Lesches, MD   PARoxetine (PAXIL) 20 MG tablet Take 1 tablet by mouth daily  BRIGHT Ragsdale CNP   calcium citrate (CALCITRATE) 950 (200 Ca) MG tablet Take 1 tablet by mouth daily   Historical Provider, MD   pantoprazole (PROTONIX) 40 MG tablet Take 1 tablet by mouth daily  Gorden Lesches, MD   dilTIAZem (CARDIZEM) 60 MG tablet Take 0.5 tablets by mouth every 12 hours  Patient not taking: No sig reported  [facility] Manuel Canales MD   Omega-3 Fatty Acids (FISH OIL PO) Take by mouth  Historical Provider, MD   Multiple Vitamins-Minerals (MULTIVITAMIN PO) Take by mouth  Historical Provider, MD       There were no vitals filed for this visit. There is no height or weight on file to calculate BMI.      Wt Readings from Last 3 Encounters:   07/11/22 145 lb 6.4 oz (66 kg)   06/29/22 147 lb (66.7 kg)   06/02/22 156 lb (70.8 kg)     BP Readings from Last 3 Encounters:   08/09/22 (!) 151/91   07/11/22 108/72   06/29/22 134/78        Social History     Tobacco Use   Smoking Status Never   Smokeless Tobacco Never None

## 2022-08-31 RX ORDER — DILTIAZEM HYDROCHLORIDE 60 MG/1
TABLET, FILM COATED ORAL
Qty: 90 TABLET | Refills: 3 | Status: SHIPPED | OUTPATIENT
Start: 2022-08-31

## 2022-09-07 ENCOUNTER — ANTI-COAG VISIT (OUTPATIENT)
Dept: CARDIOLOGY CLINIC | Age: 82
End: 2022-09-07
Payer: MEDICARE

## 2022-09-07 DIAGNOSIS — I48.20 CHRONIC ATRIAL FIBRILLATION (HCC): ICD-10-CM

## 2022-09-07 LAB — INTERNATIONAL NORMALIZATION RATIO, POC: 1.9

## 2022-09-07 PROCEDURE — 85610 PROTHROMBIN TIME: CPT | Performed by: INTERNAL MEDICINE

## 2022-09-13 ENCOUNTER — CARE COORDINATION (OUTPATIENT)
Dept: CARE COORDINATION | Age: 82
End: 2022-09-13

## 2022-09-13 NOTE — CARE COORDINATION
ACC: Priya Kilpatrick RN    Care Coordination Interventions    Referral from Primary Care Provider: No  Suggested Interventions and Community Resources  Adult Day Program: Not Started  Fall Risk Prevention: Completed  Medi Set or Pill Pack: In Process  Palliative Care: Not Started  Senior Services: In Process (Comment: has appointment today from 1-4)         , Ambulatory Care Coordination Note  9/13/2022    ACC: Priya Kilpatrick RN    Summary Note: AC made outreach to patient to follow up with Care Management. ACM was unable to reach patient's daughter Lennie Rico. ACM spoke with patient today during outreach. ACM discussed any healthcare concerns that patient is currently having and per patient she feels that she is \"perfectly fine to drive\". She is going to discuss with PCP tomorrow during appointment. ACM discussed patient's SOB and per patient she has seen much improvement since starting oxygen therapy at home. ACM discussed with patient HTN. Patient states that she was doing good at checking her BP but recently has not been checking. She is aware of the importance of monitoring her BP at home and will resume checking. ACM discussed Fall Safety with patient and recommendations for keeping home free of safety hazards. No trip hazards were identified at the time of the call. Plan  F/U HTN  F/U  services  Graduation    Lab Results       None            Care Coordination Interventions    Referral from Primary Care Provider: No  Suggested Interventions and Community Resources  Adult Day Program: Not Started  Fall Risk Prevention: Completed  Medi Set or Pill Pack: In Process  Palliative Care: Not Started  Senior Services:  In Process (Comment: has appointment today from 1-4)          Goals Addressed                   This Visit's Progress     Self Monitoring   No change     Blood Pressure - I will take my blood pressure as directed - Daily  I will notify my provider of any trends of increasing or decreasing blood pressures over a month period of time. I will notify my provider of any changes in blood pressure associated with symptoms of dizziness, falls, passing out, headache, confusion/change in mental status. None Recently Recorded    Barriers: overwhelmed by complexity of regimen and stress  Plan for overcoming my barriers: will [facility] with [facility], daughters and PCP  Confidence: 8/10  Anticipated Goal Completion Date: 10/15/2022                Prior to Admission medications    Medication Sig Start Date End Date Taking? Authorizing Provider   dilTIAZem (CARDIZEM) 60 MG tablet TAKE 1/2 TABLET BY MOUTH EVERY 12 HOURS 8/31/22   Mio Mi MD   sodium chloride (OCEAN) 0.65 % nasal spray 2 sprays by Nasal route in the morning and 2 sprays at noon and 2 sprays in the evening and 2 sprays before bedtime. 8/9/22   Bailey Paula DO   mupirocin (BACTROBAN) 2 % ointment Apply to inside of both nostrils twice daily. 8/9/22   Bailey Paula DO   atenolol (TENORMIN) 100 MG tablet Take 1 tablet by mouth in the morning. 7/27/22   Mio Mi MD   simvastatin (ZOCOR) 40 MG tablet TAKE 1 TABLET BY MOUTH EVERY DAY AT NIGHT 7/27/22   Mio Mi MD   warfarin (COUMADIN) 2.5 MG tablet Take 1 tablet by mouth daily Take 2.5 mg by mouth 1/2 tab 2 days a week and 1 tab all other days.  7/13/22   Mio Mi MD   PARoxetine (PAXIL) 20 MG tablet Take 1 tablet by mouth daily 7/11/22   BRIGHT Lynn CNP   calcium citrate (CALCITRATE) 950 (200 Ca) MG tablet Take 1 tablet by mouth daily     Historical Provider, MD   pantoprazole (PROTONIX) 40 MG tablet Take 1 tablet by mouth daily 8/30/21   Mio Mi MD   Omega-3 Fatty Acids (FISH OIL PO) Take by mouth    Historical Provider, MD   Multiple Vitamins-Minerals (MULTIVITAMIN PO) Take by mouth    Historical Provider, MD       Future Appointments   Date Time Provider Felipe Mayer   9/14/2022 11:30 AM BRIGHT Lynn CNP [address] 11/4/2022  1:45 PM MD Amairani Ludwig   ,   General Assessment    Do you have any symptoms that are causing concern?: No     , and Care Coordination Episodes    Type: Amb Care Management  Episode: Complex Care   Noted: 7/15/2022

## 2022-09-14 ENCOUNTER — OFFICE VISIT (OUTPATIENT)
Dept: FAMILY MEDICINE CLINIC | Age: 82
End: 2022-09-14
Payer: MEDICARE

## 2022-09-14 VITALS
DIASTOLIC BLOOD PRESSURE: 84 MMHG | BODY MASS INDEX: 23.47 KG/M2 | SYSTOLIC BLOOD PRESSURE: 126 MMHG | HEART RATE: 73 BPM | OXYGEN SATURATION: 98 % | WEIGHT: 145.4 LBS | TEMPERATURE: 97.4 F

## 2022-09-14 DIAGNOSIS — R41.3 MEMORY LOSS: ICD-10-CM

## 2022-09-14 DIAGNOSIS — I48.20 CHRONIC ATRIAL FIBRILLATION (HCC): ICD-10-CM

## 2022-09-14 DIAGNOSIS — J96.11 CHRONIC HYPOXEMIC RESPIRATORY FAILURE (HCC): ICD-10-CM

## 2022-09-14 DIAGNOSIS — F41.9 ANXIETY: ICD-10-CM

## 2022-09-14 DIAGNOSIS — F34.1 DYSTHYMIA: ICD-10-CM

## 2022-09-14 DIAGNOSIS — R06.02 SOB (SHORTNESS OF BREATH): Primary | ICD-10-CM

## 2022-09-14 PROCEDURE — 1036F TOBACCO NON-USER: CPT | Performed by: NURSE PRACTITIONER

## 2022-09-14 PROCEDURE — G8427 DOCREV CUR MEDS BY ELIG CLIN: HCPCS | Performed by: NURSE PRACTITIONER

## 2022-09-14 PROCEDURE — 1123F ACP DISCUSS/DSCN MKR DOCD: CPT | Performed by: NURSE PRACTITIONER

## 2022-09-14 PROCEDURE — G8400 PT W/DXA NO RESULTS DOC: HCPCS | Performed by: NURSE PRACTITIONER

## 2022-09-14 PROCEDURE — 99214 OFFICE O/P EST MOD 30 MIN: CPT | Performed by: NURSE PRACTITIONER

## 2022-09-14 PROCEDURE — G8420 CALC BMI NORM PARAMETERS: HCPCS | Performed by: NURSE PRACTITIONER

## 2022-09-14 PROCEDURE — 1090F PRES/ABSN URINE INCON ASSESS: CPT | Performed by: NURSE PRACTITIONER

## 2022-09-14 NOTE — PROGRESS NOTES
Kaila Trinh  : 1940  Encounter date: 2022    This is a 79 y.o. female who presents with  Chief Complaint   Patient presents with    Follow-up     Was placed on O2 via Lung specialist about a month prior. No concerns. History of present illness:    HPI   Presents to clinic today for follow up on chronic health conditions. SOB/Chronic hypoxemia  Following with Pulmonology. Was placed on supplemental oxygen which she feels is helpful when she wears it - wears it when she is at home or out and about. Does struggle to wear it at night as she is restless sleeper. Afib  Previously unable to afford Eliquis. Has continued with Warfarin - INR remains out of range. Follows with Cardiology. Memory Loss  Does have assistance with pill organization with daughter and sister in law - does continue with often being forgetful of taking medications at will sometimes leave a pill in the organizer. Continues to live alone. Continues to be monitored via video by family members. Wearing help button regularly. Not currently working with [facility] - follow up with  services - long wait - patient seems unsure in regards to her conversation with Karla Roth. Family at this time is okay with patient staying inside of her home. Did recently have her drivers exam with OT which she did not pass. Anxiety/Depression  Reports since decreasing her paroxetine to 20mg has noticed no difference in mood or energy. Continues to sleep a lot. Does still garden and takes interest in different things. Continues with having phone conversations with Jayne Vaughn - he did have a stroke - since is now in a memory care unit. This does seem to improve her mood staying in contact with him.      Allergies   Allergen Reactions    Penicillins      Patient cannot remember reaction    Sulfa Antibiotics      Current Outpatient Medications   Medication Sig Dispense Refill    OXYGEN Inhale into the lungs 2L via NC.      dilTIAZem (CARDIZEM) 60 MG tablet TAKE 1/2 TABLET BY MOUTH EVERY 12 HOURS 90 tablet 3    sodium chloride (OCEAN) 0.65 % nasal spray 2 sprays by Nasal route in the morning and 2 sprays at noon and 2 sprays in the evening and 2 sprays before bedtime. 1 each 3    mupirocin (BACTROBAN) 2 % ointment Apply to inside of both nostrils twice daily. 1 each 1    atenolol (TENORMIN) 100 MG tablet Take 1 tablet by mouth in the morning. 90 tablet 1    simvastatin (ZOCOR) 40 MG tablet TAKE 1 TABLET BY MOUTH EVERY DAY AT NIGHT 90 tablet 3    warfarin (COUMADIN) 2.5 MG tablet Take 1 tablet by mouth daily Take 2.5 mg by mouth 1/2 tab 2 days a week and 1 tab all other days. 30 tablet 3    PARoxetine (PAXIL) 20 MG tablet Take 1 tablet by mouth daily 90 tablet 0    calcium citrate (CALCITRATE) 950 (200 Ca) MG tablet Take 1 tablet by mouth daily       pantoprazole (PROTONIX) 40 MG tablet Take 1 tablet by mouth daily 90 tablet 3    Omega-3 Fatty Acids (FISH OIL PO) Take by mouth      Multiple Vitamins-Minerals (MULTIVITAMIN PO) Take by mouth      hydrOXYzine pamoate (VISTARIL) 25 MG capsule Take 1 capsule by mouth 3 times daily as needed for Anxiety 60 capsule 0     No current facility-administered medications for this visit. Review of Systems   Constitutional:  Negative for activity change, appetite change, chills, fatigue and fever. Respiratory:  Negative for cough and shortness of breath. Cardiovascular:  Negative for chest pain and palpitations. Gastrointestinal:  Negative for diarrhea, nausea and vomiting. Past medical, surgical, family and social history were reviewed and updated with the patient. Objective:    /84 (Site: Right Upper Arm, Position: Sitting, Cuff Size: Medium Adult)   Pulse 73   Temp 97.4 °F (36.3 °C)   Wt 145 lb 6.4 oz (66 kg)   SpO2 98% Comment: 2L via NC.   BMI 23.47 kg/m²   Weight: 145 lb 6.4 oz (66 kg)     BP Readings from Last 3 Encounters:   09/14/22 126/84   08/09/22 (!) 151/91   07/11/22 108/72     Wt Readings from Last 3 Encounters:   09/14/22 145 lb 6.4 oz (66 kg)   07/11/22 145 lb 6.4 oz (66 kg)   06/29/22 147 lb (66.7 kg)       Physical Exam  Constitutional:       General: She is not in acute distress. Appearance: She is well-developed. HENT:      Head: Normocephalic and atraumatic. Cardiovascular:      Rate and Rhythm: Normal rate and regular rhythm. Heart sounds: Normal heart sounds, S1 normal and S2 normal.   Pulmonary:      Effort: Pulmonary effort is normal. No respiratory distress. Breath sounds: Normal breath sounds. Skin:     General: Skin is warm and dry. Neurological:      Mental Status: She is alert and oriented to person, place, and time. Psychiatric:         Thought Content: Thought content normal.         Judgment: Judgment normal.       Assessment/Plan    1. SOB (shortness of breath)  Following with Pulmonology. Improving some with supplemental oxygen. Continue to monitor. 2. Chronic hypoxemic respiratory failure ([facility] [address]  Continue follow up with Pulmonology. 3. Chronic atrial fibrillation ([facility] [address]  Continue follow up with Cardiology. 4. Memory loss  Continues to be a concern. I do have some concerns in regards to her own management of her medications. Family members are helping with this. 5. Anxiety  Doing okay for now. Continue to monitor. 6. Dysthymia  Continue to monitor. Anupam Lee was counseled regarding symptoms of current diagnosis, course and complications of disease if inadequately treated. Discussed side effects of medications, diagnosis, treatment options, and prognosis along with risks, benefits, complications, and alternatives of treatment including labs, imaging and other studies/treatment targets and goals. She verbalized understanding of instructions and counseling. Return in about 3 months (around 12/14/2022) for chronic health conditions. Medical decision making of moderate complexity.

## 2022-09-15 ENCOUNTER — CARE COORDINATION (OUTPATIENT)
Dept: CARE COORDINATION | Age: 82
End: 2022-09-15

## 2022-09-15 DIAGNOSIS — F41.9 ANXIETY: ICD-10-CM

## 2022-09-15 RX ORDER — HYDROXYZINE PAMOATE 25 MG/1
25 CAPSULE ORAL 3 TIMES DAILY PRN
Qty: 60 CAPSULE | Refills: 0 | Status: SHIPPED | OUTPATIENT
Start: 2022-09-15 | End: 2022-10-07 | Stop reason: SDUPTHER

## 2022-09-15 NOTE — TELEPHONE ENCOUNTER
Medication:   Requested Prescriptions     Pending Prescriptions Disp Refills    hydrOXYzine pamoate (VISTARIL) 25 MG capsule 60 capsule 0     Sig: Take 1 capsule by mouth 3 times daily as needed for Anxiety      Last Filled:      Patient Phone Number: [phone] (home) [phone] (work)    Last appt: 9/14/2022   Next appt: 12/14/2022    Last OARRS: No flowsheet data found. PDMP Monitoring:    Last PDMP Pedro Ortiz as Reviewed [facility]):  Review User Review Instant Review Result          Preferred Pharmacy:   [facility]/pharmacy [address] [place] [address] Mateo Lucas. - P [phone] - F [phone]  [facility] Mateo Lucas.   [id number] [facility] [id number]  Phone: [phone] Fax: [phone]

## 2022-09-15 NOTE — CARE COORDINATION
ACM received VM from patient's daughter Jose Duarte requesting return call regarding patient. Tried patient's daughter at number left from VM and preferred number in [address] LMOM, waiting for return call.

## 2022-09-15 NOTE — TELEPHONE ENCOUNTER
Patient's daughter calling on her behalf for a medication refill.    hydrOXYzine pamoate (VISTARIL) 25 MG capsule [[id number]]  ENDED    Order Details  Dose: 25 mg Route: Oral Frequency: 3 TIMES DAILY PRN for Anxiety   Dispense Quantity: 60 capsule Refills: 0          Sig: Take 1 capsule by mouth 3 times daily as needed for Anxiety   [facility] [address] [phone]. Next OV 12/14.  Please advise

## 2022-09-21 ENCOUNTER — ANTI-COAG VISIT (OUTPATIENT)
Dept: CARDIOLOGY CLINIC | Age: 82
End: 2022-09-21

## 2022-09-21 LAB
COAGULATION TISSUE FACTOR INDUCED BLD TIME PT. COAG: 28.2 SECONDS (ref 10–12.8)
INR BLD: 2.4 (ref 0.9–1.1)

## 2022-09-22 ENCOUNTER — CARE COORDINATION (OUTPATIENT)
Dept: CARE COORDINATION | Age: 82
End: 2022-09-22

## 2022-09-22 NOTE — CARE COORDINATION
ACM made outreach to patient's daughter José Ro to follow up with Care Management. LM on VM, waiting for return call.

## 2022-09-30 ENCOUNTER — TELEPHONE (OUTPATIENT)
Dept: CARDIOLOGY CLINIC | Age: 82
End: 2022-09-30

## 2022-09-30 NOTE — TELEPHONE ENCOUNTER
Rian Lindsey called thinking the Pt appt was on 10/19 but is on 10/12. So there no need for a r/s are a message. Closing out.

## 2022-10-02 ASSESSMENT — ENCOUNTER SYMPTOMS
COUGH: 0
VOMITING: 0
DIARRHEA: 0
NAUSEA: 0
SHORTNESS OF BREATH: 0

## 2022-10-05 ENCOUNTER — CARE COORDINATION (OUTPATIENT)
Dept: CARE COORDINATION | Age: 82
End: 2022-10-05

## 2022-10-07 DIAGNOSIS — F34.1 DYSTHYMIA: ICD-10-CM

## 2022-10-07 DIAGNOSIS — F41.9 ANXIETY: ICD-10-CM

## 2022-10-07 RX ORDER — PAROXETINE HYDROCHLORIDE 20 MG/1
20 TABLET, FILM COATED ORAL DAILY
Qty: 90 TABLET | Refills: 0 | Status: SHIPPED | OUTPATIENT
Start: 2022-10-07

## 2022-10-07 RX ORDER — HYDROXYZINE PAMOATE 25 MG/1
25 CAPSULE ORAL 3 TIMES DAILY PRN
Qty: 60 CAPSULE | Refills: 0 | Status: SHIPPED | OUTPATIENT
Start: 2022-10-07 | End: 2022-11-06

## 2022-10-07 NOTE — TELEPHONE ENCOUNTER
Pt's daughter Isaac Ennis called requesting refills of hydroxyzine 25mg and paroxetine 20mg. [facility] [place] [facility] [address]. [facility] Katerina.

## 2022-10-07 NOTE — TELEPHONE ENCOUNTER
Medication:   Requested Prescriptions     Pending Prescriptions Disp Refills    hydrOXYzine pamoate (VISTARIL) 25 MG capsule 60 capsule 0     Sig: Take 1 capsule by mouth 3 times daily as needed for Anxiety    PARoxetine (PAXIL) 20 MG tablet 90 tablet 0     Sig: Take 1 tablet by mouth daily          Patient Phone Number: [phone] (home) [phone] (work)    Last appt: 9/14/2022   Next appt: 12/14/2022    Last OARRS: No flowsheet data found. PDMP Monitoring:    Last PDMP Sofiya marte Reviewed [facility]):  Review User Review Instant Review Result          Preferred Pharmacy:   [facility]/pharmacy [address] [place] [address] Mateo Garner - P [phone] - F [phone]  [facility] Mateo Caruso [address]  Phone: [phone] Fax: [phone]

## 2022-10-10 ENCOUNTER — CARE COORDINATION (OUTPATIENT)
Dept: CARE COORDINATION | Age: 82
End: 2022-10-10

## 2022-10-10 RX ORDER — WARFARIN SODIUM 2.5 MG/1
TABLET ORAL
Qty: 90 TABLET | Refills: 1 | Status: SHIPPED | OUTPATIENT
Start: 2022-10-10

## 2022-10-12 ENCOUNTER — TELEPHONE (OUTPATIENT)
Dept: PHARMACY | Age: 82
End: 2022-10-12

## 2022-10-12 ENCOUNTER — TELEPHONE (OUTPATIENT)
Dept: FAMILY MEDICINE CLINIC | Age: 82
End: 2022-10-12

## 2022-10-12 ENCOUNTER — ANTI-COAG VISIT (OUTPATIENT)
Dept: CARDIOLOGY CLINIC | Age: 82
End: 2022-10-12

## 2022-10-12 LAB — INR BLD: 3.4

## 2022-10-12 NOTE — TELEPHONE ENCOUNTER
Pt daughter called in stating patient usually got her INR at her cardiologist's(Dr Green) office, but was told today that they are no longer doing them there, and they are referring her to the Coumadin Clinic at [facility]. She stated that she would like to bring her here for it instead. She wanted to know if we could get patient's records from Dr Green's office for her, and if we can let her know when she will need to schedule her for her next visit.    Please advise

## 2022-10-12 NOTE — TELEPHONE ENCOUNTER
----- Message from Tabitha Sweetr, [address] [place] sent at 10/12/2022 11:34 AM EDT -----  Regarding: Transition to Coumdin clinic from [facility] office  Today was the last INR check at the [facility] office with Dr. Cinda Tam. She was dosed for today and  she will start coming to the Coumadin Clinic. INR today was 3.4      She takes 1.25 mg on Sunday and 2.5 mg all other days. Per Dr. Cinda Tam hold today and resume usual dose and check in one week. She typically has a daughter or niece accompany her. Agreement form will be faxed.

## 2022-10-12 NOTE — TELEPHONE ENCOUNTER
Called patient to schedule them for their initial apt in the clinic. LVM asking for return call.     Sha Enrique, PharmD, [facility], located within [facility]

## 2022-10-13 NOTE — TELEPHONE ENCOUNTER
Left detailed message for daughter advising that we can do INR- and she will be due next Thursday. Problem: POSTPARTUM  Goal: Experiences normal postpartum course  Description  INTERVENTIONS:  - Monitor maternal vital signs  - Assess uterine involution and lochia  2020 by Andrea Nguyen RN  Outcome: Progressing  2020 by Andrea Nguyen RN  Outcome: Progressing  Goal: Appropriate maternal -  bonding  Description  INTERVENTIONS:  - Identify family support  - Assess for appropriate maternal/infant bonding   -Encourage maternal/infant bonding opportunities  - Referral to  or  as needed  2020 by Andrea Nguyen RN  Outcome: Progressing  2020 by Andrea Nguyen RN  Outcome: Progressing  Goal: Establishment of infant feeding pattern  Description  INTERVENTIONS:  - Assess breast/bottle feeding  - Refer to lactation as needed  2020 by Andrea Nguyen RN  Outcome: Progressing  2020 by Andrea Nguyen RN  Outcome: Progressing  Goal: Incision(s), wounds(s) or drain site(s) healing without S/S of infection  Description  INTERVENTIONS  - Assess and document risk factors for skin impairment   - Assess and document dressing, incision, wound bed, drain sites and surrounding tissue  - Consider nutrition services referral as needed  - Oral mucous membranes remain intact  - Provide patient/ family education  2020 by Andrea Nguyen RN  Outcome: Progressing  2020 by Andrea Nguyen RN  Outcome: Progressing     Problem: PAIN - ADULT  Goal: Verbalizes/displays adequate comfort level or baseline comfort level  Description  Interventions:  - Encourage patient to monitor pain and request assistance  - Assess pain using appropriate pain scale  - Administer analgesics based on type and severity of pain and evaluate response  - Implement non-pharmacological measures as appropriate and evaluate response  - Consider cultural and social influences on pain and pain management  - Notify physician/advanced practitioner if interventions unsuccessful or patient reports new pain  Outcome: Progressing     Problem: INFECTION - ADULT  Goal: Absence or prevention of progression during hospitalization  Description  INTERVENTIONS:  - Assess and monitor for signs and symptoms of infection  - Monitor lab/diagnostic results  - Monitor all insertion sites, i e  indwelling lines, tubes, and drains  - Monitor endotracheal if appropriate and nasal secretions for changes in amount and color  - [place] appropriate cooling/warming therapies per order  - Administer medications as ordered  - Instruct and encourage patient and family to use good hand hygiene technique  - Identify and instruct in appropriate isolation precautions for identified infection/condition  Outcome: Progressing  Goal: Absence of fever/infection during neutropenic period  Description  INTERVENTIONS:  - Monitor WBC    Outcome: Progressing     Problem: SAFETY ADULT  Goal: Patient will remain free of falls  Description  INTERVENTIONS:  - Assess patient frequently for physical needs  -  Identify cognitive and physical deficits and behaviors that affect risk of falls    -  [place] fall precautions as indicated by assessment   - Educate patient/family on patient safety including physical limitations  - Instruct patient to call for assistance with activity based on assessment  - Modify environment to reduce risk of injury  - Consider OT/PT consult to assist with strengthening/mobility  Outcome: Progressing  Goal: Maintain or return to baseline ADL function  Description  INTERVENTIONS:  -  Assess patient's ability to carry out ADLs; assess patient's baseline for ADL function and identify physical deficits which impact ability to perform ADLs (bathing, care of mouth/teeth, toileting, grooming, dressing, etc )  - Assess/evaluate cause of self-care deficits   - Assess range of motion  - Assess patient's mobility; develop plan if impaired  - Assess patient's need for assistive devices and provide as appropriate  - Encourage maximum independence but intervene and supervise when necessary  - Involve family in performance of ADLs  - Assess for home care needs following discharge   - Consider OT consult to assist with ADL evaluation and planning for discharge  - Provide patient education as appropriate  Outcome: Progressing  Goal: Maintain or return mobility status to optimal level  Description  INTERVENTIONS:  - Assess patient's baseline mobility status (ambulation, transfers, stairs, etc )    - Identify cognitive and physical deficits and behaviors that affect mobility  - Identify mobility aids required to assist with transfers and/or ambulation (gait belt, sit-to-stand, lift, walker, cane, etc )  - [place] fall precautions as indicated by assessment  - Record patient progress and toleration of activity level on Mobility SBAR; progress patient to next Phase/Stage  - Instruct patient to call for assistance with activity based on assessment  - Consider rehabilitation consult to assist with strengthening/weightbearing, etc   Outcome: Progressing     Problem: DISCHARGE PLANNING  Goal: Discharge to home or other facility with appropriate resources  Description  INTERVENTIONS:  - Identify barriers to discharge w/patient and caregiver  - Arrange for needed discharge resources and transportation as appropriate  - Identify discharge learning needs (meds, wound care, etc )  - Arrange for interpretive services to assist at discharge as needed  - Refer to Case Management Department for coordinating discharge planning if the patient needs post-hospital services based on physician/advanced practitioner order or complex needs related to functional status, cognitive ability, or social support system  Outcome: Progressing

## 2022-10-13 NOTE — TELEPHONE ENCOUNTER
This is fine. We can review Dr. Madelin Calixto records since it is through [address]. She can come here when she is due for her next INR.

## 2022-10-14 NOTE — TELEPHONE ENCOUNTER
Per notes, BRIGHT Landon CNP, will be managing INR instead. Sent to cardio:  Hello! Per notes, BRIGHT Landon CNP will be managing INR instead. Patient didn't return any of our calls and instead reached out to PCP to see if they would manage. Will close encounter. Please contact me with questions.      Sandra Sousa PharmD, [facility] [id number]    Sandra Sousa PharmD, [facility]

## 2022-10-25 ENCOUNTER — TELEPHONE (OUTPATIENT)
Dept: PHARMACY | Age: 82
End: 2022-10-25

## 2022-10-26 ENCOUNTER — TELEPHONE (OUTPATIENT)
Dept: PHARMACY | Age: 82
End: 2022-10-26

## 2022-10-26 NOTE — TELEPHONE ENCOUNTER
Received signed ref from Dr Maureen Couch, pt scheduled initial appt in [address] [place] [address] on 11/22. Will arrive 15 minutes early to register.

## 2022-11-02 NOTE — PROGRESS NOTES
[address]   Cardiac Evaluation      Patient: Zuleika Jurado  YOB: 1940  Date: 11/4/22    No chief complaint on file. Referring provider: BRIGHT Saleh CNP    History of Present Illness:   Ms. Roberto Martinez is here today for regular follow up of her AF, HTN, HLD. 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 She had surgery on 7/18. Hospital course complicated by increased HR of her AF which is chronic (due to her illness) requiring the addition of cardizem and her usual atenolol. Ms. Roberto Martinez was hospitalized 5/28/22 with severe sepsis likely 2nd to UTI. She had AF w/ RVR at the time. Emma Limber converted to NSR after 1 dose diltiazem bolus. Today, Ms Roberto Martinez is here with a family member. She states she does some work in her yard and takes care of her house. Emma Limber denies chest pain, palpitations, dizziness, or edema. She uses O2 as needed. Emma Marquez denies PND. Past Medical History:   has a past medical history of A-fib [facility]), Atrial fibrillation ([address] Hypercholesterolemia, Hypertension, and Pelvic mass. Surgical History:   has a past surgical history that includes Appendectomy; Cystocopy (Right, 06/14/2018); Hysterectomy, total abdominal (07/09/2018); pr cysto/uretero w/lithotripsy &indwell stent insrt (Right, 10/31/2018); Upper gastrointestinal endoscopy (N/A, 11/29/2020); Upper gastrointestinal endoscopy (N/A, 11/29/2020); and Upper gastrointestinal endoscopy (N/A, 3/2/2021). Resection of ovarian serous cystadenoma. Social History:  Social History     Socioeconomic History    Marital status:       Spouse name: Not on file    Number of children: Not on file    Years of education: Not on file    Highest education level: Not on file   Occupational History    Not on file   Tobacco Use    Smoking status: Never    Smokeless tobacco: Never   Vaping Use    Vaping Use: Never used   Substance and Sexual Activity    Alcohol use: No    Drug use: No    Sexual activity: Not Currently   Other Topics Concern    Not on file   Social History Narrative    Not on file     Social Determinants of Health     Financial Resource Strain: Low Risk     Difficulty of Paying Living Expenses: Not hard at all   Food Insecurity: No Food Insecurity    Worried About Running Out of Food in the Last Year: Never true    [address] in the Last Year: Never true   Transportation Needs: No Transportation Needs    Lack of Transportation (Medical): No    Lack of Transportation (Non-Medical): No   Physical Activity: Inactive    Days of Exercise per Week: 0 days    Minutes of Exercise per Session: 0 min   Stress: No Stress Concern Present    Feeling of Stress : Not at all   Social Connections: Socially Isolated    Frequency of Communication with Friends and Family: More than three times a week    Frequency of Social Gatherings with Friends and Family: More than three times a week    Attends [employer] Services: Never    Active Member of Clubs or Organizations: No    Attends Club or Organization Meetings: Never    Marital Status:    Intimate Partner Violence: Not on file   Housing Stability: Low Risk     Unable to Pay for Housing in the Last Year: No    Number of Places Lived in the Last Year: 1    Unstable Housing in the Last Year: No     Family History:  family history includes Heart Disease in her father. Allergies:  Penicillins and Sulfa antibiotics     Review of Systems:   Constitutional: there has been no unanticipated weight loss. No change in energy or activity level   Eyes: No visual changes   ENT: No Headaches, hearing loss or vertigo. No mouth sores or sore throat. Cardiovascular: Reviewed in HPI  Respiratory: No cough or wheezing, no sputum production. No hematemesis. Gastrointestinal: No abdominal pain, appetite loss, blood in stools.  No change in bowel or bladder habits. Genitourinary: No nocturia, dysuria, trouble voiding  Musculoskeletal:  No gait disturbance, weakness or joint complaints. Integumentary: No rash or pruritis. Neurological: No headache, change in muscle strength, numbness or tingling. No change in gait, balance, coordination, mood, affect, memory, mentation, behavior. Psychiatric: No anxiety or depression  Endocrine: No malaise or fever  Hematologic/Lymphatic: No abnormal bruising or bleeding, blood clots or swollen lymph nodes. Allergic/Immunologic: No nasal congestion or hives. Physical Examination:    Vitals:    11/04/22 1347   BP: 138/80   Site: Left Upper Arm   Position: Sitting   Cuff Size: Medium Adult   Pulse: 73   SpO2: 96%   Weight: 146 lb (66.2 kg)   Height: 5' 6\" (1.676 m)       Body mass index is 23.57 kg/m². Wt Readings from Last 3 Encounters:   11/04/22 146 lb (66.2 kg)   09/14/22 145 lb 6.4 oz (66 kg)   07/11/22 145 lb 6.4 oz (66 kg)     BP Readings from Last 3 Encounters:   11/04/22 138/80   09/14/22 126/84   08/09/22 (!) 151/91      Constitutional and General Appearance:  appears stated age  Respiratory:  Normal excursion and expansion without use of accessory muscles  Resp Auscultation: Normal breath sounds without dullness  Cardiovascular: The apical impulses not displaced  Heart is irregularly irregular in rhythm   The carotid upstroke is normal, no bruit noted   JVP is not elevated  Peripheral pulses are symmetrical  There is no clubbing, cyanosis of the extremities  Trace edema   Femoral Arteries: 2+ and equal  Pedal Pulses: 2+ and equal   Abdomen:  No masses or tenderness  Normal bowel sounds  Neurological/Psychiatric:  Alert and oriented x3, appears depressed.    Moves all extremities well  Exhibits normal gait balance and coordination    Assessment:    Atrial fibrillation, chronic  On Warfarin, going to anticoagulation clinic now    Valvular heart disease - echo below was done during the recent hospitalization for sepsis. She has no clinical findings for chf today. No edema or rales and normal O2 sats. Echo 5/28/22:  EF 60-65%. No definitive regional wall motion abnormalities. Severe left atrial enlargement. There is moderate right atrial enlargement noted by visual inspection. There is mild aortic stenosis with a peak velocity of 2.09 m/s and a mean pressure gradient of 10 mmHg. There is mild-moderate aortic insufficiency. Thickened mitral valve without evidence of stenosis. There is moderate-severe mitral regurgitation. There is severe tricuspid regurgitation with a RVSP estimation of 41 mmHg. There is mild-to-moderate pulmonic regurgitation. Indeterminate diastolic function. Echo 6/18 normal LVEF 65% with URSZULA and increased RVSP of 51. Echo 4/13> Underlying rhythm is atrial fibrillation. Normal left ventricle size, wall thickness and systolic function with an estimated ejection fraction of 60%. No regional wall motion abnormalities are seen. There is severe bi atrial enlargement. 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   Uses O2 as needed per pulmonologist, she is supposed to be wearing it more than she actually does. PLAN:  Stable cardiac status. Repeat lipids, CMP, and CBC. No med changes for now. FU 6 months. Scribe's attestation: This note was scribed in the presence of Dr Julienne Alvarado MD by Arik Dahl, DINH. The scribe's documentation has been prepared under my direction and personally reviewed by me in its entirety.  I confirm that the note above accurately reflects all work, treatment, procedures, and medical decision making performed by me.

## 2022-11-04 ENCOUNTER — OFFICE VISIT (OUTPATIENT)
Dept: CARDIOLOGY CLINIC | Age: 82
End: 2022-11-04
Payer: MEDICARE

## 2022-11-04 VITALS
HEIGHT: 66 IN | OXYGEN SATURATION: 96 % | BODY MASS INDEX: 23.46 KG/M2 | HEART RATE: 73 BPM | SYSTOLIC BLOOD PRESSURE: 138 MMHG | DIASTOLIC BLOOD PRESSURE: 80 MMHG | WEIGHT: 146 LBS

## 2022-11-04 DIAGNOSIS — Z87.898 H/O EPISTAXIS: ICD-10-CM

## 2022-11-04 DIAGNOSIS — E78.2 MIXED HYPERLIPIDEMIA: ICD-10-CM

## 2022-11-04 DIAGNOSIS — I48.20 CHRONIC ATRIAL FIBRILLATION (HCC): Primary | ICD-10-CM

## 2022-11-04 DIAGNOSIS — I10 ESSENTIAL HYPERTENSION: ICD-10-CM

## 2022-11-04 PROCEDURE — 1036F TOBACCO NON-USER: CPT | Performed by: INTERNAL MEDICINE

## 2022-11-04 PROCEDURE — G8400 PT W/DXA NO RESULTS DOC: HCPCS | Performed by: INTERNAL MEDICINE

## 2022-11-04 PROCEDURE — 3074F SYST BP LT 130 MM HG: CPT | Performed by: INTERNAL MEDICINE

## 2022-11-04 PROCEDURE — G8420 CALC BMI NORM PARAMETERS: HCPCS | Performed by: INTERNAL MEDICINE

## 2022-11-04 PROCEDURE — G8484 FLU IMMUNIZE NO ADMIN: HCPCS | Performed by: INTERNAL MEDICINE

## 2022-11-04 PROCEDURE — 99214 OFFICE O/P EST MOD 30 MIN: CPT | Performed by: INTERNAL MEDICINE

## 2022-11-04 PROCEDURE — 3078F DIAST BP <80 MM HG: CPT | Performed by: INTERNAL MEDICINE

## 2022-11-04 PROCEDURE — 1123F ACP DISCUSS/DSCN MKR DOCD: CPT | Performed by: INTERNAL MEDICINE

## 2022-11-04 PROCEDURE — G8427 DOCREV CUR MEDS BY ELIG CLIN: HCPCS | Performed by: INTERNAL MEDICINE

## 2022-11-04 PROCEDURE — 1090F PRES/ABSN URINE INCON ASSESS: CPT | Performed by: INTERNAL MEDICINE

## 2022-11-14 DIAGNOSIS — F41.9 ANXIETY: ICD-10-CM

## 2022-11-14 RX ORDER — HYDROXYZINE PAMOATE 25 MG/1
25 CAPSULE ORAL 3 TIMES DAILY PRN
Qty: 30 CAPSULE | Refills: 0 | Status: SHIPPED | OUTPATIENT
Start: 2022-11-14 | End: 2022-11-17 | Stop reason: SDUPTHER

## 2022-11-14 NOTE — TELEPHONE ENCOUNTER
Medication:   Requested Prescriptions     Pending Prescriptions Disp Refills    hydrOXYzine pamoate (VISTARIL) 25 MG capsule 30 capsule 0     Sig: Take 1 capsule by mouth 3 times daily as needed for Anxiety      Last Filled:  10/7/22    Patient Phone Number: [phone] (home) [phone] (work)    Last appt: 9/14/2022   Next appt: 12/14/2022    Last OARRS: No flowsheet data found. PDMP Monitoring:    Last PDMP [facility] as Reviewed [facility]):  Review User Review Instant Review Result          Preferred Pharmacy:   [facility]/pharmacy [address] [place] [address] Mate[address] [place]. - P [phone] - F [phone]  [facility] Mate[place] Lucas.   [address] [address]  Phone: [phone] Fax: [phone]

## 2022-11-15 NOTE — TELEPHONE ENCOUNTER
Pt's daughter went to go  the: Medication  hydrOXYzine pamoate (VISTARIL) 25 MG capsule [3777]  hydrOXYzine pamoate (VISTARIL) 25 MG capsule [[id number]]     Order Details  Dose: 25 mg Route: Oral Frequency: 3 TIMES DAILY PRN for Anxiety   Dispense Quantity: 30 capsule Refills: 0          Sig: Take 1 capsule by mouth 3 times daily as needed for Anxiety         And when she got there they said they faxed it back to our doctor. And they are unaware of the reason.  PT needed this medication:     [phone] [place]  [phone] cell

## 2022-11-17 RX ORDER — HYDROXYZINE PAMOATE 25 MG/1
25 CAPSULE ORAL 3 TIMES DAILY PRN
Qty: 30 CAPSULE | Refills: 0 | Status: SHIPPED | OUTPATIENT
Start: 2022-11-17 | End: 2022-11-30 | Stop reason: SDUPTHER

## 2022-11-22 ENCOUNTER — ANTI-COAG VISIT (OUTPATIENT)
Dept: PHARMACY | Age: 82
End: 2022-11-22
Payer: MEDICARE

## 2022-11-22 DIAGNOSIS — I48.20 CHRONIC ATRIAL FIBRILLATION (HCC): Primary | ICD-10-CM

## 2022-11-22 LAB — INTERNATIONAL NORMALIZATION RATIO, POC: 3.2

## 2022-11-22 PROCEDURE — 99213 OFFICE O/P EST LOW 20 MIN: CPT

## 2022-11-22 PROCEDURE — 85610 PROTHROMBIN TIME: CPT

## 2022-11-22 NOTE — PROGRESS NOTES
Ms. Halima Robledo is a 79 y.o. y/o female with history of Afib who presents today for anticoagulation monitoring and adjustment. Pertinent PMH: Has been on warfarin for a long time, hesitant to switch, doesn't live near here and wants to go to a lab near her and have results called into PCP. Will reach out to PCP next month. Patient Reported Findings:  Yes     No  [x]   []       Patient verifies current dosing regimen as listed- cannot confirm dose, daughter fills pillbox and states 1.25mg Sun and Thurs   []   [x]       S/S bleeding/bruising/swelling/SOB- denies   []   [x]       Blood in urine or stool- denies   []   [x]       Procedures scheduled in the future at this time  []   [x]       Missed Dose-denies   []   [x]       Extra Dose- denies   []   [x]       Change in medications- denies recent changes   []   [x]       Change in health/diet/appetite- has an iceberg lettuce salad once/week  []   [x]       Change in alcohol use- doesn't drink or smoke   []   [x]       Change in activity  []   [x]       Hospital admission  []   [x]       Emergency department visit  []   [x]       Other complaints    Clinical Outcomes:  Yes     No  []   [x]       Major bleeding event  []   [x]       Thromboembolic event    Duration of warfarin Therapy: indefinite   INR Range:  2.0-3.0    Re-educated patient of signs and symptoms of bleeding and clotting, when to seek emergent care, the need to maintain a consistent diet and notification of clinic for any new medications including over the counter medications or abnormal bleeding. Patient acknowledges working in consult agreement with pharmacist as referred by his/her physician.     Has warfarin 2.5mg tablets and not sure about dose- thinks they are doing 1.25mg once or twice/week and 2.5mg AOD.---> will call to confirm dose     INR is 3.2 today  Hold tonight then continue taking dose of 1.25mg on Sun and Thurs and 2.5mg all other days   Encouraged to maintain a consistency of vegetables/salads.    Recheck INR in 2 weeks on       Referring Dr. Jose Peter   INR (no units)   Date Value   10/12/2022 3.40   2022 2.4 (A)   2022 2.4 (A)   2022 1.55 (H)     INR,(POC) (no units)   Date Value   2022 3.2   2022 1.9   2022 1.6   2022 2.4     For Pharmacy Admin Tracking Only    Intervention Detail: Adherence Monitorin and Dose Adjustment: 1, reason: Therapy Optimization  Total # of Interventions Recommended: 2  Total # of Interventions Accepted: 2  Time Spent (min): 45

## 2022-11-24 LAB
A/G RATIO: 1.3 (ref 1.2–2.2)
ALBUMIN SERPL-MCNC: 3.9 G/DL (ref 3.6–4.6)
ALP BLD-CCNC: 97 IU/L (ref 44–121)
ALT SERPL-CCNC: 13 IU/L (ref 0–32)
AST SERPL-CCNC: 23 IU/L (ref 0–40)
BASOPHILS ABSOLUTE: 0.1 X10E3/UL (ref 0–0.2)
BASOPHILS RELATIVE PERCENT: 1 %
BILIRUB SERPL-MCNC: 0.4 MG/DL (ref 0–1.2)
BUN / CREAT RATIO: 14 (ref 12–28)
BUN BLDV-MCNC: 19 MG/DL (ref 8–27)
CALCIUM SERPL-MCNC: 9.3 MG/DL (ref 8.7–10.3)
CHLORIDE BLD-SCNC: 102 MMOL/L (ref 96–106)
CHOLESTEROL, TOTAL: 126 MG/DL (ref 100–199)
CO2: 22 MMOL/L (ref 20–29)
COMMENT: ABNORMAL
CREAT SERPL-MCNC: 1.38 MG/DL (ref 0.57–1)
EOSINOPHILS ABSOLUTE: 0.1 X10E3/UL (ref 0–0.4)
EOSINOPHILS RELATIVE PERCENT: 2 %
ERYTHROCYTES, NUCLEATED/100 LEU: ABNORMAL
ESTIMATED GLOMERULAR FILTRATION RATE CREATININE EQUATION: 38 ML/MIN/1.73
GLOBULIN: 2.9 G/DL (ref 1.5–4.5)
GLUCOSE BLD-MCNC: 186 MG/DL (ref 70–99)
HCT VFR BLD CALC: 41.9 % (ref 34–46.6)
HDLC SERPL-MCNC: 38 MG/DL
HEMOGLOBIN: 13.3 G/DL (ref 11.1–15.9)
IMMATURE CELLS ABSOLUTE COUNT: ABNORMAL
IMMATURE GRANS (ABS): 0 X10E3/UL (ref 0–0.1)
IMMATURE GRANULOCYTES: 0 %
LDL CHOLESTEROL CALCULATED: 67 MG/DL (ref 0–99)
LYMPHOCYTES ABSOLUTE: 3.1 X10E3/UL (ref 0.7–3.1)
LYMPHOCYTES RELATIVE PERCENT: 36 %
MCH RBC QN AUTO: 26.3 PG (ref 26.6–33)
MCHC RBC AUTO-ENTMCNC: 31.7 G/DL (ref 31.5–35.7)
MCV RBC AUTO: 83 FL (ref 79–97)
MONOCYTES ABSOLUTE: 0.6 X10E3/UL (ref 0.1–0.9)
MONOCYTES RELATIVE PERCENT: 7 %
MORPHOLOGY: ABNORMAL
NEUTROPHILS ABSOLUTE: 4.8 X10E3/UL (ref 1.4–7)
PDW BLD-RTO: 17 % (ref 11.7–15.4)
PLATELET # BLD: 327 X10E3/UL (ref 150–450)
POTASSIUM SERPL-SCNC: 4.2 MMOL/L (ref 3.5–5.2)
RBC # BLD: 5.06 X10E6/UL (ref 3.77–5.28)
SEGMENTED NEUTROPHILS RELATIVE PERCENT: 54 %
SODIUM BLD-SCNC: 141 MMOL/L (ref 134–144)
TOTAL PROTEIN: 6.8 G/DL (ref 6–8.5)
TRIGL SERPL-MCNC: 117 MG/DL (ref 0–149)
VLDLC SERPL CALC-MCNC: 21 MG/DL (ref 5–40)
WBC # BLD: 8.8 X10E3/UL (ref 3.4–10.8)

## 2022-11-30 ENCOUNTER — TELEPHONE (OUTPATIENT)
Dept: FAMILY MEDICINE CLINIC | Age: 82
End: 2022-11-30

## 2022-11-30 DIAGNOSIS — F41.9 ANXIETY: ICD-10-CM

## 2022-11-30 RX ORDER — HYDROXYZINE PAMOATE 25 MG/1
25 CAPSULE ORAL 3 TIMES DAILY PRN
Qty: 30 CAPSULE | Refills: 0 | Status: SHIPPED | OUTPATIENT
Start: 2022-11-30 | End: 2022-12-30

## 2022-11-30 NOTE — TELEPHONE ENCOUNTER
Patient requesting medication refill         hydrOXYzine pamoate (VISTARIL) 25 MG capsule [[id number]]     Order Details  Dose: 25 mg Route: Oral Frequency: 3 TIMES DAILY PRN for Anxiety   Dispense Quantity: 30 capsule Refills: 0    Note to Pharmacy: Okay to take with current medication list.         Sig: Take 1 capsule by mouth 3 times daily as needed for Anxiety       [facility]/pharmacy #[id number]- [place] [address] [address]  formerly [facility] [phone] Josiah Gilford [phone]   [facility] [address], [address] [address]   Phone:  [phone]  Fax:  [phone]

## 2022-12-01 ENCOUNTER — TELEPHONE (OUTPATIENT)
Dept: CARDIOLOGY CLINIC | Age: 82
End: 2022-12-01

## 2022-12-01 DIAGNOSIS — R73.09 ELEVATED GLUCOSE: Primary | ICD-10-CM

## 2022-12-01 NOTE — TELEPHONE ENCOUNTER
LMOM for Ms Corinnaangelito Merrill to call back to discuss lab results and next step.  Orders placed for [facility]

## 2022-12-01 NOTE — TELEPHONE ENCOUNTER
I spoke w/ pt's daughter and relayed results and next step per Dr Cinda Tam. She verbalized understanding. She would like to take her mom to [place] in [place]. Will fax orders.

## 2022-12-01 NOTE — TELEPHONE ENCOUNTER
----- Message from Jeet Kimball MD sent at 12/1/2022  1:02 PM EST -----  Please tell her that her sugar is high and ask her to get an A1C. She may need to start on diabetes meds.   Please send results to Franca Cannon CNP

## 2022-12-06 ENCOUNTER — ANTI-COAG VISIT (OUTPATIENT)
Dept: PHARMACY | Age: 82
End: 2022-12-06
Payer: MEDICARE

## 2022-12-06 DIAGNOSIS — I48.20 CHRONIC ATRIAL FIBRILLATION (HCC): Primary | ICD-10-CM

## 2022-12-06 LAB — INTERNATIONAL NORMALIZATION RATIO, POC: 3.2

## 2022-12-06 PROCEDURE — 85610 PROTHROMBIN TIME: CPT

## 2022-12-06 PROCEDURE — 99211 OFF/OP EST MAY X REQ PHY/QHP: CPT

## 2022-12-06 NOTE — PROGRESS NOTES
Ms. Reginald Anaya is a 79 y.o. y/o female with history of Afib who presents today for anticoagulation monitoring and adjustment. Pertinent PMH: Has been on warfarin for a long time, hesitant to switch, doesn't live near here and wants to go to a lab near her and have results called into PCP. Will reach out to PCP next month. Patient Reported Findings:  Yes     No  [x]   []       Patient verifies current dosing regimen as listed- daughter fills pillbox and states 1.25mg Sun and Thurs and 2.5 mg all other days of the week   []   [x]       S/S bleeding/bruising/swelling/SOB- denies   []   [x]       Blood in urine or stool- denies   []   [x]       Procedures scheduled in the future at this time  []   [x]       Missed Dose-denies   []   [x]       Extra Dose- denies   []   [x]       Change in medications- denies recent changes   []   [x]       Change in health/diet/appetite- has an iceberg lettuce salad once/week  []   [x]       Change in alcohol use- doesn't drink or smoke   []   [x]       Change in activity  []   [x]       Hospital admission  []   [x]       Emergency department visit  []   [x]       Other complaints    Clinical Outcomes:  Yes     No  []   [x]       Major bleeding event  []   [x]       Thromboembolic event    Duration of warfarin Therapy: indefinite   INR Range:  2.0-3.0    Has warfarin 2.5mg tablets and not sure about dose- thinks they are doing 1.25mg once or twice/week and 2.5mg AOD    INR is 3.2 today  Since still supratherapeutic, will lower weekly dose   Decrease weekly dose to 1.25mg on Sun Tues and Thurs and 2.5mg all other days (8% dec)  Encouraged to maintain a consistency of vegetables/salads.    Recheck INR in 2 weeks on 12/19      Referring Dr. Kirstie Smith   INR (no units)   Date Value   10/12/2022 3.40   09/21/2022 2.4 (A)   06/06/2022 2.4 (A)   05/31/2022 1.55 (H)     INR,(POC) (no units)   Date Value   11/22/2022 3.2   09/07/2022 1.9   08/25/2022 1.6   07/13/2022 2.4     For Pharmacy Admin Tracking Only    Intervention Detail: Dose Adjustment: 1, reason: Therapy Optimization  Total # of Interventions Recommended: 1  Total # of Interventions Accepted: 1  Time Spent (min): 15

## 2022-12-15 DIAGNOSIS — F41.9 ANXIETY: ICD-10-CM

## 2022-12-15 RX ORDER — HYDROXYZINE PAMOATE 25 MG/1
25 CAPSULE ORAL 3 TIMES DAILY PRN
Qty: 30 CAPSULE | Refills: 0 | Status: SHIPPED | OUTPATIENT
Start: 2022-12-15 | End: 2023-01-14

## 2022-12-15 NOTE — TELEPHONE ENCOUNTER
Medication:   Requested Prescriptions     Pending Prescriptions Disp Refills    hydrOXYzine pamoate (VISTARIL) 25 MG capsule 30 capsule 0     Sig: Take 1 capsule by mouth 3 times daily as needed for Anxiety      Last Filled:      Patient Phone Number: [phone] (home) [phone] (work)    Last appt: 9/14/2022   Next appt: 12/19/2022    Last OARRS: No flowsheet data found. PDMP Monitoring:    Last PDMP Lynette Yip as Reviewed [facility]):  Review User Review Instant Review Result          Preferred Pharmacy:   [facility]/pharmacy [address] [place] [address] Mateo Garner - P [phone] - F [phone]  [facility] [address] [place] [address]  Phone: [phone] Fax: [phone]

## 2022-12-15 NOTE — TELEPHONE ENCOUNTER
hydrOXYzine pamoate (VISTARIL) 25 MG capsule [[id number]]     Order Details  Dose: 25 mg Route: Oral Frequency: 3 TIMES DAILY PRN for Anxiety   Dispense Quantity: 30 capsule Refills: 0    Note to Pharmacy: Okay to take with current medication list.         Sig: Take 1 capsule by mouth 3 times daily as needed for Anxiety   [facility]pharmacy [id number]- [place], [place] - [address]  [place] [phone] [facility] [phone]   [facility] [address], [place] [address]   Phone:  [phone]  Fax:  [phone]

## 2022-12-19 ENCOUNTER — OFFICE VISIT (OUTPATIENT)
Dept: FAMILY MEDICINE CLINIC | Age: 82
End: 2022-12-19

## 2022-12-19 ENCOUNTER — ANTI-COAG VISIT (OUTPATIENT)
Dept: PHARMACY | Age: 82
End: 2022-12-19
Payer: MEDICARE

## 2022-12-19 VITALS
TEMPERATURE: 97.2 F | HEART RATE: 76 BPM | WEIGHT: 148.4 LBS | OXYGEN SATURATION: 98 % | DIASTOLIC BLOOD PRESSURE: 86 MMHG | BODY MASS INDEX: 23.95 KG/M2 | SYSTOLIC BLOOD PRESSURE: 132 MMHG

## 2022-12-19 DIAGNOSIS — I48.20 CHRONIC ATRIAL FIBRILLATION (HCC): ICD-10-CM

## 2022-12-19 DIAGNOSIS — N18.32 STAGE 3B CHRONIC KIDNEY DISEASE (HCC): ICD-10-CM

## 2022-12-19 DIAGNOSIS — R73.01 ELEVATED FASTING GLUCOSE: ICD-10-CM

## 2022-12-19 DIAGNOSIS — F41.9 ANXIETY: ICD-10-CM

## 2022-12-19 DIAGNOSIS — R06.02 SOB (SHORTNESS OF BREATH): ICD-10-CM

## 2022-12-19 DIAGNOSIS — R41.3 MEMORY LOSS: ICD-10-CM

## 2022-12-19 DIAGNOSIS — E11.65 TYPE 2 DIABETES MELLITUS WITH HYPERGLYCEMIA, WITHOUT LONG-TERM CURRENT USE OF INSULIN (HCC): Primary | ICD-10-CM

## 2022-12-19 DIAGNOSIS — J96.11 CHRONIC HYPOXEMIC RESPIRATORY FAILURE (HCC): ICD-10-CM

## 2022-12-19 DIAGNOSIS — I48.20 CHRONIC ATRIAL FIBRILLATION (HCC): Primary | ICD-10-CM

## 2022-12-19 PROBLEM — N18.30 CHRONIC RENAL DISEASE, STAGE III (HCC): Status: ACTIVE | Noted: 2022-12-19

## 2022-12-19 LAB
HBA1C MFR BLD: 6.9 %
INTERNATIONAL NORMALIZATION RATIO, POC: 1.8

## 2022-12-19 PROCEDURE — 99211 OFF/OP EST MAY X REQ PHY/QHP: CPT

## 2022-12-19 PROCEDURE — 85610 PROTHROMBIN TIME: CPT

## 2022-12-19 NOTE — PROGRESS NOTES
Ms. Kit Jordan is a 79 y.o. y/o female with history of Afib who presents today for anticoagulation monitoring and adjustment. Pertinent PMH: Has been on warfarin for a long time, hesitant to switch, doesn't live near here and wants to go to a lab near her and have results called into PCP. Will reach out to PCP next month. Patient Reported Findings:  Yes     No  [x]   []       Patient verifies current dosing regimen as listed- daughter fills pillbox and states 1.25mg Sun and Thurs and 2.5 mg all other days of the week --> verified dose   []   [x]       S/S bleeding/bruising/swelling/SOB- denies   []   [x]       Blood in urine or stool- denies   []   [x]       Procedures scheduled in the future at this time  []   [x]       Missed Dose-denies   []   [x]       Extra Dose- denies   []   [x]       Change in medications- denies recent changes   []   [x]       Change in health/diet/appetite- has an iceberg lettuce salad once/week --> no changes   []   [x]       Change in alcohol use- doesn't drink or smoke   []   [x]       Change in activity  []   [x]       Hospital admission  []   [x]       Emergency department visit  []   [x]       Other complaints    Clinical Outcomes:  Yes     No  []   [x]       Major bleeding event  []   [x]       Thromboembolic event    Duration of warfarin Therapy: indefinite   INR Range:  2.0-3.0    Has warfarin 2.5mg tablets and not sure about dose- thinks they are doing 1.25mg once or twice/week and 2.5mg AOD    INR is 1.8 today after dose decrease   Return to weekly dose of 1.25mg on Sun and Thurs and 2.5mg all other days (9% inc)  Encouraged to maintain a consistency of vegetables/salads.    Recheck INR in 2 weeks on 1/3/23      Referring Dr. Gerardo Montano   INR (no units)   Date Value   10/12/2022 3.40   09/21/2022 2.4 (A)   06/06/2022 2.4 (A)   05/31/2022 1.55 (H)     INR,(POC) (no units)   Date Value   12/06/2022 3.2   11/22/2022 3.2   09/07/2022 1.9   08/25/2022 1.6     For Pharmacy Admin Tracking Only    Intervention Detail: Dose Adjustment: 1, reason: Therapy Optimization  Total # of Interventions Recommended: 1  Total # of Interventions Accepted: 1  Time Spent (min): 15

## 2022-12-19 NOTE — PROGRESS NOTES
Stephanie Cruz  : 1940  Encounter date: 2022    This is a 79 y.o. female who presents with  Chief Complaint   Patient presents with    3 Month Follow-Up     History of present illness:    HPI   Presents to clinic today for follow up on chronic health conditions. Anxiety/Memory concerns  In general patients mood has been doing okay. Has been struggling with her memory in regards to remember names, dates. Does have elderly care services that checks in once weekly - she thinks she is working with [employer]. May consider doing Meals on Wheels. Daughters help with her general to and from appointments. Has tried to do some baking, but not sure if using the oven correctly. Can still use microwave. Does seem to be taking more naps - decreased her hydroxyzine to nightly to see if this is helpful. Daughters help with pill organization - still appears that she misses on occasion. She does continue to ask to drive regularly despite her failed OT evaluation. Has an appointment with Elderly services coming up this week to discuss next steps. Ultimate goal is to stay at home for as long as possible. HTN/Afib  Stable. Compliant with medications. Denies any side effects. Is struggling with getting INRs regularly checked. May check into switching to Eliquis or Xarelto or possibly look into doing home INR checks. SOB/Chronic hypoxemia  Continues to follow with Pulmonology. Doesn't use oxygen at night like recommended. Hyperglycemia  Recent fasting glucose at 186. Eats a lot of junk food. Doesn't cook much for herself. Hoping to maybe start Meals on Wheels.      Allergies   Allergen Reactions    Penicillins      Patient cannot remember reaction    Sulfa Antibiotics      Current Outpatient Medications   Medication Sig Dispense Refill    hydrOXYzine pamoate (VISTARIL) 25 MG capsule Take 1 capsule by mouth 3 times daily as needed for Anxiety 30 capsule 0    warfarin (COUMADIN) 2.5 MG tablet TAKE 1/2 TAB BY MOUTH 2 DAYS A WEEK AND 1 TAB ALL OTHER DAYS. 90 tablet 1    PARoxetine (PAXIL) 20 MG tablet Take 1 tablet by mouth daily 90 tablet 0    OXYGEN Inhale into the lungs 2L via NC.      dilTIAZem (CARDIZEM) 60 MG tablet TAKE 1/2 TABLET BY MOUTH EVERY 12 HOURS 90 tablet 3    sodium chloride (OCEAN) 0.65 % nasal spray 2 sprays by Nasal route in the morning and 2 sprays at noon and 2 sprays in the evening and 2 sprays before bedtime. 1 each 3    atenolol (TENORMIN) 100 MG tablet Take 1 tablet by mouth in the morning. 90 tablet 1    simvastatin (ZOCOR) 40 MG tablet TAKE 1 TABLET BY MOUTH EVERY DAY AT NIGHT 90 tablet 3    calcium citrate (CALCITRATE) 950 (200 Ca) MG tablet Take 1 tablet by mouth daily       pantoprazole (PROTONIX) 40 MG tablet Take 1 tablet by mouth daily 90 tablet 3    Omega-3 Fatty Acids (FISH OIL PO) Take by mouth      Multiple Vitamins-Minerals (MULTIVITAMIN PO) Take by mouth       No current facility-administered medications for this visit. Review of Systems   Constitutional:  Negative for activity change, appetite change, chills, fatigue and fever. Respiratory:  Negative for cough and shortness of breath. Cardiovascular:  Negative for chest pain and palpitations. Gastrointestinal:  Negative for diarrhea, nausea and vomiting. Past medical, surgical, family and social history were reviewed and updated with the patient. Objective:    /86 (Site: Left Upper Arm, Position: Sitting, Cuff Size: Medium Adult)   Pulse 76   Temp 97.2 °F (36.2 °C)   Wt 148 lb 6.4 oz (67.3 kg)   SpO2 98%   BMI 23.95 kg/m²   Weight: 148 lb 6.4 oz (67.3 kg)     BP Readings from Last 3 Encounters:   12/19/22 132/86   11/04/22 138/80   09/14/22 126/84     Wt Readings from Last 3 Encounters:   12/19/22 148 lb 6.4 oz (67.3 kg)   11/04/22 146 lb (66.2 kg)   09/14/22 145 lb 6.4 oz (66 kg)     Physical Exam  Constitutional:       General: She is not in acute distress. Appearance: She is well-developed. HENT:      Head: Normocephalic and atraumatic. Cardiovascular:      Rate and Rhythm: Normal rate and regular rhythm. Heart sounds: Normal heart sounds, S1 normal and S2 normal.   Pulmonary:      Effort: Pulmonary effort is normal. No respiratory distress. Breath sounds: Normal breath sounds. Skin:     General: Skin is warm and dry. Neurological:      Mental Status: She is alert and oriented to person, place, and time. Psychiatric:         Mood and Affect: Mood normal.         Thought Content: Thought content normal.         Cognition and Memory: Memory is impaired. POCT glycosylated hemoglobin (Hb A1C)   Result Value Ref Range    Hemoglobin A1C 6.9 %     Assessment/Plan    1. Type 2 diabetes mellitus with hyperglycemia, without long-term current use of insulin ([facility])  A1C considered at goal at 6.9 with age and other co morbidities. Encouraged diet modification. No medications at this point with her current kidney function. Hopefully with starting with Meals on Wheels can be helpful. - POCT glycosylated hemoglobin (Hb A1C)    2. Elevated fasting glucose  - POCT glycosylated hemoglobin (Hb A1C)    3. Anxiety  Doing okay. Continue with hydroxyzine. Agree with adjusting to night time only. 4. Memory loss  Continue to work with [facility]. I did discuss stopping use of stove and working to get Meals on Wheels and possibly a weekly home health aide  for assistance. I am happy to put in referral if needed. 5. Stage 3b chronic kidney disease ([address]  Stable. Continue to monitor. 6. Chronic hypoxemic respiratory failure ([address]  Follow up with Pulmonology. Advised on ways to use night time supplemental oxygen comfortably. 7. SOB (shortness of breath)  Continue follow up with Pulmonology. 8. Chronic atrial fibrillation ([address]  Continue follow up with Cardiology. Recently Dr. Michael Cain office did discontinue INR management.   We can take over as opposed to coumadin clinic with it being a far drive from [facility]. May try to get home INR monitoring. Reggie Contrreas was counseled regarding symptoms of current diagnosis, course and complications of disease if inadequately treated. Discussed side effects of medications, diagnosis, treatment options, and prognosis along with risks, benefits, complications, and alternatives of treatment including labs, imaging and other studies/treatment targets and goals. She verbalized understanding of instructions and counseling. Return in about 3 months (around 3/19/2023) for chronic health conditions. .    Medical decision making of moderate complexity.

## 2022-12-30 ASSESSMENT — ENCOUNTER SYMPTOMS
NAUSEA: 0
COUGH: 0
VOMITING: 0
SHORTNESS OF BREATH: 0
DIARRHEA: 0

## 2023-01-03 ENCOUNTER — ANTI-COAG VISIT (OUTPATIENT)
Dept: PHARMACY | Age: 83
End: 2023-01-03
Payer: MEDICARE

## 2023-01-03 DIAGNOSIS — I48.20 CHRONIC ATRIAL FIBRILLATION (HCC): Primary | ICD-10-CM

## 2023-01-03 LAB — INTERNATIONAL NORMALIZATION RATIO, POC: 2.1

## 2023-01-03 PROCEDURE — 99211 OFF/OP EST MAY X REQ PHY/QHP: CPT

## 2023-01-03 PROCEDURE — 85610 PROTHROMBIN TIME: CPT

## 2023-01-03 NOTE — PROGRESS NOTES
Ms. Rhoda Rodriguez is a 79 y.o. y/o female with history of Afib who presents today for anticoagulation monitoring and adjustment. Pertinent PMH: Has been on warfarin for a long time, hesitant to switch, doesn't live near here and wants to go to a lab near her and have results called into PCP. Will reach out to PCP next month. Patient Reported Findings:  Yes     No  [x]   []       Patient verifies current dosing regimen as listed- daughter fills pillbox and states 1.25mg Sun and Thurs and 2.5 mg all other days of the week --> verified dose   []   [x]       S/S bleeding/bruising/swelling/SOB- denies   []   [x]       Blood in urine or stool- denies   []   [x]       Procedures scheduled in the future at this time  []   [x]       Missed Dose-denies   []   [x]       Extra Dose- denies   []   [x]       Change in medications- denies recent changes   []   [x]       Change in health/diet/appetite- has an iceberg lettuce salad once/week --> no changes   []   [x]       Change in alcohol use- doesn't drink or smoke   []   [x]       Change in activity  []   [x]       Hospital admission  []   [x]       Emergency department visit  []   [x]       Other complaints    Clinical Outcomes:  Yes     No  []   [x]       Major bleeding event  []   [x]       Thromboembolic event    Duration of warfarin Therapy: indefinite   INR Range:  2.0-3.0    Has warfarin 2.5mg tablets. INR is 2.1 today   Take 1.25mg on Sun and Thurs and 2.5mg all other days   Encouraged to maintain a consistency of vegetables/salads.    Recheck INR in 3 weeks on 1/24 per request       Referring Dr. Duke Rodriguez   INR (no units)   Date Value   10/12/2022 3.40   09/21/2022 2.4 (A)   06/06/2022 2.4 (A)   05/31/2022 1.55 (H)     INR,(POC) (no units)   Date Value   12/19/2022 1.8   12/06/2022 3.2   11/22/2022 3.2   09/07/2022 1.9     For Pharmacy Admin Tracking Only    Total # of Interventions Recommended: 0  Total # of Interventions Accepted: 0  Time Spent (min): 15

## 2023-01-04 ENCOUNTER — TELEPHONE (OUTPATIENT)
Dept: FAMILY MEDICINE CLINIC | Age: 83
End: 2023-01-04

## 2023-01-04 ENCOUNTER — CARE COORDINATION (OUTPATIENT)
Dept: CARE COORDINATION | Age: 83
End: 2023-01-04

## 2023-01-04 NOTE — TELEPHONE ENCOUNTER
PT's daughter  called and left the number [phone] to have her call back. This may be about results, can someone call her back?     [phone]

## 2023-01-04 NOTE — CARE COORDINATION
cc pcp referral outreach 1. Attempted to reach patient by phone. No answer. Left brief message with name, contact number and asked for return call back. Waiting for patient response at this time. Will update notes when callback is received. Will follow up at another date and time.

## 2023-01-04 NOTE — TELEPHONE ENCOUNTER
PT's daughter called back and said It was a  named Cherylene Blunt and the PT's daughter left a voicemail to speak to her- so disregard last message please.

## 2023-01-05 ENCOUNTER — CARE COORDINATION (OUTPATIENT)
Dept: CARE COORDINATION | Age: 83
End: 2023-01-05

## 2023-01-05 NOTE — CARE COORDINATION
cc pcp referral outreach 2. Attempted to reach patient/ daughter by phone. No answer. Left brief message with name, contact number and asked for return call back. Waiting for patient or daughter response at this time. Will update notes when callback is received. Will follow up at another date and time.

## 2023-01-07 DIAGNOSIS — F41.9 ANXIETY: ICD-10-CM

## 2023-01-07 DIAGNOSIS — F34.1 DYSTHYMIA: ICD-10-CM

## 2023-01-09 ENCOUNTER — CARE COORDINATION (OUTPATIENT)
Dept: CARE COORDINATION | Age: 83
End: 2023-01-09

## 2023-01-09 RX ORDER — PAROXETINE HYDROCHLORIDE 20 MG/1
TABLET, FILM COATED ORAL
Qty: 90 TABLET | Refills: 0 | Status: SHIPPED | OUTPATIENT
Start: 2023-01-09

## 2023-01-09 SDOH — ECONOMIC STABILITY: INCOME INSECURITY: IN THE LAST 12 MONTHS, WAS THERE A TIME WHEN YOU WERE NOT ABLE TO PAY THE MORTGAGE OR RENT ON TIME?: NO

## 2023-01-09 SDOH — ECONOMIC STABILITY: FOOD INSECURITY: WITHIN THE PAST 12 MONTHS, THE FOOD YOU BOUGHT JUST DIDN'T LAST AND YOU DIDN'T HAVE MONEY TO GET MORE.: NEVER TRUE

## 2023-01-09 SDOH — ECONOMIC STABILITY: FOOD INSECURITY: WITHIN THE PAST 12 MONTHS, YOU WORRIED THAT YOUR FOOD WOULD RUN OUT BEFORE YOU GOT MONEY TO BUY MORE.: NEVER TRUE

## 2023-01-09 SDOH — ECONOMIC STABILITY: HOUSING INSECURITY: IN THE LAST 12 MONTHS, HOW MANY PLACES HAVE YOU LIVED?: 1

## 2023-01-09 SDOH — HEALTH STABILITY: PHYSICAL HEALTH: ON AVERAGE, HOW MANY MINUTES DO YOU ENGAGE IN EXERCISE AT THIS LEVEL?: 0 MIN

## 2023-01-09 SDOH — HEALTH STABILITY: PHYSICAL HEALTH: ON AVERAGE, HOW MANY DAYS PER WEEK DO YOU ENGAGE IN MODERATE TO STRENUOUS EXERCISE (LIKE A BRISK WALK)?: 0 DAYS

## 2023-01-09 ASSESSMENT — SOCIAL DETERMINANTS OF HEALTH (SDOH)
HOW OFTEN DO YOU ATTENT MEETINGS OF THE CLUB OR ORGANIZATION YOU BELONG TO?: NEVER
HOW OFTEN DO YOU ATTEND CHURCH OR RELIGIOUS SERVICES?: NEVER
HOW HARD IS IT FOR YOU TO PAY FOR THE VERY BASICS LIKE FOOD, HOUSING, MEDICAL CARE, AND HEATING?: NOT HARD AT ALL
IN A TYPICAL WEEK, HOW MANY TIMES DO YOU TALK ON THE PHONE WITH FAMILY, FRIENDS, OR NEIGHBORS?: TWICE A WEEK
DO YOU BELONG TO ANY CLUBS OR ORGANIZATIONS SUCH AS CHURCH GROUPS UNIONS, FRATERNAL OR ATHLETIC GROUPS, OR SCHOOL GROUPS?: NO
HOW OFTEN DO YOU GET TOGETHER WITH FRIENDS OR RELATIVES?: THREE TIMES A WEEK

## 2023-01-09 ASSESSMENT — LIFESTYLE VARIABLES
HOW MANY STANDARD DRINKS CONTAINING ALCOHOL DO YOU HAVE ON A TYPICAL DAY: PATIENT DOES NOT DRINK
HOW OFTEN DO YOU HAVE A DRINK CONTAINING ALCOHOL: NEVER

## 2023-01-09 NOTE — TELEPHONE ENCOUNTER
Medication:   Requested Prescriptions     Pending Prescriptions Disp Refills    PARoxetine (PAXIL) 20 MG tablet [Pharmacy Med Name: PAROXETINE HCL 20 MG TABLET] 90 tablet 0     Sig: TAKE 1 TABLET BY MOUTH EVERY DAY          Patient Phone Number: [phone] (home) [phone] (work)    Last appt: 12/19/2022   Next appt: 3/20/2023    Last OARRS: No flowsheet data found. PDMP Monitoring:    Last PDMP Keshawn Victoria as Reviewed [facility]):  Review User Review Instant Review Result          Preferred Pharmacy:   [facility]/pharmacy [address] [place] [address] [address] - P [phone] - F [phone]  [facility] [address] [address]  Phone: [phone] Fax: [phone]

## 2023-01-09 NOTE — CARE COORDINATION
Ideally we would check her INR once monthly, but most recently they have had  to adjust her dosing so once weekly. She would definitely benefit from home health services including nursing, PT, OT, home health aide.

## 2023-01-09 NOTE — CARE COORDINATION
Ambulatory Care Coordination Note  1/9/2023    ACC: Domingo Rust, RN     Spoke to patients daughter Magalys(hippa) for cc pcp referral outreach attempt 3. Lennie Rico states patient lives alone, she lives 5 mins away and checks on her. Lennie Rico reports no falls/injuries. Environment reported as appropriately lit, reduced clutter grab bars, shower mat present. Patient does not use assistive devices for gait. Patient does not monitor fbs, no hx of a1c check- pcp notes from 12/19/22 dx dm2. Able to afford basic needs in relation to housing, medical, food. Needs INR checks. Will route CCSS to find in network  skilled nursing inr agencies. [facility] did a yearly safety assessment approximately 2-3 weeks ago. Patient is receiving mow's. Lennie Rico reports patient appetite is poor. Offered rpm, Lennie Rico would like to think it over. No other identified questions or concerns at this call. Plan: Carito---poc to pcp  FU ccss for  inr agencies  Edu/goals    Offered patient enrollment in the Remote Patient Monitoring (RPM) program for in-home monitoring: NA, undecided at this outreach. Ambulatory Care Coordination Assessment    Care Coordination Protocol  Referral from Primary Care Provider: Yes  Week 1 - Initial Assessment     Do you have all of your prescriptions and are they filled?: Yes (Comment: daughter fills her pill set up and prompting 1/9/23)  Barriers to medication adherence: Forgets to take  Are you able to afford your medications?: Yes  How often do you have trouble taking your medications the way you have been told to take them?: Sometimes I take them as prescribed. Do you have Home O2 Therapy?: No      Ability to seek help/take action for Emergent Urgent situations i.e. fire, crime, inclement weather or health crisis. : Independent  Ability to ambulate to restroom: Independent  Ability handle personal hygeine needs (bathing/dressing/grooming):  Independent  Ability to manage Medications: Dependent  Ability to prepare Food Preparation: Independent  Ability to maintain home (clean home, laundry): Independent  Ability to drive and/or has transportation: Needs Assistance  Ability to do shopping: Needs Assistance  Ability to manage finances: Needs Assistance  Is patient able to live independently?: Yes     Current Housing: Private Residence        Per the Fall Risk Screening, did the patient have 2 or more falls or 1 fall with injury in the past year?: No     Frequent urination at night?: Yes  Do you use rails/bars?: Yes  Do you have a non-slip tub mat?: Yes     Are you experiencing loss of meaning?: No  Are you experiencing loss of hope and peace?: No     Thinking about your patient's physical health needs, are there any symptoms or problems (risk indicators) you are unsure about that require further investigation?: No identified areas of uncertainly or problems already being investigated   Are the patients physical health problems impacting on their mental well-being?: No identified areas of concern   Are there any problems with your patients lifestyle behaviors (alcohol, drugs, diet, exercise) that are impacting on physical or mental well-being?: No identified areas of concern   Do you have any other concerns about your patients mental well-being?  How would you rate their severity and impact on the patient?: No identified areas of concern   How would you rate their home environment in terms of safety and stability (including domestic violence, insecure housing, neighbor harassment)?: Consistently safe, supportive, stable, no identified problems   How do daily activities impact on the patient's well-being? (include current or anticipated unemployment, work, caregiving, access to transportation or other): Some general dissatisfaction but no concern   How would you rate their social network (family, work, friends)?: Restricted participation with some degree of social isolation   How would you rate their financial resources (including ability to afford all required medical care)?: Financially secure, resources adequate, no identified problems   How wells does the patient now understand their health and well-being (symptoms, signs or risk factors) and what they need to do to manage their health?: Little understanding which impacts on their ability to undertake better management   How well do you think your patient can engage in healthcare discussions? (Barriers include language, deafness, aphasia, alcohol or drug problems, learning difficulties, concentration): Clear and open communication, no identified barriers   Do other services need to be involved to help this patient?: Other care/services not in place and required   Are current services involved with this patient well-coordinated? (Include coordination with other services you are now recommendation): Required care/services missing and/or fragmented   Suggested Interventions and Community Resources  Diabetes Education: In Process (Comment: 1/9/23 postal mail handouts)    Other Services or Interventions: 1/9/23 undecided rpm   Medi Set or Pill Pack: Declined   Specialty Service Referral: Not Started   Other Services: In Process                  Prior to Admission medications    Medication Sig Start Date End Date Taking? Authorizing Provider   hydrOXYzine pamoate (VISTARIL) 25 MG capsule Take 1 capsule by mouth 3 times daily as needed for Anxiety 12/15/22 1/14/23  BRIGHT Potter CNP   warfarin (COUMADIN) 2.5 MG tablet TAKE 1/2 TAB BY MOUTH 2 DAYS A WEEK AND 1 TAB ALL OTHER DAYS. 10/10/22   Andrés Carlton MD   PARoxetine (PAXIL) 20 MG tablet Take 1 tablet by mouth daily 10/7/22   BRIGHT Potter CNP   OXYGEN Inhale into the lungs 2L via NC.     Historical Provider, MD   dilTIAZem (CARDIZEM) 60 MG tablet TAKE 1/2 TABLET BY MOUTH EVERY 12 HOURS 8/31/22   Andrés Carlton MD   sodium chloride (OCEAN) 0.65 % nasal spray 2 sprays by Nasal route in the morning and 2 sprays at noon and 2 sprays in the evening and 2 sprays before bedtime. 8/9/22   Bailey Paula,    atenolol (TENORMIN) 100 MG tablet Take 1 tablet by mouth in the morning. 7/27/22   Mio Mi MD   simvastatin (ZOCOR) 40 MG tablet TAKE 1 TABLET BY MOUTH EVERY DAY AT NIGHT 7/27/22   Mio Mi MD   calcium citrate (CALCITRATE) 950 (200 Ca) MG tablet Take 1 tablet by mouth daily     Historical Provider, MD   pantoprazole (PROTONIX) 40 MG tablet Take 1 tablet by mouth daily 8/30/21   Mio Mi MD   Omega-3 Fatty Acids (FISH OIL PO) Take by mouth    Historical Provider, MD   Multiple Vitamins-Minerals (MULTIVITAMIN PO) Take by mouth    Historical Provider, MD       Future Appointments   Date Time Provider Felipe Leyla   1/24/2023  3:15 PM [facility] ANTICOAGULATION CLINIC [facility] [place]    3/20/2023  3:30 PM Shin Beach, APRN - CNP [id number] - DYD   5/22/2023  3:00 PM MD Andreia Andrade [facility]     ,   Diabetes Assessment    Medic Alert ID: No  Meal Planning: Avoidance of concentrated sweets   How often do you test your blood sugar?: No Testing (Comment: 1/9/23 pcp note12/19/22 dm2)   Do you have barriers with adherence to non-pharmacologic self-management interventions?  (Nutrition/Exercise/Self-Monitoring): No   Have you ever had to go to the ED for symptoms of low blood sugar?: No       No patient-reported symptoms        , and   General Assessment    Do you have any symptoms that are causing concern?: No

## 2023-01-09 NOTE — CARE COORDINATION
ACM requested [address] for patient. Needs weekly inr for now with ot,pt,skilled nursing. [facility] be able to [facility] the patient. Confirmed they do take both primary and supplemental insurances.   Please send referral, last OV note, Demo, Insurance info to fax number below:  [phone]      Routed to [employer] to have office send [facility] to [facility]

## 2023-01-10 ENCOUNTER — CARE COORDINATION (OUTPATIENT)
Dept: CARE COORDINATION | Age: 83
End: 2023-01-10

## 2023-01-10 DIAGNOSIS — Z91.81 AT HIGH RISK FOR INJURY RELATED TO FALL: ICD-10-CM

## 2023-01-10 DIAGNOSIS — I27.20 PULMONARY HYPERTENSION (HCC): ICD-10-CM

## 2023-01-10 DIAGNOSIS — R06.02 SOB (SHORTNESS OF BREATH): ICD-10-CM

## 2023-01-10 DIAGNOSIS — I48.20 CHRONIC ATRIAL FIBRILLATION (HCC): Primary | ICD-10-CM

## 2023-01-10 DIAGNOSIS — Z79.01 CURRENT USE OF LONG TERM ANTICOAGULATION: ICD-10-CM

## 2023-01-10 DIAGNOSIS — J96.11 CHRONIC HYPOXEMIC RESPIRATORY FAILURE (HCC): ICD-10-CM

## 2023-01-10 DIAGNOSIS — I10 ESSENTIAL HYPERTENSION: ICD-10-CM

## 2023-01-10 NOTE — CARE COORDINATION
Copied from CCSS:    [facility] requested [address] for patient. Needs weekly inr for now with ot,pt,skilled nursing. Called [facility] they will most likely be able to accept the patient. Confirmed they do take both primary and supplemental insurances.   Please send referral, last OV note, Demo, Insurance info to fax number below:  [phone]        [facility] to have office send requested info to [facility]    Please complete order and fax with last OV note, demographics and insurance info to [phone]

## 2023-01-11 ENCOUNTER — TELEPHONE (OUTPATIENT)
Dept: FAMILY MEDICINE CLINIC | Age: 83
End: 2023-01-11

## 2023-01-11 NOTE — TELEPHONE ENCOUNTER
Per Manuela Lugo's note:    [facility] requested [address] for patient. Needs weekly inr for now with ot,pt,skilled nursing. Called [facility] they will most likely be able to accept the patient. Confirmed they do take both primary and supplemental insurances. Please send referral, last OV note, Demo, Insurance info to fax number below:  [phone]        Routed to [email or web address] to have office send requested info to [facility]     Please complete order and fax with last OV note, demographics and insurance info to [phone]    Home health order signed. Please fax to above number with OV.

## 2023-01-12 DIAGNOSIS — F41.9 ANXIETY: ICD-10-CM

## 2023-01-12 RX ORDER — HYDROXYZINE PAMOATE 25 MG/1
25 CAPSULE ORAL 3 TIMES DAILY PRN
Qty: 30 CAPSULE | Refills: 2 | Status: SHIPPED | OUTPATIENT
Start: 2023-01-12 | End: 2023-02-11

## 2023-01-12 NOTE — TELEPHONE ENCOUNTER
Pt's daughter called for refill    hydrOXYzine pamoate (VISTARIL) 25 MG capsule [[id number]]     Order Details  Dose: 25 mg Route: Oral Frequency: 3 TIMES DAILY PRN for Anxiety   Dispense Quantity: 30 capsule Refills: 0    Note to Pharmacy: Okay to take with current medication list.         Sig: Take 1 capsule by mouth 3 times daily as needed for Anxiety       [facility]/pharmacy #[id number]- [place] [address] [address]  [facility] [phone] [facility] [phone] [address], [place] [address]   Phone:  [phone]  Fax:  [phone]

## 2023-01-12 NOTE — TELEPHONE ENCOUNTER
Medication:   Requested Prescriptions     Pending Prescriptions Disp Refills    hydrOXYzine pamoate (VISTARIL) 25 MG capsule 30 capsule 2     Sig: Take 1 capsule by mouth 3 times daily as needed for Anxiety      Last Filled:      Patient Phone Number: [phone] (home) [phone] (work)    Last appt: 12/19/2022   Next appt: 3/20/2023    Last OARRS: No flowsheet data found. PDMP Monitoring:    Last PDMP Leyla Rivera as Reviewed [facility]):  Review User Review Instant Review Result          Preferred Pharmacy:   [facility]/pharmacy [address] [place] [address] Mate[address] Lucas. - P [phone] - F [phone]  [facility] [address]   [address] [facility],[address] [address]  Phone: [phone] Fax: [phone]

## 2023-01-16 ENCOUNTER — CARE COORDINATION (OUTPATIENT)
Dept: CARE COORDINATION | Age: 83
End: 2023-01-16

## 2023-01-16 NOTE — CARE COORDINATION
STEPHANIE received message on VM from Stephon Martin with [facility] advising that she has not made successful outreach to patient at home number listed and requested alternative number. ACM left message on secure VM providing Stephon Martin with Magalys's contact information. Chadd Palacios is patient's daughter and emergency. Confirmed she is on [employer] form.

## 2023-01-16 NOTE — CARE COORDINATION
Received vm from daughter Kassandra Linder about not needing help in the home. Spoke to Kassandra Linder giving her update on home health and INR monthly checks is what the provider thought would be beneficial. Informed Kassandra Linder if they declined wanting to do home care then will just need to bring her in for INR checks at the office/facility. Kassandra Linder states she will talk to her mom and let the office know or primary ACM.

## 2023-01-17 ENCOUNTER — CARE COORDINATION (OUTPATIENT)
Dept: CARE COORDINATION | Age: 83
End: 2023-01-17

## 2023-01-17 NOTE — CARE COORDINATION
Received voicemail from daughter Giovanny Cowan stating she talked to her mother and she wants to decline home health services and that she can just take her mother to have monthly INR checks at a facility. Will route message to primary RN ACM.

## 2023-01-19 ENCOUNTER — TELEPHONE (OUTPATIENT)
Dept: CARDIOLOGY CLINIC | Age: 83
End: 2023-01-19

## 2023-01-19 NOTE — TELEPHONE ENCOUNTER
Called patient left message on machine to call us back. Her coumadin will need to be managed by the clinic. I advised her to check with the Coumadin clinic to see if they would allow her to go to a lab.

## 2023-01-19 NOTE — TELEPHONE ENCOUNTER
Pt daughter Gustabo Baumann called stating they can get INR done at [address] [facility], they are wanting to know it they can get an order to have it done there, they stated it would much easier than going to [facility] every two weeks and trying to fit it in a work schedule. Gustabo Baumann stated she is going to work and it is ok to leave message on her home.       Pls advise thank you  Gsutabo Baumann   [phone]

## 2023-01-24 ENCOUNTER — ANTI-COAG VISIT (OUTPATIENT)
Dept: PHARMACY | Age: 83
End: 2023-01-24
Payer: MEDICARE

## 2023-01-24 DIAGNOSIS — I48.20 CHRONIC ATRIAL FIBRILLATION (HCC): Primary | ICD-10-CM

## 2023-01-24 LAB — INTERNATIONAL NORMALIZATION RATIO, POC: 2.8

## 2023-01-24 PROCEDURE — 99211 OFF/OP EST MAY X REQ PHY/QHP: CPT

## 2023-01-24 PROCEDURE — 85610 PROTHROMBIN TIME: CPT

## 2023-01-24 RX ORDER — ATENOLOL 100 MG/1
TABLET ORAL
Qty: 90 TABLET | Refills: 3 | Status: SHIPPED | OUTPATIENT
Start: 2023-01-24

## 2023-01-24 NOTE — TELEPHONE ENCOUNTER
Refill was requested from pharmacy. Patient is up to date with appointments and lab work. 11/4/2022 OV with Dr. Gwendolyn Jara    5/22/2023 Next OV     11/23/2022 Labs done.

## 2023-01-24 NOTE — PROGRESS NOTES
Ms. Jose Martin Herbert is a 79 y.o. y/o female with history of Afib who presents today for anticoagulation monitoring and adjustment. Pertinent PMH: Has been on warfarin for a long time, hesitant to switch, doesn't live near here and wants to go to a lab near her and have results called into PCP. Will reach out to PCP next month. Patient Reported Findings:  Yes     No  [x]   []       Patient verifies current dosing regimen as listed- daughter fills pillbox and states 1.25mg Sun and Thurs and 2.5 mg all other days of the week --> verified dose   []   [x]       S/S bleeding/bruising/swelling/SOB- denies   []   [x]       Blood in urine or stool- denies   []   [x]       Procedures scheduled in the future at this time  []   [x]       Missed Dose-denies   []   [x]       Extra Dose- denies   []   [x]       Change in medications- denies recent changes   []   [x]       Change in health/diet/appetite- has an iceberg lettuce salad once/week --> no changes   []   [x]       Change in alcohol use- doesn't drink or smoke   []   [x]       Change in activity  []   [x]       Hospital admission  []   [x]       Emergency department visit  []   [x]       Other complaints    Clinical Outcomes:  Yes     No  []   [x]       Major bleeding event  []   [x]       Thromboembolic event    Duration of warfarin Therapy: indefinite   INR Range:  2.0-3.0    Has warfarin 2.5mg tablets. INR is 2.8 today   Take 1.25mg on Sun and Thurs and 2.5mg all other days   Encouraged to maintain a consistency of vegetables/salads.    Recheck INR in 4 weeks, 2/20    Pt consent signed 11/22/22    Referring Dr. Wes Shah   INR (no units)   Date Value   10/12/2022 3.40   09/21/2022 2.4 (A)   06/06/2022 2.4 (A)   05/31/2022 1.55 (H)     INR,(POC) (no units)   Date Value   01/03/2023 2.1   12/19/2022 1.8   12/06/2022 3.2   11/22/2022 3.2     For Pharmacy Admin Tracking Only    Total # of Interventions Recommended: 0  Total # of Interventions Accepted: 0  Time Spent (min): 15

## 2023-01-25 ENCOUNTER — TELEPHONE (OUTPATIENT)
Dept: CARDIOLOGY CLINIC | Age: 83
End: 2023-01-25

## 2023-01-25 NOTE — TELEPHONE ENCOUNTER
Lab orders needed to be sent, please fax to [phone]    POCT INR  Hemoglobin A1C   Lipid Panel  Comprehensive Metabolic Panel  CBC with Auto Differential

## 2023-01-25 NOTE — TELEPHONE ENCOUNTER
Pt daughter called stating she is taking the pt  to get lab work drawn at [facility] , please fax lab orders over to [phone] and include the fax number where it needs to be faxed back to.     Fax [phone]

## 2023-01-26 NOTE — TELEPHONE ENCOUNTER
I spoke to her daughter and she just wanted to know if she could go to [facility] for her PT/INR. I advised her to call the coumadin clinic and find out what her options are.

## 2023-01-27 ENCOUNTER — TELEPHONE (OUTPATIENT)
Dept: PHARMACY | Age: 83
End: 2023-01-27

## 2023-01-27 NOTE — TELEPHONE ENCOUNTER
Daughter called to ask if there is a closer clinic ([facility]) to her mom because coming less than 4 weeks is tough for them. Explained they used to have a clinic but no longer do. Suggested reaching out to PCP to see if they would accept management by getting INRs drawn at the lab there. Otherwise, daughter agrees that monthly apts are manageable here.      Amaury Mcdonnell, PharmD, [address]    Total # of Interventions Recommended: 0  Total # of Interventions Accepted: 0  Time Spent (min): 15

## 2023-02-02 ENCOUNTER — CARE COORDINATION (OUTPATIENT)
Dept: CARE COORDINATION | Age: 83
End: 2023-02-02

## 2023-02-06 ENCOUNTER — TELEPHONE (OUTPATIENT)
Dept: CASE MANAGEMENT | Age: 83
End: 2023-02-06

## 2023-02-07 NOTE — TELEPHONE ENCOUNTER
I spoke to daughter, Ynes Rowe only has one left and Dr. Mago Pandey has filled it in the past. I advised her to get the next refill from her PCP.

## 2023-02-07 NOTE — TELEPHONE ENCOUNTER
Medication Refill    Medication needing refilled:  pantoprazole (PROTONIX) 40 MG- Pt only has once dose left. Dosage of the medication:    How are you taking this medication (QD, BID, TID, QID, PRN): 1 tablet by mouth daily    30 or 90 day supply called in: 90 day supply    When will you run out of your medication:    Which Pharmacy are we sending the medication to?: [facility]/pharmacy #[id number]- [place], [place] - [facility] [address]iz [phone] - F [phone]

## 2023-02-08 RX ORDER — PANTOPRAZOLE SODIUM 40 MG/1
40 TABLET, DELAYED RELEASE ORAL DAILY
Qty: 90 TABLET | Refills: 0 | Status: SHIPPED | OUTPATIENT
Start: 2023-02-08

## 2023-02-20 ENCOUNTER — CARE COORDINATION (OUTPATIENT)
Dept: CARE COORDINATION | Age: 83
End: 2023-02-20

## 2023-02-20 ENCOUNTER — ANTI-COAG VISIT (OUTPATIENT)
Dept: PHARMACY | Age: 83
End: 2023-02-20
Payer: MEDICARE

## 2023-02-20 DIAGNOSIS — I48.20 CHRONIC ATRIAL FIBRILLATION (HCC): Primary | ICD-10-CM

## 2023-02-20 LAB — INTERNATIONAL NORMALIZATION RATIO, POC: 2.2

## 2023-02-20 PROCEDURE — 99211 OFF/OP EST MAY X REQ PHY/QHP: CPT

## 2023-02-20 PROCEDURE — 85610 PROTHROMBIN TIME: CPT

## 2023-02-20 NOTE — PROGRESS NOTES
Ms. Anupam Lee is a 79 y.o. y/o female with history of Afib who presents today for anticoagulation monitoring and adjustment. Pertinent PMH: Has been on warfarin for a long time, hesitant to switch, doesn't live near here and wants to go to a lab near her and have results called into PCP. Will reach out to PCP next month. Patient Reported Findings:  Yes     No  [x]   []       Patient verifies current dosing regimen as listed- daughter fills pillbox and states 1.25mg Sun and Thurs and 2.5 mg all other days of the week --> verified dose   []   [x]       S/S bleeding/bruising/swelling/SOB- denies   []   [x]       Blood in urine or stool- denies   []   [x]       Procedures scheduled in the future at this time  []   [x]       Missed Dose-denies   []   [x]       Extra Dose- denies   []   [x]       Change in medications- denies recent changes   []   [x]       Change in health/diet/appetite- has an iceberg lettuce salad once/week --> no changes   []   [x]       Change in alcohol use- doesn't drink or smoke   []   [x]       Change in activity  []   [x]       Hospital admission  []   [x]       Emergency department visit  []   [x]       Other complaints    Clinical Outcomes:  Yes     No  []   [x]       Major bleeding event  []   [x]       Thromboembolic event    Duration of warfarin Therapy: indefinite   INR Range:  2.0-3.0    Has warfarin 2.5mg tablets. INR is 2.2 today   Take 1.25mg on Sun and Thurs and 2.5mg all other days   Encouraged to maintain a consistency of vegetables/salads.    Recheck INR in 4 weeks, 3/20    Pt consent signed 11/22/22    Referring Dr. Majo Diamond   INR (no units)   Date Value   10/12/2022 3.40   09/21/2022 2.4 (A)   06/06/2022 2.4 (A)   05/31/2022 1.55 (H)     INR,(POC) (no units)   Date Value   01/24/2023 2.8   01/03/2023 2.1   12/19/2022 1.8   12/06/2022 3.2     For Pharmacy Admin Tracking Only    Total # of Interventions Recommended: 0  Total # of Interventions Accepted: 0  Time Spent (min): 15

## 2023-03-06 ENCOUNTER — CARE COORDINATION (OUTPATIENT)
Dept: CARE COORDINATION | Age: 83
End: 2023-03-06

## 2023-03-06 NOTE — CARE COORDINATION
ACM made outreach to patient's daughter to follow up with Care management. Left message on VM, ACM will end CM episode if no return call by next outreach.

## 2023-03-20 ENCOUNTER — OFFICE VISIT (OUTPATIENT)
Dept: FAMILY MEDICINE CLINIC | Age: 83
End: 2023-03-20

## 2023-03-20 ENCOUNTER — CARE COORDINATION (OUTPATIENT)
Dept: CARE COORDINATION | Age: 83
End: 2023-03-20

## 2023-03-20 ENCOUNTER — ANTI-COAG VISIT (OUTPATIENT)
Dept: PHARMACY | Age: 83
End: 2023-03-20
Payer: MEDICARE

## 2023-03-20 VITALS
WEIGHT: 148 LBS | BODY MASS INDEX: 23.89 KG/M2 | DIASTOLIC BLOOD PRESSURE: 80 MMHG | SYSTOLIC BLOOD PRESSURE: 120 MMHG | TEMPERATURE: 97.2 F

## 2023-03-20 DIAGNOSIS — E11.65 TYPE 2 DIABETES MELLITUS WITH HYPERGLYCEMIA, WITHOUT LONG-TERM CURRENT USE OF INSULIN (HCC): ICD-10-CM

## 2023-03-20 DIAGNOSIS — G44.209 TENSION HEADACHE: ICD-10-CM

## 2023-03-20 DIAGNOSIS — N18.32 STAGE 3B CHRONIC KIDNEY DISEASE (HCC): ICD-10-CM

## 2023-03-20 DIAGNOSIS — F34.1 DYSTHYMIA: ICD-10-CM

## 2023-03-20 DIAGNOSIS — I48.20 CHRONIC ATRIAL FIBRILLATION (HCC): ICD-10-CM

## 2023-03-20 DIAGNOSIS — F41.9 ANXIETY: Primary | ICD-10-CM

## 2023-03-20 DIAGNOSIS — R06.02 SOB (SHORTNESS OF BREATH): ICD-10-CM

## 2023-03-20 DIAGNOSIS — I48.20 CHRONIC ATRIAL FIBRILLATION (HCC): Primary | ICD-10-CM

## 2023-03-20 DIAGNOSIS — J96.11 CHRONIC HYPOXEMIC RESPIRATORY FAILURE (HCC): ICD-10-CM

## 2023-03-20 DIAGNOSIS — R41.3 MEMORY LOSS: ICD-10-CM

## 2023-03-20 DIAGNOSIS — I27.20 PULMONARY HYPERTENSION (HCC): ICD-10-CM

## 2023-03-20 LAB
HBA1C MFR BLD: 6.8 %
INTERNATIONAL NORMALIZATION RATIO, POC: 2.4

## 2023-03-20 PROCEDURE — 99211 OFF/OP EST MAY X REQ PHY/QHP: CPT

## 2023-03-20 PROCEDURE — 85610 PROTHROMBIN TIME: CPT

## 2023-03-20 SDOH — ECONOMIC STABILITY: INCOME INSECURITY: HOW HARD IS IT FOR YOU TO PAY FOR THE VERY BASICS LIKE FOOD, HOUSING, MEDICAL CARE, AND HEATING?: NOT HARD AT ALL

## 2023-03-20 SDOH — ECONOMIC STABILITY: FOOD INSECURITY: WITHIN THE PAST 12 MONTHS, THE FOOD YOU BOUGHT JUST DIDN'T LAST AND YOU DIDN'T HAVE MONEY TO GET MORE.: NEVER TRUE

## 2023-03-20 SDOH — ECONOMIC STABILITY: FOOD INSECURITY: WITHIN THE PAST 12 MONTHS, YOU WORRIED THAT YOUR FOOD WOULD RUN OUT BEFORE YOU GOT MONEY TO BUY MORE.: NEVER TRUE

## 2023-03-20 ASSESSMENT — PATIENT HEALTH QUESTIONNAIRE - PHQ9
SUM OF ALL RESPONSES TO PHQ QUESTIONS 1-9: 6
1. LITTLE INTEREST OR PLEASURE IN DOING THINGS: 0
7. TROUBLE CONCENTRATING ON THINGS, SUCH AS READING THE NEWSPAPER OR WATCHING TELEVISION: 1
6. FEELING BAD ABOUT YOURSELF - OR THAT YOU ARE A FAILURE OR HAVE LET YOURSELF OR YOUR FAMILY DOWN: 1
SUM OF ALL RESPONSES TO PHQ QUESTIONS 1-9: 6
3. TROUBLE FALLING OR STAYING ASLEEP: 1
SUM OF ALL RESPONSES TO PHQ9 QUESTIONS 1 & 2: 0
2. FEELING DOWN, DEPRESSED OR HOPELESS: 0
4. FEELING TIRED OR HAVING LITTLE ENERGY: 1
10. IF YOU CHECKED OFF ANY PROBLEMS, HOW DIFFICULT HAVE THESE PROBLEMS MADE IT FOR YOU TO DO YOUR WORK, TAKE CARE OF THINGS AT HOME, OR GET ALONG WITH OTHER PEOPLE: 1
5. POOR APPETITE OR OVEREATING: 1
9. THOUGHTS THAT YOU WOULD BE BETTER OFF DEAD, OR OF HURTING YOURSELF: 0
8. MOVING OR SPEAKING SO SLOWLY THAT OTHER PEOPLE COULD HAVE NOTICED. OR THE OPPOSITE, BEING SO FIGETY OR RESTLESS THAT YOU HAVE BEEN MOVING AROUND A LOT MORE THAN USUAL: 1

## 2023-03-20 ASSESSMENT — ENCOUNTER SYMPTOMS
SHORTNESS OF BREATH: 0
VOMITING: 0
DIARRHEA: 0
NAUSEA: 0
COUGH: 0

## 2023-03-20 NOTE — PROGRESS NOTES
hemoglobin (Hb A1C)   Result Value Ref Range    Hemoglobin A1C 6.8 %     Assessment/Plan    1. Anxiety  Improving. Continue on current medication regimen. She does continue to be concerned about being able to drive. I did advise it will be unlikely she will be able drive again. She is wanting to know if she can redo her driving evaluation. She has not improved enough to redo driving exam.  Will revisit this in 6 months. 2. Dysthymia  Stable. Continue on current medication regimen. 3. Memory loss  Doing okay inside of her home for now. Daughters are helping with pill organization - some question as to whether or not she is taking regularly. Working with [address]. Does need to wear her help button regularly. 4. Tension headache  Advised to not take NSAIDs with her being on warfarin. Okay for Tylenol. If headaches worsen or persist call office. Encouraged her to wear her supplemental oxygen at night to see if this helps during the day. 5. Type 2 diabetes mellitus with hyperglycemia, without long-term current use of insulin (HCC)  A1C stable. Continue with diet/exercise. - POCT glycosylated hemoglobin (Hb A1C)    6. Chronic hypoxemic respiratory failure ([facility] [address]  Continue follow up with Pulmonology. May consider wearing supplemental oxygen at night. 7. SOB (shortness of breath)  Improving     8. Chronic atrial fibrillation (HCC)  Stable. Continue on current medication regimen. Continue with [facility]. 9. Stage 3b chronic kidney disease ([facility] [address]  Improved. 10. Pulmonary hypertension ([facility] [address]  Continue follow up with Cardiology/Pulmonology. Petros Feliciano was counseled regarding symptoms of current diagnosis, course and complications of disease if inadequately treated.   Discussed side effects of medications, diagnosis, treatment options, and prognosis along with risks, benefits, complications, and alternatives of treatment including labs, imaging and other studies/treatment targets

## 2023-03-20 NOTE — CARE COORDINATION
[facility] has ended Care Management episode at this time after multiple unsuccessful outreach attempts. No outreach placed and no further outreach at this time.

## 2023-03-20 NOTE — PROGRESS NOTES
Ms. Dinora Rivas is a 79 y.o. y/o female with history of Afib who presents today for anticoagulation monitoring and adjustment. Pertinent PMH: Has been on warfarin for a long time, hesitant to switch, doesn't live near here and wants to go to a lab near her and have results called into PCP. Will reach out to PCP next month. Patient Reported Findings:  Yes     No  [x]   []       Patient verifies current dosing regimen as listed- daughter fills pillbox and states 1.25mg Sun and Thurs and 2.5 mg all other days of the week --> verified dose   []   [x]       S/S bleeding/bruising/swelling/SOB- denies   []   [x]       Blood in urine or stool- denies   []   [x]       Procedures scheduled in the future at this time  []   [x]       Missed Dose-denies   []   [x]       Extra Dose- denies   []   [x]       Change in medications- denies recent changes   []   [x]       Change in health/diet/appetite- has an iceberg lettuce salad once/week --> no changes   []   [x]       Change in alcohol use- doesn't drink or smoke   []   [x]       Change in activity  []   [x]       Hospital admission  []   [x]       Emergency department visit  []   [x]       Other complaints    Clinical Outcomes:  Yes     No  []   [x]       Major bleeding event  []   [x]       Thromboembolic event    Duration of warfarin Therapy: indefinite   INR Range:  2.0-3.0    Has warfarin 2.5mg tablets. INR is 2.4 today   Take 1.25mg on Sun and Thurs and 2.5mg all other days   Encouraged to maintain a consistency of vegetables/salads.    Recheck INR in 4 weeks, 4/17    Pt consent signed 11/22/22    Referring Dr. Leandra Liriano   INR (no units)   Date Value   10/12/2022 3.40   09/21/2022 2.4 (A)   06/06/2022 2.4 (A)   05/31/2022 1.55 (H)     INR,(POC) (no units)   Date Value   02/20/2023 2.2   01/24/2023 2.8   01/03/2023 2.1   12/19/2022 1.8     For Pharmacy Admin Tracking Only    Total # of Interventions Recommended: 0  Total # of Interventions Accepted: 0  Time Spent

## 2023-04-05 ENCOUNTER — TELEPHONE (OUTPATIENT)
Dept: CASE MANAGEMENT | Age: 83
End: 2023-04-05

## 2023-04-12 ENCOUNTER — OFFICE VISIT (OUTPATIENT)
Dept: FAMILY MEDICINE CLINIC | Age: 83
End: 2023-04-12
Payer: MEDICARE

## 2023-04-12 VITALS
WEIGHT: 149.8 LBS | SYSTOLIC BLOOD PRESSURE: 128 MMHG | BODY MASS INDEX: 24.18 KG/M2 | OXYGEN SATURATION: 96 % | HEART RATE: 77 BPM | DIASTOLIC BLOOD PRESSURE: 84 MMHG

## 2023-04-12 DIAGNOSIS — F33.1 MODERATE EPISODE OF RECURRENT MAJOR DEPRESSIVE DISORDER (HCC): ICD-10-CM

## 2023-04-12 DIAGNOSIS — N30.01 ACUTE CYSTITIS WITH HEMATURIA: Primary | ICD-10-CM

## 2023-04-12 DIAGNOSIS — R53.83 FATIGUE, UNSPECIFIED TYPE: ICD-10-CM

## 2023-04-12 LAB
BACTERIA URINE, POC: ABNORMAL
BILIRUBIN URINE: 0 MG/DL
BLOOD, URINE: POSITIVE
CASTS URINE, POC: 0
CLARITY: ABNORMAL
COLOR: YELLOW
CRYSTALS URINE, POC: 0
EPI CELLS URINE, POC: ABNORMAL
GLUCOSE URINE: ABNORMAL
KETONES, URINE: NEGATIVE
LEUKOCYTE EST, POC: ABNORMAL
NITRITE, URINE: NEGATIVE
PH UA: 5.5 (ref 4.5–8)
PROTEIN UA: POSITIVE
RBC URINE, POC: ABNORMAL
SPECIFIC GRAVITY UA: 1.02 (ref 1–1.03)
UROBILINOGEN, URINE: ABNORMAL
WBC URINE, POC: ABNORMAL
YEAST URINE, POC: 0

## 2023-04-12 PROCEDURE — G8427 DOCREV CUR MEDS BY ELIG CLIN: HCPCS | Performed by: NURSE PRACTITIONER

## 2023-04-12 PROCEDURE — 1036F TOBACCO NON-USER: CPT | Performed by: NURSE PRACTITIONER

## 2023-04-12 PROCEDURE — 81000 URINALYSIS NONAUTO W/SCOPE: CPT | Performed by: NURSE PRACTITIONER

## 2023-04-12 PROCEDURE — G8420 CALC BMI NORM PARAMETERS: HCPCS | Performed by: NURSE PRACTITIONER

## 2023-04-12 PROCEDURE — 3074F SYST BP LT 130 MM HG: CPT | Performed by: NURSE PRACTITIONER

## 2023-04-12 PROCEDURE — G8400 PT W/DXA NO RESULTS DOC: HCPCS | Performed by: NURSE PRACTITIONER

## 2023-04-12 PROCEDURE — 1090F PRES/ABSN URINE INCON ASSESS: CPT | Performed by: NURSE PRACTITIONER

## 2023-04-12 PROCEDURE — 1123F ACP DISCUSS/DSCN MKR DOCD: CPT | Performed by: NURSE PRACTITIONER

## 2023-04-12 PROCEDURE — 99214 OFFICE O/P EST MOD 30 MIN: CPT | Performed by: NURSE PRACTITIONER

## 2023-04-12 PROCEDURE — 3078F DIAST BP <80 MM HG: CPT | Performed by: NURSE PRACTITIONER

## 2023-04-12 RX ORDER — BUPROPION HYDROCHLORIDE 150 MG/1
150 TABLET ORAL EVERY MORNING
Qty: 30 TABLET | Refills: 0 | Status: SHIPPED | OUTPATIENT
Start: 2023-04-12

## 2023-04-12 RX ORDER — CIPROFLOXACIN 250 MG/1
250 TABLET, FILM COATED ORAL 2 TIMES DAILY
Qty: 14 TABLET | Refills: 0 | Status: SHIPPED | OUTPATIENT
Start: 2023-04-12 | End: 2023-04-19

## 2023-04-12 ASSESSMENT — ENCOUNTER SYMPTOMS
COUGH: 0
SHORTNESS OF BREATH: 0
DIARRHEA: 0
NAUSEA: 0
VOMITING: 0

## 2023-04-14 LAB
BACTERIA UR CULT: ABNORMAL
ORGANISM: ABNORMAL

## 2023-04-17 ENCOUNTER — ANTI-COAG VISIT (OUTPATIENT)
Dept: PHARMACY | Age: 83
End: 2023-04-17
Payer: MEDICARE

## 2023-04-17 DIAGNOSIS — I48.20 CHRONIC ATRIAL FIBRILLATION (HCC): Primary | ICD-10-CM

## 2023-04-17 LAB — INTERNATIONAL NORMALIZATION RATIO, POC: 4.3

## 2023-04-17 PROCEDURE — 99211 OFF/OP EST MAY X REQ PHY/QHP: CPT

## 2023-04-17 PROCEDURE — 85610 PROTHROMBIN TIME: CPT

## 2023-04-17 NOTE — PROGRESS NOTES
01/24/2023 2.8   01/03/2023 2.1     For Pharmacy Admin Tracking Only    Intervention Detail: Dose Adjustment: 1, reason: Therapy Optimization  Total # of Interventions Recommended: 1  Total # of Interventions Accepted: 1  Time Spent (min): 15

## 2023-04-21 ENCOUNTER — TELEPHONE (OUTPATIENT)
Dept: CARDIOLOGY CLINIC | Age: 83
End: 2023-04-21

## 2023-04-21 DIAGNOSIS — F41.9 ANXIETY: ICD-10-CM

## 2023-04-21 DIAGNOSIS — F34.1 DYSTHYMIA: ICD-10-CM

## 2023-04-21 RX ORDER — PAROXETINE HYDROCHLORIDE 20 MG/1
20 TABLET, FILM COATED ORAL DAILY
Qty: 90 TABLET | Refills: 0 | Status: SHIPPED | OUTPATIENT
Start: 2023-04-21

## 2023-04-21 RX ORDER — HYDROXYZINE PAMOATE 25 MG/1
25 CAPSULE ORAL 3 TIMES DAILY PRN
Qty: 30 CAPSULE | Refills: 2 | Status: SHIPPED | OUTPATIENT
Start: 2023-04-21 | End: 2023-05-21

## 2023-04-21 RX ORDER — WARFARIN SODIUM 2.5 MG/1
TABLET ORAL
Qty: 90 TABLET | Refills: 1 | Status: SHIPPED | OUTPATIENT
Start: 2023-04-21

## 2023-04-21 NOTE — TELEPHONE ENCOUNTER
Medication:   Requested Prescriptions     Pending Prescriptions Disp Refills    PARoxetine (PAXIL) 20 MG tablet 90 tablet 0     Sig: Take 1 tablet by mouth daily    hydrOXYzine pamoate (VISTARIL) 25 MG capsule 30 capsule 2     Sig: Take 1 capsule by mouth 3 times daily as needed for Anxiety      Last Filled:      Patient Phone Number: [phone] (home) [phone] (work)    Last appt: 4/12/2023   Next appt: 5/8/2023    Last OARRS: No flowsheet data found.   PDMP Monitoring:    Last PDMP Omi marte Reviewed [facility]):  Review User Review Instant Review Result          Preferred Pharmacy:   [facility]/pharmacy #[id number] MANUEL Claire [address] [place] [address]  Phone: [phone] Fax: [phone]

## 2023-04-21 NOTE — TELEPHONE ENCOUNTER
Medication Refill    Medication needing refilled:  warfarin    Dosage of the medication: 2.5mg    How are you taking this medication (QD, BID, TID, QID, PRN):  TAKE 1/2 TAB BY MOUTH 2 DAYS A WEEK AND 1 TAB ALL OTHER DAYS.     30 or 90 day supply called in:  90    When will you run out of your medication:    Which Pharmacy are we sending the medication to?:    [facility]/pharmacy [address] [place] [address], [place] [address]

## 2023-04-21 NOTE — TELEPHONE ENCOUNTER
hydrOXYzine pamoate (VISTARIL) 25 MG capsule       PARoxetine (PAXIL) 20 MG tablet         [facility]/pharmacy [address] [place] [address], [id number] [place] [address],[address] [id number]   Phone:  [phone]  Fax:  [phone]

## 2023-05-01 ENCOUNTER — ANTI-COAG VISIT (OUTPATIENT)
Dept: PHARMACY | Age: 83
End: 2023-05-01
Payer: MEDICARE

## 2023-05-01 DIAGNOSIS — N28.9 ABNORMAL KIDNEY FUNCTION: ICD-10-CM

## 2023-05-01 DIAGNOSIS — I48.20 CHRONIC ATRIAL FIBRILLATION (HCC): Primary | ICD-10-CM

## 2023-05-01 LAB
ANION GAP SERPL CALCULATED.3IONS-SCNC: 12 MMOL/L (ref 3–16)
BUN SERPL-MCNC: 30 MG/DL (ref 7–20)
CALCIUM SERPL-MCNC: 10.3 MG/DL (ref 8.3–10.6)
CHLORIDE SERPL-SCNC: 103 MMOL/L (ref 99–110)
CO2 SERPL-SCNC: 27 MMOL/L (ref 21–32)
CREAT SERPL-MCNC: 1.9 MG/DL (ref 0.6–1.2)
GFR SERPLBLD CREATININE-BSD FMLA CKD-EPI: 26 ML/MIN/{1.73_M2}
GLUCOSE SERPL-MCNC: 133 MG/DL (ref 70–99)
INTERNATIONAL NORMALIZATION RATIO, POC: 2.4
POTASSIUM SERPL-SCNC: 4.8 MMOL/L (ref 3.5–5.1)
SODIUM SERPL-SCNC: 142 MMOL/L (ref 136–145)

## 2023-05-01 PROCEDURE — 85610 PROTHROMBIN TIME: CPT

## 2023-05-01 PROCEDURE — 99211 OFF/OP EST MAY X REQ PHY/QHP: CPT

## 2023-05-01 NOTE — PROGRESS NOTES
Ms. Shy Marks is a 79 y.o. y/o female with history of Afib who presents today for anticoagulation monitoring and adjustment. Pertinent PMH: Has been on warfarin for a long time, hesitant to switch, doesn't live near here and wants to go to a lab near her and have results called into PCP. Will reach out to PCP next month. Patient Reported Findings:  Yes     No  [x]   []       Patient verifies current dosing regimen as listed- daughter fills pillbox and states 1.25mg Sun and Thurs and 2.5 mg all other days of the week --> verified dose   []   [x]       S/S bleeding/bruising/swelling/SOB- denies   []   [x]       Blood in urine or stool- denies   []   [x]       Procedures scheduled in the future at this time  []   [x]       Missed Dose- denies   []   [x]       Extra Dose- denies   [x]   []       Change in medications- denies recent changes --> started bupropion, 7d cipro course for UTI --> finished cipro   []   [x]       Change in health/diet/appetite- has an iceberg lettuce salad once/week --> no changes, no NVD  []   [x]       Change in alcohol use- doesn't drink or smoke   []   [x]       Change in activity  []   [x]       Hospital admission  []   [x]       Emergency department visit  []   [x]       Other complaints    Clinical Outcomes:  Yes     No  []   [x]       Major bleeding event  []   [x]       Thromboembolic event    Duration of warfarin Therapy: indefinite   INR Range:  2.0-3.0    Has warfarin 2.5mg tablets. INR is 2.4 today   Continue to take 1.25mg on Sun and Thurs and 2.5mg all other days   Encouraged to maintain a consistency of vegetables/salads.    Recheck INR in 4 weeks, 5/31    Pt consent signed 11/22/22    Referring Dr. Nicole Garcia   INR (no units)   Date Value   10/12/2022 3.40   09/21/2022 2.4 (A)   06/06/2022 2.4 (A)   05/31/2022 1.55 (H)     INR,(POC) (no units)   Date Value   04/17/2023 4.3   03/20/2023 2.4   02/20/2023 2.2   01/24/2023 2.8     For Pharmacy Admin Tracking

## 2023-05-03 DIAGNOSIS — N18.4 STAGE 4 CHRONIC KIDNEY DISEASE (HCC): Primary | ICD-10-CM

## 2023-05-04 ENCOUNTER — HOSPITAL ENCOUNTER (OUTPATIENT)
Dept: CT IMAGING | Age: 83
Discharge: HOME | End: 2023-05-04
Payer: MEDICARE

## 2023-05-04 DIAGNOSIS — R91.1 PULMONARY NODULE: ICD-10-CM

## 2023-05-04 PROCEDURE — 71250 CT THORAX DX C-: CPT

## 2023-05-05 DIAGNOSIS — F33.1 MODERATE EPISODE OF RECURRENT MAJOR DEPRESSIVE DISORDER (HCC): ICD-10-CM

## 2023-05-08 ENCOUNTER — OFFICE VISIT (OUTPATIENT)
Dept: FAMILY MEDICINE CLINIC | Age: 83
End: 2023-05-08

## 2023-05-08 VITALS
OXYGEN SATURATION: 98 % | WEIGHT: 148 LBS | HEART RATE: 81 BPM | TEMPERATURE: 97.2 F | BODY MASS INDEX: 23.89 KG/M2 | SYSTOLIC BLOOD PRESSURE: 112 MMHG | DIASTOLIC BLOOD PRESSURE: 80 MMHG

## 2023-05-08 DIAGNOSIS — K25.4 GASTROINTESTINAL HEMORRHAGE ASSOCIATED WITH GASTRIC ULCER: ICD-10-CM

## 2023-05-08 DIAGNOSIS — R53.83 FATIGUE, UNSPECIFIED TYPE: ICD-10-CM

## 2023-05-08 DIAGNOSIS — F33.1 MODERATE EPISODE OF RECURRENT MAJOR DEPRESSIVE DISORDER (HCC): Primary | ICD-10-CM

## 2023-05-08 DIAGNOSIS — F41.9 ANXIETY: ICD-10-CM

## 2023-05-08 DIAGNOSIS — N18.32 STAGE 3B CHRONIC KIDNEY DISEASE (HCC): ICD-10-CM

## 2023-05-08 RX ORDER — BUPROPION HYDROCHLORIDE 150 MG/1
TABLET ORAL
Qty: 90 TABLET | Refills: 0 | Status: SHIPPED | OUTPATIENT
Start: 2023-05-08

## 2023-05-08 RX ORDER — PANTOPRAZOLE SODIUM 40 MG/1
40 TABLET, DELAYED RELEASE ORAL DAILY
Qty: 90 TABLET | Refills: 0 | Status: SHIPPED | OUTPATIENT
Start: 2023-05-08

## 2023-05-08 NOTE — PROGRESS NOTES
Kong Dolan  : 1940  Encounter date: 2023    This is a 79 y.o. female who presents with  Chief Complaint   Patient presents with    1 Month Follow-Up     History of present illness:    HPI   Presents to clinic today for 4 week follow up on medication monitoring. Did start on Wellbutrin. Reports does feel there is some improvement. Denies any side effects. Daughter does feel that her mood has improved and not sleeping as much during the day. Her placement is on hold for now as patient wishes to remain in her home independently. She did say she may consider at least going for a visit. Daughter is off in the summer time when they would go to look. Allergies   Allergen Reactions    Penicillins      Patient cannot remember reaction    Sulfa Antibiotics      Current Outpatient Medications   Medication Sig Dispense Refill    pantoprazole (PROTONIX) 40 MG tablet Take 1 tablet by mouth daily 90 tablet 0    hydrOXYzine pamoate (VISTARIL) 25 MG capsule Take 1 capsule by mouth 3 times daily as needed for Anxiety 30 capsule 2    warfarin (COUMADIN) 2.5 MG tablet TAKE 1/2 TAB BY MOUTH 2 DAYS A WEEK AND 1 TAB ALL OTHER DAYS OR AS DIRECTED 90 tablet 1    atenolol (TENORMIN) 100 MG tablet TAKE 1 TABLET BY MOUTH EVERY DAY IN THE MORNING 90 tablet 3    OXYGEN Inhale into the lungs 2L via NC.      dilTIAZem (CARDIZEM) 60 MG tablet TAKE 1/2 TABLET BY MOUTH EVERY 12 HOURS 90 tablet 3    calcium citrate (CALCITRATE) 950 (200 Ca) MG tablet Take 1 tablet by mouth daily      Multiple Vitamins-Minerals (MULTIVITAMIN PO) Take by mouth      simvastatin (ZOCOR) 40 MG tablet Take 1 tablet by mouth nightly 90 tablet 3    PARoxetine (PAXIL) 20 MG tablet Take 1 tablet by mouth daily 90 tablet 0    buPROPion (WELLBUTRIN XL) 150 MG extended release tablet TAKE 1 TABLET BY MOUTH EVERY DAY IN THE MORNING 90 tablet 0     No current facility-administered medications for this visit.       Review of Systems   Constitutional:

## 2023-05-10 ENCOUNTER — TELEPHONE (OUTPATIENT)
Dept: PULMONOLOGY | Age: 83
End: 2023-05-10

## 2023-05-10 DIAGNOSIS — F41.9 ANXIETY: ICD-10-CM

## 2023-05-10 DIAGNOSIS — F34.1 DYSTHYMIA: ICD-10-CM

## 2023-05-10 RX ORDER — PAROXETINE HYDROCHLORIDE 20 MG/1
20 TABLET, FILM COATED ORAL DAILY
Qty: 90 TABLET | Refills: 0 | Status: SHIPPED | OUTPATIENT
Start: 2023-05-10

## 2023-05-10 RX ORDER — SIMVASTATIN 40 MG
40 TABLET ORAL NIGHTLY
Qty: 90 TABLET | Refills: 3 | Status: SHIPPED | OUTPATIENT
Start: 2023-05-10

## 2023-05-10 ASSESSMENT — ENCOUNTER SYMPTOMS
VOMITING: 0
SHORTNESS OF BREATH: 0
DIARRHEA: 0
NAUSEA: 0
COUGH: 0

## 2023-05-10 NOTE — TELEPHONE ENCOUNTER
Patients daughter called and wanted to go over results of patients CT scan done on 5/4/23    PH: [phone]

## 2023-05-10 NOTE — TELEPHONE ENCOUNTER
Medication:   Requested Prescriptions     Pending Prescriptions Disp Refills    simvastatin (ZOCOR) 40 MG tablet 90 tablet 3     Sig: Take 1 tablet by mouth nightly    PARoxetine (PAXIL) 20 MG tablet 90 tablet 0     Sig: Take 1 tablet by mouth daily      Last Filled:      Patient Phone Number: [phone] (home) [phone] (work)    Last appt: 5/8/2023   Next appt: 8/10/2023    Last OARRS: No flowsheet data found.   PDMP Monitoring:    Last PDMP Talita Rosado as Reviewed [facility]):  Review User Review Instant Review Result          Preferred Pharmacy:       [address]  [facility] [address]  Phone: [phone] Fax: [phone]

## 2023-05-10 NOTE — TELEPHONE ENCOUNTER
PARoxetine (PAXIL) 20 MG tablet [[id number]    simvastatin (ZOCOR) 40 MG tablet [[id number]]    [facility] #[id number] - [address] [phone]

## 2023-05-22 ENCOUNTER — OFFICE VISIT (OUTPATIENT)
Dept: CARDIOLOGY CLINIC | Age: 83
End: 2023-05-22
Payer: MEDICARE

## 2023-05-22 VITALS
BODY MASS INDEX: 23.46 KG/M2 | WEIGHT: 146 LBS | SYSTOLIC BLOOD PRESSURE: 122 MMHG | HEART RATE: 82 BPM | HEIGHT: 66 IN | DIASTOLIC BLOOD PRESSURE: 60 MMHG | OXYGEN SATURATION: 97 %

## 2023-05-22 DIAGNOSIS — I10 ESSENTIAL HYPERTENSION: ICD-10-CM

## 2023-05-22 DIAGNOSIS — E78.2 MIXED HYPERLIPIDEMIA: ICD-10-CM

## 2023-05-22 DIAGNOSIS — I48.20 CHRONIC ATRIAL FIBRILLATION (HCC): Primary | ICD-10-CM

## 2023-05-22 PROCEDURE — 1123F ACP DISCUSS/DSCN MKR DOCD: CPT | Performed by: INTERNAL MEDICINE

## 2023-05-22 PROCEDURE — G8427 DOCREV CUR MEDS BY ELIG CLIN: HCPCS | Performed by: INTERNAL MEDICINE

## 2023-05-22 PROCEDURE — 1090F PRES/ABSN URINE INCON ASSESS: CPT | Performed by: INTERNAL MEDICINE

## 2023-05-22 PROCEDURE — G8420 CALC BMI NORM PARAMETERS: HCPCS | Performed by: INTERNAL MEDICINE

## 2023-05-22 PROCEDURE — 3078F DIAST BP <80 MM HG: CPT | Performed by: INTERNAL MEDICINE

## 2023-05-22 PROCEDURE — G8400 PT W/DXA NO RESULTS DOC: HCPCS | Performed by: INTERNAL MEDICINE

## 2023-05-22 PROCEDURE — 1036F TOBACCO NON-USER: CPT | Performed by: INTERNAL MEDICINE

## 2023-05-22 PROCEDURE — 99214 OFFICE O/P EST MOD 30 MIN: CPT | Performed by: INTERNAL MEDICINE

## 2023-05-22 PROCEDURE — 3074F SYST BP LT 130 MM HG: CPT | Performed by: INTERNAL MEDICINE

## 2023-05-22 RX ORDER — DILTIAZEM HYDROCHLORIDE 60 MG/1
TABLET, FILM COATED ORAL
Qty: 90 TABLET | Refills: 3 | Status: SHIPPED | OUTPATIENT
Start: 2023-05-22

## 2023-05-22 NOTE — PROGRESS NOTES
[address]   Cardiac Evaluation      Patient: Akbar Mc  YOB: 1940  Date: 5/22/23    Chief Complaint   Patient presents with    Atrial Fibrillation    Hypertension       Referring provider: BRIGHT Merritt CNP    History of Present Illness:   Ms. Catherine Curiel is here today for regular follow up of her AF, HTN, HLD. 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 She had surgery on 7/18. Hospital course complicated by increased HR of her AF which is chronic (due to her illness) requiring the addition of cardizem and her usual atenolol. Ms. Catherine Curiel was hospitalized 5/28/22 with severe sepsis likely 2nd to UTI. She had AF w/ RVR at the time. Balta Duque converted to NSR after 1 dose diltiazem bolus. Today, Ms Catherine Curiel is here with a family member. She still lives in her house. She denies chest pain, palpitations, LOPEZ, dizziness, or edema. She takes care of her house and planted flowers recently. Past Medical History:   has a past medical history of A-fib [facility]), Atrial fibrillation ([facility] [address] Hypercholesterolemia, Hypertension, and Pelvic mass. Surgical History:   has a past surgical history that includes Appendectomy; Cystocopy (Right, 06/14/2018); Hysterectomy, total abdominal (07/09/2018); pr cysto/uretero w/lithotripsy &indwell stent insrt (Right, 10/31/2018); Upper gastrointestinal endoscopy (N/A, 11/29/2020); Upper gastrointestinal endoscopy (N/A, 11/29/2020); and Upper gastrointestinal endoscopy (N/A, 3/2/2021). Resection of ovarian serous cystadenoma. Social History:  Social History     Socioeconomic History    Marital status:       Spouse name: Not on file    Number of children: Not on file    Years of education: Not on file    Highest education level: Not on file   Occupational History    Not on file   Tobacco Use    Smoking status: Never

## 2023-06-01 ENCOUNTER — ANTI-COAG VISIT (OUTPATIENT)
Dept: PHARMACY | Age: 83
End: 2023-06-01
Payer: MEDICARE

## 2023-06-01 DIAGNOSIS — I48.20 CHRONIC ATRIAL FIBRILLATION (HCC): Primary | ICD-10-CM

## 2023-06-01 LAB — INTERNATIONAL NORMALIZATION RATIO, POC: 2.3

## 2023-06-01 PROCEDURE — 99211 OFF/OP EST MAY X REQ PHY/QHP: CPT

## 2023-06-01 PROCEDURE — 85610 PROTHROMBIN TIME: CPT

## 2023-06-01 NOTE — PROGRESS NOTES
Tracking Only    Total # of Interventions Recommended: 0  Total # of Interventions Accepted: 0  Time Spent (min): 15

## 2023-06-29 ENCOUNTER — ANTI-COAG VISIT (OUTPATIENT)
Dept: PHARMACY | Age: 83
End: 2023-06-29
Payer: MEDICARE

## 2023-06-29 DIAGNOSIS — I48.20 CHRONIC ATRIAL FIBRILLATION (HCC): Primary | ICD-10-CM

## 2023-06-29 LAB — INTERNATIONAL NORMALIZATION RATIO, POC: 2.7

## 2023-06-29 PROCEDURE — 99211 OFF/OP EST MAY X REQ PHY/QHP: CPT

## 2023-06-29 PROCEDURE — 85610 PROTHROMBIN TIME: CPT

## 2023-07-10 ENCOUNTER — OFFICE VISIT (OUTPATIENT)
Dept: FAMILY MEDICINE CLINIC | Age: 83
End: 2023-07-10
Payer: MEDICARE

## 2023-07-10 ENCOUNTER — TELEPHONE (OUTPATIENT)
Dept: PHARMACY | Age: 83
End: 2023-07-10

## 2023-07-10 VITALS
DIASTOLIC BLOOD PRESSURE: 78 MMHG | SYSTOLIC BLOOD PRESSURE: 106 MMHG | OXYGEN SATURATION: 96 % | WEIGHT: 141.8 LBS | BODY MASS INDEX: 22.89 KG/M2 | HEART RATE: 88 BPM

## 2023-07-10 DIAGNOSIS — N30.01 ACUTE CYSTITIS WITH HEMATURIA: Primary | ICD-10-CM

## 2023-07-10 DIAGNOSIS — R41.0 CONFUSION: ICD-10-CM

## 2023-07-10 LAB
BACTERIA URINE, POC: ABNORMAL
BILIRUBIN URINE: 0 MG/DL
BLOOD, URINE: POSITIVE
CASTS URINE, POC: 0
CLARITY: ABNORMAL
COLOR: ABNORMAL
CRYSTALS URINE, POC: 0
EPI CELLS URINE, POC: ABNORMAL
GLUCOSE URINE: NEGATIVE
KETONES, URINE: NEGATIVE
LEUKOCYTE EST, POC: ABNORMAL
NITRITE, URINE: POSITIVE
PH UA: 5.5 (ref 4.5–8)
PROTEIN UA: POSITIVE
RBC URINE, POC: ABNORMAL
SPECIFIC GRAVITY UA: 1.03 (ref 1–1.03)
UROBILINOGEN, URINE: ABNORMAL
WBC URINE, POC: ABNORMAL
YEAST URINE, POC: 0

## 2023-07-10 PROCEDURE — 99214 OFFICE O/P EST MOD 30 MIN: CPT | Performed by: NURSE PRACTITIONER

## 2023-07-10 PROCEDURE — G8427 DOCREV CUR MEDS BY ELIG CLIN: HCPCS | Performed by: NURSE PRACTITIONER

## 2023-07-10 PROCEDURE — G8400 PT W/DXA NO RESULTS DOC: HCPCS | Performed by: NURSE PRACTITIONER

## 2023-07-10 PROCEDURE — G8420 CALC BMI NORM PARAMETERS: HCPCS | Performed by: NURSE PRACTITIONER

## 2023-07-10 PROCEDURE — 3074F SYST BP LT 130 MM HG: CPT | Performed by: NURSE PRACTITIONER

## 2023-07-10 PROCEDURE — 81000 URINALYSIS NONAUTO W/SCOPE: CPT | Performed by: NURSE PRACTITIONER

## 2023-07-10 PROCEDURE — 1123F ACP DISCUSS/DSCN MKR DOCD: CPT | Performed by: NURSE PRACTITIONER

## 2023-07-10 PROCEDURE — 3078F DIAST BP <80 MM HG: CPT | Performed by: NURSE PRACTITIONER

## 2023-07-10 PROCEDURE — 1090F PRES/ABSN URINE INCON ASSESS: CPT | Performed by: NURSE PRACTITIONER

## 2023-07-10 PROCEDURE — 1036F TOBACCO NON-USER: CPT | Performed by: NURSE PRACTITIONER

## 2023-07-10 RX ORDER — CIPROFLOXACIN 250 MG/1
250 TABLET, FILM COATED ORAL 2 TIMES DAILY
Qty: 14 TABLET | Refills: 0 | Status: SHIPPED | OUTPATIENT
Start: 2023-07-10 | End: 2023-07-17

## 2023-07-10 NOTE — PROGRESS NOTES
Herminia Boogie  : 1940  Encounter date: 7/10/2023    This is a 79 y.o. female who presents with  Chief Complaint   Patient presents with    Urinary Tract Infection     Increased Confusion/ possible dementia. Daughter thinks it is a uti the same thing happened the last time she had a uti      History of present illness:    HPI   Presents to clinic today with concerns for possible UTI. Has had increased in confusion along with increase in fatigue. Similar symptoms to last time she had a UTI - has been spending lots of time in bed - unsure of how long it has been going on. Does have cameras set up inside house for monitoring by her daughters. Is has been increasingly hard for her to manage herself at home it seems. Both daughters are concerned about her behavior and ability to manage herself safely. Did fall two weeks prior and broke her dentures - unsure of how she fell - crawled into the home to call her aunt. Wasn't wearing her alert bracelet. She did not go to the ED for evaluation. Daughters would like her to explore a possible option for placement - patient is resistant.      Allergies   Allergen Reactions    Penicillins      Patient cannot remember reaction    Sulfa Antibiotics      Current Outpatient Medications   Medication Sig Dispense Refill    PARoxetine (PAXIL) 20 MG tablet Take 1 tablet by mouth daily 90 tablet 0    hydrOXYzine pamoate (VISTARIL) 25 MG capsule Take 1 capsule by mouth 3 times daily as needed for Anxiety 30 capsule 2    simvastatin (ZOCOR) 40 MG tablet TAKE 1 TABLET BY MOUTH EVERY DAY AT NIGHT 90 tablet 3    dilTIAZem (CARDIZEM) 60 MG tablet TAKE 1/2 TABLET BY MOUTH EVERY 12 HOURS 90 tablet 3    buPROPion (WELLBUTRIN XL) 150 MG extended release tablet TAKE 1 TABLET BY MOUTH EVERY DAY IN THE MORNING 90 tablet 0    pantoprazole (PROTONIX) 40 MG tablet Take 1 tablet by mouth daily 90 tablet 0    warfarin (COUMADIN) 2.5 MG tablet TAKE 1/2 TAB BY MOUTH 2 DAYS A WEEK AND 1 TAB

## 2023-07-10 NOTE — PATIENT INSTRUCTIONS
Take 1/2 tablet daily until Cipro is completed. Repeat INR 7/24/23    -- Memory [address], DO  MD Prakash Talley MD    [phone] (Work)  [address], [facility] and  [address] [facility] [address], [facility]  Phone: [phone] [address], [address] [place], [place] [phone]     [facility] [address]  [address]  [place], [address] [facility]  [phone]?

## 2023-07-12 LAB
BACTERIA UR CULT: ABNORMAL
ORGANISM: ABNORMAL

## 2023-07-17 ENCOUNTER — HOSPITAL ENCOUNTER (OUTPATIENT)
Dept: ULTRASOUND IMAGING | Age: 83
Discharge: HOME | End: 2023-07-17
Payer: MEDICARE

## 2023-07-17 ENCOUNTER — HOSPITAL ENCOUNTER (OUTPATIENT)
Age: 83
Discharge: HOME | End: 2023-07-17
Payer: MEDICARE

## 2023-07-17 DIAGNOSIS — N18.4 CKD (CHRONIC KIDNEY DISEASE) STAGE 4, GFR 15-29 ML/MIN (HCC): ICD-10-CM

## 2023-07-17 PROCEDURE — 83883 ASSAY NEPHELOMETRY NOT SPEC: CPT

## 2023-07-17 PROCEDURE — 86038 ANTINUCLEAR ANTIBODIES: CPT

## 2023-07-17 PROCEDURE — 36415 COLL VENOUS BLD VENIPUNCTURE: CPT

## 2023-07-17 PROCEDURE — 76770 US EXAM ABDO BACK WALL COMP: CPT

## 2023-07-18 ENCOUNTER — HOSPITAL ENCOUNTER (OUTPATIENT)
Age: 83
Discharge: HOME | End: 2023-07-18
Payer: MEDICARE

## 2023-07-18 LAB
ANA SER QL IA: NEGATIVE
BACTERIA URNS QL MICRO: ABNORMAL /HPF
BILIRUB UR QL STRIP.AUTO: NEGATIVE
CLARITY UR: CLEAR
COLOR UR: ABNORMAL
CREAT UR-MCNC: 269.1 MG/DL (ref 28–259)
EPI CELLS #/AREA URNS AUTO: 3 /HPF (ref 0–5)
GLUCOSE UR STRIP.AUTO-MCNC: NEGATIVE MG/DL
HGB UR QL STRIP.AUTO: NEGATIVE
HYALINE CASTS #/AREA URNS AUTO: 4 /LPF (ref 0–8)
KAPPA LC FREE SER-MCNC: 36.23 MG/L (ref 3.3–19.4)
KAPPA LC FREE/LAMBDA FREE SER: 1.53 {RATIO} (ref 0.26–1.65)
KETONES UR STRIP.AUTO-MCNC: ABNORMAL MG/DL
LAMBDA LC FREE SERPL-MCNC: 23.63 MG/L (ref 5.71–26.3)
LEUKOCYTE ESTERASE UR QL STRIP.AUTO: ABNORMAL
MICROALBUMIN UR DL<=1MG/L-MCNC: 6.3 MG/DL
MICROALBUMIN/CREAT UR: 23.4 MG/G (ref 0–30)
NITRITE UR QL STRIP.AUTO: NEGATIVE
PH UR STRIP.AUTO: 5.5 [PH] (ref 5–8)
PROT UR STRIP.AUTO-MCNC: 30 MG/DL
RBC CLUMPS #/AREA URNS AUTO: 3 /HPF (ref 0–4)
RPT COMMENT: ABNORMAL
SP GR UR STRIP.AUTO: 1.02 (ref 1–1.03)
UA DIPSTICK W REFLEX MICRO PNL UR: YES
URN SPEC COLLECT METH UR: ABNORMAL
UROBILINOGEN UR STRIP-ACNC: 1 E.U./DL
WBC #/AREA URNS AUTO: 15 /HPF (ref 0–5)

## 2023-07-18 PROCEDURE — 82570 ASSAY OF URINE CREATININE: CPT

## 2023-07-18 PROCEDURE — 81001 URINALYSIS AUTO W/SCOPE: CPT

## 2023-07-18 PROCEDURE — 82043 UR ALBUMIN QUANTITATIVE: CPT

## 2023-07-21 RX ORDER — SIMVASTATIN 40 MG
TABLET ORAL
Qty: 90 TABLET | Refills: 3 | Status: SHIPPED | OUTPATIENT
Start: 2023-07-21

## 2023-07-24 ENCOUNTER — ANTI-COAG VISIT (OUTPATIENT)
Dept: PHARMACY | Age: 83
End: 2023-07-24
Payer: MEDICARE

## 2023-07-24 DIAGNOSIS — I48.20 CHRONIC ATRIAL FIBRILLATION (HCC): Primary | ICD-10-CM

## 2023-07-24 LAB — INTERNATIONAL NORMALIZATION RATIO, POC: 2.4

## 2023-07-24 PROCEDURE — 85610 PROTHROMBIN TIME: CPT

## 2023-07-24 PROCEDURE — 99211 OFF/OP EST MAY X REQ PHY/QHP: CPT

## 2023-07-24 NOTE — PROGRESS NOTES
Ms. Fabiano Perez is a 79 y.o. y/o female with history of Afib who presents today for anticoagulation monitoring and adjustment. Pertinent PMH: Has been on warfarin for a long time, hesitant to switch, doesn't live near here and wants to go to a lab near her and have results called into PCP. Will reach out to PCP next month. Patient Reported Findings:  Yes     No  [x]   []       Patient verifies current dosing regimen as listed- daughter fills pillbox and states 1.25mg Sun and Thurs and 2.5 mg all other days of the week --> verified dose   []   [x]       S/S bleeding/bruising/swelling/SOB- denies   []   [x]       Blood in urine or stool- denies   []   [x]       Procedures scheduled in the future at this time  []   [x]       Missed Dose- denies   []   [x]       Extra Dose- denies   []   [x]       Change in medications- denies recent changes --> started bupropion, 7d cipro course for UTI --> finished cipro   []   [x]       Change in health/diet/appetite- has an iceberg lettuce salad once/week --> no changes, no NVD  []   [x]       Change in alcohol use- doesn't drink or smoke   []   [x]       Change in activity  []   [x]       Hospital admission  []   [x]       Emergency department visit  []   [x]       Other complaints    Clinical Outcomes:  Yes     No  []   [x]       Major bleeding event  []   [x]       Thromboembolic event    Duration of warfarin Therapy: indefinite   INR Range:  2.0-3.0    Has warfarin 2.5mg tablets. INR is 2.4 today   Continue to take 1.25mg on Sun and Thurs and 2.5mg all other days   Encouraged to maintain a consistency of vegetables/salads.    Recheck INR in 3 weeks, 8/14    Pt consent signed 11/22/22    Referring Dr. Twan Gates   INR (no units)   Date Value   10/12/2022 3.40   09/21/2022 2.4 (A)   06/06/2022 2.4 (A)   05/31/2022 1.55 (H)     INR,(POC) (no units)   Date Value   07/24/2023 2.4   06/29/2023 2.7   06/01/2023 2.3   05/01/2023 2.4     For Pharmacy Admin Tracking

## 2023-07-31 DIAGNOSIS — F34.1 DYSTHYMIA: ICD-10-CM

## 2023-07-31 DIAGNOSIS — F41.9 ANXIETY: ICD-10-CM

## 2023-07-31 RX ORDER — PAROXETINE HYDROCHLORIDE 20 MG/1
20 TABLET, FILM COATED ORAL DAILY
Qty: 90 TABLET | Refills: 0 | Status: SHIPPED | OUTPATIENT
Start: 2023-07-31

## 2023-07-31 RX ORDER — HYDROXYZINE PAMOATE 25 MG/1
25 CAPSULE ORAL 3 TIMES DAILY PRN
Qty: 30 CAPSULE | Refills: 2 | Status: SHIPPED | OUTPATIENT
Start: 2023-07-31 | End: 2023-08-02 | Stop reason: SDUPTHER

## 2023-07-31 NOTE — TELEPHONE ENCOUNTER
PARoxetine (PAXIL) 20 MG tablet       hydrOXYzine pamoate (VISTARIL) 25 MG capsule       [facility] #[id number] - [address] [place] [address] [phone] [address] [address]   Phone:  [phone]  Fax:  [phone]

## 2023-08-01 ASSESSMENT — ENCOUNTER SYMPTOMS
COUGH: 0
VOMITING: 0
DIARRHEA: 0
SHORTNESS OF BREATH: 0
NAUSEA: 0

## 2023-08-02 ENCOUNTER — TELEPHONE (OUTPATIENT)
Dept: FAMILY MEDICINE CLINIC | Age: 83
End: 2023-08-02

## 2023-08-02 DIAGNOSIS — F41.9 ANXIETY: ICD-10-CM

## 2023-08-02 RX ORDER — HYDROXYZINE PAMOATE 25 MG/1
25 CAPSULE ORAL 3 TIMES DAILY PRN
Qty: 30 CAPSULE | Refills: 2 | Status: SHIPPED | OUTPATIENT
Start: 2023-08-02 | End: 2023-09-01

## 2023-08-02 NOTE — TELEPHONE ENCOUNTER
Submitted PA for Hydroxyzine Via [facility] Key: [id number] STATUS: PENDING. Follow up done daily; if no response in three days we will refax for status check. If another three days goes by with no response we will call the insurance for status.

## 2023-08-02 NOTE — TELEPHONE ENCOUNTER
hydrOXYzine pamoate (VISTARIL) 25 MG capsule       Patient's daughter called and states that she was told by the pharmacy today when she went to pick it up this medication that it needs a prior authorization. States patient needs this medication by Friday if possible.      Please advise

## 2023-08-03 NOTE — TELEPHONE ENCOUNTER
The medication is APPROVED. 1/1/2023- 8/1/2024. If this requires a response please respond to the pool ( [phone] [phone]). Thank you please advise patient.

## 2023-08-08 DIAGNOSIS — F33.1 MODERATE EPISODE OF RECURRENT MAJOR DEPRESSIVE DISORDER (HCC): ICD-10-CM

## 2023-08-08 DIAGNOSIS — K25.4 GASTROINTESTINAL HEMORRHAGE ASSOCIATED WITH GASTRIC ULCER: ICD-10-CM

## 2023-08-08 RX ORDER — BUPROPION HYDROCHLORIDE 150 MG/1
TABLET ORAL
Qty: 90 TABLET | Refills: 0 | Status: SHIPPED | OUTPATIENT
Start: 2023-08-08

## 2023-08-08 RX ORDER — PANTOPRAZOLE SODIUM 40 MG/1
40 TABLET, DELAYED RELEASE ORAL DAILY
Qty: 90 TABLET | Refills: 0 | Status: SHIPPED | OUTPATIENT
Start: 2023-08-08

## 2023-08-08 NOTE — TELEPHONE ENCOUNTER
Medication:   Requested Prescriptions     Pending Prescriptions Disp Refills    pantoprazole (PROTONIX) 40 MG tablet [Pharmacy Med Name: PANTOPRAZOLE 40MG TABLETS] 90 tablet 0     Sig: TAKE 1 TABLET BY MOUTH DAILY    buPROPion (WELLBUTRIN XL) 150 MG extended release tablet [Pharmacy Med Name: BUPROPION XL 150MG TABLETS (24 H)] 90 tablet 0     Sig: TAKE 1 TABLET BY MOUTH EVERY DAY IN THE MORNING      Last Filled:      Patient Phone Number: [phone] (home) [phone] (work)    Last appt: 7/10/2023   Next appt: 8/10/2023    Last OARRS: No flowsheet data found.   PDMP Monitoring:    Last PDMP Elis Mckeon as Reviewed [facility]):  Review User Review Instant Review Result          Preferred Pharmacy:   Viri Zamora [id number] [address], [address] [place] [address],[address] [place], [place] [address]  Phone: [phone] Fax: [phone]

## 2023-08-10 ENCOUNTER — OFFICE VISIT (OUTPATIENT)
Dept: FAMILY MEDICINE CLINIC | Age: 83
End: 2023-08-10
Payer: MEDICARE

## 2023-08-10 VITALS
HEART RATE: 85 BPM | WEIGHT: 142 LBS | BODY MASS INDEX: 22.92 KG/M2 | SYSTOLIC BLOOD PRESSURE: 132 MMHG | OXYGEN SATURATION: 94 % | DIASTOLIC BLOOD PRESSURE: 80 MMHG

## 2023-08-10 DIAGNOSIS — F41.9 ANXIETY: ICD-10-CM

## 2023-08-10 DIAGNOSIS — K21.9 GASTROESOPHAGEAL REFLUX DISEASE WITHOUT ESOPHAGITIS: ICD-10-CM

## 2023-08-10 DIAGNOSIS — I10 PRIMARY HYPERTENSION: ICD-10-CM

## 2023-08-10 DIAGNOSIS — N18.32 STAGE 3B CHRONIC KIDNEY DISEASE (HCC): ICD-10-CM

## 2023-08-10 DIAGNOSIS — J96.11 CHRONIC HYPOXEMIC RESPIRATORY FAILURE (HCC): ICD-10-CM

## 2023-08-10 DIAGNOSIS — I48.20 CHRONIC ATRIAL FIBRILLATION (HCC): ICD-10-CM

## 2023-08-10 DIAGNOSIS — F33.1 MODERATE EPISODE OF RECURRENT MAJOR DEPRESSIVE DISORDER (HCC): ICD-10-CM

## 2023-08-10 DIAGNOSIS — E11.65 TYPE 2 DIABETES MELLITUS WITH HYPERGLYCEMIA, WITHOUT LONG-TERM CURRENT USE OF INSULIN (HCC): Primary | ICD-10-CM

## 2023-08-10 DIAGNOSIS — R41.0 CONFUSION: ICD-10-CM

## 2023-08-10 LAB — HBA1C MFR BLD: 6.6 %

## 2023-08-10 PROCEDURE — 3044F HG A1C LEVEL LT 7.0%: CPT | Performed by: NURSE PRACTITIONER

## 2023-08-10 PROCEDURE — 99214 OFFICE O/P EST MOD 30 MIN: CPT | Performed by: NURSE PRACTITIONER

## 2023-08-10 PROCEDURE — G8427 DOCREV CUR MEDS BY ELIG CLIN: HCPCS | Performed by: NURSE PRACTITIONER

## 2023-08-10 PROCEDURE — 1036F TOBACCO NON-USER: CPT | Performed by: NURSE PRACTITIONER

## 2023-08-10 PROCEDURE — 3074F SYST BP LT 130 MM HG: CPT | Performed by: NURSE PRACTITIONER

## 2023-08-10 PROCEDURE — 3078F DIAST BP <80 MM HG: CPT | Performed by: NURSE PRACTITIONER

## 2023-08-10 PROCEDURE — 1090F PRES/ABSN URINE INCON ASSESS: CPT | Performed by: NURSE PRACTITIONER

## 2023-08-10 PROCEDURE — G8420 CALC BMI NORM PARAMETERS: HCPCS | Performed by: NURSE PRACTITIONER

## 2023-08-10 PROCEDURE — 83037 HB GLYCOSYLATED A1C HOME DEV: CPT | Performed by: NURSE PRACTITIONER

## 2023-08-10 PROCEDURE — G8400 PT W/DXA NO RESULTS DOC: HCPCS | Performed by: NURSE PRACTITIONER

## 2023-08-10 PROCEDURE — 1123F ACP DISCUSS/DSCN MKR DOCD: CPT | Performed by: NURSE PRACTITIONER

## 2023-08-10 ASSESSMENT — ENCOUNTER SYMPTOMS
SHORTNESS OF BREATH: 0
NAUSEA: 0
DIARRHEA: 0
COUGH: 0
VOMITING: 0

## 2023-08-10 NOTE — PROGRESS NOTES
Jyoti Napier  : 1940  Encounter date: 8/10/2023    This is a 79 y.o. female who presents with  Chief Complaint   Patient presents with    3 Month Follow-Up     History of present illness:    HPI   Presents to clinic today for follow up on chronic health conditions. HTN  Follows with Cardiology. Per patient well controlled. Doesn't check blood pressure readings at home. Compliant with medications. Denies any side effects. Denies any hypotensive episodes. Denies any chest pain, heart palpitations, SOB w/exertion, leg swelling or headaches. CKD  Recently followed with the Nephrologist. Concerned about being dehydrated - recommended to increase fluids. T2DM  Due for repeat A1C. She has been losing weight over the past 3 months - down 6lbs. Possibly forgetting to eat. Afib  Following with coumadin clinic - has been stable recently. SOB/Hypoxia  No longer following with Pulmonologist - isn't interested in oxygen. Continues with SOB. GERD  Stable. Compliant with medications. Denies any side effects. Depression/Anxiety/Confusion  Thinks from a mood standpoint is doing well. Daughter has been working to get her in with memory care - wait list at some places are 6 months - 1 year. She was able to get in with [facility] in September. Did have a visit with Camila Guzman at a [address] [place] [address] was interested - unfortunately was denied for placement - has to pay up front for the first 3 years. Has another visit today with another facility. Does have an aide once a week through [employer] - hoping to increase to twice a week. Helps with light housework, laundry. Hoping to do some light meal prep. Has been getting better about wearing her help button on occasion. Does still have cameras monitoring her. Daughter is concerned about consistency of medication - will forget to take regularly. Daughter does do a pill organizer.   Sometimes will not take all the pills in the container -

## 2023-08-14 ENCOUNTER — TELEPHONE (OUTPATIENT)
Dept: PHARMACY | Age: 83
End: 2023-08-14

## 2023-08-21 ENCOUNTER — ANTI-COAG VISIT (OUTPATIENT)
Dept: PHARMACY | Age: 83
End: 2023-08-21
Payer: MEDICARE

## 2023-08-21 DIAGNOSIS — I48.20 CHRONIC ATRIAL FIBRILLATION (HCC): Primary | ICD-10-CM

## 2023-08-21 LAB — INTERNATIONAL NORMALIZATION RATIO, POC: 2.9

## 2023-08-21 PROCEDURE — 85610 PROTHROMBIN TIME: CPT

## 2023-08-21 PROCEDURE — 99211 OFF/OP EST MAY X REQ PHY/QHP: CPT

## 2023-08-21 NOTE — PROGRESS NOTES
Ms. Fabiano Perez is a 79 y.o. y/o female with history of Afib who presents today for anticoagulation monitoring and adjustment. Pertinent PMH: Has been on warfarin for a long time, hesitant to switch, doesn't live near here and wants to go to a lab near her and have results called into PCP. Will reach out to PCP next month. Patient Reported Findings:  Yes     No  [x]   []       Patient verifies current dosing regimen as listed- daughter fills pillbox and states 1.25mg Sun and Thurs and 2.5 mg all other days of the week --> verified dose   []   [x]       S/S bleeding/bruising/swelling/SOB- denies   []   [x]       Blood in urine or stool- denies   []   [x]       Procedures scheduled in the future at this time  []   [x]       Missed Dose- denies   []   [x]       Extra Dose- denies   []   [x]       Change in medications- denies recent changes --> started bupropion, 7d cipro course for UTI --> finished cipro --> no changes   []   [x]       Change in health/diet/appetite- has an iceberg lettuce salad once/week --> no changes, no NVD  []   [x]       Change in alcohol use- doesn't drink or smoke   []   [x]       Change in activity  []   [x]       Hospital admission  []   [x]       Emergency department visit  []   [x]       Other complaints    Clinical Outcomes:  Yes     No  []   [x]       Major bleeding event  []   [x]       Thromboembolic event    Duration of warfarin Therapy: indefinite   INR Range:  2.0-3.0    Has warfarin 2.5mg tablets. INR is 2.9 today   Continue to take 1.25mg on Sun and Thurs and 2.5mg all other days   Encouraged to maintain a consistency of vegetables/salads.    Recheck INR in 4 weeks, 9/21    Pt consent signed 11/22/22    Referring Dr. Twan Gates   INR (no units)   Date Value   10/12/2022 3.40   09/21/2022 2.4 (A)   06/06/2022 2.4 (A)   05/31/2022 1.55 (H)     INR,(POC) (no units)   Date Value   07/24/2023 2.4   06/29/2023 2.7   06/01/2023 2.3   05/01/2023 2.4     For Pharmacy Admin

## 2023-09-11 NOTE — TELEPHONE ENCOUNTER
simvastatin (ZOCOR) 40 MG tablet     dilTIAZem (CARDIZEM) 60 MG tablet     [facility] #[id number] - [place] - [address] [place], [place] [address] [address]   Phone:  [phone]  Fax:  [phone]

## 2023-09-11 NOTE — TELEPHONE ENCOUNTER
Medication:   Requested Prescriptions     Pending Prescriptions Disp Refills    simvastatin (ZOCOR) 40 MG tablet 90 tablet 3     Sig: Take 1 tablet by mouth nightly    dilTIAZem (CARDIZEM) 60 MG tablet 90 tablet 3     Sig: TAKE 1/2 TABLET BY MOUTH EVERY 12 HOURS      Last Filled:      Patient Phone Number: [phone] (home) [phone] (work)    Last appt: 8/10/2023   Next appt: 9/25/2023    Last OARRS:        No data to display              PDMP Monitoring:    Last PDMP Sugar Romero as Reviewed [facility]):  Review User Review Instant Review Result          Preferred Pharmacy:   [address] [facility]  [address] [address] [address],[facility] [address]  Phone: [phone] Fax: [phone]

## 2023-09-13 RX ORDER — DILTIAZEM HYDROCHLORIDE 60 MG/1
TABLET, FILM COATED ORAL
Qty: 90 TABLET | Refills: 0 | Status: SHIPPED | OUTPATIENT
Start: 2023-09-13

## 2023-09-13 RX ORDER — SIMVASTATIN 40 MG
40 TABLET ORAL NIGHTLY
Qty: 90 TABLET | Refills: 0 | Status: SHIPPED | OUTPATIENT
Start: 2023-09-13

## 2023-09-21 ENCOUNTER — ANTI-COAG VISIT (OUTPATIENT)
Dept: PHARMACY | Age: 83
End: 2023-09-21
Payer: MEDICARE

## 2023-09-21 DIAGNOSIS — I48.20 CHRONIC ATRIAL FIBRILLATION (HCC): Primary | ICD-10-CM

## 2023-09-21 LAB — INTERNATIONAL NORMALIZATION RATIO, POC: 4.2

## 2023-09-21 PROCEDURE — 85610 PROTHROMBIN TIME: CPT

## 2023-09-21 PROCEDURE — 99212 OFFICE O/P EST SF 10 MIN: CPT

## 2023-09-21 NOTE — PROGRESS NOTES
Ms. Chantal Oliveira is a 79 y.o. y/o female with history of Afib who presents today for anticoagulation monitoring and adjustment. Pertinent PMH: Has been on warfarin for a long time, hesitant to switch, doesn't live near here and wants to go to a lab near her and have results called into PCP. Will reach out to PCP next month. Patient Reported Findings:  Yes     No  [x]   []       Patient verifies current dosing regimen as listed- daughter fills pillbox and states 1.25mg Sun and Thurs and 2.5 mg all other days of the week --> verified dose   []   [x]       S/S bleeding/bruising/swelling/SOB- denies   []   [x]       Blood in urine or stool- denies   []   [x]       Procedures scheduled in the future at this time  []   [x]       Missed Dose- denies   []   [x]       Extra Dose- denies   [x]   []       Change in medications- denies recent changes --> started bupropion, 7d cipro course for UTI --> finished cipro --> tylenol once last week. Has been missing MVI a few days as has been missing morning meds   []   [x]       Change in health/diet/appetite- has an iceberg lettuce salad once/week --> no changes, no NVD  []   [x]       Change in alcohol use- doesn't drink or smoke   []   [x]       Change in activity  []   [x]       Hospital admission  []   [x]       Emergency department visit  []   [x]       Other complaints    Clinical Outcomes:  Yes     No  []   [x]       Major bleeding event  []   [x]       Thromboembolic event    Duration of warfarin Therapy: indefinite   INR Range:  2.0-3.0    Has warfarin 2.5mg tablets. INR is 4.2 today   Hold dose tonight then continue to take 1.25mg on Sun and Thurs and 2.5mg all other days  Encouraged to maintain a consistency of vegetables/salads.    Recheck INR in 2 weeks, 10/5    Pt consent signed 11/22/22    Referring Dr. Edward Davison   INR (no units)   Date Value   10/12/2022 3.40   09/21/2022 2.4 (A)   06/06/2022 2.4 (A)   05/31/2022 1.55 (H)     INR,(POC) (no units)   Date

## 2023-09-25 ENCOUNTER — OFFICE VISIT (OUTPATIENT)
Dept: FAMILY MEDICINE CLINIC | Age: 83
End: 2023-09-25
Payer: MEDICARE

## 2023-09-25 VITALS
SYSTOLIC BLOOD PRESSURE: 128 MMHG | HEIGHT: 65 IN | BODY MASS INDEX: 23.29 KG/M2 | HEART RATE: 91 BPM | TEMPERATURE: 97.8 F | WEIGHT: 139.8 LBS | OXYGEN SATURATION: 95 % | DIASTOLIC BLOOD PRESSURE: 88 MMHG

## 2023-09-25 DIAGNOSIS — I10 PRIMARY HYPERTENSION: ICD-10-CM

## 2023-09-25 DIAGNOSIS — E11.65 TYPE 2 DIABETES MELLITUS WITH HYPERGLYCEMIA, WITHOUT LONG-TERM CURRENT USE OF INSULIN (HCC): ICD-10-CM

## 2023-09-25 DIAGNOSIS — K21.9 GASTROESOPHAGEAL REFLUX DISEASE WITHOUT ESOPHAGITIS: ICD-10-CM

## 2023-09-25 DIAGNOSIS — Z00.00 ENCOUNTER FOR ANNUAL WELLNESS EXAM IN MEDICARE PATIENT: Primary | ICD-10-CM

## 2023-09-25 DIAGNOSIS — Z23 NEED FOR VACCINATION: ICD-10-CM

## 2023-09-25 DIAGNOSIS — N18.32 STAGE 3B CHRONIC KIDNEY DISEASE (HCC): ICD-10-CM

## 2023-09-25 DIAGNOSIS — F41.9 ANXIETY: ICD-10-CM

## 2023-09-25 DIAGNOSIS — R41.0 CONFUSION: ICD-10-CM

## 2023-09-25 DIAGNOSIS — F33.1 MODERATE EPISODE OF RECURRENT MAJOR DEPRESSIVE DISORDER (HCC): ICD-10-CM

## 2023-09-25 DIAGNOSIS — I48.20 CHRONIC ATRIAL FIBRILLATION (HCC): ICD-10-CM

## 2023-09-25 DIAGNOSIS — J96.11 CHRONIC HYPOXEMIC RESPIRATORY FAILURE (HCC): ICD-10-CM

## 2023-09-25 PROCEDURE — 1090F PRES/ABSN URINE INCON ASSESS: CPT | Performed by: NURSE PRACTITIONER

## 2023-09-25 PROCEDURE — 90694 VACC AIIV4 NO PRSRV 0.5ML IM: CPT | Performed by: NURSE PRACTITIONER

## 2023-09-25 PROCEDURE — 1123F ACP DISCUSS/DSCN MKR DOCD: CPT | Performed by: NURSE PRACTITIONER

## 2023-09-25 PROCEDURE — G0008 ADMIN INFLUENZA VIRUS VAC: HCPCS | Performed by: NURSE PRACTITIONER

## 2023-09-25 PROCEDURE — G8427 DOCREV CUR MEDS BY ELIG CLIN: HCPCS | Performed by: NURSE PRACTITIONER

## 2023-09-25 PROCEDURE — 99214 OFFICE O/P EST MOD 30 MIN: CPT | Performed by: NURSE PRACTITIONER

## 2023-09-25 PROCEDURE — G8420 CALC BMI NORM PARAMETERS: HCPCS | Performed by: NURSE PRACTITIONER

## 2023-09-25 PROCEDURE — 3044F HG A1C LEVEL LT 7.0%: CPT | Performed by: NURSE PRACTITIONER

## 2023-09-25 PROCEDURE — G0439 PPPS, SUBSEQ VISIT: HCPCS | Performed by: NURSE PRACTITIONER

## 2023-09-25 PROCEDURE — 3074F SYST BP LT 130 MM HG: CPT | Performed by: NURSE PRACTITIONER

## 2023-09-25 PROCEDURE — 3078F DIAST BP <80 MM HG: CPT | Performed by: NURSE PRACTITIONER

## 2023-09-25 PROCEDURE — 1036F TOBACCO NON-USER: CPT | Performed by: NURSE PRACTITIONER

## 2023-09-25 PROCEDURE — G8400 PT W/DXA NO RESULTS DOC: HCPCS | Performed by: NURSE PRACTITIONER

## 2023-09-25 ASSESSMENT — PATIENT HEALTH QUESTIONNAIRE - PHQ9
7. TROUBLE CONCENTRATING ON THINGS, SUCH AS READING THE NEWSPAPER OR WATCHING TELEVISION: 0
5. POOR APPETITE OR OVEREATING: 0
9. THOUGHTS THAT YOU WOULD BE BETTER OFF DEAD, OR OF HURTING YOURSELF: 0
SUM OF ALL RESPONSES TO PHQ QUESTIONS 1-9: 6
8. MOVING OR SPEAKING SO SLOWLY THAT OTHER PEOPLE COULD HAVE NOTICED. OR THE OPPOSITE, BEING SO FIGETY OR RESTLESS THAT YOU HAVE BEEN MOVING AROUND A LOT MORE THAN USUAL: 0
SUM OF ALL RESPONSES TO PHQ QUESTIONS 1-9: 6
3. TROUBLE FALLING OR STAYING ASLEEP: 0
SUM OF ALL RESPONSES TO PHQ QUESTIONS 1-9: 6
6. FEELING BAD ABOUT YOURSELF - OR THAT YOU ARE A FAILURE OR HAVE LET YOURSELF OR YOUR FAMILY DOWN: 0
SUM OF ALL RESPONSES TO PHQ QUESTIONS 1-9: 6
1. LITTLE INTEREST OR PLEASURE IN DOING THINGS: 3
4. FEELING TIRED OR HAVING LITTLE ENERGY: 0
10. IF YOU CHECKED OFF ANY PROBLEMS, HOW DIFFICULT HAVE THESE PROBLEMS MADE IT FOR YOU TO DO YOUR WORK, TAKE CARE OF THINGS AT HOME, OR GET ALONG WITH OTHER PEOPLE: 0
SUM OF ALL RESPONSES TO PHQ9 QUESTIONS 1 & 2: 6
2. FEELING DOWN, DEPRESSED OR HOPELESS: 3

## 2023-09-25 NOTE — PROGRESS NOTES
[facility] VISIT    Patient is here for their Medicare Annual Wellness Visit and chronic health conditions. Last eye exam: within the last 6 months   Last dental exam: Next months   Exercise: no   Diet: in general, an \"unhealthy\" diet, on average, 2-3 meals per day    How would you rate your overall health? : Girish        9/25/2023     3:08 PM 1/9/2023     9:04 AM 7/15/2022    12:15 PM 9/15/2021    10:26 AM 12/14/2020    10:38 AM   Fall Risk   2 or more falls in past year? yes no no no no   Fall with injury in past year? yes no no no no           9/25/2023     3:15 PM 3/20/2023     4:56 PM 7/11/2022    11:03 AM 9/15/2021    10:26 AM 3/15/2021     3:34 PM 12/14/2020    10:38 AM   PHQ Scores   PHQ2 Score 6 0 0 0 1 1   PHQ9 Score 6 6 5 0 1 1     Do you always wear a seat belt in the car?: Yes    Have you noted any problems with hearing?: Yes  Have you noted any vision problems?: Yes  Do you have concerns about your sexual health?: no  In the past month how much has pain been an issue for you?:  Not at all  In the past month have you had issues with anxiety, loneliness, irritability or fatigue:  A little bit    Do you take opioid medications even sometimes?  (if using assess risk and whether other treatments would be beneficial)    Living Will: Yes,   Copy requested    Healthcare Decision Maker:    Primary Decision MakeRemberto Tirado - [phone]    Secondary Decision Maker: Rosamaria Roldan - Child - [phone]  Click here to complete Healthcare Decision Makers including selection of the Healthcare Decision Maker Relationship (ie \"Primary\"). Today we documented Decision Maker(s) consistent with Legal Next of Kin hierarchy. Who lives at home with you: No one   Do you have any pets? none  Do you have any services coming to your home (meals on wheels, home health, etc) ? : yes - Meals on wheels    Do you need help with:  Using the phone:  No  Bathing: Yes: daughter   Dressing:

## 2023-10-05 ENCOUNTER — ANTI-COAG VISIT (OUTPATIENT)
Dept: PHARMACY | Age: 83
End: 2023-10-05
Payer: MEDICARE

## 2023-10-05 ENCOUNTER — TELEPHONE (OUTPATIENT)
Dept: PHARMACY | Age: 83
End: 2023-10-05

## 2023-10-05 DIAGNOSIS — F41.9 ANXIETY: ICD-10-CM

## 2023-10-05 DIAGNOSIS — I48.20 CHRONIC ATRIAL FIBRILLATION (HCC): Primary | ICD-10-CM

## 2023-10-05 LAB — INTERNATIONAL NORMALIZATION RATIO, POC: 5.4

## 2023-10-05 PROCEDURE — 85610 PROTHROMBIN TIME: CPT

## 2023-10-05 PROCEDURE — 99212 OFFICE O/P EST SF 10 MIN: CPT

## 2023-10-05 NOTE — TELEPHONE ENCOUNTER
Medication:   Requested Prescriptions     Pending Prescriptions Disp Refills    hydrOXYzine pamoate (VISTARIL) 25 MG capsule 30 capsule 2     Sig: Take 1 capsule by mouth 3 times daily as needed for Anxiety      Last Filled:      Patient Phone Number: [phone] (home) [phone] (work)    Last appt: 9/25/2023   Next appt: Visit date not found    Last OARRS:        No data to display              PDMP Monitoring:    Last PDMP Al Ledesma as Reviewed [facility]):  Review User Review Instant Review Result          Preferred Pharmacy:   Lavinia Borrego [address] [place] [address] 250 [facility]  [address] [place] [address]  Phone: [phone] Fax: [phone]

## 2023-10-05 NOTE — TELEPHONE ENCOUNTER
Daughter called and said pt has not missed any warfarin. Unclear what she has had in pillbox. Unclear if patient has taken warfarin outside of pillbox. Daughter will follow AVS and hide warfarin bottle.      Rebeca Alexander, PharmD, [address]    Total # of Interventions Recommended: 0  Total # of Interventions Accepted: 0  Time Spent (min): 15

## 2023-10-05 NOTE — PROGRESS NOTES
Ms. Wes Anderson is a 79 y.o. y/o female with history of Afib who presents today for anticoagulation monitoring and adjustment. Pertinent PMH: Has been on warfarin for a long time, hesitant to switch, doesn't live near here and wants to go to a lab near her and have results called into PCP. Will reach out to PCP next month. Patient Reported Findings:  Yes     No  [x]   []       Patient verifies current dosing regimen as listed- daughter fills pillbox and states 1.25mg Sun and Thurs and 2.5 mg all other days of the week --> verified dose   []   [x]       S/S bleeding/bruising/swelling/SOB- denies   []   [x]       Blood in urine or stool- denies   []   [x]       Procedures scheduled in the future at this time  []   [x]       Missed Dose- denies   []   [x]       Extra Dose- denies   [x]   []       Change in medications- denies recent changes --> started bupropion, 7d cipro course for UTI --> finished cipro --> tylenol once last week. Has been missing MVI a few days as has been missing morning meds ---> no changes   []   [x]       Change in health/diet/appetite- has an iceberg lettuce salad once/week --> no changes, no NVD  []   [x]       Change in alcohol use- doesn't drink or smoke   []   [x]       Change in activity  []   [x]       Hospital admission  []   [x]       Emergency department visit  []   [x]       Other complaints    Clinical Outcomes:  Yes     No  []   [x]       Major bleeding event  []   [x]       Thromboembolic event    Duration of warfarin Therapy: indefinite   INR Range:  2.0-3.0    Has warfarin 2.5mg tablets. Takes at night. Moving to senior living in Nov, need to check on if they will mange INRs. INR is 5.4 today   Daughter states unclear what pt has been doing, has not been feeling well. Will check pills at home and also contact doctor about UTI.   Hold today then decrease dose to 1.25mg on Sun, Tues and Thurs and 2.5mg all other days (8.3%)  Encouraged to maintain a consistency

## 2023-10-06 RX ORDER — HYDROXYZINE PAMOATE 25 MG/1
25 CAPSULE ORAL 3 TIMES DAILY PRN
Qty: 30 CAPSULE | Refills: 2 | Status: SHIPPED | OUTPATIENT
Start: 2023-10-06 | End: 2023-11-05

## 2023-10-16 ENCOUNTER — ANTI-COAG VISIT (OUTPATIENT)
Dept: PHARMACY | Age: 83
End: 2023-10-16
Payer: MEDICARE

## 2023-10-16 DIAGNOSIS — I48.20 CHRONIC ATRIAL FIBRILLATION (HCC): Primary | ICD-10-CM

## 2023-10-16 LAB — INTERNATIONAL NORMALIZATION RATIO, POC: 2.2

## 2023-10-16 PROCEDURE — 85610 PROTHROMBIN TIME: CPT

## 2023-10-16 PROCEDURE — 99211 OFF/OP EST MAY X REQ PHY/QHP: CPT

## 2023-10-16 NOTE — PROGRESS NOTES
vegetables/salads. Recheck INR in 2 weeks, 10/30  Moves to assisted living [facility].     Pt consent signed 22    Referring Dr. Stephon Brown   INR (no units)   Date Value   10/12/2022 3.40   2022 2.4 (A)   2022 2.4 (A)   2022 1.55 (H)     INR,(POC) (no units)   Date Value   10/16/2023 2.2   10/05/2023 5.4   2023 4.2   2023 2.9     For Pharmacy Admin Tracking Only    Intervention Detail: Adherence Monitorin  Total # of Interventions Recommended: 1  Total # of Interventions Accepted: 1  Time Spent (min): 15

## 2023-10-30 ENCOUNTER — ANTI-COAG VISIT (OUTPATIENT)
Dept: PHARMACY | Age: 83
End: 2023-10-30
Payer: MEDICARE

## 2023-10-30 DIAGNOSIS — F34.1 DYSTHYMIA: ICD-10-CM

## 2023-10-30 DIAGNOSIS — I48.20 CHRONIC ATRIAL FIBRILLATION (HCC): Primary | ICD-10-CM

## 2023-10-30 DIAGNOSIS — F41.9 ANXIETY: ICD-10-CM

## 2023-10-30 LAB — INTERNATIONAL NORMALIZATION RATIO, POC: 2

## 2023-10-30 PROCEDURE — 85610 PROTHROMBIN TIME: CPT

## 2023-10-30 PROCEDURE — 99212 OFFICE O/P EST SF 10 MIN: CPT

## 2023-10-30 RX ORDER — PAROXETINE HYDROCHLORIDE 20 MG/1
20 TABLET, FILM COATED ORAL DAILY
Qty: 90 TABLET | Refills: 0 | Status: SHIPPED | OUTPATIENT
Start: 2023-10-30

## 2023-10-30 NOTE — PROGRESS NOTES
2.5mg Mon, Wed and Fri and 1.25mg all other days (9.1% dec)  Make sure INR checked by  at facility.      Pt consent signed 22    Referring Dr. Jj Ying   INR (no units)   Date Value   10/12/2022 3.40   2022 2.4 (A)   2022 2.4 (A)   2022 1.55 (H)     INR,(POC) (no units)   Date Value   10/30/2023 2.0   10/16/2023 2.2   10/05/2023 5.4   2023 4.2     For Pharmacy Admin Tracking Only    Intervention Detail: Adherence Monitorin and Dose Adjustment: 1, reason: Therapy Optimization  Total # of Interventions Recommended: 2  Total # of Interventions Accepted: 2  Time Spent (min): 15 64

## 2023-11-06 ENCOUNTER — TELEPHONE (OUTPATIENT)
Dept: FAMILY MEDICINE CLINIC | Age: 83
End: 2023-11-06

## 2023-11-06 DIAGNOSIS — F41.9 ANXIETY: ICD-10-CM

## 2023-11-06 RX ORDER — HYDROXYZINE PAMOATE 25 MG/1
25 CAPSULE ORAL 3 TIMES DAILY PRN
Qty: 30 CAPSULE | Refills: 2 | Status: SHIPPED | OUTPATIENT
Start: 2023-11-06 | End: 2023-12-06

## 2023-11-06 NOTE — TELEPHONE ENCOUNTER
Medication:   Requested Prescriptions     Pending Prescriptions Disp Refills    hydrOXYzine pamoate (VISTARIL) 25 MG capsule 30 capsule 2     Sig: Take 1 capsule by mouth 3 times daily as needed for Anxiety      Last Filled:      Patient Phone Number: [phone] (home) [phone] (work)    Last appt: 9/25/2023   Next appt: 11/6/2023    Last OARRS:        No data to display              PDMP Monitoring:    Last PDMP Jannet Grace as Reviewed [facility]):  Review User Review Instant Review Result          Preferred Pharmacy:   [address] [facility] 250 [facility]  [address]  Phone: [phone] Fax: [phone]

## 2023-11-06 NOTE — TELEPHONE ENCOUNTER
Pt's daughter wanted to let you know they got Rebeca Both into [facility] on [address]. Plan is to move her in there this Saturday (11/11).

## 2023-11-06 NOTE — TELEPHONE ENCOUNTER
hydrOXYzine pamoate (VISTARIL) 25 MG capsule       [facility] #[id number] - [address] [place] [address], [address], [address] [place] [address],[facility] [address]   Phone:  [phone]  Fax:  [phone]

## 2023-11-11 DIAGNOSIS — F33.1 MODERATE EPISODE OF RECURRENT MAJOR DEPRESSIVE DISORDER (HCC): ICD-10-CM

## 2023-11-11 DIAGNOSIS — K25.4 GASTROINTESTINAL HEMORRHAGE ASSOCIATED WITH GASTRIC ULCER: ICD-10-CM

## 2023-11-13 RX ORDER — BUPROPION HYDROCHLORIDE 150 MG/1
TABLET ORAL
Qty: 90 TABLET | Refills: 0 | Status: SHIPPED | OUTPATIENT
Start: 2023-11-13

## 2023-11-13 RX ORDER — PANTOPRAZOLE SODIUM 40 MG/1
40 TABLET, DELAYED RELEASE ORAL DAILY
Qty: 90 TABLET | Refills: 0 | Status: SHIPPED | OUTPATIENT
Start: 2023-11-13

## 2023-11-13 NOTE — TELEPHONE ENCOUNTER
Medication:   Requested Prescriptions     Pending Prescriptions Disp Refills    buPROPion (WELLBUTRIN XL) 150 MG extended release tablet [Pharmacy Med Name: BUPROPION XL 150MG TABLETS (24 H)] 90 tablet 0     Sig: TAKE 1 TABLET BY MOUTH EVERY DAY IN THE MORNING    pantoprazole (PROTONIX) 40 MG tablet [Pharmacy Med Name: PANTOPRAZOLE 40MG TABLETS] 90 tablet 0     Sig: TAKE 1 TABLET BY MOUTH DAILY      Last Filled:  8/8/2023, 8/8/2023    Patient Phone Number: [phone] (home) [phone] (work)    Last appt: 9/25/2023   Next appt: Visit date not found    Last OARRS:        No data to display              PDMP Monitoring:    Last PDMP Eleuterio Mark as Reviewed [facility]):  Review User Review Instant Review Result          Preferred Pharmacy:   Darlin Weaver [address] [address] [place] [address]  Phone: [phone] Fax: [phone]    [facility] [address], [address] [place] [address] [phone] [facility] [phone] [address]  [address] [place] [address]  Phone: [phone] Fax: [phone]

## 2023-11-15 ENCOUNTER — OFFICE VISIT (OUTPATIENT)
Dept: CARDIOLOGY CLINIC | Age: 83
End: 2023-11-15
Payer: COMMERCIAL

## 2023-11-15 VITALS
HEART RATE: 90 BPM | WEIGHT: 137 LBS | BODY MASS INDEX: 23.39 KG/M2 | HEIGHT: 64 IN | DIASTOLIC BLOOD PRESSURE: 80 MMHG | OXYGEN SATURATION: 96 % | SYSTOLIC BLOOD PRESSURE: 130 MMHG

## 2023-11-15 DIAGNOSIS — E78.2 MIXED HYPERLIPIDEMIA: ICD-10-CM

## 2023-11-15 DIAGNOSIS — I10 ESSENTIAL HYPERTENSION: ICD-10-CM

## 2023-11-15 DIAGNOSIS — I48.20 CHRONIC ATRIAL FIBRILLATION (HCC): Primary | ICD-10-CM

## 2023-11-15 PROCEDURE — 99214 OFFICE O/P EST MOD 30 MIN: CPT | Performed by: INTERNAL MEDICINE

## 2023-11-15 PROCEDURE — 3078F DIAST BP <80 MM HG: CPT | Performed by: INTERNAL MEDICINE

## 2023-11-15 PROCEDURE — 1123F ACP DISCUSS/DSCN MKR DOCD: CPT | Performed by: INTERNAL MEDICINE

## 2023-11-15 PROCEDURE — 3074F SYST BP LT 130 MM HG: CPT | Performed by: INTERNAL MEDICINE

## 2023-11-15 NOTE — PROGRESS NOTES
[facility]   Cardiac Evaluation      Patient: Loan Arellano  YOB: 1940  Date: 11/15/23    No chief complaint on file. Referring provider: Lupita Schilder, APRN - CNP    History of Present Illness:   Ms. Jacinda Vail is here today for regular follow up of her AF, HTN, HLD. 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 She had surgery on 7/18. Hospital course complicated by increased HR of her AF which is chronic (due to her illness) requiring the addition of cardizem and her usual atenolol. Ms. Jacinda Vail was hospitalized 5/28/22 with severe sepsis likely 2nd to UTI. She had AF w/ RVR at the time. Magdalena North Star converted to NSR after 1 dose diltiazem bolus. Today, Ms Jacinda Vail is here with her daughter. She has moved to an assisted living facility with a recent diagnosis of dementia. Magdalena North Star states feels ok. She denies chest pain, palpitations, LOPEZ, dizziness, or edema. Magdalena North Star states she eats ok but not like she used to. Her daughter is asking if Warfarin can be changed to a  NOAC. Past Medical History:   has a past medical history of A-fib [facility]), Atrial fibrillation ([address]), Hypercholesterolemia, Hypertension, and Pelvic mass. Surgical History:   has a past surgical history that includes Appendectomy; Cystocopy (Right, 06/14/2018); Hysterectomy, total abdominal (07/09/2018); pr cysto/uretero w/lithotripsy &indwell stent insrt (Right, 10/31/2018); Upper gastrointestinal endoscopy (N/A, 11/29/2020); Upper gastrointestinal endoscopy (N/A, 11/29/2020); and Upper gastrointestinal endoscopy (N/A, 3/2/2021). Resection of ovarian serous cystadenoma. Social History:  Social History     Socioeconomic History    Marital status:       Spouse name: Not on file    Number of children: Not on file    Years of education: Not on file    Highest education level: Not on file

## 2023-11-16 RX ORDER — WARFARIN SODIUM 2.5 MG/1
TABLET ORAL
Qty: 90 TABLET | OUTPATIENT
Start: 2023-11-16

## 2023-11-17 DIAGNOSIS — K25.4 GASTROINTESTINAL HEMORRHAGE ASSOCIATED WITH GASTRIC ULCER: ICD-10-CM

## 2023-11-17 DIAGNOSIS — F33.1 MODERATE EPISODE OF RECURRENT MAJOR DEPRESSIVE DISORDER (HCC): ICD-10-CM

## 2023-11-17 RX ORDER — PANTOPRAZOLE SODIUM 40 MG/1
40 TABLET, DELAYED RELEASE ORAL DAILY
Qty: 90 TABLET | Refills: 0 | OUTPATIENT
Start: 2023-11-17

## 2023-11-17 RX ORDER — BUPROPION HYDROCHLORIDE 150 MG/1
150 TABLET ORAL EVERY MORNING
Qty: 90 TABLET | Refills: 0 | OUTPATIENT
Start: 2023-11-17

## 2023-11-17 NOTE — TELEPHONE ENCOUNTER
buPROPion (WELLBUTRIN XL) 150 MG extended release tablet [[id number]     pantoprazole (PROTONIX) 40 MG tablet [[id number]     [facility] #[id number] [facility], [address] [address], [facility] Lo[address]usha Nelsoncristian,[facility] [address]  Phone: [phone]  Fax: [phone]

## 2023-11-20 ENCOUNTER — OFFICE VISIT (OUTPATIENT)
Dept: FAMILY MEDICINE CLINIC | Age: 83
End: 2023-11-20

## 2023-11-20 VITALS
HEIGHT: 64 IN | SYSTOLIC BLOOD PRESSURE: 132 MMHG | HEART RATE: 86 BPM | BODY MASS INDEX: 23.56 KG/M2 | TEMPERATURE: 97.8 F | RESPIRATION RATE: 16 BRPM | DIASTOLIC BLOOD PRESSURE: 68 MMHG | WEIGHT: 138 LBS | OXYGEN SATURATION: 98 %

## 2023-11-20 DIAGNOSIS — I48.20 CHRONIC ATRIAL FIBRILLATION (HCC): Primary | ICD-10-CM

## 2023-11-20 DIAGNOSIS — R41.0 CONFUSION: ICD-10-CM

## 2023-11-20 DIAGNOSIS — N30.00 ACUTE CYSTITIS WITHOUT HEMATURIA: Primary | ICD-10-CM

## 2023-11-20 LAB
BILIRUBIN, POC: NORMAL
BLOOD URINE, POC: NORMAL
CLARITY, POC: CLEAR
COLOR, POC: NORMAL
GLUCOSE URINE, POC: NORMAL
KETONES, POC: NORMAL
LEUKOCYTE EST, POC: NORMAL
NITRITE, POC: POSITIVE
PH, POC: 6
PROTEIN, POC: 30
SPECIFIC GRAVITY, POC: 1.02
UROBILINOGEN, POC: 0.2

## 2023-11-20 RX ORDER — NITROFURANTOIN 25; 75 MG/1; MG/1
100 CAPSULE ORAL 2 TIMES DAILY
Qty: 20 CAPSULE | Refills: 0 | Status: SHIPPED | OUTPATIENT
Start: 2023-11-20 | End: 2023-11-30

## 2023-11-22 ENCOUNTER — TELEPHONE (OUTPATIENT)
Dept: FAMILY MEDICINE CLINIC | Age: 83
End: 2023-11-22

## 2023-11-23 LAB
BACTERIA UR CULT: ABNORMAL
ORGANISM: ABNORMAL

## 2023-11-27 DIAGNOSIS — F41.9 ANXIETY: ICD-10-CM

## 2023-11-27 RX ORDER — HYDROXYZINE PAMOATE 25 MG/1
25 CAPSULE ORAL 3 TIMES DAILY PRN
Qty: 30 CAPSULE | Refills: 2 | Status: SHIPPED | OUTPATIENT
Start: 2023-11-27 | End: 2023-12-27

## 2023-11-27 RX ORDER — ATENOLOL 100 MG/1
100 TABLET ORAL EVERY MORNING
Qty: 90 TABLET | Refills: 0 | Status: SHIPPED | OUTPATIENT
Start: 2023-11-27

## 2023-11-27 RX ORDER — PANTOPRAZOLE SODIUM 40 MG/1
40 TABLET, DELAYED RELEASE ORAL DAILY
Qty: 90 TABLET | Refills: 0 | Status: SHIPPED | OUTPATIENT
Start: 2023-11-27

## 2023-11-27 RX ORDER — SIMVASTATIN 40 MG
40 TABLET ORAL NIGHTLY
Qty: 90 TABLET | Refills: 0 | Status: SHIPPED | OUTPATIENT
Start: 2023-11-27

## 2023-11-27 RX ORDER — BUPROPION HYDROCHLORIDE 150 MG/1
150 TABLET ORAL EVERY MORNING
Qty: 90 TABLET | Refills: 0 | Status: SHIPPED | OUTPATIENT
Start: 2023-11-27

## 2023-11-27 NOTE — TELEPHONE ENCOUNTER
simvastatin (ZOCOR) 40 MG tablet [[id number]     buPROPion (WELLBUTRIN XL) 150 MG extended release tablet [[id number]     atenolol (TENORMIN) 100 MG tablet [[id number]     hydrOXYzine pamoate (VISTARIL) 25 MG capsule [[id number]         [facility] #[id number] - [address] [place] [address] [phone]  [address] [place] [address]  Phone: [phone]  Fax: [phone]

## 2023-11-30 ENCOUNTER — TELEPHONE (OUTPATIENT)
Dept: FAMILY MEDICINE CLINIC | Age: 83
End: 2023-11-30

## 2023-11-30 NOTE — TELEPHONE ENCOUNTER
Received INR results- not at goal. Kyaw Holder changed doses to:     2.5mg Mon/Wed/Friday/Saturday  1.25mg all other days. Repeat INR in 2 weeks. Left message for daughter to call back.

## 2023-12-01 ENCOUNTER — ANTI-COAG VISIT (OUTPATIENT)
Dept: FAMILY MEDICINE CLINIC | Age: 83
End: 2023-12-01

## 2023-12-01 DIAGNOSIS — I48.20 CHRONIC ATRIAL FIBRILLATION (HCC): Primary | ICD-10-CM

## 2024-01-04 ENCOUNTER — TELEPHONE (OUTPATIENT)
Dept: FAMILY MEDICINE CLINIC | Age: 84
End: 2024-01-04

## 2024-01-04 NOTE — TELEPHONE ENCOUNTER
Received patient's INR- 2.8  per Glory continue on same dosage and recheck in 2 weeks. Daughter advised.

## 2024-01-12 ENCOUNTER — OFFICE VISIT (OUTPATIENT)
Dept: FAMILY MEDICINE CLINIC | Age: 84
End: 2024-01-12
Payer: MEDICARE

## 2024-01-12 VITALS
WEIGHT: 134 LBS | OXYGEN SATURATION: 96 % | DIASTOLIC BLOOD PRESSURE: 82 MMHG | SYSTOLIC BLOOD PRESSURE: 110 MMHG | BODY MASS INDEX: 23 KG/M2 | HEART RATE: 92 BPM

## 2024-01-12 DIAGNOSIS — N30.00 ACUTE CYSTITIS WITHOUT HEMATURIA: Primary | ICD-10-CM

## 2024-01-12 DIAGNOSIS — R41.0 CONFUSION: ICD-10-CM

## 2024-01-12 PROCEDURE — 1090F PRES/ABSN URINE INCON ASSESS: CPT | Performed by: NURSE PRACTITIONER

## 2024-01-12 PROCEDURE — G8420 CALC BMI NORM PARAMETERS: HCPCS | Performed by: NURSE PRACTITIONER

## 2024-01-12 PROCEDURE — 99214 OFFICE O/P EST MOD 30 MIN: CPT | Performed by: NURSE PRACTITIONER

## 2024-01-12 PROCEDURE — 1036F TOBACCO NON-USER: CPT | Performed by: NURSE PRACTITIONER

## 2024-01-12 PROCEDURE — 3074F SYST BP LT 130 MM HG: CPT | Performed by: NURSE PRACTITIONER

## 2024-01-12 PROCEDURE — 1123F ACP DISCUSS/DSCN MKR DOCD: CPT | Performed by: NURSE PRACTITIONER

## 2024-01-12 PROCEDURE — G8427 DOCREV CUR MEDS BY ELIG CLIN: HCPCS | Performed by: NURSE PRACTITIONER

## 2024-01-12 PROCEDURE — 3079F DIAST BP 80-89 MM HG: CPT | Performed by: NURSE PRACTITIONER

## 2024-01-12 PROCEDURE — G8400 PT W/DXA NO RESULTS DOC: HCPCS | Performed by: NURSE PRACTITIONER

## 2024-01-12 PROCEDURE — G8484 FLU IMMUNIZE NO ADMIN: HCPCS | Performed by: NURSE PRACTITIONER

## 2024-01-12 RX ORDER — NITROFURANTOIN 25; 75 MG/1; MG/1
100 CAPSULE ORAL 2 TIMES DAILY
Qty: 20 CAPSULE | Refills: 0 | Status: SHIPPED | OUTPATIENT
Start: 2024-01-12 | End: 2024-01-22

## 2024-01-12 RX ORDER — WARFARIN SODIUM 5 MG/1
5 TABLET ORAL
COMMUNITY
End: 2024-01-12

## 2024-01-12 RX ORDER — WARFARIN SODIUM 2.5 MG/1
2.5 TABLET ORAL
COMMUNITY

## 2024-01-12 NOTE — PROGRESS NOTES
Laurie Dominguez  : 1940  Encounter date: 2024    This is a 83 y.o. female who presents with  Chief Complaint   Patient presents with    Urinary Tract Infection     Possible UTI increase in behaviors. Per daughter she has been calling family to come to get her and take her to [facility] in the middle of night.      History of present illness:    HPI   Presents to clinic today with concerns for increasing confusion.  Last episode of increased confusion she has a UTI - daughter would like her tested.  She is staying at [facility] - most recently has been calling family members to get her to come pick her up for things.  She has had two AWOL attempts in the past - thinking she is supposed to be meeting someone for things to get picked up or selling a house.  Not currently staying on a locked unit.  There is some question if she needs to be moved to a more restricted/locked environment.      Allergies   Allergen Reactions    Penicillins      Patient cannot remember reaction    Sulfa Antibiotics      Current Outpatient Medications   Medication Sig Dispense Refill    warfarin (COUMADIN) 2.5 MG tablet Take 1 tablet by mouth      nitrofurantoin, macrocrystal-monohydrate, (MACROBID) 100 MG capsule Take 1 capsule by mouth 2 times daily for 10 days 20 capsule 0    buPROPion (WELLBUTRIN XL) 150 MG extended release tablet Take 1 tablet by mouth every morning 90 tablet 0    pantoprazole (PROTONIX) 40 MG tablet Take 1 tablet by mouth daily 90 tablet 0    atenolol (TENORMIN) 100 MG tablet Take 1 tablet by mouth every morning 90 tablet 0    simvastatin (ZOCOR) 40 MG tablet Take 1 tablet by mouth nightly 90 tablet 0    PARoxetine (PAXIL) 20 MG tablet TAKE 1 TABLET BY MOUTH DAILY 90 tablet 0    dilTIAZem (CARDIZEM) 60 MG tablet TAKE 1/2 TABLET BY MOUTH EVERY 12 HOURS 90 tablet 0    OXYGEN Inhale into the lungs 2L via NC.      calcium citrate (CALCITRATE) 950 (200 Ca) MG tablet Take 1 tablet by mouth daily      Multiple

## 2024-01-17 ASSESSMENT — ENCOUNTER SYMPTOMS
SHORTNESS OF BREATH: 0
COUGH: 0
DIARRHEA: 0
NAUSEA: 0
VOMITING: 0

## 2024-01-28 DIAGNOSIS — F41.9 ANXIETY: ICD-10-CM

## 2024-01-28 DIAGNOSIS — F34.1 DYSTHYMIA: ICD-10-CM

## 2024-01-29 ENCOUNTER — TELEPHONE (OUTPATIENT)
Dept: FAMILY MEDICINE CLINIC | Age: 84
End: 2024-01-29

## 2024-01-29 RX ORDER — PAROXETINE HYDROCHLORIDE 20 MG/1
20 TABLET, FILM COATED ORAL DAILY
Qty: 90 TABLET | Refills: 0 | Status: SHIPPED | OUTPATIENT
Start: 2024-01-29

## 2024-01-29 NOTE — TELEPHONE ENCOUNTER
Left message for patient's daughter Magalys.     Received INR results per Glory at goal- continue on current dosing and repeat in 4 weeks.

## 2024-02-05 ENCOUNTER — APPOINTMENT (OUTPATIENT)
Dept: GENERAL RADIOLOGY | Age: 84
End: 2024-02-05
Payer: MEDICARE

## 2024-02-05 ENCOUNTER — APPOINTMENT (OUTPATIENT)
Dept: CT IMAGING | Age: 84
End: 2024-02-05
Payer: MEDICARE

## 2024-02-05 ENCOUNTER — HOSPITAL ENCOUNTER (INPATIENT)
Age: 84
LOS: 5 days | Discharge: NURSING, REHAB OR LONG-TERM CARE | End: 2024-02-10
Attending: STUDENT IN AN ORGANIZED HEALTH CARE EDUCATION/TRAINING PROGRAM | Admitting: INTERNAL MEDICINE
Payer: MEDICARE

## 2024-02-05 DIAGNOSIS — S72.002A CLOSED FRACTURE OF NECK OF LEFT FEMUR, INITIAL ENCOUNTER (HCC): ICD-10-CM

## 2024-02-05 DIAGNOSIS — I48.91 ATRIAL FIBRILLATION WITH RAPID VENTRICULAR RESPONSE (HCC): Primary | ICD-10-CM

## 2024-02-05 DIAGNOSIS — N39.0 URINARY TRACT INFECTION WITHOUT HEMATURIA, SITE UNSPECIFIED: ICD-10-CM

## 2024-02-05 PROBLEM — S72.012D: Status: ACTIVE | Noted: 2024-02-05

## 2024-02-05 LAB
ALBUMIN SERPL-MCNC: 3.9 G/DL (ref 3.4–5)
ALBUMIN/GLOB SERPL: 1.3 {RATIO} (ref 1.1–2.2)
ALP SERPL-CCNC: 74 U/L (ref 40–129)
ALT SERPL-CCNC: 21 U/L (ref 10–40)
ANION GAP SERPL CALCULATED.3IONS-SCNC: 13 MMOL/L (ref 3–16)
AST SERPL-CCNC: 33 U/L (ref 15–37)
BACTERIA URNS QL MICRO: ABNORMAL /HPF
BASOPHILS # BLD: 0.1 K/UL (ref 0–0.2)
BASOPHILS NFR BLD: 0.6 %
BILIRUB SERPL-MCNC: 0.6 MG/DL (ref 0–1)
BILIRUB UR QL STRIP.AUTO: NEGATIVE
BUN SERPL-MCNC: 32 MG/DL (ref 7–20)
CALCIUM SERPL-MCNC: 9.6 MG/DL (ref 8.3–10.6)
CHLORIDE SERPL-SCNC: 103 MMOL/L (ref 99–110)
CLARITY UR: CLEAR
CO2 SERPL-SCNC: 24 MMOL/L (ref 21–32)
COLOR UR: ABNORMAL
CREAT SERPL-MCNC: 1.7 MG/DL (ref 0.6–1.2)
DEPRECATED RDW RBC AUTO: 15.7 % (ref 12.4–15.4)
EKG ATRIAL RATE: 92 BPM
EKG DIAGNOSIS: NORMAL
EKG Q-T INTERVAL: 300 MS
EKG QRS DURATION: 78 MS
EKG QTC CALCULATION (BAZETT): 443 MS
EKG R AXIS: 77 DEGREES
EKG T AXIS: 269 DEGREES
EKG VENTRICULAR RATE: 131 BPM
EOSINOPHIL # BLD: 0.1 K/UL (ref 0–0.6)
EOSINOPHIL NFR BLD: 0.4 %
EPI CELLS #/AREA URNS AUTO: 1 /HPF (ref 0–5)
GFR SERPLBLD CREATININE-BSD FMLA CKD-EPI: 29 ML/MIN/{1.73_M2}
GLUCOSE SERPL-MCNC: 171 MG/DL (ref 70–99)
GLUCOSE UR STRIP.AUTO-MCNC: NEGATIVE MG/DL
HCT VFR BLD AUTO: 42.7 % (ref 36–48)
HGB BLD-MCNC: 14.2 G/DL (ref 12–16)
HGB UR QL STRIP.AUTO: ABNORMAL
HYALINE CASTS #/AREA URNS AUTO: 2 /LPF (ref 0–8)
INR PPP: 2.52 (ref 0.84–1.16)
KETONES UR STRIP.AUTO-MCNC: NEGATIVE MG/DL
LACTATE BLDV-SCNC: 1.3 MMOL/L (ref 0.4–1.9)
LEUKOCYTE ESTERASE UR QL STRIP.AUTO: NEGATIVE
LYMPHOCYTES # BLD: 2.1 K/UL (ref 1–5.1)
LYMPHOCYTES NFR BLD: 14.5 %
MCH RBC QN AUTO: 29.8 PG (ref 26–34)
MCHC RBC AUTO-ENTMCNC: 33.2 G/DL (ref 31–36)
MCV RBC AUTO: 89.7 FL (ref 80–100)
MONOCYTES # BLD: 0.8 K/UL (ref 0–1.3)
MONOCYTES NFR BLD: 5.7 %
NEUTROPHILS # BLD: 11.2 K/UL (ref 1.7–7.7)
NEUTROPHILS NFR BLD: 78.8 %
NITRITE UR QL STRIP.AUTO: NEGATIVE
NT-PROBNP SERPL-MCNC: 2764 PG/ML (ref 0–449)
PH UR STRIP.AUTO: 5.5 [PH] (ref 5–8)
PLATELET # BLD AUTO: 249 K/UL (ref 135–450)
PMV BLD AUTO: 8.1 FL (ref 5–10.5)
POTASSIUM SERPL-SCNC: 4.6 MMOL/L (ref 3.5–5.1)
PROT SERPL-MCNC: 6.9 G/DL (ref 6.4–8.2)
PROT UR STRIP.AUTO-MCNC: ABNORMAL MG/DL
PROTHROMBIN TIME: 27 SEC (ref 11.5–14.8)
RBC # BLD AUTO: 4.76 M/UL (ref 4–5.2)
RBC CLUMPS #/AREA URNS AUTO: 4 /HPF (ref 0–4)
SODIUM SERPL-SCNC: 140 MMOL/L (ref 136–145)
SP GR UR STRIP.AUTO: 1.02 (ref 1–1.03)
TROPONIN, HIGH SENSITIVITY: 14 NG/L (ref 0–14)
UA DIPSTICK W REFLEX MICRO PNL UR: YES
URN SPEC COLLECT METH UR: ABNORMAL
UROBILINOGEN UR STRIP-ACNC: 1 E.U./DL
WBC # BLD AUTO: 14.2 K/UL (ref 4–11)
WBC #/AREA URNS AUTO: 3 /HPF (ref 0–5)

## 2024-02-05 PROCEDURE — 6360000002 HC RX W HCPCS: Performed by: STUDENT IN AN ORGANIZED HEALTH CARE EDUCATION/TRAINING PROGRAM

## 2024-02-05 PROCEDURE — 73502 X-RAY EXAM HIP UNI 2-3 VIEWS: CPT

## 2024-02-05 PROCEDURE — 99285 EMERGENCY DEPT VISIT HI MDM: CPT

## 2024-02-05 PROCEDURE — 87040 BLOOD CULTURE FOR BACTERIA: CPT

## 2024-02-05 PROCEDURE — 85610 PROTHROMBIN TIME: CPT

## 2024-02-05 PROCEDURE — 83605 ASSAY OF LACTIC ACID: CPT

## 2024-02-05 PROCEDURE — 81001 URINALYSIS AUTO W/SCOPE: CPT

## 2024-02-05 PROCEDURE — 1200000000 HC SEMI PRIVATE

## 2024-02-05 PROCEDURE — 70450 CT HEAD/BRAIN W/O DYE: CPT

## 2024-02-05 PROCEDURE — 93005 ELECTROCARDIOGRAM TRACING: CPT | Performed by: STUDENT IN AN ORGANIZED HEALTH CARE EDUCATION/TRAINING PROGRAM

## 2024-02-05 PROCEDURE — 85025 COMPLETE CBC W/AUTO DIFF WBC: CPT

## 2024-02-05 PROCEDURE — 93010 ELECTROCARDIOGRAM REPORT: CPT | Performed by: INTERNAL MEDICINE

## 2024-02-05 PROCEDURE — 36415 COLL VENOUS BLD VENIPUNCTURE: CPT

## 2024-02-05 PROCEDURE — 83880 ASSAY OF NATRIURETIC PEPTIDE: CPT

## 2024-02-05 PROCEDURE — 96365 THER/PROPH/DIAG IV INF INIT: CPT

## 2024-02-05 PROCEDURE — 71045 X-RAY EXAM CHEST 1 VIEW: CPT

## 2024-02-05 PROCEDURE — 2580000003 HC RX 258: Performed by: STUDENT IN AN ORGANIZED HEALTH CARE EDUCATION/TRAINING PROGRAM

## 2024-02-05 PROCEDURE — 2500000003 HC RX 250 WO HCPCS: Performed by: STUDENT IN AN ORGANIZED HEALTH CARE EDUCATION/TRAINING PROGRAM

## 2024-02-05 PROCEDURE — 96375 TX/PRO/DX INJ NEW DRUG ADDON: CPT

## 2024-02-05 PROCEDURE — 84484 ASSAY OF TROPONIN QUANT: CPT

## 2024-02-05 PROCEDURE — 72131 CT LUMBAR SPINE W/O DYE: CPT

## 2024-02-05 PROCEDURE — 72125 CT NECK SPINE W/O DYE: CPT

## 2024-02-05 PROCEDURE — 80053 COMPREHEN METABOLIC PANEL: CPT

## 2024-02-05 PROCEDURE — 73700 CT LOWER EXTREMITY W/O DYE: CPT

## 2024-02-05 RX ORDER — MORPHINE SULFATE 2 MG/ML
2 INJECTION, SOLUTION INTRAMUSCULAR; INTRAVENOUS
Status: COMPLETED | OUTPATIENT
Start: 2024-02-05 | End: 2024-02-05

## 2024-02-05 RX ORDER — DILTIAZEM HYDROCHLORIDE 5 MG/ML
10 INJECTION INTRAVENOUS ONCE
Status: COMPLETED | OUTPATIENT
Start: 2024-02-05 | End: 2024-02-05

## 2024-02-05 RX ORDER — HYDROXYZINE HYDROCHLORIDE 25 MG/1
25 TABLET, FILM COATED ORAL 2 TIMES DAILY PRN
COMMUNITY

## 2024-02-05 RX ORDER — ONDANSETRON 2 MG/ML
4 INJECTION INTRAMUSCULAR; INTRAVENOUS ONCE
Status: COMPLETED | OUTPATIENT
Start: 2024-02-05 | End: 2024-02-05

## 2024-02-05 RX ADMIN — DILTIAZEM HYDROCHLORIDE 10 MG: 5 INJECTION, SOLUTION INTRAVENOUS at 22:05

## 2024-02-05 RX ADMIN — MORPHINE SULFATE 2 MG: 2 INJECTION, SOLUTION INTRAMUSCULAR; INTRAVENOUS at 22:05

## 2024-02-05 RX ADMIN — ONDANSETRON 4 MG: 2 INJECTION INTRAMUSCULAR; INTRAVENOUS at 21:22

## 2024-02-05 RX ADMIN — CEFTRIAXONE SODIUM 1000 MG: 1 INJECTION, POWDER, FOR SOLUTION INTRAMUSCULAR; INTRAVENOUS at 22:11

## 2024-02-05 ASSESSMENT — PAIN SCALES - GENERAL: PAINLEVEL_OUTOF10: 6

## 2024-02-05 NOTE — ED PROVIDER NOTES
[facility] EMERGENCY DEPARTMENT  EMERGENCY DEPARTMENT ENCOUNTER      Pt Name: Laurie Dominguez  MRN: [id number]  Birthdate 1940  Date of evaluation: 2/5/2024  Provider: Joel Sparrow MD    CHIEF COMPLAINT       Chief Complaint   Patient presents with    Fall     Pt presents from [facility] for an unwitnessed fall, pt denies hitting her head, pt denies any pain, -130s, hx a fib, A/O x 3          HISTORY OF PRESENT ILLNESS   (Location/Symptom, Timing/Onset, Context/Setting, Quality, Duration, Modifying Factors, Severity)  Note limiting factors.   Laurie Dominguez is a 83 y.o. female who presents to the emergency department for evaluation of left hip pain.  Patient had an unwitnessed fall at her nursing facility today.  She does not think that she passed out.  She believes that she fell because she was holding onto an object that gave out from under her.  Uncertain if she hit her head.  She is anticoagulated on Coumadin and has a history of atrial fibrillation.  She has history of dementia, majority of information obtained by her daughter.  Daughter reports frequent falls at her nursing facility.  They are transitioning the patient to higher level of care at [facility] this coming weekend.    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    REVIEW OF SYSTEMS    (2-9 systems for level 4, 10 or more for level 5)     Review of Systems    Except as noted above the remainder of the review of systems was reviewed and negative.       PAST MEDICAL HISTORY     Past Medical History:   Diagnosis Date    A-fib (HCC)     Atrial fibrillation (HCC)     Hypercholesterolemia     Hypertension     Pelvic mass          SURGICAL HISTORY       Past Surgical History:   Procedure Laterality Date    APPENDECTOMY      CYSTOSCOPY Right 06/14/2018     PEPTIDE - Abnormal; Notable for the following components:    Pro-BNP 2,764 (*)     All other components within normal limits   URINALYSIS - Abnormal; Notable for the following components:    Color, UA DARK YELLOW (*)     Blood, Urine SMALL (*)     Protein, UA TRACE (*)     All other components within normal limits   MICROSCOPIC URINALYSIS - Abnormal; Notable for the following components:    Bacteria, UA 4+ (*)     All other components within normal limits   CULTURE, BLOOD 1   CULTURE, BLOOD 2   LACTATE, SEPSIS   TROPONIN   LACTATE, SEPSIS   PROTIME-INR       All other labs were within normal range or not returned as of this dictation.    EMERGENCY DEPARTMENT COURSE and DIFFERENTIAL DIAGNOSIS/MDM:   Vitals:    Vitals:    02/05/24 2155 02/05/24 2205 02/05/24 2210 02/05/24 2227   BP: (!) 140/114 130/77  112/74   Pulse: (!) 130 (!) 134 93 88   Resp: (!) 42 23 19 28   Temp:       TempSrc:       SpO2:  90% 92%      Vitals:    02/05/24 2227   BP: 112/74   Pulse: 88   Resp: 28   Temp:    SpO2:              Medications   cefTRIAXone (ROCEPHIN) 1,000 mg in sodium chloride 0.9 % 50 mL IVPB (mini-bag) (1,000 mg IntraVENous New Bag 2/5/24 2211)   ondansetron (ZOFRAN) injection 4 mg (4 mg IntraVENous Given 2/5/24 2122)   dilTIAZem injection 10 mg (10 mg IntraVENous Given 2/5/24 2205)   morphine (PF) injection 2 mg (2 mg IntraVENous Given 2/5/24 2205)       Is this patient to be included in the SEP-1 Core Measure due to severe sepsis or septic shock?   No   Exclusion criteria - the patient is NOT to be included for SEP-1 Core Measure due to:  2+ SIRS criteria are not met        Course and MDM:  83-year-old female presents for evaluation of left hip pain after mechanical fall.  She arrives tachycardic, in A-fib with RVR.  Blood pressure is stable.  I did give her dose of diltiazem with improvement in heart rate.  CT imaging of the head and C-spine were performed showing no acute injury.  CT lumbar spine is showing multiple areas of

## 2024-02-06 ENCOUNTER — ANESTHESIA EVENT (OUTPATIENT)
Dept: OPERATING ROOM | Age: 84
End: 2024-02-06
Payer: MEDICARE

## 2024-02-06 LAB
ANION GAP SERPL CALCULATED.3IONS-SCNC: 9 MMOL/L (ref 3–16)
BUN SERPL-MCNC: 30 MG/DL (ref 7–20)
CALCIUM SERPL-MCNC: 9.1 MG/DL (ref 8.3–10.6)
CHLORIDE SERPL-SCNC: 105 MMOL/L (ref 99–110)
CO2 SERPL-SCNC: 23 MMOL/L (ref 21–32)
CREAT SERPL-MCNC: 1.5 MG/DL (ref 0.6–1.2)
DEPRECATED RDW RBC AUTO: 15.6 % (ref 12.4–15.4)
GFR SERPLBLD CREATININE-BSD FMLA CKD-EPI: 34 ML/MIN/{1.73_M2}
GLUCOSE SERPL-MCNC: 137 MG/DL (ref 70–99)
HCT VFR BLD AUTO: 40 % (ref 36–48)
HGB BLD-MCNC: 13.3 G/DL (ref 12–16)
INR PPP: 1.6 (ref 0.84–1.16)
LACTATE BLDV-SCNC: 1.1 MMOL/L (ref 0.4–1.9)
MCH RBC QN AUTO: 30.1 PG (ref 26–34)
MCHC RBC AUTO-ENTMCNC: 33.2 G/DL (ref 31–36)
MCV RBC AUTO: 90.7 FL (ref 80–100)
PLATELET # BLD AUTO: 224 K/UL (ref 135–450)
PMV BLD AUTO: 8.3 FL (ref 5–10.5)
POTASSIUM SERPL-SCNC: 4.4 MMOL/L (ref 3.5–5.1)
PROTHROMBIN TIME: 19 SEC (ref 11.5–14.8)
RBC # BLD AUTO: 4.41 M/UL (ref 4–5.2)
SODIUM SERPL-SCNC: 137 MMOL/L (ref 136–145)
WBC # BLD AUTO: 10.8 K/UL (ref 4–11)

## 2024-02-06 PROCEDURE — 85610 PROTHROMBIN TIME: CPT

## 2024-02-06 PROCEDURE — 83605 ASSAY OF LACTIC ACID: CPT

## 2024-02-06 PROCEDURE — 1200000000 HC SEMI PRIVATE

## 2024-02-06 PROCEDURE — 85027 COMPLETE CBC AUTOMATED: CPT

## 2024-02-06 PROCEDURE — 36415 COLL VENOUS BLD VENIPUNCTURE: CPT

## 2024-02-06 PROCEDURE — 2580000003 HC RX 258: Performed by: INTERNAL MEDICINE

## 2024-02-06 PROCEDURE — 6370000000 HC RX 637 (ALT 250 FOR IP): Performed by: INTERNAL MEDICINE

## 2024-02-06 PROCEDURE — 2580000003 HC RX 258: Performed by: ORTHOPAEDIC SURGERY

## 2024-02-06 PROCEDURE — 6360000002 HC RX W HCPCS: Performed by: INTERNAL MEDICINE

## 2024-02-06 PROCEDURE — 6360000002 HC RX W HCPCS: Performed by: ORTHOPAEDIC SURGERY

## 2024-02-06 PROCEDURE — APPNB45 APP NON BILLABLE 31-45 MINUTES: Performed by: NURSE PRACTITIONER

## 2024-02-06 PROCEDURE — 80048 BASIC METABOLIC PNL TOTAL CA: CPT

## 2024-02-06 RX ORDER — MULTIVITAMIN WITH IRON
1 TABLET ORAL DAILY
Status: DISCONTINUED | OUTPATIENT
Start: 2024-02-06 | End: 2024-02-10 | Stop reason: HOSPADM

## 2024-02-06 RX ORDER — BUPROPION HYDROCHLORIDE 150 MG/1
150 TABLET ORAL EVERY MORNING
Status: DISCONTINUED | OUTPATIENT
Start: 2024-02-06 | End: 2024-02-10 | Stop reason: HOSPADM

## 2024-02-06 RX ORDER — SODIUM CHLORIDE 9 MG/ML
INJECTION, SOLUTION INTRAVENOUS CONTINUOUS
Status: DISCONTINUED | OUTPATIENT
Start: 2024-02-06 | End: 2024-02-08

## 2024-02-06 RX ORDER — ATENOLOL 50 MG/1
100 TABLET ORAL EVERY MORNING
Status: DISCONTINUED | OUTPATIENT
Start: 2024-02-06 | End: 2024-02-10 | Stop reason: HOSPADM

## 2024-02-06 RX ORDER — PAROXETINE HYDROCHLORIDE 20 MG/1
20 TABLET, FILM COATED ORAL EVERY EVENING
Status: DISCONTINUED | OUTPATIENT
Start: 2024-02-06 | End: 2024-02-10 | Stop reason: HOSPADM

## 2024-02-06 RX ORDER — ENOXAPARIN SODIUM 100 MG/ML
30 INJECTION SUBCUTANEOUS DAILY
Status: DISCONTINUED | OUTPATIENT
Start: 2024-02-07 | End: 2024-02-06

## 2024-02-06 RX ORDER — IBUPROFEN 200 MG
200 CAPSULE ORAL DAILY
Status: DISCONTINUED | OUTPATIENT
Start: 2024-02-06 | End: 2024-02-06 | Stop reason: RX

## 2024-02-06 RX ORDER — HYDROMORPHONE HYDROCHLORIDE 1 MG/ML
0.5 INJECTION, SOLUTION INTRAMUSCULAR; INTRAVENOUS; SUBCUTANEOUS EVERY 4 HOURS PRN
Status: DISCONTINUED | OUTPATIENT
Start: 2024-02-06 | End: 2024-02-10 | Stop reason: HOSPADM

## 2024-02-06 RX ORDER — ONDANSETRON 2 MG/ML
4 INJECTION INTRAMUSCULAR; INTRAVENOUS EVERY 6 HOURS PRN
Status: DISCONTINUED | OUTPATIENT
Start: 2024-02-06 | End: 2024-02-10 | Stop reason: HOSPADM

## 2024-02-06 RX ORDER — PANTOPRAZOLE SODIUM 40 MG/1
40 TABLET, DELAYED RELEASE ORAL DAILY
Status: DISCONTINUED | OUTPATIENT
Start: 2024-02-06 | End: 2024-02-10 | Stop reason: HOSPADM

## 2024-02-06 RX ORDER — ACETAMINOPHEN 325 MG/1
650 TABLET ORAL EVERY 6 HOURS PRN
Status: DISCONTINUED | OUTPATIENT
Start: 2024-02-06 | End: 2024-02-07

## 2024-02-06 RX ORDER — ATORVASTATIN CALCIUM 20 MG/1
20 TABLET, FILM COATED ORAL NIGHTLY
Status: DISCONTINUED | OUTPATIENT
Start: 2024-02-06 | End: 2024-02-10 | Stop reason: HOSPADM

## 2024-02-06 RX ADMIN — SODIUM CHLORIDE: 9 INJECTION, SOLUTION INTRAVENOUS at 01:15

## 2024-02-06 RX ADMIN — PHYTONADIONE 5 MG: 10 INJECTION, EMULSION INTRAMUSCULAR; INTRAVENOUS; SUBCUTANEOUS at 01:17

## 2024-02-06 RX ADMIN — DILTIAZEM HYDROCHLORIDE 30 MG: 30 TABLET, FILM COATED ORAL at 13:11

## 2024-02-06 RX ADMIN — ATORVASTATIN CALCIUM 20 MG: 20 TABLET, FILM COATED ORAL at 01:10

## 2024-02-06 RX ADMIN — CEFTRIAXONE SODIUM 1000 MG: 1 INJECTION, POWDER, FOR SOLUTION INTRAMUSCULAR; INTRAVENOUS at 20:10

## 2024-02-06 RX ADMIN — ACETAMINOPHEN 650 MG: 325 TABLET ORAL at 13:15

## 2024-02-06 RX ADMIN — THERA TABS 1 TABLET: TAB at 10:35

## 2024-02-06 RX ADMIN — SODIUM CHLORIDE: 9 INJECTION, SOLUTION INTRAVENOUS at 20:03

## 2024-02-06 RX ADMIN — SODIUM CHLORIDE: 9 INJECTION, SOLUTION INTRAVENOUS at 09:26

## 2024-02-06 RX ADMIN — BUPROPION HYDROCHLORIDE 150 MG: 150 TABLET, EXTENDED RELEASE ORAL at 10:34

## 2024-02-06 RX ADMIN — PAROXETINE HYDROCHLORIDE 20 MG: 20 TABLET, FILM COATED ORAL at 17:57

## 2024-02-06 RX ADMIN — ATORVASTATIN CALCIUM 20 MG: 20 TABLET, FILM COATED ORAL at 20:07

## 2024-02-06 RX ADMIN — PANTOPRAZOLE SODIUM 40 MG: 40 TABLET, DELAYED RELEASE ORAL at 10:35

## 2024-02-06 RX ADMIN — ATENOLOL 100 MG: 50 TABLET ORAL at 10:29

## 2024-02-06 RX ADMIN — DILTIAZEM HYDROCHLORIDE 30 MG: 30 TABLET, FILM COATED ORAL at 01:10

## 2024-02-06 ASSESSMENT — PAIN SCALES - GENERAL
PAINLEVEL_OUTOF10: 5
PAINLEVEL_OUTOF10: 0
PAINLEVEL_OUTOF10: 0

## 2024-02-06 NOTE — PROGRESS NOTES
Shift assessment completed, pt is alert and oriented, Hx of early dementia, VSS, fall precautions in place, call light within reach, denies and doesn't appear to be in any pain at this time, see flowsheets and MAR, will monitor pt. The care plan and education has been reviewed and mutually agreed upon with the patient.

## 2024-02-06 NOTE — CARE COORDINATION
Case Management Assessment  Initial Evaluation    Date/Time of Evaluation: 2/6/2024 8:41 AM  Assessment Completed by: Shabbir Pretty Jr, RN    If patient is discharged prior to next notation, then this note serves as note for discharge by case management.    Patient Name: Laurie Dominguez                   YOB: 1940  Diagnosis: Atrial fibrillation with rapid ventricular response ([facility]) [I48.91]  Closed fracture of neck of left femur, initial encounter ([facility]) [S72.002A]  Closed subcapital fracture of femur with routine healing, left [S72.012D]  Urinary tract infection without hematuria, site unspecified [N39.0]                   Date / Time: 2/5/2024  5:51 PM    Patient Admission Status: Inpatient   Readmission Risk (Low < 19, Mod (19-27), High > 27): Readmission Risk Score: 14.4    Current PCP: Glory Miner APRN - CNP  PCP verified by CM? (P) Yes    Chart Reviewed: Yes      History Provided by: (P) Patient  Patient Orientation: (P) Alert and Oriented    Patient Cognition: (P) Alert    Hospitalization in the last 30 days (Readmission):  No    If yes, Readmission Assessment in CM Navigator will be completed.    Advance Directives:      Code Status: Full Code   Patient's Primary Decision Maker is: (P) Legal Next of Kin    Primary Decision Maker: william,christiano - Child - [phone]    Secondary Decision Maker: Rosamaria Roldan - Child - [phone]    Discharge Planning:    Patient lives with: (P) Alone Type of Home: (P) Independent Living ([facility])  Primary Care Giver: (P) Self  Patient Support Systems include: (P) Family Members   Current Financial resources: (P) Medicare  Current community resources: (P) None  Current services prior to admission: (P) None            Current DME:              Type of Home Care services:  (P) None    ADLS  Prior functional level: (P) Independent in ADLs/IADLs  Current functional level: (P) Independent in ADLs/IADLs    PT AM-PAC:   /24  OT AM-PAC:

## 2024-02-06 NOTE — ANESTHESIA PRE PROCEDURE
Department of Anesthesiology  Preprocedure Note       Name:  Laurie Dominguez   Age:  83 y.o.  :  1940                                          MRN:  [id number]         Date:  2024      Surgeon: Surgeon(s):  Porfirio Ledesma MD    Procedure: Procedure(s):  LEFT HIP HEMIARTHROPLASTY - ESE    Medications prior to admission:   Prior to Admission medications    Medication Sig Start Date End Date Taking? Authorizing Provider   hydrOXYzine HCl (ATARAX) 25 MG tablet Take 1 tablet by mouth 2 times daily as needed for Anxiety   Yes Joseph Silver MD   PARoxetine (PAXIL) 20 MG tablet TAKE 1 TABLET BY MOUTH DAILY  Patient taking differently: Take 1 tablet by mouth every evening 24   Glory Miner APRN - CNP   warfarin (COUMADIN) 2.5 MG tablet Take 1 tablet by mouth See Admin Instructions Take 1/2 tablet by mouth Monday, Wednesday, Friday and whole tablet all other days in the evening.    Joseph Silver MD   buPROPion (WELLBUTRIN XL) 150 MG extended release tablet Take 1 tablet by mouth every morning 23   Glory Miner APRN - CNP   pantoprazole (PROTONIX) 40 MG tablet Take 1 tablet by mouth daily 23   Glory Miner APRN - CNP   atenolol (TENORMIN) 100 MG tablet Take 1 tablet by mouth every morning 23   Glory Miner APRN - CNP   simvastatin (ZOCOR) 40 MG tablet Take 1 tablet by mouth nightly 23   Glory Miner APRN - CNP   dilTIAZem (CARDIZEM) 60 MG tablet TAKE 1/2 TABLET BY MOUTH EVERY 12 HOURS  Patient taking differently: Take 0.5 tablets by mouth in the morning and 0.5 tablets in the evening. TAKE 1/2 TABLET BY MOUTH EVERY 12 HOURS. 23   Glory Miner APRN - CNP   OXYGEN Inhale into the lungs 2L via NC.    Joseph Silver MD   calcium citrate (CALCITRATE) 950 (200 Ca) MG tablet Take 1 tablet by mouth daily    Joseph Silver MD   Multiple Vitamins-Minerals (MULTIVITAMIN PO) Take by mouth    Joseph Silver MD       Current

## 2024-02-06 NOTE — CONSULTS
[facility] Orthopedic Surgery  Consult Note    Patient: Laurie Dominguez  Admit Date: 2/5/2024  Requesting Physician: Mehul Tan MD  Room: [address] [place] [address]/[address] [place] [address]    Chief complaint: LEFT hip pain    HPI: Laurie Dominguez is a 83 y.o. female who presented to [facility] ER with complaints of left hip pain after unwitnessed fall.      Describes pain in the left hip of mild intensity and of aching nature since yesterday which is relieved by rest. Denies new numbness/tingling.       Independent imaging review of the left hip via plain films demonstrated: mildly displaced femoral neck fracture.    She is anticoagulated on warfarin for AFIB;  INR 2.52; Received IV Vit K 5mg 2/5.    Patient lives @ [facility].       Relevant notes, labs and other tests reviewed.  Medical History:  Past Medical History:   Diagnosis Date    A-fib (HCC)     Atrial fibrillation (HCC)     Hypercholesterolemia     Hypertension     Pelvic mass      Past Surgical History:   Procedure Laterality Date    APPENDECTOMY      CYSTOSCOPY Right 06/14/2018    retrograde pyelogram, ureteroscopy, and stent placement    HYSTERECTOMY, TOTAL ABDOMINAL (CERVIX REMOVED)  07/09/2018    robotic with BSO, omentectomy, pelvic mass removal, lymphnode dissection    [place] CYSTO/URETERO W/LITHOTRIPSY &INDWELL STENT INSRT Right 10/31/2018    CYSTOSCOPY WITH RIGHT URETEROSCOPY HOLMIUM LASER LITHOTRIPSY STONE MANIPULATION STONE BASKET WITH RIGHT STENT EXCHANGE performed by Daniel Hastings MD at [facility] OR    UPPER GASTROINTESTINAL ENDOSCOPY N/A 11/29/2020    EGD CONTROL HEMORRHAGE performed by Stoney Allen MD at [facility] ASC ENDOSCOPY    UPPER GASTROINTESTINAL ENDOSCOPY N/A 11/29/2020    EGD BIOPSY performed by Stoney Allen MD at [facility] ENDOSCOPY    UPPER GASTROINTESTINAL ENDOSCOPY N/A 3/2/2021    EGD BIOPSY performed by Stoney Allen MD at [facility] ENDOSCOPY       Social History:    reports that she has never smoked. She has never used smokeless tobacco.    Family  explain that while fixing was an option, that this leads to higher rates of revision surgery and return to OR. We decided to proceed with arthroplasty. She denied antecedent hip pain  We discussed the surgical procedure, recovery period, and protocol for pain management, expected post-operative course in detail.    We discussed the potential benefits of the surgery including improved mobility, pain relief as well as the inherent risks including but are not limited to infection, dislocation, leg length inequality, hardware failure, nerve or vascular injury, need for further surgery, deep vein thrombus, pulmonary embolism.   The patient has verbalized understanding of these risks and wishes to proceed.      Greater than 55 minutes was spent preparing to see the patient, reviewing prior records, independently evaluating prior imaging, performing necessary examination and history, counseling about treatment options and performing clinical documentation.

## 2024-02-06 NOTE — PLAN OF CARE
Problem: Discharge Planning  Goal: Discharge to home or other facility with appropriate resources  Recent Flowsheet Documentation  Taken 2/6/2024 0915 by Nora Mary RN  Discharge to home or other facility with appropriate resources: Identify barriers to discharge with patient and caregiver     Problem: Pain  Goal: Verbalizes/displays adequate comfort level or baseline comfort level  Recent Flowsheet Documentation  Taken 2/6/2024 0915 by Nora Mary RN  Verbalizes/displays adequate comfort level or baseline comfort level:   Encourage patient to monitor pain and request assistance   Assess pain using appropriate pain scale     Problem: Neurosensory - Adult  Goal: Achieves stable or improved neurological status  Outcome: Progressing     Problem: Cardiovascular - Adult  Goal: Maintains optimal cardiac output and hemodynamic stability  Outcome: Progressing  Goal: Absence of cardiac dysrhythmias or at baseline  Outcome: Progressing

## 2024-02-06 NOTE — PROGRESS NOTES
Pharmacy Home Medication Reconciliation Note    A medication reconciliation has been completed for Laurie Dominguez 1940    Pharmacy: Counts include 234 beds at the [facility] [address] [place], [place]  Information provided by: patient and facility    The patient's home medication list is as follows:  No current facility-administered medications on file prior to encounter.     Current Outpatient Medications on File Prior to Encounter   Medication Sig Dispense Refill    hydrOXYzine HCl (ATARAX) 25 MG tablet Take 1 tablet by mouth 2 times daily as needed for Anxiety      PARoxetine (PAXIL) 20 MG tablet TAKE 1 TABLET BY MOUTH DAILY (Patient taking differently: Take 1 tablet by mouth every evening) 90 tablet 0    warfarin (COUMADIN) 2.5 MG tablet Take 1 tablet by mouth See Admin Instructions Take 1/2 tablet by mouth Monday, Wednesday, Friday and whole tablet all other days in the evening.      buPROPion (WELLBUTRIN XL) 150 MG extended release tablet Take 1 tablet by mouth every morning 90 tablet 0    pantoprazole (PROTONIX) 40 MG tablet Take 1 tablet by mouth daily 90 tablet 0    atenolol (TENORMIN) 100 MG tablet Take 1 tablet by mouth every morning 90 tablet 0    simvastatin (ZOCOR) 40 MG tablet Take 1 tablet by mouth nightly 90 tablet 0    dilTIAZem (CARDIZEM) 60 MG tablet TAKE 1/2 TABLET BY MOUTH EVERY 12 HOURS (Patient taking differently: Take 0.5 tablets by mouth in the morning and 0.5 tablets in the evening. TAKE 1/2 TABLET BY MOUTH EVERY 12 HOURS.) 90 tablet 0    OXYGEN Inhale into the lungs 2L via NC.      calcium citrate (CALCITRATE) 950 (200 Ca) MG tablet Take 1 tablet by mouth daily      Multiple Vitamins-Minerals (MULTIVITAMIN PO) Take by mouth         Of note, patient takes Warfarin 1.25 mg on M/W/F and 2.5 mg all other days in the evening: patient did not take any home meds today.    Timing of last doses updated.    Thank you,  Magalie Guardado, FRANCESCOhT

## 2024-02-06 NOTE — H&P
Department of Internal Medicine  General Internal Medicine   H&P       SUBJECTIVE: This is an 83-year-old white American lady brought to [facility] from a nursing home following an accidental fall contracted left-sided hip injury unable to ambulate denies any chest pain shortness of breath no nausea vomiting or dizziness no history of head injury no ear nose and throat bleeding she is on long-term oral anticoagulation    Past Medical History:   Diagnosis Date    A-fib (HCC)     Atrial fibrillation (HCC)     Hypercholesterolemia     Hypertension     Pelvic mass      Past Surgical History:   Procedure Laterality Date    APPENDECTOMY      CYSTOSCOPY Right 06/14/2018    retrograde pyelogram, ureteroscopy, and stent placement    HYSTERECTOMY, TOTAL ABDOMINAL (CERVIX REMOVED)  07/09/2018    robotic with BSO, omentectomy, pelvic mass removal, lymphnode dissection    [place] CYSTO/URETERO W/LITHOTRIPSY &INDWELL STENT INSRT Right 10/31/2018    CYSTOSCOPY WITH RIGHT URETEROSCOPY HOLMIUM LASER LITHOTRIPSY STONE MANIPULATION STONE BASKET WITH RIGHT STENT EXCHANGE performed by Daniel Hastings MD at [facility] OR    UPPER GASTROINTESTINAL ENDOSCOPY N/A 11/29/2020    EGD CONTROL HEMORRHAGE performed by Stoney Allen MD at [facility] ASC ENDOSCOPY    UPPER GASTROINTESTINAL ENDOSCOPY N/A 11/29/2020    EGD BIOPSY performed by Stoney Allen MD at [facility] ENDOSCOPY    UPPER GASTROINTESTINAL ENDOSCOPY N/A 3/2/2021    EGD BIOPSY performed by Stoney Allen MD at [facility] ENDOSCOPY     Social History     Social History Narrative     , non smoker , 2 children , social ETOH use , worked as  and a realtor         History obtained from chart review, the patient, and nursing staff   General ROS: positive for  - fatigue, malaise, sleep disturbance, and weight loss  negative for - chills, fever, or night sweats  Psychological ROS: negative  Ophthalmic ROS: negative  Respiratory ROS: no cough, shortness of breath, or

## 2024-02-06 NOTE — PROGRESS NOTES
Occupational Therapy/Physical Therapy  Laurie Dominguez    OT/PT orders received and appreciated - will hold at this time as pt with pending ortho consult for L femoral hip fx. Plan to follow up for assessment as medically appropriate to participate.    Thanks,    Conrado Murphy, MOT OTR/L [id number]  Ynes Palomino PT, DPT [id number]

## 2024-02-06 NOTE — PROGRESS NOTES
Shift assessment completed. Neuro WNL. Denies pain at this time. AM meds administered per MAR. The care plan and education has been reviewed and mutually agreed upon with the patient. Standard safety measures in place.

## 2024-02-07 ENCOUNTER — APPOINTMENT (OUTPATIENT)
Dept: GENERAL RADIOLOGY | Age: 84
End: 2024-02-07
Payer: MEDICARE

## 2024-02-07 ENCOUNTER — ANESTHESIA (OUTPATIENT)
Dept: OPERATING ROOM | Age: 84
End: 2024-02-07
Payer: MEDICARE

## 2024-02-07 PROBLEM — I48.91 ATRIAL FIBRILLATION WITH RAPID VENTRICULAR RESPONSE (HCC): Status: ACTIVE | Noted: 2024-02-07

## 2024-02-07 PROBLEM — S72.002A CLOSED FRACTURE OF NECK OF LEFT FEMUR (HCC): Status: ACTIVE | Noted: 2024-02-07

## 2024-02-07 LAB
ABO + RH BLD: NORMAL
ANION GAP SERPL CALCULATED.3IONS-SCNC: 12 MMOL/L (ref 3–16)
BLD GP AB SCN SERPL QL: NORMAL
BUN SERPL-MCNC: 24 MG/DL (ref 7–20)
CALCIUM SERPL-MCNC: 8.8 MG/DL (ref 8.3–10.6)
CHLORIDE SERPL-SCNC: 107 MMOL/L (ref 99–110)
CO2 SERPL-SCNC: 21 MMOL/L (ref 21–32)
CREAT SERPL-MCNC: 1.6 MG/DL (ref 0.6–1.2)
DEPRECATED RDW RBC AUTO: 16 % (ref 12.4–15.4)
GFR SERPLBLD CREATININE-BSD FMLA CKD-EPI: 32 ML/MIN/{1.73_M2}
GLUCOSE SERPL-MCNC: 120 MG/DL (ref 70–99)
HCT VFR BLD AUTO: 38.2 % (ref 36–48)
HGB BLD-MCNC: 12.9 G/DL (ref 12–16)
INR PPP: 1.28 (ref 0.84–1.16)
MCH RBC QN AUTO: 30.7 PG (ref 26–34)
MCHC RBC AUTO-ENTMCNC: 33.7 G/DL (ref 31–36)
MCV RBC AUTO: 91.1 FL (ref 80–100)
PLATELET # BLD AUTO: 206 K/UL (ref 135–450)
PMV BLD AUTO: 8.1 FL (ref 5–10.5)
POTASSIUM SERPL-SCNC: 4.5 MMOL/L (ref 3.5–5.1)
PROTHROMBIN TIME: 16 SEC (ref 11.5–14.8)
RBC # BLD AUTO: 4.19 M/UL (ref 4–5.2)
SODIUM SERPL-SCNC: 140 MMOL/L (ref 136–145)
WBC # BLD AUTO: 10.2 K/UL (ref 4–11)

## 2024-02-07 PROCEDURE — 6360000002 HC RX W HCPCS: Performed by: STUDENT IN AN ORGANIZED HEALTH CARE EDUCATION/TRAINING PROGRAM

## 2024-02-07 PROCEDURE — 6370000000 HC RX 637 (ALT 250 FOR IP): Performed by: NURSE PRACTITIONER

## 2024-02-07 PROCEDURE — 6360000002 HC RX W HCPCS: Performed by: NURSE ANESTHETIST, CERTIFIED REGISTERED

## 2024-02-07 PROCEDURE — 85027 COMPLETE CBC AUTOMATED: CPT

## 2024-02-07 PROCEDURE — 6360000002 HC RX W HCPCS: Performed by: ORTHOPAEDIC SURGERY

## 2024-02-07 PROCEDURE — 2500000003 HC RX 250 WO HCPCS: Performed by: ORTHOPAEDIC SURGERY

## 2024-02-07 PROCEDURE — 36415 COLL VENOUS BLD VENIPUNCTURE: CPT

## 2024-02-07 PROCEDURE — 2720000010 HC SURG SUPPLY STERILE: Performed by: ORTHOPAEDIC SURGERY

## 2024-02-07 PROCEDURE — 73501 X-RAY EXAM HIP UNI 1 VIEW: CPT

## 2024-02-07 PROCEDURE — 86901 BLOOD TYPING SEROLOGIC RH(D): CPT

## 2024-02-07 PROCEDURE — 72170 X-RAY EXAM OF PELVIS: CPT

## 2024-02-07 PROCEDURE — 6370000000 HC RX 637 (ALT 250 FOR IP): Performed by: INTERNAL MEDICINE

## 2024-02-07 PROCEDURE — 7100000001 HC PACU RECOVERY - ADDTL 15 MIN: Performed by: ORTHOPAEDIC SURGERY

## 2024-02-07 PROCEDURE — 2500000003 HC RX 250 WO HCPCS: Performed by: NURSE ANESTHETIST, CERTIFIED REGISTERED

## 2024-02-07 PROCEDURE — 2709999900 HC NON-CHARGEABLE SUPPLY: Performed by: ORTHOPAEDIC SURGERY

## 2024-02-07 PROCEDURE — 86850 RBC ANTIBODY SCREEN: CPT

## 2024-02-07 PROCEDURE — 2580000003 HC RX 258: Performed by: INTERNAL MEDICINE

## 2024-02-07 PROCEDURE — 0SRS0JA REPLACEMENT OF LEFT HIP JOINT, FEMORAL SURFACE WITH SYNTHETIC SUBSTITUTE, UNCEMENTED, OPEN APPROACH: ICD-10-PCS | Performed by: ORTHOPAEDIC SURGERY

## 2024-02-07 PROCEDURE — 2500000003 HC RX 250 WO HCPCS

## 2024-02-07 PROCEDURE — 73502 X-RAY EXAM HIP UNI 2-3 VIEWS: CPT | Performed by: ORTHOPAEDIC SURGERY

## 2024-02-07 PROCEDURE — 80048 BASIC METABOLIC PNL TOTAL CA: CPT

## 2024-02-07 PROCEDURE — 3700000001 HC ADD 15 MINUTES (ANESTHESIA): Performed by: ORTHOPAEDIC SURGERY

## 2024-02-07 PROCEDURE — 85610 PROTHROMBIN TIME: CPT

## 2024-02-07 PROCEDURE — 2580000003 HC RX 258: Performed by: ORTHOPAEDIC SURGERY

## 2024-02-07 PROCEDURE — C1776 JOINT DEVICE (IMPLANTABLE): HCPCS | Performed by: ORTHOPAEDIC SURGERY

## 2024-02-07 PROCEDURE — 3700000000 HC ANESTHESIA ATTENDED CARE: Performed by: ORTHOPAEDIC SURGERY

## 2024-02-07 PROCEDURE — 7100000000 HC PACU RECOVERY - FIRST 15 MIN: Performed by: ORTHOPAEDIC SURGERY

## 2024-02-07 PROCEDURE — 1200000000 HC SEMI PRIVATE

## 2024-02-07 PROCEDURE — 6360000002 HC RX W HCPCS: Performed by: NURSE PRACTITIONER

## 2024-02-07 PROCEDURE — 3600000014 HC SURGERY LEVEL 4 ADDTL 15MIN: Performed by: ORTHOPAEDIC SURGERY

## 2024-02-07 PROCEDURE — 99222 1ST HOSP IP/OBS MODERATE 55: CPT | Performed by: ORTHOPAEDIC SURGERY

## 2024-02-07 PROCEDURE — 86900 BLOOD TYPING SEROLOGIC ABO: CPT

## 2024-02-07 PROCEDURE — 27236 TREAT THIGH FRACTURE: CPT | Performed by: ORTHOPAEDIC SURGERY

## 2024-02-07 PROCEDURE — 3600000004 HC SURGERY LEVEL 4 BASE: Performed by: ORTHOPAEDIC SURGERY

## 2024-02-07 PROCEDURE — 2580000003 HC RX 258: Performed by: NURSE PRACTITIONER

## 2024-02-07 DEVICE — IMPLANTABLE DEVICE
Type: IMPLANTABLE DEVICE | Site: HIP | Status: FUNCTIONAL
Brand: AVENIR COMPLETE™

## 2024-02-07 DEVICE — IMPLANTABLE DEVICE
Type: IMPLANTABLE DEVICE | Site: HIP | Status: FUNCTIONAL
Brand: MULTIPOLAR®

## 2024-02-07 RX ORDER — SODIUM CHLORIDE 0.9 % (FLUSH) 0.9 %
5-40 SYRINGE (ML) INJECTION EVERY 12 HOURS SCHEDULED
Status: DISCONTINUED | OUTPATIENT
Start: 2024-02-07 | End: 2024-02-07 | Stop reason: HOSPADM

## 2024-02-07 RX ORDER — ACETAMINOPHEN 325 MG/1
650 TABLET ORAL 3 TIMES DAILY
Status: DISCONTINUED | OUTPATIENT
Start: 2024-02-07 | End: 2024-02-10 | Stop reason: HOSPADM

## 2024-02-07 RX ORDER — OXYCODONE HYDROCHLORIDE 5 MG/1
5 TABLET ORAL
Status: DISCONTINUED | OUTPATIENT
Start: 2024-02-07 | End: 2024-02-07 | Stop reason: HOSPADM

## 2024-02-07 RX ORDER — DEXMEDETOMIDINE HYDROCHLORIDE 100 UG/ML
INJECTION, SOLUTION INTRAVENOUS PRN
Status: DISCONTINUED | OUTPATIENT
Start: 2024-02-07 | End: 2024-02-07 | Stop reason: SDUPTHER

## 2024-02-07 RX ORDER — MAGNESIUM HYDROXIDE 1200 MG/15ML
LIQUID ORAL
Status: COMPLETED | OUTPATIENT
Start: 2024-02-07 | End: 2024-02-07

## 2024-02-07 RX ORDER — ONDANSETRON 2 MG/ML
4 INJECTION INTRAMUSCULAR; INTRAVENOUS
Status: DISCONTINUED | OUTPATIENT
Start: 2024-02-07 | End: 2024-02-07 | Stop reason: HOSPADM

## 2024-02-07 RX ORDER — SODIUM CHLORIDE 0.9 % (FLUSH) 0.9 %
5-40 SYRINGE (ML) INJECTION PRN
Status: DISCONTINUED | OUTPATIENT
Start: 2024-02-07 | End: 2024-02-07 | Stop reason: HOSPADM

## 2024-02-07 RX ORDER — DEXAMETHASONE SODIUM PHOSPHATE 4 MG/ML
INJECTION, SOLUTION INTRA-ARTICULAR; INTRALESIONAL; INTRAMUSCULAR; INTRAVENOUS; SOFT TISSUE PRN
Status: DISCONTINUED | OUTPATIENT
Start: 2024-02-07 | End: 2024-02-07 | Stop reason: SDUPTHER

## 2024-02-07 RX ORDER — ONDANSETRON 2 MG/ML
INJECTION INTRAMUSCULAR; INTRAVENOUS PRN
Status: DISCONTINUED | OUTPATIENT
Start: 2024-02-07 | End: 2024-02-07 | Stop reason: SDUPTHER

## 2024-02-07 RX ORDER — HYDROMORPHONE HYDROCHLORIDE 2 MG/ML
INJECTION, SOLUTION INTRAMUSCULAR; INTRAVENOUS; SUBCUTANEOUS PRN
Status: DISCONTINUED | OUTPATIENT
Start: 2024-02-07 | End: 2024-02-07 | Stop reason: SDUPTHER

## 2024-02-07 RX ORDER — FENTANYL CITRATE 50 UG/ML
INJECTION, SOLUTION INTRAMUSCULAR; INTRAVENOUS PRN
Status: DISCONTINUED | OUTPATIENT
Start: 2024-02-07 | End: 2024-02-07 | Stop reason: SDUPTHER

## 2024-02-07 RX ORDER — ACETAMINOPHEN 650 MG
TABLET, EXTENDED RELEASE ORAL
Status: COMPLETED | OUTPATIENT
Start: 2024-02-07 | End: 2024-02-07

## 2024-02-07 RX ORDER — VANCOMYCIN HYDROCHLORIDE 1 G/20ML
INJECTION, POWDER, LYOPHILIZED, FOR SOLUTION INTRAVENOUS
Status: COMPLETED | OUTPATIENT
Start: 2024-02-07 | End: 2024-02-07

## 2024-02-07 RX ORDER — ROCURONIUM BROMIDE 10 MG/ML
INJECTION, SOLUTION INTRAVENOUS PRN
Status: DISCONTINUED | OUTPATIENT
Start: 2024-02-07 | End: 2024-02-07 | Stop reason: SDUPTHER

## 2024-02-07 RX ORDER — FENTANYL CITRATE 50 UG/ML
50 INJECTION, SOLUTION INTRAMUSCULAR; INTRAVENOUS EVERY 5 MIN PRN
Status: DISCONTINUED | OUTPATIENT
Start: 2024-02-07 | End: 2024-02-07 | Stop reason: HOSPADM

## 2024-02-07 RX ORDER — LABETALOL HYDROCHLORIDE 5 MG/ML
INJECTION, SOLUTION INTRAVENOUS PRN
Status: DISCONTINUED | OUTPATIENT
Start: 2024-02-07 | End: 2024-02-07 | Stop reason: SDUPTHER

## 2024-02-07 RX ORDER — TRANEXAMIC ACID 10 MG/ML
INJECTION, SOLUTION INTRAVENOUS
Status: COMPLETED
Start: 2024-02-07 | End: 2024-02-07

## 2024-02-07 RX ORDER — TRAMADOL HYDROCHLORIDE 50 MG/1
50 TABLET ORAL EVERY 6 HOURS PRN
Status: DISCONTINUED | OUTPATIENT
Start: 2024-02-07 | End: 2024-02-10 | Stop reason: HOSPADM

## 2024-02-07 RX ORDER — HYDROMORPHONE HYDROCHLORIDE 2 MG/ML
0.5 INJECTION, SOLUTION INTRAMUSCULAR; INTRAVENOUS; SUBCUTANEOUS EVERY 5 MIN PRN
Status: DISCONTINUED | OUTPATIENT
Start: 2024-02-07 | End: 2024-02-07 | Stop reason: HOSPADM

## 2024-02-07 RX ORDER — LIDOCAINE HYDROCHLORIDE 20 MG/ML
INJECTION, SOLUTION EPIDURAL; INFILTRATION; INTRACAUDAL; PERINEURAL PRN
Status: DISCONTINUED | OUTPATIENT
Start: 2024-02-07 | End: 2024-02-07 | Stop reason: SDUPTHER

## 2024-02-07 RX ORDER — SUCCINYLCHOLINE/SOD CL,ISO/PF 200MG/10ML
SYRINGE (ML) INTRAVENOUS PRN
Status: DISCONTINUED | OUTPATIENT
Start: 2024-02-07 | End: 2024-02-07 | Stop reason: SDUPTHER

## 2024-02-07 RX ORDER — SODIUM CHLORIDE 9 MG/ML
INJECTION, SOLUTION INTRAVENOUS CONTINUOUS
Status: DISCONTINUED | OUTPATIENT
Start: 2024-02-07 | End: 2024-02-07 | Stop reason: HOSPADM

## 2024-02-07 RX ORDER — PROPOFOL 10 MG/ML
INJECTION, EMULSION INTRAVENOUS PRN
Status: DISCONTINUED | OUTPATIENT
Start: 2024-02-07 | End: 2024-02-07 | Stop reason: SDUPTHER

## 2024-02-07 RX ORDER — SODIUM CHLORIDE 9 MG/ML
INJECTION, SOLUTION INTRAVENOUS PRN
Status: DISCONTINUED | OUTPATIENT
Start: 2024-02-07 | End: 2024-02-07 | Stop reason: HOSPADM

## 2024-02-07 RX ADMIN — PAROXETINE HYDROCHLORIDE 20 MG: 20 TABLET, FILM COATED ORAL at 20:11

## 2024-02-07 RX ADMIN — PROPOFOL 120 MG: 10 INJECTION, EMULSION INTRAVENOUS at 16:05

## 2024-02-07 RX ADMIN — SODIUM CHLORIDE: 9 INJECTION, SOLUTION INTRAVENOUS at 08:15

## 2024-02-07 RX ADMIN — ATORVASTATIN CALCIUM 20 MG: 20 TABLET, FILM COATED ORAL at 20:10

## 2024-02-07 RX ADMIN — HYDROMORPHONE HYDROCHLORIDE 0.2 MG: 2 INJECTION, SOLUTION INTRAMUSCULAR; INTRAVENOUS; SUBCUTANEOUS at 16:45

## 2024-02-07 RX ADMIN — DEXAMETHASONE SODIUM PHOSPHATE 4 MG: 4 INJECTION, SOLUTION INTRAMUSCULAR; INTRAVENOUS at 16:26

## 2024-02-07 RX ADMIN — SODIUM CHLORIDE: 9 INJECTION, SOLUTION INTRAVENOUS at 20:12

## 2024-02-07 RX ADMIN — Medication 100 MG: at 16:05

## 2024-02-07 RX ADMIN — TRANEXAMIC ACID: 10 INJECTION, SOLUTION INTRAVENOUS at 16:10

## 2024-02-07 RX ADMIN — DILTIAZEM HYDROCHLORIDE 30 MG: 30 TABLET, FILM COATED ORAL at 00:21

## 2024-02-07 RX ADMIN — LABETALOL HYDROCHLORIDE 5 MG: 5 INJECTION, SOLUTION INTRAVENOUS at 16:53

## 2024-02-07 RX ADMIN — CEFAZOLIN 2000 MG: 2 INJECTION, POWDER, FOR SOLUTION INTRAMUSCULAR; INTRAVENOUS at 15:49

## 2024-02-07 RX ADMIN — FENTANYL CITRATE 50 MCG: 50 INJECTION, SOLUTION INTRAMUSCULAR; INTRAVENOUS at 16:26

## 2024-02-07 RX ADMIN — HYDROMORPHONE HYDROCHLORIDE 0.2 MG: 2 INJECTION, SOLUTION INTRAMUSCULAR; INTRAVENOUS; SUBCUTANEOUS at 16:53

## 2024-02-07 RX ADMIN — FENTANYL CITRATE 25 MCG: 50 INJECTION, SOLUTION INTRAMUSCULAR; INTRAVENOUS at 16:02

## 2024-02-07 RX ADMIN — FENTANYL CITRATE 25 MCG: 50 INJECTION, SOLUTION INTRAMUSCULAR; INTRAVENOUS at 16:29

## 2024-02-07 RX ADMIN — BUPROPION HYDROCHLORIDE 150 MG: 150 TABLET, EXTENDED RELEASE ORAL at 08:12

## 2024-02-07 RX ADMIN — SUGAMMADEX 200 MG: 100 INJECTION, SOLUTION INTRAVENOUS at 17:18

## 2024-02-07 RX ADMIN — LABETALOL HYDROCHLORIDE 5 MG: 5 INJECTION, SOLUTION INTRAVENOUS at 16:41

## 2024-02-07 RX ADMIN — SODIUM CHLORIDE: 9 INJECTION, SOLUTION INTRAVENOUS at 15:40

## 2024-02-07 RX ADMIN — ONDANSETRON 4 MG: 2 INJECTION INTRAMUSCULAR; INTRAVENOUS at 16:05

## 2024-02-07 RX ADMIN — THERA TABS 1 TABLET: TAB at 08:12

## 2024-02-07 RX ADMIN — LIDOCAINE HYDROCHLORIDE 80 MG: 20 INJECTION, SOLUTION EPIDURAL; INFILTRATION; INTRACAUDAL; PERINEURAL at 16:05

## 2024-02-07 RX ADMIN — ATENOLOL 100 MG: 50 TABLET ORAL at 08:12

## 2024-02-07 RX ADMIN — HYDROMORPHONE HYDROCHLORIDE 0.2 MG: 2 INJECTION, SOLUTION INTRAMUSCULAR; INTRAVENOUS; SUBCUTANEOUS at 17:12

## 2024-02-07 RX ADMIN — DEXMEDETOMIDINE HYDROCHLORIDE 8 MCG: 100 INJECTION, SOLUTION INTRAVENOUS at 16:29

## 2024-02-07 RX ADMIN — FENTANYL CITRATE 50 MCG: 0.05 INJECTION, SOLUTION INTRAMUSCULAR; INTRAVENOUS at 17:43

## 2024-02-07 RX ADMIN — ROCURONIUM BROMIDE 5 MG: 10 INJECTION INTRAVENOUS at 16:05

## 2024-02-07 RX ADMIN — ACETAMINOPHEN 650 MG: 325 TABLET ORAL at 20:10

## 2024-02-07 RX ADMIN — DILTIAZEM HYDROCHLORIDE 30 MG: 30 TABLET, FILM COATED ORAL at 13:09

## 2024-02-07 RX ADMIN — PANTOPRAZOLE SODIUM 40 MG: 40 TABLET, DELAYED RELEASE ORAL at 08:12

## 2024-02-07 RX ADMIN — ROCURONIUM BROMIDE 45 MG: 10 INJECTION INTRAVENOUS at 16:26

## 2024-02-07 RX ADMIN — CEFTRIAXONE SODIUM 1000 MG: 1 INJECTION, POWDER, FOR SOLUTION INTRAMUSCULAR; INTRAVENOUS at 20:13

## 2024-02-07 ASSESSMENT — PAIN DESCRIPTION - PAIN TYPE: TYPE: SURGICAL PAIN

## 2024-02-07 ASSESSMENT — PAIN DESCRIPTION - DESCRIPTORS: DESCRIPTORS: ACHING

## 2024-02-07 ASSESSMENT — PAIN DESCRIPTION - LOCATION: LOCATION: HIP

## 2024-02-07 ASSESSMENT — PAIN DESCRIPTION - ORIENTATION: ORIENTATION: LEFT

## 2024-02-07 ASSESSMENT — PAIN DESCRIPTION - ONSET: ONSET: ON-GOING

## 2024-02-07 ASSESSMENT — PAIN - FUNCTIONAL ASSESSMENT
PAIN_FUNCTIONAL_ASSESSMENT: 0-10
PAIN_FUNCTIONAL_ASSESSMENT: PREVENTS OR INTERFERES SOME ACTIVE ACTIVITIES AND ADLS

## 2024-02-07 ASSESSMENT — ENCOUNTER SYMPTOMS: SHORTNESS OF BREATH: 1

## 2024-02-07 ASSESSMENT — PAIN DESCRIPTION - FREQUENCY: FREQUENCY: INTERMITTENT

## 2024-02-07 NOTE — PROGRESS NOTES
Shift assessment completed. Neuro WNL. Denies any pain at this time. AM meds administered per MAR. Pt remains NPO for pm sx. The care plan and education has been reviewed and mutually agreed upon with the patient. Standard safety measures in place.    6:45 pm  Pt arrived to unit at this time. Family at bedside. Received bedside report from PACU nurse. Pt is sleepy but easy to arouse. Reassessment completed. Neuro WNL. Pt is on 3L of O2. Denies any pain at this time. RUBÉN surgical incision; dressing remains in place. Cold pack on incision. Standard safety measures in place.

## 2024-02-07 NOTE — OP NOTE
Patient: Laurie Dominguez                    : 1940     MRN: [id number]     Date: 24     SURGEON: Porfirio Ledesma MD     ASSISTANT: Yves Mar SA     PREOPERATIVE DIAGNOSES:     1) LEFT femoral neck fracture      POSTOPERATIVE DIAGNOSES: Same     PROCEDURES: LEFT hip hemiarthroplasty     Interpretation of intra-operative imaging: AP image of the LEFT hip with placement of femoral component showing intramedullary implant and reduction of the femoral head within the acetabulum. There is no evidence of femoral fracture. Lastly, AP of the pelvis showing the contralateral hip with intraoperative measurement confirming leg length measurement.    ANESTHESIA: General     COMPLICATIONS: None     ESTIMATED BLOOD LOSS: Less than 100     SPECIMENS: None     DRAINS: None     TOURNIQUET TIME:  Tourniquet was not used.     IMPLANTS USED:   Implant Name Type Inv. Item Serial No.  Lot No. LRB No. Used Action   [id number] [employer]ine [employer]omet cocr head, femoral head 12/14 taper, 28 mm, 3.5 neck length     [id number] Left 1 Implanted   SHELL ACET BPLR 47 MM MTL - [id number]  SHELL ACET BPLR 47 MM MTL  [employer] [id number] Left 1 Implanted   BIPOLAR LINER 47/48/49MM OD X 28MM ID - [id number]  BIPOLAR LINER 47/48/49MM OD X 28MM ID  [employer] [id number] Left 1 Implanted   Avenir Complete Standard Collared Size 4 - [id number]  Avenir Complete Standard Collared Size 4  [employer] [id number] Left 1 Implanted          INDICATIONS FOR OPERATION: Laurie Dominguez is a 83 y.o. female  with a LEFT femoral neck fracture with displacement.  Patient reportedly fell at home with inability to bear weight.  The risks and alternatives to surgery were discussed with the patient and the patient has elected to proceed. Informed consent was obtained.  Risks included blood loss infection damage to surrounding structures including blood vessels and nerves risk of repeat procedure pain

## 2024-02-07 NOTE — ANESTHESIA POSTPROCEDURE EVALUATION
Department of Anesthesiology  Postprocedure Note    Patient: Laurie Dominguez  MRN: [id number]  YOB: 1940  Date of evaluation: 2/7/2024    Procedure Summary       Date: 02/07/24 Room / Location: [address]    Anesthesia Start: 1558 Anesthesia Stop: 1732    Procedure: LEFT BIPOLAR HIP HEMIARTHROPLASTY ANTERIOR APPROACH (ESE) (Left: Hip) Diagnosis:       Closed fracture of neck of left femur, initial encounter ([facility])      (Closed fracture of neck of left femur, initial encounter ([facility]) [S72.002A])    Surgeons: Porfirio Ledesma MD Responsible Provider: Delia Steve MD    Anesthesia Type: general ASA Status: 3            Anesthesia Type: No value filed.    Taylor Phase I: Taylor Score: 8    Taylor Phase II:      Anesthesia Post Evaluation    Patient location during evaluation: bedside  Patient participation: complete - patient participated  Level of consciousness: awake and alert  Pain score: 2  Airway patency: patent  Nausea & Vomiting: no vomiting  Cardiovascular status: hemodynamically stable  Respiratory status: nonlabored ventilation  Hydration status: stable  Multimodal analgesia pain management approach  Pain management: adequate    No notable events documented.

## 2024-02-07 NOTE — PROGRESS NOTES
Patient drowsy, awakens to voice. VSS. Surgical dressing intact. Post op xray done. Phase 1 recovery completed, will transfer back to .

## 2024-02-07 NOTE — PROGRESS NOTES
Shift assessment completed, pt is alert and oriented forgetful at night, VSS, fall precautions in place, call light within reach, denies any pain, pt is reminded of NPO status after midnight. See flowsheets and MAR, will monitor pt. The care plan and education has been reviewed and mutually agreed upon with the patient.

## 2024-02-07 NOTE — PROGRESS NOTES
Patient to PACU from OR. Drowsy. VSS. Afib on tele. Surgical dressing to left hip intact. + 3 bilat pedal pulses.

## 2024-02-07 NOTE — PLAN OF CARE
Problem: Discharge Planning  Goal: Discharge to home or other facility with appropriate resources  Outcome: Progressing  Flowsheets (Taken 2/6/2024 0915 by Nora Mary, RN)  Discharge to home or other facility with appropriate resources: Identify barriers to discharge with patient and caregiver     Problem: Pain  Goal: Verbalizes/displays adequate comfort level or baseline comfort level  Outcome: Progressing  Flowsheets (Taken 2/6/2024 0915 by Nora Mary, RN)  Verbalizes/displays adequate comfort level or baseline comfort level:   Encourage patient to monitor pain and request assistance   Assess pain using appropriate pain scale     Problem: Safety - Adult  Goal: Free from fall injury  Outcome: Progressing     Problem: ABCDS Injury Assessment  Goal: Absence of physical injury  Outcome: Progressing     Problem: Chronic Conditions and Co-morbidities  Goal: Patient's chronic conditions and co-morbidity symptoms are monitored and maintained or improved  Outcome: Progressing     Problem: Neurosensory - Adult  Goal: Achieves stable or improved neurological status  2/6/2024 2305 by Kvng Auguste RN  Outcome: Progressing     Problem: Cardiovascular - Adult  Goal: Maintains optimal cardiac output and hemodynamic stability  2/6/2024 2305 by Kvng Auguste RN  Outcome: Progressing     Problem: Musculoskeletal - Adult  Goal: Return mobility to safest level of function  Outcome: Progressing     Problem: Skin/Tissue Integrity  Goal: Absence of new skin breakdown  Description: 1.  Monitor for areas of redness and/or skin breakdown  2.  Assess vascular access sites hourly  3.  Every 4-6 hours minimum:  Change oxygen saturation probe site  4.  Every 4-6 hours:  If on nasal continuous positive airway pressure, respiratory therapy assess nares and determine need for appliance change or resting period.  Outcome: Progressing

## 2024-02-07 NOTE — DISCHARGE INSTRUCTIONS
DO NOT REMOVE INFORMATION BELOW      [facility] is participating in Medicare's Comprehensive Care for Joint Replacement (CJR) model    [facility] is participating in a Medicare initiative called the Comprehensive Care for Joint Replacement (CJR) model. Medicare designed this model to encourage higher quality care and greater financial accountability from hospitals when Medicare beneficiaries receive lower-extremity joint replacement procedures (LEJR), typically hip or knee replacements. [facility]’s participation in the CJR model should not restrict your access to care for your medical condition or your freedom to choose your health care providers and services. All existing Medicare beneficiary protections continue to be available to you.     The CJR model aims to help give you better care.     The CJR model aims to support better and more efficient care for beneficiaries undergoing LEJR procedures. A CJR episode of care is typically defined as an admission of an eligible Medicare beneficiary to a hospital participating in the CJR model for an LEJR procedure. The LEJR procedure can take place in either an inpatient or outpatient setting and will still be considered a CJR episode of care. The CJR episode of care continues for 90 days following discharge.     This model uses episode payment and quality measurement for an episode of care associated with LEJR procedures to encourage hospitals, physicians, and post-acute care providers to work together to improve the quality and coordination of care from the initial hospitalization through recovery. Under the CJR model, [facility] can earn additional payments from Medicare if we meet the high quality goals set by Medicare, while keeping hospital costs and care spending under control. If we don’t meet these quality and cost goals, we may have to repay Medicare.

## 2024-02-08 ENCOUNTER — APPOINTMENT (OUTPATIENT)
Dept: GENERAL RADIOLOGY | Age: 84
End: 2024-02-08
Payer: MEDICARE

## 2024-02-08 PROBLEM — I35.1 NONRHEUMATIC AORTIC VALVE INSUFFICIENCY: Status: ACTIVE | Noted: 2024-02-08

## 2024-02-08 LAB
GLUCOSE BLD-MCNC: 157 MG/DL (ref 70–99)
PERFORMED ON: ABNORMAL

## 2024-02-08 PROCEDURE — 97530 THERAPEUTIC ACTIVITIES: CPT

## 2024-02-08 PROCEDURE — 97110 THERAPEUTIC EXERCISES: CPT

## 2024-02-08 PROCEDURE — 2580000003 HC RX 258: Performed by: NURSE PRACTITIONER

## 2024-02-08 PROCEDURE — 99024 POSTOP FOLLOW-UP VISIT: CPT | Performed by: NURSE PRACTITIONER

## 2024-02-08 PROCEDURE — 6370000000 HC RX 637 (ALT 250 FOR IP): Performed by: NURSE PRACTITIONER

## 2024-02-08 PROCEDURE — 6360000002 HC RX W HCPCS: Performed by: INTERNAL MEDICINE

## 2024-02-08 PROCEDURE — APPNB45 APP NON BILLABLE 31-45 MINUTES: Performed by: NURSE PRACTITIONER

## 2024-02-08 PROCEDURE — 97535 SELF CARE MNGMENT TRAINING: CPT

## 2024-02-08 PROCEDURE — 97166 OT EVAL MOD COMPLEX 45 MIN: CPT

## 2024-02-08 PROCEDURE — 71046 X-RAY EXAM CHEST 2 VIEWS: CPT

## 2024-02-08 PROCEDURE — 97162 PT EVAL MOD COMPLEX 30 MIN: CPT

## 2024-02-08 PROCEDURE — 1200000000 HC SEMI PRIVATE

## 2024-02-08 PROCEDURE — 51798 US URINE CAPACITY MEASURE: CPT

## 2024-02-08 RX ORDER — TRAMADOL HYDROCHLORIDE 50 MG/1
50 TABLET ORAL EVERY 6 HOURS PRN
Qty: 20 TABLET | Refills: 0 | Status: SHIPPED | OUTPATIENT
Start: 2024-02-08 | End: 2024-02-13

## 2024-02-08 RX ORDER — FUROSEMIDE 10 MG/ML
20 INJECTION INTRAMUSCULAR; INTRAVENOUS ONCE
Status: COMPLETED | OUTPATIENT
Start: 2024-02-08 | End: 2024-02-08

## 2024-02-08 RX ADMIN — FUROSEMIDE 20 MG: 10 INJECTION, SOLUTION INTRAMUSCULAR; INTRAVENOUS at 13:13

## 2024-02-08 RX ADMIN — THERA TABS 1 TABLET: TAB at 09:25

## 2024-02-08 RX ADMIN — ACETAMINOPHEN 650 MG: 325 TABLET ORAL at 21:34

## 2024-02-08 RX ADMIN — ACETAMINOPHEN 650 MG: 325 TABLET ORAL at 09:25

## 2024-02-08 RX ADMIN — ATORVASTATIN CALCIUM 20 MG: 20 TABLET, FILM COATED ORAL at 21:34

## 2024-02-08 RX ADMIN — SODIUM CHLORIDE: 9 INJECTION, SOLUTION INTRAVENOUS at 07:00

## 2024-02-08 RX ADMIN — ACETAMINOPHEN 650 MG: 325 TABLET ORAL at 15:19

## 2024-02-08 RX ADMIN — PANTOPRAZOLE SODIUM 40 MG: 40 TABLET, DELAYED RELEASE ORAL at 09:25

## 2024-02-08 RX ADMIN — PAROXETINE HYDROCHLORIDE 20 MG: 20 TABLET, FILM COATED ORAL at 18:16

## 2024-02-08 RX ADMIN — BUPROPION HYDROCHLORIDE 150 MG: 150 TABLET, EXTENDED RELEASE ORAL at 09:25

## 2024-02-08 ASSESSMENT — PAIN SCALES - GENERAL
PAINLEVEL_OUTOF10: 2
PAINLEVEL_OUTOF10: 0
PAINLEVEL_OUTOF10: 3

## 2024-02-08 ASSESSMENT — PAIN DESCRIPTION - DESCRIPTORS: DESCRIPTORS: ACHING

## 2024-02-08 ASSESSMENT — PAIN DESCRIPTION - ORIENTATION
ORIENTATION: LEFT
ORIENTATION: LEFT

## 2024-02-08 ASSESSMENT — PAIN - FUNCTIONAL ASSESSMENT: PAIN_FUNCTIONAL_ASSESSMENT: ACTIVITIES ARE NOT PREVENTED

## 2024-02-08 ASSESSMENT — PAIN DESCRIPTION - LOCATION
LOCATION: FLANK
LOCATION: LEG

## 2024-02-08 NOTE — PROGRESS NOTES
Shift assessment completed, morning medication given per MAR. VSS, alert and oriented. Call light within reach. The care plan and education has been reviewed and mutually agreed upon with the patient.

## 2024-02-08 NOTE — PROGRESS NOTES
[facility] [place] [facility]   Progress Note    CHIEF COMPLAINT/DIAGNOSIS: S/p left hip hemiarthroplasty for fracture    SUBJECTIVE: Seen working with therapy sitting at side of the bed; describes mild hip pain    OBJECTIVE  Physical    VITALS:  /78   Pulse 95   Temp 97.6 °F (36.4 °C) (Axillary)   Resp 16   Ht 1.727 m (5' 8\")   Wt 61.8 kg (136 lb 3.9 oz)   SpO2 97%   BMI 20.72 kg/m²     GENERAL: Alert and oriented x3, in no acute distress.   MUSCULOSKELETAL: Able to dorsi and plantarflex the ankle on operative side without issue.   INCISION:  Covered with post-op dressing; clean dry and intact.  ROM: Limited secondary pain and swelling.   Sensory:  Intact to light touch in peroneal and tibial distributions.   Vascular:   2+ DP pulses with brisk cap refill;  calf soft and nontender.    Data    ALL MEDICATIONS HAVE BEEN REVIEWED    CBC:   Recent Labs     02/05/24  1828 02/06/24  0438 02/07/24  0536   WBC 14.2* 10.8 10.2   HGB 14.2 13.3 12.9   HCT 42.7 40.0 38.2    224 206     BMP:   Recent Labs     02/05/24  1828 02/06/24  0438 02/07/24  0536    137 140   K 4.6 4.4 4.5    105 107   CO2 24 23 21   BUN 32* 30* 24*   CREATININE 1.7* 1.5* 1.6*     INR:   Recent Labs     02/05/24  2309 02/06/24  1132 02/07/24  0536   INR 2.52* 1.60* 1.28*       ASSESSMENT:  S/p left hip hemiarthroplasty (2/8/24), POD#1  Afib on warfarin    PLAN:   - WB status:  WBAT through operative extremity; no specific precautions  - DVT prophylaxis: OK to resume coumadin if Hgb above 8  - Ice therapy  - PT/OT  - Pain Control: Rx for dc in chart.  Due to orthopaedic surgical procedure/condition, patient may require pain medication for up to 6-8 weeks.  - ID:  Ancef x 2 doses post-op completed.   - Disposition: per primary team; ok to d/c from our end once medically stable and dispo needs met.     Follow-up in two weeks with Dr. Ledesma.  Office # [phone]    BRIGHT Crump - CNP  2/8/2024  9:36 AM

## 2024-02-08 NOTE — CARE COORDINATION
Discharge Planning Note:    Met with the patient. An approved SNF was reviewed and the following referral was placed a the request of the patient:    - [facility] - ACCEPTED    - No Pre-Cert required    Will continue to follow.    ROMIE NicholsonN RN    [facility]  Phone: [phone]

## 2024-02-08 NOTE — PROGRESS NOTES
[facility] - Inpatient Rehabilitation Department   Phone: [phone]    Occupational Therapy    [x] Initial Evaluation            [] Daily Treatment Note         [] Discharge Summary      Patient: Laurie Dominguez   : 1940   MRN: [id number]   Date of Service:  2024    Admitting Diagnosis:  Closed subcapital fracture of femur with routine healing, left  Current Admission Summary:  Laurie Dominguez is an 83 year old female admitted after a fall at her nursing home. X-ray showed impacted subcapital fracture of the left femoral neck. Patient is s/p left hip hemiarthroplasty with Dr. Ledesma on 24.   Past Medical History:  has a past medical history of A-fib (HCC), Atrial fibrillation (HCC), Hypercholesterolemia, Hypertension, and Pelvic mass.  Past Surgical History:  has a past surgical history that includes Appendectomy; Cystocopy (Right, 2018); Hysterectomy, total abdominal (2018); pr cysto/uretero w/lithotripsy &indwell stent insrt (Right, 10/31/2018); Upper gastrointestinal endoscopy (N/A, 2020); Upper gastrointestinal endoscopy (N/A, 2020); and Upper gastrointestinal endoscopy (N/A, 3/2/2021).    Discharge Recommendations: Laurie Dominguez scored a 14/24 on the AM-PAC ADL Inpatient form. Current research shows that an AM-PAC score of 17 or less is typically not associated with a discharge to the patient's home setting. Based on the patient's AM-PAC score and their current ADL deficits, it is recommended that the patient have 3-5 sessions per week of Occupational Therapy at d/c to increase the patient's independence.  Please see assessment section for further patient specific details.    If patient discharges prior to next session this note will serve as a discharge summary.  Please see below for the latest assessment towards goals.       DME Required For Discharge: DME to be determined at next level of care, DME to be determined pending patient  progress    Precautions/Restrictions: high fall risk  Weight Bearing Restrictions: weight bearing as tolerated  [] Right Upper Extremity  [] Left Upper Extremity [] Right Lower Extremity  [x] Left Lower Extremity     Required Braces/Orthotics: no braces required   [] Right  [] Left  Positional Restrictions:no positional restrictions    Pre-Admission Information   Lives With: alone                     Type of Home:  Memory Care at [facility]    Home Layout: one level  Home Access: level entry  Bathroom Layout: walk in shower  Bathroom Equipment: grab bars in shower, grab bars around toilet, shower chair, hand held shower head  Toilet Height: elevated height  Home Equipment:  patient states that the place ([facility]) does   Transfer Assistance: Independent without use of device  Ambulation Assistance:Independent without use of device  ADL Assistance: requires assistance with bathing, patient states that her daughter helps with bathing  IADL Assistance: requires assistance with all homemaking tasks  Active :        [] Yes                 [x] No  Hand Dominance: [] Left                 [x] Right  Current Employment:  retired   Hobbies: Read, garden  Recent Falls: Patient reports a total of three falls in the last 6 months including the one leading to this admission.     Examination   Vision:   Vision Gross Assessment: Impaired and Vision Corrective Device: wears glasses for reading  Hearing:   hard of hearing    Observation:   General Observation:  Pt on 3.5 L O2 upon entry     Tone:   Normotonic    ROM:   (B) UE AROM WFL  Strength:   (L) Elbow: +4     (R) Elbow: -4    Therapist Clinical Decision Making (Complexity): medium complexity  Clinical Presentation: evolving      Subjective  General: Pt lying supine in bed with HOB slightly raised upon entry. Pt agreeable to OT/PT initial evaluation. Pt presents with some confusion and very limited recall to command following throughout.   Pain: 0/10  Pain Interventions:

## 2024-02-08 NOTE — PROGRESS NOTES
Patient had left bipolar hip hemiarthroplasty done yesterday. Has been sleeping since getting on the floor. BP trending low at a time, HR elevated, message sent to NP to ask if I can hold Cardizem and give an IV bolus, response not received. IV fluid NS infusing @100ml/hr, no urine output yet, patient states, \"I don't have to go now, I have something in there if I need to go\"(Augustine). BP and HR looking better this morning. Cardizem not given last night, Tele applied, all safety precautions in place.

## 2024-02-08 NOTE — PROGRESS NOTES
Department of Internal Medicine  General Internal Medicine   Progress Note      SUBJECTIVE: s/p prosthesis hemireplacement of the hip O2 intially post op was needed up to 6-7 L now down to 3-3.5 denies angina     History obtained from chart review, the patient, and nursing staff   General ROS: positive for  - fatigue, malaise, and weight loss  negative for - chills, fever, or night sweats  Psychological ROS: positive for - memory difficulties  negative for - anxiety, behavioral disorder, or hallucinations  Ophthalmic ROS: negative  Respiratory ROS: no cough, shortness of breath, or wheezing  Cardiovascular ROS: positive for - dyspnea on exertion  negative for - chest pain, loss of consciousness, orthopnea, or palpitations  Gastrointestinal ROS: no abdominal pain, change in bowel habits, or black or bloody stools  Genito-Urinary ROS: no dysuria, trouble voiding, or hematuria  Musculoskeletal ROS: left hip pain   Neurological ROS: no TIA or stroke symptoms  Dermatological ROS: negative    OBJECTIVE      Medications      Current Facility-Administered Medications: acetaminophen (TYLENOL) tablet 650 mg, 650 mg, Oral, TID  traMADol (ULTRAM) tablet 50 mg, 50 mg, Oral, Q6H PRN  atenolol (TENORMIN) tablet 100 mg, 100 mg, Oral, QAM  buPROPion (WELLBUTRIN XL) extended release tablet 150 mg, 150 mg, Oral, QAM  dilTIAZem (CARDIZEM) tablet 30 mg, 30 mg, Oral, Q12H  multivitamin 1 tablet, 1 tablet, Oral, Daily  pantoprazole (PROTONIX) tablet 40 mg, 40 mg, Oral, Daily  PARoxetine (PAXIL) tablet 20 mg, 20 mg, Oral, QPM  atorvastatin (LIPITOR) tablet 20 mg, 20 mg, Oral, Nightly  cefTRIAXone (ROCEPHIN) 1,000 mg in sodium chloride 0.9 % 50 mL IVPB (mini-bag), 1,000 mg, IntraVENous, Q24H  HYDROmorphone HCl PF (DILAUDID) injection 0.5 mg, 0.5 mg, IntraVENous, Q4H PRN  ondansetron (ZOFRAN) injection 4 mg, 4 mg, IntraVENous, Q6H PRN    Physical      Vitals: /76   Pulse 99   Temp 97.9 °F (36.6 °C) (Axillary)   Resp 17   Ht 1.727 m  Troponin, High Sensitivity 14 0 - 14 ng/L   Urinalysis    Collection Time: 02/05/24  7:53 PM   Result Value Ref Range    Color, UA DARK YELLOW (A) Straw/Yellow    Clarity, UA Clear Clear    Glucose, Ur Negative Negative mg/dL    Bilirubin Urine Negative Negative    Ketones, Urine Negative Negative mg/dL    Specific Gravity, UA 1.020 1.005 - 1.030    Blood, Urine SMALL (A) Negative    pH, UA 5.5 5.0 - 8.0    Protein, UA TRACE (A) Negative mg/dL    Urobilinogen, Urine 1.0 <2.0 E.U./dL    Nitrite, Urine Negative Negative    Leukocyte Esterase, Urine Negative Negative    Microscopic Examination YES     Urine Type NotGiven    Microscopic Urinalysis    Collection Time: 02/05/24  7:53 PM   Result Value Ref Range    Bacteria, UA 4+ (A) None Seen /HPF    Hyaline Casts, UA 2 0 - 8 /LPF    WBC, UA 3 0 - 5 /HPF    RBC, UA 4 0 - 4 /HPF    Epithelial Cells, UA 1 0 - 5 /HPF   Protime-INR    Collection Time: 02/05/24 11:09 PM   Result Value Ref Range    Protime 27.0 (H) 11.5 - 14.8 sec    INR 2.52 (H) 0.84 - 1.16   Lactate, Sepsis    Collection Time: 02/06/24 12:33 AM   Result Value Ref Range    Lactic Acid, Sepsis 1.1 0.4 - 1.9 mmol/L   CBC    Collection Time: 02/06/24  4:38 AM   Result Value Ref Range    WBC 10.8 4.0 - 11.0 K/uL    RBC 4.41 4.00 - 5.20 M/uL    Hemoglobin 13.3 12.0 - 16.0 g/dL    Hematocrit 40.0 36.0 - 48.0 %    MCV 90.7 80.0 - 100.0 fL    MCH 30.1 26.0 - 34.0 pg    MCHC 33.2 31.0 - 36.0 g/dL    RDW 15.6 (H) 12.4 - 15.4 %    Platelets 224 135 - 450 K/uL    MPV 8.3 5.0 - 10.5 fL   Basic Metabolic Panel    Collection Time: 02/06/24  4:38 AM   Result Value Ref Range    Sodium 137 136 - 145 mmol/L    Potassium 4.4 3.5 - 5.1 mmol/L    Chloride 105 99 - 110 mmol/L    CO2 23 21 - 32 mmol/L    Anion Gap 9 3 - 16    Glucose 137 (H) 70 - 99 mg/dL    BUN 30 (H) 7 - 20 mg/dL    Creatinine 1.5 (H) 0.6 - 1.2 mg/dL    Est, Glom Filt Rate 34 (A) >60    Calcium 9.1 8.3 - 10.6 mg/dL   Protime-INR    Collection Time: 02/06/24 11:32

## 2024-02-08 NOTE — PROGRESS NOTES
Department of Internal Medicine  General Internal Medicine   Progress Note      SUBJECTIVE: moderate pain over all in no distress     History obtained from chart review, the patient, and nursing staff   General ROS: positive for  - fatigue, malaise, and weight loss  negative for - chills, fever, or night sweats  Psychological ROS: positive for - memory difficulties  negative for - anxiety, behavioral disorder, or hallucinations  Ophthalmic ROS: negative  Respiratory ROS: no cough, shortness of breath, or wheezing  Cardiovascular ROS: positive for - dyspnea on exertion  negative for - chest pain, loss of consciousness, orthopnea, or palpitations  Gastrointestinal ROS: no abdominal pain, change in bowel habits, or black or bloody stools  Genito-Urinary ROS: no dysuria, trouble voiding, or hematuria  Musculoskeletal ROS: left hip pain   Neurological ROS: no TIA or stroke symptoms  Dermatological ROS: negative    OBJECTIVE      Medications      Current Facility-Administered Medications: acetaminophen (TYLENOL) tablet 650 mg, 650 mg, Oral, TID  traMADol (ULTRAM) tablet 50 mg, 50 mg, Oral, Q6H PRN  atenolol (TENORMIN) tablet 100 mg, 100 mg, Oral, QAM  buPROPion (WELLBUTRIN XL) extended release tablet 150 mg, 150 mg, Oral, QAM  dilTIAZem (CARDIZEM) tablet 30 mg, 30 mg, Oral, Q12H  multivitamin 1 tablet, 1 tablet, Oral, Daily  pantoprazole (PROTONIX) tablet 40 mg, 40 mg, Oral, Daily  PARoxetine (PAXIL) tablet 20 mg, 20 mg, Oral, QPM  atorvastatin (LIPITOR) tablet 20 mg, 20 mg, Oral, Nightly  cefTRIAXone (ROCEPHIN) 1,000 mg in sodium chloride 0.9 % 50 mL IVPB (mini-bag), 1,000 mg, IntraVENous, Q24H  0.9 % sodium chloride infusion, , IntraVENous, Continuous  HYDROmorphone HCl PF (DILAUDID) injection 0.5 mg, 0.5 mg, IntraVENous, Q4H PRN  ondansetron (ZOFRAN) injection 4 mg, 4 mg, IntraVENous, Q6H PRN    Physical      Vitals: /75   Pulse 87   Temp 97.6 °F (36.4 °C) (Axillary)   Resp 16   Ht 1.727 m (5' 8\")   Wt 61.8

## 2024-02-08 NOTE — PROGRESS NOTES
Physical Therapy    [facility] - Inpatient Rehabilitation Department   Phone: [phone]    Physical Therapy    [x] Initial Evaluation            [] Daily Treatment Note         [] Discharge Summary      Patient: Laurie Dominguez   : 1940   MRN: [id number]   Date of Service:  2024  Admitting Diagnosis: Closed subcapital fracture of femur with routine healing, left  Current Admission Summary: Laurie Dominguez is an 83 year old female admitted after a fall at her nursing home. X-ray showed impacted subcapital fracture of the left femoral neck. Patient is s/p left hip hemiarthroplasty with Dr. Ledesma on 24.   Past Medical History:  has a past medical history of A-fib (HCC), Atrial fibrillation (HCC), Hypercholesterolemia, Hypertension, and Pelvic mass.  Past Surgical History:  has a past surgical history that includes Appendectomy; Cystocopy (Right, 2018); Hysterectomy, total abdominal (2018); pr cysto/uretero w/lithotripsy &indwell stent insrt (Right, 10/31/2018); Upper gastrointestinal endoscopy (N/A, 2020); Upper gastrointestinal endoscopy (N/A, 2020); and Upper gastrointestinal endoscopy (N/A, 3/2/2021).  Discharge Recommendations: Laurie Dominguez scored a  on the AM-PAC short mobility form. Current research shows that an AM-PAC score of 17 or less is typically not associated with a discharge to the patient's home setting. Based on the patient's AM-PAC score and their current functional mobility deficits, it is recommended that the patient have 3-5 sessions per week of Physical Therapy at d/c to increase the patient's independence.  Please see assessment section for further patient specific details. If patient discharges prior to next session this note will serve as a discharge summary.  Please see below for the latest assessment towards goals.    DME Required For Discharge: DME to be determined at next level of care, DME to be determined pending patient

## 2024-02-08 NOTE — PLAN OF CARE
Problem: Discharge Planning  Goal: Discharge to home or other facility with appropriate resources  Outcome: Progressing     Problem: Pain  Goal: Verbalizes/displays adequate comfort level or baseline comfort level  Outcome: Progressing     Problem: Safety - Adult  Goal: Free from fall injury  Outcome: Progressing     Problem: ABCDS Injury Assessment  Goal: Absence of physical injury  Outcome: Progressing     Problem: Chronic Conditions and Co-morbidities  Goal: Patient's chronic conditions and co-morbidity symptoms are monitored and maintained or improved  Outcome: Progressing     Problem: Neurosensory - Adult  Goal: Achieves stable or improved neurological status  Outcome: Progressing     Problem: Cardiovascular - Adult  Goal: Maintains optimal cardiac output and hemodynamic stability  Outcome: Progressing     Problem: Cardiovascular - Adult  Goal: Absence of cardiac dysrhythmias or at baseline  Outcome: Progressing     Problem: Musculoskeletal - Adult  Goal: Return mobility to safest level of function  Outcome: Progressing     Problem: Musculoskeletal - Adult  Goal: Maintain proper alignment of affected body part  Outcome: Progressing     Problem: Skin/Tissue Integrity  Goal: Absence of new skin breakdown  Description: 1.  Monitor for areas of redness and/or skin breakdown  2.  Assess vascular access sites hourly  3.  Every 4-6 hours minimum:  Change oxygen saturation probe site  4.  Every 4-6 hours:  If on nasal continuous positive airway pressure, respiratory therapy assess nares and determine need for appliance change or resting period.  Outcome: Progressing

## 2024-02-09 LAB
ANION GAP SERPL CALCULATED.3IONS-SCNC: 12 MMOL/L (ref 3–16)
BACTERIA BLD CULT ORG #2: NORMAL
BACTERIA BLD CULT: NORMAL
BILIRUB UR QL STRIP.AUTO: NEGATIVE
BUN SERPL-MCNC: 34 MG/DL (ref 7–20)
CALCIUM SERPL-MCNC: 8.7 MG/DL (ref 8.3–10.6)
CHLORIDE SERPL-SCNC: 106 MMOL/L (ref 99–110)
CLARITY UR: CLEAR
CO2 SERPL-SCNC: 21 MMOL/L (ref 21–32)
COLOR UR: YELLOW
CREAT SERPL-MCNC: 1.6 MG/DL (ref 0.6–1.2)
DEPRECATED RDW RBC AUTO: 15.8 % (ref 12.4–15.4)
GFR SERPLBLD CREATININE-BSD FMLA CKD-EPI: 32 ML/MIN/{1.73_M2}
GLUCOSE SERPL-MCNC: 139 MG/DL (ref 70–99)
GLUCOSE UR STRIP.AUTO-MCNC: NEGATIVE MG/DL
HCT VFR BLD AUTO: 36.7 % (ref 36–48)
HGB BLD-MCNC: 12.2 G/DL (ref 12–16)
HGB UR QL STRIP.AUTO: NEGATIVE
KETONES UR STRIP.AUTO-MCNC: NEGATIVE MG/DL
LEUKOCYTE ESTERASE UR QL STRIP.AUTO: NEGATIVE
MCH RBC QN AUTO: 30.5 PG (ref 26–34)
MCHC RBC AUTO-ENTMCNC: 33.4 G/DL (ref 31–36)
MCV RBC AUTO: 91.4 FL (ref 80–100)
NITRITE UR QL STRIP.AUTO: NEGATIVE
NT-PROBNP SERPL-MCNC: 7032 PG/ML (ref 0–449)
PH UR STRIP.AUTO: 5.5 [PH] (ref 5–8)
PLATELET # BLD AUTO: 234 K/UL (ref 135–450)
PMV BLD AUTO: 8.4 FL (ref 5–10.5)
POTASSIUM SERPL-SCNC: 4.2 MMOL/L (ref 3.5–5.1)
PROCALCITONIN SERPL IA-MCNC: 0.22 NG/ML (ref 0–0.15)
PROT UR STRIP.AUTO-MCNC: NEGATIVE MG/DL
RBC # BLD AUTO: 4.01 M/UL (ref 4–5.2)
SODIUM SERPL-SCNC: 139 MMOL/L (ref 136–145)
SP GR UR STRIP.AUTO: 1.01 (ref 1–1.03)
UA COMPLETE W REFLEX CULTURE PNL UR: NORMAL
UA DIPSTICK W REFLEX MICRO PNL UR: NORMAL
URN SPEC COLLECT METH UR: NORMAL
UROBILINOGEN UR STRIP-ACNC: 0.2 E.U./DL
WBC # BLD AUTO: 14 K/UL (ref 4–11)

## 2024-02-09 PROCEDURE — 84145 PROCALCITONIN (PCT): CPT

## 2024-02-09 PROCEDURE — 97110 THERAPEUTIC EXERCISES: CPT

## 2024-02-09 PROCEDURE — 81003 URINALYSIS AUTO W/O SCOPE: CPT

## 2024-02-09 PROCEDURE — 1200000000 HC SEMI PRIVATE

## 2024-02-09 PROCEDURE — 93306 TTE W/DOPPLER COMPLETE: CPT

## 2024-02-09 PROCEDURE — 97530 THERAPEUTIC ACTIVITIES: CPT

## 2024-02-09 PROCEDURE — 6370000000 HC RX 637 (ALT 250 FOR IP): Performed by: NURSE PRACTITIONER

## 2024-02-09 PROCEDURE — 6360000002 HC RX W HCPCS: Performed by: PHYSICIAN ASSISTANT

## 2024-02-09 PROCEDURE — 85027 COMPLETE CBC AUTOMATED: CPT

## 2024-02-09 PROCEDURE — 80048 BASIC METABOLIC PNL TOTAL CA: CPT

## 2024-02-09 PROCEDURE — 83880 ASSAY OF NATRIURETIC PEPTIDE: CPT

## 2024-02-09 PROCEDURE — 36415 COLL VENOUS BLD VENIPUNCTURE: CPT

## 2024-02-09 PROCEDURE — 6370000000 HC RX 637 (ALT 250 FOR IP): Performed by: PHYSICIAN ASSISTANT

## 2024-02-09 PROCEDURE — APPNB45 APP NON BILLABLE 31-45 MINUTES: Performed by: NURSE PRACTITIONER

## 2024-02-09 RX ORDER — ENOXAPARIN SODIUM 100 MG/ML
30 INJECTION SUBCUTANEOUS DAILY
Status: DISCONTINUED | OUTPATIENT
Start: 2024-02-09 | End: 2024-02-10 | Stop reason: HOSPADM

## 2024-02-09 RX ORDER — FUROSEMIDE 10 MG/ML
40 INJECTION INTRAMUSCULAR; INTRAVENOUS ONCE
Status: COMPLETED | OUTPATIENT
Start: 2024-02-09 | End: 2024-02-09

## 2024-02-09 RX ORDER — WARFARIN SODIUM 2.5 MG/1
2.5 TABLET ORAL DAILY
Status: DISCONTINUED | OUTPATIENT
Start: 2024-02-09 | End: 2024-02-10 | Stop reason: HOSPADM

## 2024-02-09 RX ADMIN — BUPROPION HYDROCHLORIDE 150 MG: 150 TABLET, EXTENDED RELEASE ORAL at 08:52

## 2024-02-09 RX ADMIN — THERA TABS 1 TABLET: TAB at 08:52

## 2024-02-09 RX ADMIN — ACETAMINOPHEN 650 MG: 325 TABLET ORAL at 08:52

## 2024-02-09 RX ADMIN — PANTOPRAZOLE SODIUM 40 MG: 40 TABLET, DELAYED RELEASE ORAL at 08:52

## 2024-02-09 RX ADMIN — ATORVASTATIN CALCIUM 20 MG: 20 TABLET, FILM COATED ORAL at 20:43

## 2024-02-09 RX ADMIN — ACETAMINOPHEN 650 MG: 325 TABLET ORAL at 20:43

## 2024-02-09 RX ADMIN — ATENOLOL 100 MG: 50 TABLET ORAL at 08:52

## 2024-02-09 RX ADMIN — DILTIAZEM HYDROCHLORIDE 30 MG: 30 TABLET, FILM COATED ORAL at 00:33

## 2024-02-09 RX ADMIN — PAROXETINE HYDROCHLORIDE 20 MG: 20 TABLET, FILM COATED ORAL at 17:59

## 2024-02-09 RX ADMIN — DILTIAZEM HYDROCHLORIDE 30 MG: 30 TABLET, FILM COATED ORAL at 14:04

## 2024-02-09 RX ADMIN — ACETAMINOPHEN 650 MG: 325 TABLET ORAL at 14:04

## 2024-02-09 RX ADMIN — ENOXAPARIN SODIUM 30 MG: 100 INJECTION SUBCUTANEOUS at 12:00

## 2024-02-09 RX ADMIN — FUROSEMIDE 40 MG: 10 INJECTION, SOLUTION INTRAMUSCULAR; INTRAVENOUS at 12:00

## 2024-02-09 RX ADMIN — WARFARIN SODIUM 2.5 MG: 2.5 TABLET ORAL at 17:58

## 2024-02-09 ASSESSMENT — PAIN SCALES - WONG BAKER
WONGBAKER_NUMERICALRESPONSE: 0
WONGBAKER_NUMERICALRESPONSE: 4
WONGBAKER_NUMERICALRESPONSE: 0

## 2024-02-09 ASSESSMENT — PAIN SCALES - GENERAL
PAINLEVEL_OUTOF10: 0

## 2024-02-09 NOTE — PLAN OF CARE
Problem: Discharge Planning  Goal: Discharge to home or other facility with appropriate resources  Outcome: Progressing     Problem: Pain  Goal: Verbalizes/displays adequate comfort level or baseline comfort level  Outcome: Progressing     Problem: Safety - Adult  Goal: Free from fall injury  Outcome: Progressing     Problem: ABCDS Injury Assessment  Goal: Absence of physical injury  Outcome: Progressing     Problem: Chronic Conditions and Co-morbidities  Goal: Patient's chronic conditions and co-morbidity symptoms are monitored and maintained or improved  Outcome: Progressing     Problem: Neurosensory - Adult  Goal: Achieves stable or improved neurological status  Outcome: Progressing     Problem: Cardiovascular - Adult  Goal: Maintains optimal cardiac output and hemodynamic stability  Outcome: Progressing  Goal: Absence of cardiac dysrhythmias or at baseline  Outcome: Progressing     Problem: Musculoskeletal - Adult  Goal: Return mobility to safest level of function  Outcome: Progressing  Goal: Maintain proper alignment of affected body part  Outcome: Progressing     Problem: Skin/Tissue Integrity  Goal: Absence of new skin breakdown  Description: 1.  Monitor for areas of redness and/or skin breakdown  2.  Assess vascular access sites hourly  3.  Every 4-6 hours minimum:  Change oxygen saturation probe site  4.  Every 4-6 hours:  If on nasal continuous positive airway pressure, respiratory therapy assess nares and determine need for appliance change or resting period.  Outcome: Progressing

## 2024-02-09 NOTE — PROGRESS NOTES
Physical Therapy/Occupational Therapy      Treatment attempt. Nsg reported transport just called to  pt for ECHO.  Will re-attempt later.   Thanks,   Maureen Bro PT, DPT [id number]  Veronica Domingo, [facility] OTR/L, [id number] 2/9/2024 9:53 AM

## 2024-02-09 NOTE — PROGRESS NOTES
[facility] - Inpatient Rehabilitation Department   Phone: [phone]    Occupational Therapy    [] Initial Evaluation            [x] Daily Treatment Note         [] Discharge Summary      Patient: Laurie Dominguez   : 1940   MRN: [id number]   Date of Service:  2024    Admitting Diagnosis:  Closed subcapital fracture of femur with routine healing, left  Current Admission Summary:  Laurie Dominguez is an 83 year old female admitted after a fall at her nursing home. X-ray showed impacted subcapital fracture of the left femoral neck. Patient is s/p left hip hemiarthroplasty with Dr. Ledesma on 24.   Past Medical History:  has a past medical history of A-fib (HCC), Atrial fibrillation (HCC), Hypercholesterolemia, Hypertension, and Pelvic mass.  Past Surgical History:  has a past surgical history that includes Appendectomy; Cystocopy (Right, 2018); Hysterectomy, total abdominal (2018); pr cysto/uretero w/lithotripsy &indwell stent insrt (Right, 10/31/2018); Upper gastrointestinal endoscopy (N/A, 2020); Upper gastrointestinal endoscopy (N/A, 2020); Upper gastrointestinal endoscopy (N/A, 3/2/2021); and hip surgery (Left, 2024).    Discharge Recommendations: Laurie Dominguez scored a 16/24 on the AM-PAC ADL Inpatient form. Current research shows that an AM-PAC score of 17 or less is typically not associated with a discharge to the patient's home setting. Based on the patient's AM-PAC score and their current ADL deficits, it is recommended that the patient have 3-5 sessions per week of Occupational Therapy at d/c to increase the patient's independence.  Please see assessment section for further patient specific details.    If patient discharges prior to next session this note will serve as a discharge summary.  Please see below for the latest assessment towards goals.       DME Required For Discharge: DME to be determined at next level of care, DME to be determined  participate    Instrumental Activities of Daily Living  No IADL completed on this date.    Functional Mobility  Bed Mobility:  Supine to Sit: minimal assistance  Scooting: stand by assistance  Comments: Demarcus for BLE  Transfers:  Sit to stand transfer:2 person assistance with Demarcus   Stand to sit transfer: 2 person assistance with Demarcus   Stand step transfer: 2 person assistance with Demarcus   Comments: VC for sequencing step-pattern, use of RW  Functional Mobility  Functional Mobility Activity: 4 steps  Device Use: rolling walker  Required Assistance: 2 person assistance with Demarcus   Comment: Initial weight shifts in stance prior to mobility, VC for step sequence and RW mgmt during turn, reports feeling \"woozy\" upon 2 steps  Balance:  Static Sitting Balance: fair (+): maintains balance at SBA/supervision without use of UE support  Dynamic Sitting Balance: fair: maintains balance at CGA without use of UE support  Static Standing Balance: poor: requires mod (A) to maintain balance  Dynamic Standing Balance: poor (-): requires max (A) to maintain balance  Comments:    Other Therapeutic Interventions  Pt participated in UE/LE There Ex (See PT note for B LE exercises)  Use of Light Resistance Theraband, seated with back support in recliner, 1 set of 10 reps each (B)  -Elbow Flexion  -Elbow Extension  -Alternating cross-midline punch to target x20  -Shoulder flexion     Functional Outcomes  AM-PAC Inpatient Daily Activity Raw Score: 16    Cognition  Overall Cognitive Status: Impaired  Following Commands: follows one step commands with repetition, follows one step commands with increased time  Memory: decreased recall of recent events, decreased short term memory  Safety Judgement: decreased awareness of need for assistance, decreased awareness of need for safety  Problem Solving: assistance required to generate solutions, decreased awareness of errors, assistance required to correct errors made  Insights: decreased awareness of

## 2024-02-09 NOTE — CARE COORDINATION
IMM Letter       02/09/24 1541   IMM Letter   IMM Letter given to Patient/Family/Significant other/Guardian/POA/by: Patient   IMM Letter date given: 02/09/24   IMM Letter time given: 1535     BILLY Nicholson RN    [facility]  Phone: [phone]

## 2024-02-09 NOTE — PLAN OF CARE
Problem: Discharge Planning  Goal: Discharge to home or other facility with appropriate resources  2/9/2024 0851 by Mich Bennett  Outcome: Progressing     Problem: Pain  Goal: Verbalizes/displays adequate comfort level or baseline comfort level  2/9/2024 0851 by Mich Bennett  Outcome: Progressing     Problem: Safety - Adult  Goal: Free from fall injury  2/9/2024 0851 by Mich Bennett  Outcome: Progressing     Problem: ABCDS Injury Assessment  Goal: Absence of physical injury  2/9/2024 0851 by Mich Bennett  Outcome: Progressing     Problem: Chronic Conditions and Co-morbidities  Goal: Patient's chronic conditions and co-morbidity symptoms are monitored and maintained or improved  2/9/2024 0851 by Mich Bennett  Outcome: Progressing     Problem: Neurosensory - Adult  Goal: Achieves stable or improved neurological status  2/9/2024 0851 by Mich Bennett  Outcome: Progressing     Problem: Cardiovascular - Adult  Goal: Maintains optimal cardiac output and hemodynamic stability  2/9/2024 0851 by Mich Bennett  Outcome: Progressing     Problem: Cardiovascular - Adult  Goal: Absence of cardiac dysrhythmias or at baseline  2/9/2024 0851 by Mich Bennett  Outcome: Progressing     Problem: Musculoskeletal - Adult  Goal: Return mobility to safest level of function  2/9/2024 0851 by Mich Bennett  Outcome: Progressing     Problem: Musculoskeletal - Adult  Goal: Maintain proper alignment of affected body part  2/9/2024 0851 by Mich Bennett  Outcome: Progressing     Problem: Skin/Tissue Integrity  Goal: Absence of new skin breakdown  Description: 1.  Monitor for areas of redness and/or skin breakdown  2.  Assess vascular access sites hourly  3.  Every 4-6 hours minimum:  Change oxygen saturation probe site  4.  Every 4-6 hours:  If on nasal continuous positive airway pressure, respiratory therapy assess nares and determine need for appliance change or resting period.  2/9/2024 0851 by

## 2024-02-09 NOTE — PROGRESS NOTES
[facility] Orthopedic Surgery   Progress Note    CHIEF COMPLAINT/DIAGNOSIS: S/p left hip hemiarthroplasty for fracture    SUBJECTIVE: PT DOWN AT ECHO    OBJECTIVE  Physical    VITALS:  /83   Pulse 80   Temp 98.1 °F (36.7 °C) (Oral)   Resp 21   Ht 1.727 m (5' 8\")   Wt 61.8 kg (136 lb 3.9 oz)   SpO2 94%   BMI 20.72 kg/m²     Data    ALL MEDICATIONS HAVE BEEN REVIEWED    CBC:   Recent Labs     02/07/24  0536 02/09/24  0536   WBC 10.2 14.0*   HGB 12.9 12.2   HCT 38.2 36.7    234     BMP:   Recent Labs     02/07/24  0536 02/09/24  0536    139   K 4.5 4.2    106   CO2 21 21   BUN 24* 34*   CREATININE 1.6* 1.6*     INR:   Recent Labs     02/06/24  1132 02/07/24  0536   INR 1.60* 1.28*       ASSESSMENT:  S/p left hip hemiarthroplasty (2/8/24), POD#2  Afib on warfarin    PLAN:   - WB status:  WBAT through operative extremity; no specific precautions  - DVT prophylaxis: OK to resume coumadin  - Ice therapy  - PT/OT  - Pain Control: Rx for dc in chart.  Due to orthopaedic surgical procedure/condition, patient may require pain medication for up to 6-8 weeks.  - ID:  Ancef x 2 doses post-op completed.   - Disposition: per primary team; ok to d/c from our end once medically stable and dispo needs met.     Follow-up in two weeks with Dr. Ledesma.  Office # [phone]    BRIGHT Crump - CNP  2/9/2024  10:03 AM

## 2024-02-09 NOTE — PROGRESS NOTES
Shift assessment completed, patient alert and oriented, vitals, purewick replaced, voiding normal, administered all her evening medications as per mar, patient able to swallow pills whole with water, neurovascular assessment WNL, dressing dry, clean and intact, denied for sever pain, pain managed with scheduled acetaminophen, safety precautions applied, camera in her room, care plan reviewed and manually agreed upon with the patient.  Eloy Kinney RN

## 2024-02-09 NOTE — PROGRESS NOTES
complex at C5-6.      Small disc osteophyte complex at C4-5.         CT HEAD WO CONTRAST   Final Result   No acute intracranial abnormality.         XR CHEST PORTABLE   Final Result   Stable chronic changes with no acute abnormality seen.             Recent imaging reviewed    Problem List  Principal Problem:    Closed subcapital fracture of femur with routine healing, left  Active Problems:    Nonrheumatic mitral valve regurgitation    Hypercholesterolemia    Hypertension    Atrial fibrillation (HCC)    Stage 2 chronic kidney disease    Diastolic dysfunction    On continuous oral anticoagulation    Atrial fibrillation with rapid ventricular response (HCC)    Closed fracture of neck of left femur (HCC)    Nonrheumatic aortic valve insufficiency  Resolved Problems:    * No resolved hospital problems. *       Assessment & Plan:   Leukocytosis-no fevers. Repeat UA today. Just had CXR yesterday. Procalcitonin normal. Repeat labs in am.   Fluid overload-BNP 7k. Give additional 40 of lasix iv. Repeat labs in am. Reviewed echo has severe mitral regurg-unchanged from previous echo. Needs daily weights.   Post op hypoxia-secondary to fluid overload. Weaned off O2.   Atrial fib-coumadin and lovenox started today  Disposition-await repeat labs tomorrow. Possible dc to snf      Diet: ADULT DIET; Regular; 3 carb choices (45 gm/meal); Low Fat/Low Chol/High Fiber/2 gm Na  Code:Full Code        Ritu Riley PA-C   2/9/2024 3:27 PM

## 2024-02-09 NOTE — PLAN OF CARE
Problem: Discharge Planning  Goal: Discharge to home or other facility with appropriate resources  2/9/2024 0853 by Rafaela Singh RN  Outcome: Progressing     Problem: Pain  Goal: Verbalizes/displays adequate comfort level or baseline comfort level  2/9/2024 0853 by Rafaela Singh RN  Outcome: Progressing     Problem: Safety - Adult  Goal: Free from fall injury  2/9/2024 0853 by Rafaela Singh RN  Outcome: Progressing     Problem: ABCDS Injury Assessment  Goal: Absence of physical injury  2/9/2024 0853 by Rafaela Singh RN  Outcome: Progressing     Problem: Chronic Conditions and Co-morbidities  Goal: Patient's chronic conditions and co-morbidity symptoms are monitored and maintained or improved  2/9/2024 0853 by Rafaela Singh RN  Outcome: Progressing     Problem: Neurosensory - Adult  Goal: Achieves stable or improved neurological status  2/9/2024 0853 by Rafaela Singh RN  Outcome: Progressing     Problem: Cardiovascular - Adult  Goal: Maintains optimal cardiac output and hemodynamic stability  2/9/2024 0853 by Rafaela Singh RN  Outcome: Progressing     Problem: Cardiovascular - Adult  Goal: Absence of cardiac dysrhythmias or at baseline  2/9/2024 0853 by Rafaela Singh RN  Outcome: Progressing     Problem: Musculoskeletal - Adult  Goal: Return mobility to safest level of function  2/9/2024 0853 by Rafaela Singh RN  Outcome: Progressing     Problem: Musculoskeletal - Adult  Goal: Maintain proper alignment of affected body part  2/9/2024 0853 by Rafaela Singh RN  Outcome: Progressing

## 2024-02-09 NOTE — PROGRESS NOTES
Physical Therapy    [facility] - Inpatient Rehabilitation Department   Phone: [phone]    Physical Therapy    [] Initial Evaluation            [x] Daily Treatment Note         [] Discharge Summary      Patient: Laurie Dominguez   : 1940   MRN: [id number]   Date of Service:  2024  Admitting Diagnosis: Closed subcapital fracture of femur with routine healing, left  Current Admission Summary: Laurie Dominguez is an 83 year old female admitted after a fall at her nursing home. X-ray showed impacted subcapital fracture of the left femoral neck. Patient is s/p left hip hemiarthroplasty with Dr. Ledesma on 24.   Past Medical History:  has a past medical history of A-fib (HCC), Atrial fibrillation (HCC), Hypercholesterolemia, Hypertension, and Pelvic mass.  Past Surgical History:  has a past surgical history that includes Appendectomy; Cystocopy (Right, 2018); Hysterectomy, total abdominal (2018); pr cysto/uretero w/lithotripsy &indwell stent insrt (Right, 10/31/2018); Upper gastrointestinal endoscopy (N/A, 2020); Upper gastrointestinal endoscopy (N/A, 2020); Upper gastrointestinal endoscopy (N/A, 3/2/2021); and hip surgery (Left, 2024).  Discharge Recommendations: Laurie Dominguez scored a  on the AM-PAC short mobility form. Current research shows that an AM-PAC score of 17 or less is typically not associated with a discharge to the patient's home setting. Based on the patient's AM-PAC score and their current functional mobility deficits, it is recommended that the patient have 3-5 sessions per week of Physical Therapy at d/c to increase the patient's independence.  Please see assessment section for further patient specific details. If patient discharges prior to next session this note will serve as a discharge summary.  Please see below for the latest assessment towards goals.    DME Required For Discharge: DME to be determined at next level of care, DME to be

## 2024-02-10 VITALS
OXYGEN SATURATION: 92 % | HEIGHT: 68 IN | SYSTOLIC BLOOD PRESSURE: 128 MMHG | HEART RATE: 92 BPM | BODY MASS INDEX: 21.89 KG/M2 | DIASTOLIC BLOOD PRESSURE: 89 MMHG | TEMPERATURE: 97.9 F | RESPIRATION RATE: 16 BRPM | WEIGHT: 144.4 LBS

## 2024-02-10 LAB
ANION GAP SERPL CALCULATED.3IONS-SCNC: 16 MMOL/L (ref 3–16)
BASOPHILS # BLD: 0.1 K/UL (ref 0–0.2)
BASOPHILS NFR BLD: 0.5 %
BUN SERPL-MCNC: 39 MG/DL (ref 7–20)
CALCIUM SERPL-MCNC: 9 MG/DL (ref 8.3–10.6)
CHLORIDE SERPL-SCNC: 104 MMOL/L (ref 99–110)
CO2 SERPL-SCNC: 21 MMOL/L (ref 21–32)
CREAT SERPL-MCNC: 1.7 MG/DL (ref 0.6–1.2)
DEPRECATED RDW RBC AUTO: 15.7 % (ref 12.4–15.4)
EOSINOPHIL # BLD: 0.1 K/UL (ref 0–0.6)
EOSINOPHIL NFR BLD: 0.9 %
GFR SERPLBLD CREATININE-BSD FMLA CKD-EPI: 29 ML/MIN/{1.73_M2}
GLUCOSE SERPL-MCNC: 139 MG/DL (ref 70–99)
HCT VFR BLD AUTO: 37.9 % (ref 36–48)
HGB BLD-MCNC: 12.6 G/DL (ref 12–16)
INR PPP: 1.25 (ref 0.84–1.16)
LYMPHOCYTES # BLD: 2.8 K/UL (ref 1–5.1)
LYMPHOCYTES NFR BLD: 23.9 %
MCH RBC QN AUTO: 30.2 PG (ref 26–34)
MCHC RBC AUTO-ENTMCNC: 33.3 G/DL (ref 31–36)
MCV RBC AUTO: 90.6 FL (ref 80–100)
MONOCYTES # BLD: 0.7 K/UL (ref 0–1.3)
MONOCYTES NFR BLD: 5.6 %
NEUTROPHILS # BLD: 8.2 K/UL (ref 1.7–7.7)
NEUTROPHILS NFR BLD: 69.1 %
NT-PROBNP SERPL-MCNC: 5548 PG/ML (ref 0–449)
PLATELET # BLD AUTO: 270 K/UL (ref 135–450)
PMV BLD AUTO: 8.3 FL (ref 5–10.5)
POTASSIUM SERPL-SCNC: 4.1 MMOL/L (ref 3.5–5.1)
PROTHROMBIN TIME: 15.7 SEC (ref 11.5–14.8)
RBC # BLD AUTO: 4.19 M/UL (ref 4–5.2)
SODIUM SERPL-SCNC: 141 MMOL/L (ref 136–145)
WBC # BLD AUTO: 11.9 K/UL (ref 4–11)

## 2024-02-10 PROCEDURE — 80048 BASIC METABOLIC PNL TOTAL CA: CPT

## 2024-02-10 PROCEDURE — 97530 THERAPEUTIC ACTIVITIES: CPT

## 2024-02-10 PROCEDURE — 85610 PROTHROMBIN TIME: CPT

## 2024-02-10 PROCEDURE — 85025 COMPLETE CBC W/AUTO DIFF WBC: CPT

## 2024-02-10 PROCEDURE — 97116 GAIT TRAINING THERAPY: CPT

## 2024-02-10 PROCEDURE — 36415 COLL VENOUS BLD VENIPUNCTURE: CPT

## 2024-02-10 PROCEDURE — 6370000000 HC RX 637 (ALT 250 FOR IP): Performed by: NURSE PRACTITIONER

## 2024-02-10 PROCEDURE — 6360000002 HC RX W HCPCS: Performed by: PHYSICIAN ASSISTANT

## 2024-02-10 PROCEDURE — 83880 ASSAY OF NATRIURETIC PEPTIDE: CPT

## 2024-02-10 RX ORDER — WARFARIN SODIUM 2.5 MG/1
2.5 TABLET ORAL
Status: DISCONTINUED | OUTPATIENT
Start: 2024-02-10 | End: 2024-02-10 | Stop reason: SDUPTHER

## 2024-02-10 RX ORDER — FUROSEMIDE 20 MG/1
20 TABLET ORAL DAILY PRN
Qty: 60 TABLET | Refills: 3
Start: 2024-02-10

## 2024-02-10 RX ADMIN — DILTIAZEM HYDROCHLORIDE 30 MG: 30 TABLET, FILM COATED ORAL at 13:10

## 2024-02-10 RX ADMIN — ATENOLOL 100 MG: 50 TABLET ORAL at 08:38

## 2024-02-10 RX ADMIN — ENOXAPARIN SODIUM 30 MG: 100 INJECTION SUBCUTANEOUS at 08:42

## 2024-02-10 RX ADMIN — PANTOPRAZOLE SODIUM 40 MG: 40 TABLET, DELAYED RELEASE ORAL at 08:38

## 2024-02-10 RX ADMIN — THERA TABS 1 TABLET: TAB at 08:38

## 2024-02-10 RX ADMIN — BUPROPION HYDROCHLORIDE 150 MG: 150 TABLET, EXTENDED RELEASE ORAL at 08:38

## 2024-02-10 RX ADMIN — ACETAMINOPHEN 650 MG: 325 TABLET ORAL at 14:36

## 2024-02-10 RX ADMIN — DILTIAZEM HYDROCHLORIDE 30 MG: 30 TABLET, FILM COATED ORAL at 03:19

## 2024-02-10 RX ADMIN — ACETAMINOPHEN 650 MG: 325 TABLET ORAL at 08:38

## 2024-02-10 ASSESSMENT — PAIN SCALES - GENERAL: PAINLEVEL_OUTOF10: 0

## 2024-02-10 NOTE — DISCHARGE INSTR - COC
Continuity of Care Form    Patient Name: Laurie Dominguez   :  1940  MRN:  [id number]    Admit date:  2024  Discharge date:  ***    Code Status Order: Full Code   Advance Directives:   Advance Care Flowsheet Documentation       Date/Time Healthcare Directive Type of Healthcare Directive Copy in Chart Healthcare Agent Appointed Healthcare Agent's Name Healthcare Agent's Phone Number    24 1544 No, patient does not have an advance directive for healthcare treatment -- -- -- -- --            Admitting Physician:  Mehul Tan MD  PCP: Glory Miner, APRN - CNP    Discharging Nurse: ***  Discharging Hospital Unit/Room#: 4TN-4464/4464-01  Discharging Unit Phone Number: ***    Emergency Contact:   Extended Emergency Contact Information  Primary Emergency Contact: christiano thomas  Home Phone: [phone]  Mobile Phone: [phone]  Relation: Child  Secondary Emergency Contact: Rosamaria Roldan  Home Phone: [phone]  Mobile Phone: [phone]  Relation: Child   needed? No    Past Surgical History:  Past Surgical History:   Procedure Laterality Date    APPENDECTOMY      CYSTOSCOPY Right 2018    retrograde pyelogram, ureteroscopy, and stent placement    HIP SURGERY Left 2024    LEFT BIPOLAR HIP HEMIARTHROPLASTY ANTERIOR APPROACH (ESE) performed by Porfirio Ledesma MD at [facility] OR    HYSTERECTOMY, TOTAL ABDOMINAL (CERVIX REMOVED)  2018    robotic with BSO, omentectomy, pelvic mass removal, lymphnode dissection    MT CYSTO/URETERO W/LITHOTRIPSY &INDWELL STENT INSRT Right 10/31/2018    CYSTOSCOPY WITH RIGHT URETEROSCOPY HOLMIUM LASER LITHOTRIPSY STONE MANIPULATION STONE BASKET WITH RIGHT STENT EXCHANGE performed by Daniel Hastings MD at [facility] OR    UPPER GASTROINTESTINAL ENDOSCOPY N/A 2020    EGD CONTROL HEMORRHAGE performed by Stoney Allen MD at [facility] ASC ENDOSCOPY    UPPER GASTROINTESTINAL ENDOSCOPY N/A 2020    EGD BIOPSY performed by Stoney Allen MD at [facility] ASC  ENDOSCOPY    UPPER GASTROINTESTINAL ENDOSCOPY N/A 3/2/2021    EGD BIOPSY performed by Stoney Allen MD at [facility] ENDOSCOPY       Immunization History:   Immunization History   Administered Date(s) Administered    Influenza, FLUAD, (age 65 y+), Adjuvanted, 0.5mL 09/25/2023       Active Problems:  Patient Active Problem List   Diagnosis Code    Hypercholesterolemia E78.00    Hypertension I10    Atrial fibrillation (HCC) I48.91    Severe sepsis ([facility]) A41.9, R65.20    Hydroureteronephrosis N13.30    Pelvic mass R19.00    CHARISSE (acute kidney injury) ([facility]) N17.9    Acute metabolic encephalopathy G93.41    Mixed hyperlipidemia E78.2    Primary osteoarthritis of left knee M17.12    Effusion of left knee joint M25.462    Synovitis of left knee M65.9    Pelvic mass in female R19.00    Dysthymia F34.1    Gastrointestinal hemorrhage associated with gastric ulcer K25.4    Sepsis ([facility]) A41.9    SOB (shortness of breath) R06.02    Pulmonary hypertension ([facility]) I27.20    Nonrheumatic mitral valve regurgitation I34.0    Pulmonary nodule R91.1    Chronic hypoxemic respiratory failure ([facility]) J96.11    Chronic renal disease, stage III ([facility]) [628876] N18.30    Type 2 diabetes mellitus with hyperglycemia, without long-term current use of insulin ([facility]) E11.65    Closed subcapital fracture of femur with routine healing, left S72.012D    Stage 2 chronic kidney disease N18.2    Diastolic dysfunction I51.89    On continuous oral anticoagulation Z79.01    Atrial fibrillation with rapid ventricular response ([facility]) I48.91    Closed fracture of neck of left femur ([facility]) S72.002A    Nonrheumatic aortic valve insufficiency I35.1       Isolation/Infection:   Isolation            No Isolation          Patient Infection Status       None to display                     Nurse Assessment:  Last Vital Signs: /76   Pulse 93   Temp 98.4 °F (36.9 °C) (Oral)   Resp 16   Ht 1.727 m (5' 8\")   Wt 66.2 kg (145 lb 15.1 oz)   SpO2 96%   BMI 22.19 kg/m²

## 2024-02-10 NOTE — DISCHARGE SUMMARY
Hospital Medicine Discharge Summary    Patient: Laurie Dominguez     Gender: female  : 1940   Age: 83 y.o.  MRN: [id number]    Admitting Physician: Mehul Tan MD  Discharge Physician: Ritu Riley PA-C     Code Status: Full Code     Admit Date: 2024   Discharge Date:   2/10/24    Disposition:  Home  Time spent arranging discharge: 33 minutes    Discharge Diagnoses:    Active Hospital Problems    Diagnosis Date Noted    Nonrheumatic mitral valve regurgitation [I34.0] 2022     Priority: Medium    Nonrheumatic aortic valve insufficiency [I35.1] 2024    Atrial fibrillation with rapid ventricular response (HCC) [I48.91] 2024    Closed fracture of neck of left femur (HCC) [S72.002A] 2024    Stage 2 chronic kidney disease [N18.2]     Diastolic dysfunction [I51.89]     On continuous oral anticoagulation [Z79.01]     Closed subcapital fracture of femur with routine healing, left [S72.012D] 2024    Atrial fibrillation (HCC) [I48.91] 2011    Hypercholesterolemia [E78.00] 2011    Hypertension [I10] 2011       Recommendations: Follow-up with orthopedic surgery in 2 weeks.    Condition at Discharge:  Stable    Hospital Course:   Patient is a pleasant 83-year-old female who presented to the hospital with left hip pain after sustaining mechanical fall.  In the emergency room she was found to have an impacted subcapital left femoral neck fracture.  Patient was admitted and seen by orthopedic surgery.  She was taken to the operating room by Dr. Ledesma for a left hip hemiarthroplasty on .  Postoperatively she did have some hypoxia.  Chest x-ray revealed small bilateral pleural effusions.  She was given IV Lasix.  Echocardiogram revealed ejection fraction of 55 to 60%.  She has grade 3 diastolic dysfunction and severe mitral valve regurgitation which is unchanged from previous study.  The day prior to discharge she had developed some leukocytosis but  Exam: post op in pacu FINDINGS: Patient is undergone total hip arthroplasty.  The components appear to be well seated.  Gas in the soft tissues as expected.  No acute fracture     Left hip arthroplasty.     XR HIP LEFT (1 VIEW)    Result Date: 2/7/2024  Radiology exam is complete. No Radiologist dictation. Please follow up with ordering provider.     XR HIP LEFT (2-3 VIEWS)    Result Date: 2/5/2024  EXAMINATION: TWO XRAY VIEWS OF THE LEFT HIP 2/5/2024 9:03 pm COMPARISON: None. HISTORY: ORDERING SYSTEM PROVIDED HISTORY: pre op planning femoral neck fx TECHNOLOGIST PROVIDED HISTORY: Reason for exam:->pre op planning femoral neck fx Reason for Exam: pre op planning, femoral neck fx FINDINGS: There is impacted subcapital fracture of the left femoral neck.  There is no evidence of osteolytic or osteoblastic lesions.  The soft tissues are grossly unremarkable.     There is impacted subcapital fracture of the left femoral neck.     CT LUMBAR SPINE WO CONTRAST    Result Date: 2/5/2024  EXAMINATION: CT OF THE LUMBAR SPINE WITHOUT CONTRAST  2/5/2024 TECHNIQUE: CT of the lumbar spine was performed without the administration of intravenous contrast. Multiplanar reformatted images are provided for review. Adjustment of mA and/or kV according to patient size was utilized.  Automated exposure control, iterative reconstruction, and/or weight based adjustment of the mA/kV was utilized to reduce the radiation dose to as low as reasonably achievable. COMPARISON: CT chest 05/04/2023 HISTORY: ORDERING SYSTEM PROVIDED HISTORY: low back pain after fall TECHNOLOGIST PROVIDED HISTORY: Reason for exam:->low back pain after fall Decision Support Exception - unselect if not a suspected or confirmed emergency medical condition->Emergency Medical Condition (MA) Reason for Exam: Fall (Pt presents from [facility] for an unwitnessed fall, pt denies hitting her head, pt denies any pain, -130s, hx a fib, A/O x 3 ) FINDINGS: BONES/ALIGNMENT:

## 2024-02-10 NOTE — CARE COORDINATION
Case Management -  Discharge Note      Patient Name: Laurie Dominguez                   YOB: 1940            Readmission Risk (Low < 19, Mod (19-27), High > 27): Readmission Risk Score: 14.7    Current PCP: Glory Miner APRN - CNP    ([facility]) Important Message from Medicare:    Date: 2/8/24    PT AM-PAC: 8 /24  OT AM-PAC: 16 /24    Patient/patient representative has been educated on the benefits of Skilled Rehab as well as the possible risks of declining recommended services. Patient/patient representative has acknowledged the information provided and decided on the following discharge plan. Patient/ patient representative has been provided freedom of choice regarding service provider, supported by basic dialogue that supports the patient's individualized plan of care/goals.    Patient noted to have a discharge order.  Pt has been medically cleared for transition to Skilled Nursing Rehab Facility    Patient discharged to   [facility]  [address] [address]  Phone: [phone]  Fax: [phone]    HENS Completed:  yes  Pre-cert required/obtained:  n/a    Transportation scheduled for 1600  Transportation provided by [employer] [phone]    AVS faxed and agency notified:  yes  The following prescriptions sent with pt: Ultram  Family Notified:  marii Dowell via phone    Nurse to call report to facility      Financial    Payor: MEDICARE / Plan: MEDICARE PART A AND B / Product Type: *No Product type* /     Pharmacy:  Potential assistance Purchasing Medications: No  Meds-to-Beds request:        [id number] [id number] [facility] [phone] - F [phone]  [address] [place] [id number]  Phone: [phone] Fax: [phone]    [id number] - [place], [place] - [address] [phone] - F [phone]  [address] [id number]  Phone: [phone] Fax: [phone]    [facility] -

## 2024-02-10 NOTE — PROGRESS NOTES
to person, oriented to place, oriented to situation, and disoriented to time    Command Following:   accurately follows one step commands with increased time and repetition of instruction    Education  Barriers To Learning: cognition and hearing  Patient Education: patient educated on PT role and benefits, general safety, functional mobility training, proper use of assistive device/equipment, transfer training, discharge recommendations  Learning Assessment: patient verbalizes understanding, would benefit from continued reinforcement    Assessment  Activity Tolerance: Pt tolerated the PT/OT treatment session well with no significant limitations.  Impairments Requiring Therapeutic Intervention: decreased functional mobility, decreased ROM, decreased strength, decreased safety awareness, decreased cognition, decreased endurance, decreased balance  Prognosis: good  Clinical Assessment: Pt continues to present below her baseline level of function however was able to increase her ambulation distance this date with use of a RW. Pt continues to require increased assistance for transfers as well. Pt will benefit from continued skilled PT to facilitate return to PLOF and to promote independence.  Safety Interventions: patient left in chair, chair alarm in place, call light within reach, gait belt, telesitter in use, and nurse notified    Plan  Frequency: 7 x/week  Current Treatment Recommendations: strengthening, ROM, balance training, functional mobility training, transfer training, gait training, endurance training, patient/caregiver education, home exercise program, safety education, and equipment evaluation/education    Goals  Patient Goals: To return home   Short Term Goals:  Time Frame: Upon discharge   Patient will complete bed mobility at minimal assistance- met 2/10/24   Patient will complete transfers at minimal assistance- met 2/10/24   Patient will ambulate 10 ft with use of rolling walker at moderate assistance-  met 2/10/24  Patient will complete bed mobility at modified independent  Patient will complete transfers at stand by assistance  Patient will ambulate 50 ft with use of rolling walker at contact guard assistance    *All goals met and updated 2/10/24    Above goals reviewed on 2/10/2024.  All goals are ongoing at this time unless indicated above.      Therapy Session Time      Individual Group Co-treatment   Time In     1112   Time Out     1136   Minutes     24     Timed Code Treatment Minutes: 24 Minutes  Total Treatment Minutes: 24 Minutes        Electronically Signed By: Emily Ulloa PT, DPT [id number]

## 2024-02-10 NOTE — PROGRESS NOTES
[facility] - Inpatient Rehabilitation Department   Phone: [phone]    Occupational Therapy    [] Initial Evaluation            [x] Daily Treatment Note         [] Discharge Summary      Patient: Laurie Dominguez   : 1940   MRN: [id number]   Date of Service:  2/10/2024    Admitting Diagnosis:  Closed subcapital fracture of femur with routine healing, left  Current Admission Summary:  Laurie Dominguez is an 83 year old female admitted after a fall at her nursing home. X-ray showed impacted subcapital fracture of the left femoral neck. Patient is s/p left hip hemiarthroplasty with Dr. Ledesma on 24.   Past Medical History:  has a past medical history of A-fib (HCC), Atrial fibrillation (HCC), Hypercholesterolemia, Hypertension, and Pelvic mass.  Past Surgical History:  has a past surgical history that includes Appendectomy; Cystocopy (Right, 2018); Hysterectomy, total abdominal (2018); pr cysto/uretero w/lithotripsy &indwell stent insrt (Right, 10/31/2018); Upper gastrointestinal endoscopy (N/A, 2020); Upper gastrointestinal endoscopy (N/A, 2020); Upper gastrointestinal endoscopy (N/A, 3/2/2021); and hip surgery (Left, 2024).    Discharge Recommendations: Laurie Dominguez scored a 16/24 on the AM-PAC ADL Inpatient form. Current research shows that an AM-PAC score of 17 or less is typically not associated with a discharge to the patient's home setting. Based on the patient's AM-PAC score and their current ADL deficits, it is recommended that the patient have 3-5 sessions per week of Occupational Therapy at d/c to increase the patient's independence.  Please see assessment section for further patient specific details.    If patient discharges prior to next session this note will serve as a discharge summary.  Please see below for the latest assessment towards goals.       DME Required For Discharge: DME to be determined at next level of care, DME to be determined

## 2024-02-10 NOTE — PROGRESS NOTES
Shift assessment completed, evening medication given per MAR. VSS, alert and oriented. Call light within reach. The care plan and education has been reviewed and mutually agreed upon with the patient. Pt has no complaints of pain at this time. Will continue to monitor.  Purewick in place and patent.

## 2024-02-10 NOTE — PLAN OF CARE
Problem: Discharge Planning  Goal: Discharge to home or other facility with appropriate resources  Outcome: Completed     Problem: Pain  Goal: Verbalizes/displays adequate comfort level or baseline comfort level  Outcome: Completed     Problem: Safety - Adult  Goal: Free from fall injury  Outcome: Completed     Problem: ABCDS Injury Assessment  Goal: Absence of physical injury  Outcome: Completed     Problem: Chronic Conditions and Co-morbidities  Goal: Patient's chronic conditions and co-morbidity symptoms are monitored and maintained or improved  Outcome: Completed     Problem: Neurosensory - Adult  Goal: Achieves stable or improved neurological status  Outcome: Completed     Problem: Cardiovascular - Adult  Goal: Maintains optimal cardiac output and hemodynamic stability  Outcome: Completed     Problem: Cardiovascular - Adult  Goal: Absence of cardiac dysrhythmias or at baseline  Outcome: Completed     Problem: Musculoskeletal - Adult  Goal: Return mobility to safest level of function  Outcome: Completed     Problem: Musculoskeletal - Adult  Goal: Maintain proper alignment of affected body part  Outcome: Completed     Problem: Skin/Tissue Integrity  Goal: Absence of new skin breakdown  Description: 1.  Monitor for areas of redness and/or skin breakdown  2.  Assess vascular access sites hourly  3.  Every 4-6 hours minimum:  Change oxygen saturation probe site  4.  Every 4-6 hours:  If on nasal continuous positive airway pressure, respiratory therapy assess nares and determine need for appliance change or resting period.  Outcome: Completed

## 2024-02-10 NOTE — PROGRESS NOTES
Pharmacy to Dose Warfarin    Pharmacy consulted to dose warfarin for Afib.    INR Goal: 2-3    INR today: 1.25    Assessment/Plan:  - INR is subtherapeutic.   - Will give warfarin 2.5mg x1 tonight.   - Possible concomitant drug-drug interactions include: APAP     Pharmacy will continue to follow.    Verito Hart, PharmD  PGY-1 Pharmacy Resident  [id number]

## 2024-02-13 ENCOUNTER — CLINICAL DOCUMENTATION ONLY (OUTPATIENT)
Facility: CLINIC | Age: 84
End: 2024-02-13

## 2024-02-13 DIAGNOSIS — F33.1 MODERATE EPISODE OF RECURRENT MAJOR DEPRESSIVE DISORDER (HCC): ICD-10-CM

## 2024-02-14 DIAGNOSIS — K25.4 GASTROINTESTINAL HEMORRHAGE ASSOCIATED WITH GASTRIC ULCER: ICD-10-CM

## 2024-02-14 RX ORDER — BUPROPION HYDROCHLORIDE 150 MG/1
150 TABLET ORAL EVERY MORNING
Qty: 90 TABLET | Refills: 0 | Status: SHIPPED | OUTPATIENT
Start: 2024-02-14

## 2024-02-14 RX ORDER — PANTOPRAZOLE SODIUM 40 MG/1
40 TABLET, DELAYED RELEASE ORAL DAILY
Qty: 90 TABLET | Refills: 0 | Status: SHIPPED | OUTPATIENT
Start: 2024-02-14

## 2024-02-26 ENCOUNTER — OFFICE VISIT (OUTPATIENT)
Dept: ORTHOPEDIC SURGERY | Age: 84
End: 2024-02-26

## 2024-02-26 VITALS — BODY MASS INDEX: 21.82 KG/M2 | HEIGHT: 68 IN | WEIGHT: 144 LBS

## 2024-02-26 DIAGNOSIS — Z87.81 S/P LEFT HIP FRACTURE: Primary | ICD-10-CM

## 2024-02-26 PROCEDURE — 99024 POSTOP FOLLOW-UP VISIT: CPT | Performed by: ORTHOPAEDIC SURGERY

## 2024-02-26 RX ORDER — WARFARIN SODIUM 2.5 MG/1
TABLET ORAL
Qty: 90 TABLET | OUTPATIENT
Start: 2024-02-26

## 2024-02-26 RX ORDER — ATENOLOL 100 MG/1
100 TABLET ORAL EVERY MORNING
Qty: 90 TABLET | Refills: 3 | Status: SHIPPED | OUTPATIENT
Start: 2024-02-26

## 2024-02-26 NOTE — PROGRESS NOTES
Patient: Laurie Dominguez                  : 1940   MRN: [id number]   Date of Visit: 24     Physician: Porfirio Ledesma MD.     Reason for Visit: Status post REJI     Subjective History of Present Illness:     Laurie Dominguez is here for regularly scheduled follow-up s/p L hip HA. Reports they are doing well, occasional aches and pains are controlled with over the counter medications. Taking opioid medications sparingly and continuing to wean. They do not report fevers, chills or drainage from the incision site    Physical Examination??: ?   General: Patient is alert and oriented x 3 and appears comfortable.   Incision is well-approximated, no erythema, fluctuance   Fires quad, SILT to thigh     Radiographs: AP pelvis/AP/lateral hip views of operative hip with well-positioned total hip arthroplasty. No evidence of fracture subluxation or dislocation.   She is a little long on the left hip.    Assessment and Plan?: The patient is progressing well approximately 2 weeks s/p HA     1. A thorough discussion was had with the patient concerning the ?postoperative course and the patient is in agreement with the plan.   3. Discussed the need for antibiotics prior to dental procedures at least for 1 years after arthroplasty  4. Will see the patient in 4 weeks with repeat xrays at that time.    _______________________      Porfirio Ledesma MD

## 2024-03-04 RX ORDER — SIMVASTATIN 40 MG
40 TABLET ORAL NIGHTLY
Qty: 90 TABLET | Refills: 0 | Status: SHIPPED | OUTPATIENT
Start: 2024-03-04

## 2024-03-29 ENCOUNTER — OFFICE VISIT (OUTPATIENT)
Dept: ORTHOPEDIC SURGERY | Age: 84
End: 2024-03-29

## 2024-03-29 VITALS — WEIGHT: 144 LBS | HEIGHT: 68 IN | BODY MASS INDEX: 21.82 KG/M2

## 2024-03-29 DIAGNOSIS — Z87.81 S/P LEFT HIP FRACTURE: Primary | ICD-10-CM

## 2024-03-29 NOTE — PROGRESS NOTES
Patient: Laurie Dominguez                  : 1940   MRN: [id number]   Date of Visit: 3/29/24     Physician: Porfirio Ledesma MD.     Reason for Visit: Status post REJI     Subjective History of Present Illness:     Laurie Dominguez is here for regularly scheduled follow-up s/p L hip HA. Reports they are doing well, occasional aches and pains are controlled with over the counter medications. They do not report fevers, chills or drainage from the incision site    Physical Examination??: ?   General: Patient is alert and oriented x 3 and appears comfortable.   Incision is well-approximated, no erythema, fluctuance   Fires quad, SILT to thigh     Radiographs: AP pelvis/AP/lateral hip views of operative hip with well-positioned total hip arthroplasty. No evidence of fracture subluxation or dislocation.   She is a little long on the left hip.    Assessment and Plan?: The patient is progressing well approximately 6 weeks s/p HA     1. A thorough discussion was had with the patient concerning the ?postoperative course and the patient is in agreement with the plan.   3. Discussed the need for antibiotics prior to dental procedures at least for 1 years after arthroplasty  4. Will see the patient in 6 months    _______________________      Porfirio Ledesma MD

## 2024-04-17 NOTE — PROGRESS NOTES
moderate right atrial enlargement noted by visual inspection. There is mild aortic stenosis with a peak velocity of 2.09 m/s and a mean pressure gradient of 10 mmHg. There is mild-moderate aortic insufficiency. Thickened mitral valve without evidence of stenosis. There is moderate-severe mitral regurgitation. There is severe tricuspid regurgitation with a RVSP estimation of 41 mmHg. There is mild-to-moderate pulmonic regurgitation. Indeterminate diastolic function.    Echo 6/18 normal LVEF 65% with URSZULA and increased RVSP of 51.      Echo 4/13> Underlying rhythm is atrial fibrillation. Normal left ventricle size, wall thickness and systolic function with an estimated ejection fraction of 60%.No regional wall motion abnormalities are seen. There is severe bi atrial enlargement.  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   Uses O2 as needed per pulmonologist, she is supposed to be wearing it more than she actually does.       PLAN:  No med changes.  Advised to stay hydrated. FU 6 months.     Scribe's attestation: This note was scribed in the presence of Dr Peter ROSAS by Juliette Lawler RN.The scribe's documentation has been prepared under my direction and personally reviewed by me in its entirety. I confirm that the note above accurately reflects all work, treatment, procedures, and medical decision making performed by me.

## 2024-04-26 ENCOUNTER — OFFICE VISIT (OUTPATIENT)
Dept: CARDIOLOGY CLINIC | Age: 84
End: 2024-04-26
Payer: MEDICARE

## 2024-04-26 VITALS
WEIGHT: 133 LBS | OXYGEN SATURATION: 96 % | HEART RATE: 84 BPM | SYSTOLIC BLOOD PRESSURE: 136 MMHG | HEIGHT: 68 IN | BODY MASS INDEX: 20.16 KG/M2 | DIASTOLIC BLOOD PRESSURE: 78 MMHG

## 2024-04-26 DIAGNOSIS — I10 ESSENTIAL HYPERTENSION: ICD-10-CM

## 2024-04-26 DIAGNOSIS — E78.2 MIXED HYPERLIPIDEMIA: ICD-10-CM

## 2024-04-26 DIAGNOSIS — I48.20 CHRONIC ATRIAL FIBRILLATION (HCC): Primary | ICD-10-CM

## 2024-04-26 PROCEDURE — G8400 PT W/DXA NO RESULTS DOC: HCPCS | Performed by: INTERNAL MEDICINE

## 2024-04-26 PROCEDURE — 1123F ACP DISCUSS/DSCN MKR DOCD: CPT | Performed by: INTERNAL MEDICINE

## 2024-04-26 PROCEDURE — G8427 DOCREV CUR MEDS BY ELIG CLIN: HCPCS | Performed by: INTERNAL MEDICINE

## 2024-04-26 PROCEDURE — 3075F SYST BP GE 130 - 139MM HG: CPT | Performed by: INTERNAL MEDICINE

## 2024-04-26 PROCEDURE — 99214 OFFICE O/P EST MOD 30 MIN: CPT | Performed by: INTERNAL MEDICINE

## 2024-04-26 PROCEDURE — 1036F TOBACCO NON-USER: CPT | Performed by: INTERNAL MEDICINE

## 2024-04-26 PROCEDURE — 1090F PRES/ABSN URINE INCON ASSESS: CPT | Performed by: INTERNAL MEDICINE

## 2024-04-26 PROCEDURE — 3078F DIAST BP <80 MM HG: CPT | Performed by: INTERNAL MEDICINE

## 2024-04-26 PROCEDURE — G8420 CALC BMI NORM PARAMETERS: HCPCS | Performed by: INTERNAL MEDICINE

## 2024-04-28 DIAGNOSIS — F34.1 DYSTHYMIA: ICD-10-CM

## 2024-04-28 DIAGNOSIS — F41.9 ANXIETY: ICD-10-CM

## 2024-04-29 RX ORDER — PAROXETINE HYDROCHLORIDE 20 MG/1
20 TABLET, FILM COATED ORAL DAILY
Qty: 90 TABLET | Refills: 0 | Status: SHIPPED | OUTPATIENT
Start: 2024-04-29

## 2024-05-20 ENCOUNTER — APPOINTMENT (OUTPATIENT)
Dept: GENERAL RADIOLOGY | Age: 84
DRG: 481 | End: 2024-05-20
Payer: MEDICARE

## 2024-05-20 ENCOUNTER — HOSPITAL ENCOUNTER (INPATIENT)
Age: 84
LOS: 4 days | Discharge: NURSING, REHAB OR LONG-TERM CARE | DRG: 481 | End: 2024-05-24
Attending: INTERNAL MEDICINE | Admitting: STUDENT IN AN ORGANIZED HEALTH CARE EDUCATION/TRAINING PROGRAM
Payer: MEDICARE

## 2024-05-20 ENCOUNTER — APPOINTMENT (OUTPATIENT)
Dept: CT IMAGING | Age: 84
DRG: 481 | End: 2024-05-20
Payer: MEDICARE
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
## 2024-05-20 DIAGNOSIS — S72.001A CLOSED RIGHT HIP FRACTURE, INITIAL ENCOUNTER (HCC): Primary | ICD-10-CM

## 2024-05-20 PROBLEM — S72.011A CLOSED SUBCAPITAL FRACTURE OF NECK OF RIGHT FEMUR, INITIAL ENCOUNTER (HCC): Status: ACTIVE | Noted: 2024-05-20

## 2024-05-20 PROBLEM — S72.21XA FRACTURE, SUBTROCHANTERIC, RIGHT FEMUR, CLOSED, INITIAL ENCOUNTER (HCC): Status: ACTIVE | Noted: 2024-05-20

## 2024-05-20 LAB
ANION GAP SERPL CALCULATED.3IONS-SCNC: 14 MMOL/L (ref 3–16)
BACTERIA URNS QL MICRO: NORMAL /HPF
BASOPHILS # BLD: 0.1 K/UL (ref 0–0.2)
BASOPHILS NFR BLD: 0.5 %
BILIRUB UR QL STRIP.AUTO: NEGATIVE
BUN SERPL-MCNC: 35 MG/DL (ref 7–20)
CALCIUM SERPL-MCNC: 9.8 MG/DL (ref 8.3–10.6)
CHLORIDE SERPL-SCNC: 100 MMOL/L (ref 99–110)
CLARITY UR: CLEAR
CO2 SERPL-SCNC: 23 MMOL/L (ref 21–32)
COLOR UR: ABNORMAL
CREAT SERPL-MCNC: 1.8 MG/DL (ref 0.6–1.2)
DEPRECATED RDW RBC AUTO: 15.3 % (ref 12.4–15.4)
EOSINOPHIL # BLD: 0 K/UL (ref 0–0.6)
EOSINOPHIL NFR BLD: 0.2 %
EPI CELLS #/AREA URNS AUTO: 0 /HPF (ref 0–5)
ETHANOLAMINE SERPL-MCNC: NORMAL MG/DL (ref 0–0.08)
GFR SERPLBLD CREATININE-BSD FMLA CKD-EPI: 27 ML/MIN/{1.73_M2}
GLUCOSE BLD-MCNC: 159 MG/DL (ref 70–99)
GLUCOSE SERPL-MCNC: 144 MG/DL (ref 70–99)
GLUCOSE UR STRIP.AUTO-MCNC: NEGATIVE MG/DL
HCT VFR BLD AUTO: 41.4 % (ref 36–48)
HGB BLD-MCNC: 13.6 G/DL (ref 12–16)
HGB UR QL STRIP.AUTO: NEGATIVE
HYALINE CASTS #/AREA URNS AUTO: 1 /LPF (ref 0–8)
INR PPP: 2.06 (ref 0.85–1.15)
KETONES UR STRIP.AUTO-MCNC: NEGATIVE MG/DL
LEUKOCYTE ESTERASE UR QL STRIP.AUTO: ABNORMAL
LYMPHOCYTES # BLD: 2.7 K/UL (ref 1–5.1)
LYMPHOCYTES NFR BLD: 21.2 %
MCH RBC QN AUTO: 29 PG (ref 26–34)
MCHC RBC AUTO-ENTMCNC: 32.8 G/DL (ref 31–36)
MCV RBC AUTO: 88.5 FL (ref 80–100)
MONOCYTES # BLD: 0.9 K/UL (ref 0–1.3)
MONOCYTES NFR BLD: 7.2 %
NEUTROPHILS # BLD: 9 K/UL (ref 1.7–7.7)
NEUTROPHILS NFR BLD: 70.9 %
NITRITE UR QL STRIP.AUTO: NEGATIVE
PERFORMED ON: ABNORMAL
PH UR STRIP.AUTO: 5.5 [PH] (ref 5–8)
PLATELET # BLD AUTO: 293 K/UL (ref 135–450)
PMV BLD AUTO: 8.4 FL (ref 5–10.5)
POTASSIUM SERPL-SCNC: 4.2 MMOL/L (ref 3.5–5.1)
PROT UR STRIP.AUTO-MCNC: 30 MG/DL
PROTHROMBIN TIME: 23.3 SEC (ref 11.9–14.9)
RBC # BLD AUTO: 4.68 M/UL (ref 4–5.2)
RBC CLUMPS #/AREA URNS AUTO: 1 /HPF (ref 0–4)
SODIUM SERPL-SCNC: 137 MMOL/L (ref 136–145)
SP GR UR STRIP.AUTO: 1.02 (ref 1–1.03)
TROPONIN, HIGH SENSITIVITY: 14 NG/L (ref 0–14)
UA COMPLETE W REFLEX CULTURE PNL UR: ABNORMAL
UA DIPSTICK W REFLEX MICRO PNL UR: YES
URN SPEC COLLECT METH UR: ABNORMAL
UROBILINOGEN UR STRIP-ACNC: 1 E.U./DL
WBC # BLD AUTO: 12.8 K/UL (ref 4–11)
WBC #/AREA URNS AUTO: 2 /HPF (ref 0–5)

## 2024-05-20 PROCEDURE — 93005 ELECTROCARDIOGRAM TRACING: CPT | Performed by: INTERNAL MEDICINE

## 2024-05-20 PROCEDURE — 51702 INSERT TEMP BLADDER CATH: CPT

## 2024-05-20 PROCEDURE — 99285 EMERGENCY DEPT VISIT HI MDM: CPT

## 2024-05-20 PROCEDURE — 70450 CT HEAD/BRAIN W/O DYE: CPT

## 2024-05-20 PROCEDURE — 6370000000 HC RX 637 (ALT 250 FOR IP): Performed by: NURSE PRACTITIONER

## 2024-05-20 PROCEDURE — 6360000002 HC RX W HCPCS: Performed by: NURSE PRACTITIONER

## 2024-05-20 PROCEDURE — 80048 BASIC METABOLIC PNL TOTAL CA: CPT

## 2024-05-20 PROCEDURE — 2580000003 HC RX 258: Performed by: NURSE PRACTITIONER

## 2024-05-20 PROCEDURE — 6370000000 HC RX 637 (ALT 250 FOR IP): Performed by: ORTHOPAEDIC SURGERY

## 2024-05-20 PROCEDURE — 73502 X-RAY EXAM HIP UNI 2-3 VIEWS: CPT

## 2024-05-20 PROCEDURE — 6360000002 HC RX W HCPCS: Performed by: INTERNAL MEDICINE

## 2024-05-20 PROCEDURE — 81001 URINALYSIS AUTO W/SCOPE: CPT

## 2024-05-20 PROCEDURE — 82077 ASSAY SPEC XCP UR&BREATH IA: CPT

## 2024-05-20 PROCEDURE — 71045 X-RAY EXAM CHEST 1 VIEW: CPT

## 2024-05-20 PROCEDURE — 96374 THER/PROPH/DIAG INJ IV PUSH: CPT

## 2024-05-20 PROCEDURE — 85610 PROTHROMBIN TIME: CPT

## 2024-05-20 PROCEDURE — 1200000000 HC SEMI PRIVATE

## 2024-05-20 PROCEDURE — 85025 COMPLETE CBC W/AUTO DIFF WBC: CPT

## 2024-05-20 PROCEDURE — 84484 ASSAY OF TROPONIN QUANT: CPT

## 2024-05-20 RX ORDER — PHYTONADIONE 5 MG/1
5 TABLET ORAL ONCE
Status: COMPLETED | OUTPATIENT
Start: 2024-05-20 | End: 2024-05-20

## 2024-05-20 RX ORDER — IBUPROFEN 200 MG
200 CAPSULE ORAL DAILY
Status: DISCONTINUED | OUTPATIENT
Start: 2024-05-21 | End: 2024-05-20 | Stop reason: RX

## 2024-05-20 RX ORDER — SODIUM CHLORIDE 0.9 % (FLUSH) 0.9 %
5-40 SYRINGE (ML) INJECTION EVERY 12 HOURS SCHEDULED
Status: DISCONTINUED | OUTPATIENT
Start: 2024-05-20 | End: 2024-05-24 | Stop reason: HOSPADM

## 2024-05-20 RX ORDER — PAROXETINE HYDROCHLORIDE 20 MG/1
20 TABLET, FILM COATED ORAL DAILY
Status: DISCONTINUED | OUTPATIENT
Start: 2024-05-20 | End: 2024-05-24 | Stop reason: HOSPADM

## 2024-05-20 RX ORDER — POLYETHYLENE GLYCOL 3350 17 G/17G
17 POWDER, FOR SOLUTION ORAL DAILY PRN
Status: DISCONTINUED | OUTPATIENT
Start: 2024-05-20 | End: 2024-05-24 | Stop reason: HOSPADM

## 2024-05-20 RX ORDER — MORPHINE SULFATE 2 MG/ML
2 INJECTION, SOLUTION INTRAMUSCULAR; INTRAVENOUS EVERY 4 HOURS PRN
Status: DISCONTINUED | OUTPATIENT
Start: 2024-05-20 | End: 2024-05-24 | Stop reason: HOSPADM

## 2024-05-20 RX ORDER — INSULIN LISPRO 100 [IU]/ML
0-4 INJECTION, SOLUTION INTRAVENOUS; SUBCUTANEOUS NIGHTLY
Status: DISCONTINUED | OUTPATIENT
Start: 2024-05-20 | End: 2024-05-24 | Stop reason: HOSPADM

## 2024-05-20 RX ORDER — SODIUM CHLORIDE 9 MG/ML
INJECTION, SOLUTION INTRAVENOUS CONTINUOUS
Status: ACTIVE | OUTPATIENT
Start: 2024-05-21 | End: 2024-05-21

## 2024-05-20 RX ORDER — ATENOLOL 50 MG/1
100 TABLET ORAL EVERY MORNING
Status: DISCONTINUED | OUTPATIENT
Start: 2024-05-21 | End: 2024-05-24 | Stop reason: HOSPADM

## 2024-05-20 RX ORDER — ONDANSETRON 2 MG/ML
4 INJECTION INTRAMUSCULAR; INTRAVENOUS EVERY 6 HOURS PRN
Status: DISCONTINUED | OUTPATIENT
Start: 2024-05-20 | End: 2024-05-24 | Stop reason: HOSPADM

## 2024-05-20 RX ORDER — ATORVASTATIN CALCIUM 20 MG/1
20 TABLET, FILM COATED ORAL DAILY
Status: DISCONTINUED | OUTPATIENT
Start: 2024-05-20 | End: 2024-05-24 | Stop reason: HOSPADM

## 2024-05-20 RX ORDER — SODIUM CHLORIDE 9 MG/ML
INJECTION, SOLUTION INTRAVENOUS PRN
Status: DISCONTINUED | OUTPATIENT
Start: 2024-05-20 | End: 2024-05-24 | Stop reason: HOSPADM

## 2024-05-20 RX ORDER — FENTANYL CITRATE 50 UG/ML
25 INJECTION, SOLUTION INTRAMUSCULAR; INTRAVENOUS ONCE
Status: COMPLETED | OUTPATIENT
Start: 2024-05-20 | End: 2024-05-20

## 2024-05-20 RX ORDER — HYDROXYZINE HYDROCHLORIDE 25 MG/1
25 TABLET, FILM COATED ORAL 2 TIMES DAILY PRN
Status: DISCONTINUED | OUTPATIENT
Start: 2024-05-20 | End: 2024-05-20 | Stop reason: ALTCHOICE

## 2024-05-20 RX ORDER — INSULIN LISPRO 100 [IU]/ML
0-8 INJECTION, SOLUTION INTRAVENOUS; SUBCUTANEOUS
Status: DISCONTINUED | OUTPATIENT
Start: 2024-05-21 | End: 2024-05-24 | Stop reason: HOSPADM

## 2024-05-20 RX ORDER — BUPROPION HYDROCHLORIDE 150 MG/1
150 TABLET ORAL EVERY MORNING
Status: DISCONTINUED | OUTPATIENT
Start: 2024-05-21 | End: 2024-05-20 | Stop reason: ALTCHOICE

## 2024-05-20 RX ORDER — HEPARIN SODIUM 5000 [USP'U]/ML
5000 INJECTION, SOLUTION INTRAVENOUS; SUBCUTANEOUS EVERY 8 HOURS SCHEDULED
Status: DISCONTINUED | OUTPATIENT
Start: 2024-05-20 | End: 2024-05-22

## 2024-05-20 RX ORDER — PANTOPRAZOLE SODIUM 40 MG/1
40 TABLET, DELAYED RELEASE ORAL DAILY
Status: DISCONTINUED | OUTPATIENT
Start: 2024-05-21 | End: 2024-05-24 | Stop reason: HOSPADM

## 2024-05-20 RX ORDER — ACETAMINOPHEN 650 MG/1
650 SUPPOSITORY RECTAL EVERY 6 HOURS PRN
Status: DISCONTINUED | OUTPATIENT
Start: 2024-05-20 | End: 2024-05-24 | Stop reason: HOSPADM

## 2024-05-20 RX ORDER — ONDANSETRON 4 MG/1
4 TABLET, ORALLY DISINTEGRATING ORAL EVERY 8 HOURS PRN
Status: DISCONTINUED | OUTPATIENT
Start: 2024-05-20 | End: 2024-05-24 | Stop reason: HOSPADM

## 2024-05-20 RX ORDER — BUPROPION HYDROCHLORIDE 300 MG/1
300 TABLET ORAL EVERY MORNING
COMMUNITY

## 2024-05-20 RX ORDER — ACETAMINOPHEN 325 MG/1
650 TABLET ORAL EVERY 6 HOURS PRN
Status: DISCONTINUED | OUTPATIENT
Start: 2024-05-20 | End: 2024-05-24 | Stop reason: HOSPADM

## 2024-05-20 RX ORDER — SODIUM CHLORIDE 0.9 % (FLUSH) 0.9 %
5-40 SYRINGE (ML) INJECTION PRN
Status: DISCONTINUED | OUTPATIENT
Start: 2024-05-20 | End: 2024-05-24 | Stop reason: HOSPADM

## 2024-05-20 RX ADMIN — SODIUM CHLORIDE, PRESERVATIVE FREE 10 ML: 5 INJECTION INTRAVENOUS at 22:19

## 2024-05-20 RX ADMIN — DILTIAZEM HYDROCHLORIDE 30 MG: 30 TABLET, FILM COATED ORAL at 22:16

## 2024-05-20 RX ADMIN — PAROXETINE HYDROCHLORIDE 20 MG: 20 TABLET, FILM COATED ORAL at 22:16

## 2024-05-20 RX ADMIN — PHYTONADIONE 5 MG: 5 TABLET ORAL at 22:16

## 2024-05-20 RX ADMIN — FENTANYL CITRATE 25 MCG: 50 INJECTION INTRAMUSCULAR; INTRAVENOUS at 19:25

## 2024-05-20 RX ADMIN — MORPHINE SULFATE 2 MG: 2 INJECTION, SOLUTION INTRAMUSCULAR; INTRAVENOUS at 22:18

## 2024-05-20 RX ADMIN — ATORVASTATIN CALCIUM 20 MG: 20 TABLET, FILM COATED ORAL at 22:25

## 2024-05-20 RX ADMIN — SODIUM CHLORIDE: 9 INJECTION, SOLUTION INTRAVENOUS at 23:07

## 2024-05-20 ASSESSMENT — PAIN SCALES - GENERAL
PAINLEVEL_OUTOF10: 0
PAINLEVEL_OUTOF10: 0
PAINLEVEL_OUTOF10: 7

## 2024-05-20 ASSESSMENT — PAIN SCALES - WONG BAKER: WONGBAKER_NUMERICALRESPONSE: NO HURT

## 2024-05-20 ASSESSMENT — PAIN DESCRIPTION - LOCATION: LOCATION: HIP

## 2024-05-20 ASSESSMENT — PAIN - FUNCTIONAL ASSESSMENT
PAIN_FUNCTIONAL_ASSESSMENT: 0-10
PAIN_FUNCTIONAL_ASSESSMENT: ACTIVITIES ARE NOT PREVENTED

## 2024-05-20 ASSESSMENT — PAIN DESCRIPTION - DESCRIPTORS: DESCRIPTORS: DISCOMFORT

## 2024-05-20 ASSESSMENT — PAIN DESCRIPTION - ORIENTATION: ORIENTATION: RIGHT

## 2024-05-20 NOTE — ED NOTES
How does patient ambulate?   []Low Fall Risk (ambulates by themselves without support)  []Stand by assist   []Contact Guard   []Front wheel walker  []Wheelchair   []Steady  [x]Bed bound  []History of Lower Extremity Amputation  []Unknown, did not assess in the emergency department   How does patient take pills?  [x]Whole with Water  []Crushed in applesauce  []Crushed in pudding  []Other  []Unknown no oral medications were given in the ED  Is patient alert?   [x]Alert  []Drowsy but responds to voice  []Doesn't respond to voice but responds to painful stimuli  []Unresponsive  Is patient oriented?   [x]To person  [x]To place  []To time  []To situation  [x]Confused  []Agitated  [x]Follows commands  If patient is disoriented or from a Skill Nursing Facility has family been notified of admission?   [x]Yes   []No  Patient belongings?   []Cell phone  []Wallet   []Dentures  [x]Clothing  Any specific patient or family belongings/needs/dynamics?   Daughters at bedside  Miscellaneous comments/pending orders?  Pt lives at [facility] pt fell, hx of dementia, pt c/o right groin pain, +for fracture, pain 0/10, johnson placed, denies pain medication. Hx of previous hip fracture     If there are any additional questions please reach out to the Emergency Department.

## 2024-05-20 NOTE — ED PROVIDER NOTES
pantoprazole (PROTONIX) tablet 40 mg (has no administration in time range)   PARoxetine (PAXIL) tablet 20 mg (20 mg Oral Given 5/20/24 2216)   atorvastatin (LIPITOR) tablet 20 mg (20 mg Oral Given 5/20/24 2225)   sodium chloride flush 0.9 % injection 5-40 mL (10 mLs IntraVENous Given 5/20/24 2219)   sodium chloride flush 0.9 % injection 5-40 mL (has no administration in time range)   0.9 % sodium chloride infusion (has no administration in time range)   ondansetron (ZOFRAN-ODT) disintegrating tablet 4 mg (has no administration in time range)     Or   ondansetron (ZOFRAN) injection 4 mg (has no administration in time range)   polyethylene glycol (GLYCOLAX) packet 17 g (has no administration in time range)   acetaminophen (TYLENOL) tablet 650 mg (has no administration in time range)     Or   acetaminophen (TYLENOL) suppository 650 mg (has no administration in time range)   0.9 % sodium chloride infusion (has no administration in time range)   heparin (porcine) injection 5,000 Units ( SubCUTAneous Automatically Held 5/23/24 2200)   morphine (PF) injection 2 mg (2 mg IntraVENous Given 5/20/24 2218)   insulin lispro (HUMALOG,ADMELOG) injection vial 0-8 Units (has no administration in time range)   insulin lispro (HUMALOG,ADMELOG) injection vial 0-4 Units ( SubCUTAneous Not Given 5/20/24 2213)   fentaNYL (SUBLIMAZE) injection 25 mcg (25 mcg IntraVENous Given 5/20/24 1925)   phytonadione (VITAMIN K) tablet 5 mg (5 mg Oral Given 5/20/24 2216)       Records Reviewed  I reviewed the patient's previous medical records.    Social Determinants of Health  None    Chronic Conditions affecting care   has a past medical history of A-fib (HCC), Atrial fibrillation (HCC), Hypercholesterolemia, Hypertension, and Pelvic mass.    MDM and ED Course  X-ray does show a right hip fracture.  I spoke with Dr. Diaz with orthopedics and he will be on consult.  INR is 2.06.  Patient is on Coumadin and does have a history of atrial fibrillation.

## 2024-05-21 ENCOUNTER — ANESTHESIA EVENT (OUTPATIENT)
Dept: OPERATING ROOM | Age: 84
DRG: 481 | End: 2024-05-21
Payer: MEDICARE

## 2024-05-21 LAB
ABO + RH BLD: NORMAL
ANION GAP SERPL CALCULATED.3IONS-SCNC: 11 MMOL/L (ref 3–16)
APTT BLD: 38.8 SEC (ref 22.1–36.4)
BASOPHILS # BLD: 0.1 K/UL (ref 0–0.2)
BASOPHILS NFR BLD: 0.9 %
BLD GP AB SCN SERPL QL: NORMAL
BUN SERPL-MCNC: 30 MG/DL (ref 7–20)
CALCIUM SERPL-MCNC: 9.2 MG/DL (ref 8.3–10.6)
CHLORIDE SERPL-SCNC: 105 MMOL/L (ref 99–110)
CO2 SERPL-SCNC: 22 MMOL/L (ref 21–32)
CREAT SERPL-MCNC: 1.5 MG/DL (ref 0.6–1.2)
DEPRECATED RDW RBC AUTO: 15.3 % (ref 12.4–15.4)
EKG DIAGNOSIS: NORMAL
EKG Q-T INTERVAL: 312 MS
EKG QRS DURATION: 80 MS
EKG QTC CALCULATION (BAZETT): 410 MS
EKG R AXIS: 62 DEGREES
EKG T AXIS: -36 DEGREES
EKG VENTRICULAR RATE: 104 BPM
EOSINOPHIL # BLD: 0 K/UL (ref 0–0.6)
EOSINOPHIL NFR BLD: 0.5 %
GFR SERPLBLD CREATININE-BSD FMLA CKD-EPI: 34 ML/MIN/{1.73_M2}
GLUCOSE BLD-MCNC: 101 MG/DL (ref 70–99)
GLUCOSE BLD-MCNC: 106 MG/DL (ref 70–99)
GLUCOSE BLD-MCNC: 115 MG/DL (ref 70–99)
GLUCOSE BLD-MCNC: 83 MG/DL (ref 70–99)
GLUCOSE SERPL-MCNC: 123 MG/DL (ref 70–99)
HCT VFR BLD AUTO: 41.2 % (ref 36–48)
HGB BLD-MCNC: 13.3 G/DL (ref 12–16)
INR PPP: 1.61 (ref 0.85–1.15)
INR PPP: 2.26 (ref 0.85–1.15)
LYMPHOCYTES # BLD: 2.2 K/UL (ref 1–5.1)
LYMPHOCYTES NFR BLD: 23.8 %
MCH RBC QN AUTO: 28.3 PG (ref 26–34)
MCHC RBC AUTO-ENTMCNC: 32.3 G/DL (ref 31–36)
MCV RBC AUTO: 87.6 FL (ref 80–100)
MONOCYTES # BLD: 0.8 K/UL (ref 0–1.3)
MONOCYTES NFR BLD: 8.3 %
NEUTROPHILS # BLD: 6.1 K/UL (ref 1.7–7.7)
NEUTROPHILS NFR BLD: 66.5 %
PERFORMED ON: ABNORMAL
PERFORMED ON: NORMAL
PLATELET # BLD AUTO: 290 K/UL (ref 135–450)
PMV BLD AUTO: 8.2 FL (ref 5–10.5)
POTASSIUM SERPL-SCNC: 4 MMOL/L (ref 3.5–5.1)
PROTHROMBIN TIME: 19.3 SEC (ref 11.9–14.9)
PROTHROMBIN TIME: 25 SEC (ref 11.9–14.9)
RBC # BLD AUTO: 4.71 M/UL (ref 4–5.2)
SODIUM SERPL-SCNC: 138 MMOL/L (ref 136–145)
WBC # BLD AUTO: 9.2 K/UL (ref 4–11)

## 2024-05-21 PROCEDURE — 6360000002 HC RX W HCPCS: Performed by: NURSE PRACTITIONER

## 2024-05-21 PROCEDURE — 85730 THROMBOPLASTIN TIME PARTIAL: CPT

## 2024-05-21 PROCEDURE — 6370000000 HC RX 637 (ALT 250 FOR IP): Performed by: NURSE PRACTITIONER

## 2024-05-21 PROCEDURE — 80048 BASIC METABOLIC PNL TOTAL CA: CPT

## 2024-05-21 PROCEDURE — APPNB45 APP NON BILLABLE 31-45 MINUTES: Performed by: NURSE PRACTITIONER

## 2024-05-21 PROCEDURE — 2580000003 HC RX 258: Performed by: NURSE PRACTITIONER

## 2024-05-21 PROCEDURE — 85610 PROTHROMBIN TIME: CPT

## 2024-05-21 PROCEDURE — 85025 COMPLETE CBC W/AUTO DIFF WBC: CPT

## 2024-05-21 PROCEDURE — 36415 COLL VENOUS BLD VENIPUNCTURE: CPT

## 2024-05-21 PROCEDURE — 86900 BLOOD TYPING SEROLOGIC ABO: CPT

## 2024-05-21 PROCEDURE — 86850 RBC ANTIBODY SCREEN: CPT

## 2024-05-21 PROCEDURE — 86901 BLOOD TYPING SEROLOGIC RH(D): CPT

## 2024-05-21 PROCEDURE — 1200000000 HC SEMI PRIVATE

## 2024-05-21 RX ORDER — DEXTROSE MONOHYDRATE 100 MG/ML
INJECTION, SOLUTION INTRAVENOUS CONTINUOUS PRN
Status: DISCONTINUED | OUTPATIENT
Start: 2024-05-21 | End: 2024-05-24 | Stop reason: HOSPADM

## 2024-05-21 RX ORDER — GLUCAGON 1 MG/ML
1 KIT INJECTION PRN
Status: DISCONTINUED | OUTPATIENT
Start: 2024-05-21 | End: 2024-05-24 | Stop reason: HOSPADM

## 2024-05-21 RX ORDER — ACETAMINOPHEN 325 MG/1
650 TABLET ORAL 3 TIMES DAILY
Status: DISCONTINUED | OUTPATIENT
Start: 2024-05-21 | End: 2024-05-24 | Stop reason: HOSPADM

## 2024-05-21 RX ADMIN — PHYTONADIONE 5 MG: 10 INJECTION, EMULSION INTRAMUSCULAR; INTRAVENOUS; SUBCUTANEOUS at 10:18

## 2024-05-21 RX ADMIN — PANTOPRAZOLE SODIUM 40 MG: 40 TABLET, DELAYED RELEASE ORAL at 09:55

## 2024-05-21 RX ADMIN — SODIUM CHLORIDE 25 ML: 9 INJECTION, SOLUTION INTRAVENOUS at 10:16

## 2024-05-21 RX ADMIN — DILTIAZEM HYDROCHLORIDE 30 MG: 30 TABLET, FILM COATED ORAL at 19:59

## 2024-05-21 RX ADMIN — DILTIAZEM HYDROCHLORIDE 30 MG: 30 TABLET, FILM COATED ORAL at 09:55

## 2024-05-21 RX ADMIN — SODIUM CHLORIDE, PRESERVATIVE FREE 10 ML: 5 INJECTION INTRAVENOUS at 20:00

## 2024-05-21 RX ADMIN — ACETAMINOPHEN 650 MG: 325 TABLET ORAL at 09:55

## 2024-05-21 RX ADMIN — PAROXETINE HYDROCHLORIDE 20 MG: 20 TABLET, FILM COATED ORAL at 09:55

## 2024-05-21 RX ADMIN — ATORVASTATIN CALCIUM 20 MG: 20 TABLET, FILM COATED ORAL at 09:55

## 2024-05-21 RX ADMIN — ATENOLOL 100 MG: 50 TABLET ORAL at 09:55

## 2024-05-21 RX ADMIN — SODIUM CHLORIDE, PRESERVATIVE FREE 10 ML: 5 INJECTION INTRAVENOUS at 09:56

## 2024-05-21 RX ADMIN — ACETAMINOPHEN 650 MG: 325 TABLET ORAL at 19:59

## 2024-05-21 RX ADMIN — ACETAMINOPHEN 650 MG: 325 TABLET ORAL at 14:48

## 2024-05-21 ASSESSMENT — ENCOUNTER SYMPTOMS: SHORTNESS OF BREATH: 1

## 2024-05-21 ASSESSMENT — PAIN SCALES - GENERAL
PAINLEVEL_OUTOF10: 3
PAINLEVEL_OUTOF10: 0

## 2024-05-21 ASSESSMENT — PAIN DESCRIPTION - LOCATION: LOCATION: HIP

## 2024-05-21 NOTE — CONSULTS
[facility] Orthopedic Surgery  Consult Note    Patient: Laurie Dominguez  Admit Date: 5/20/2024  Requesting Physician: Ese Vargas MD  Room: [address] [place] [address]/[address] [place] [address]    Chief complaint: RIGHT hip pain    HPI: Laurie Dominguez is a 83 y.o. female who presented to [facility] ER with complaints of right hip pain after fall at [facility].      Describes pain in the right hip of moderate intensity and of aching nature since the fall which is relieved by rest. Denies new numbness/tingling.       Independent imaging review of the right hip via plain films demonstrated: mildly impacted right femoral neck fracture.  Stable left hip hemiarthroplasty noted.     Relevant notes, labs and other tests reviewed.  Medical History:  Past Medical History:   Diagnosis Date    A-fib (HCC)     Atrial fibrillation (HCC)     Hypercholesterolemia     Hypertension     Pelvic mass      Past Surgical History:   Procedure Laterality Date    APPENDECTOMY      CYSTOSCOPY Right 06/14/2018    retrograde pyelogram, ureteroscopy, and stent placement    HIP SURGERY Left 2/7/2024    LEFT BIPOLAR HIP HEMIARTHROPLASTY ANTERIOR APPROACH (ESE) performed by Porfirio Ledesma MD at [facility] OR    HYSTERECTOMY, TOTAL ABDOMINAL (CERVIX REMOVED)  07/09/2018    robotic with BSO, omentectomy, pelvic mass removal, lymphnode dissection    MA CYSTO/URETERO W/LITHOTRIPSY &INDWELL STENT INSRT Right 10/31/2018    CYSTOSCOPY WITH RIGHT URETEROSCOPY HOLMIUM LASER LITHOTRIPSY STONE MANIPULATION STONE BASKET WITH RIGHT STENT EXCHANGE performed by Daniel Hastings MD at [facility] OR    UPPER GASTROINTESTINAL ENDOSCOPY N/A 11/29/2020    EGD CONTROL HEMORRHAGE performed by Stoney Allen MD at [facility] ENDOSCOPY    UPPER GASTROINTESTINAL ENDOSCOPY N/A 11/29/2020    EGD BIOPSY performed by Stoney Allen MD at [facility] ENDOSCOPY    UPPER GASTROINTESTINAL ENDOSCOPY N/A 3/2/2021    EGD BIOPSY performed by Stoney Allen MD at [facility] ENDOSCOPY       Social History:    reports that she has never smoked. She has

## 2024-05-21 NOTE — ANESTHESIA PRE PROCEDURE
OXYGEN Inhale into the lungs 2L via NC.    Provider, MD Joseph   calcium citrate (CALCITRATE) 950 (200 Ca) MG tablet Take 1 tablet by mouth daily    ProviderJoseph MD   Multiple Vitamins-Minerals (MULTIVITAMIN PO) Take 1 tablet by mouth daily    ProviderJoseph MD       Current medications:    Current Facility-Administered Medications   Medication Dose Route Frequency Provider Last Rate Last Admin   • acetaminophen (TYLENOL) tablet 650 mg  650 mg Oral TID Moose Hurley APRN - CNP   650 mg at 05/21/24 0955   • dextrose bolus 10% 125 mL  125 mL IntraVENous PRN Daren Cowan MD        Or   • dextrose bolus 10% 250 mL  250 mL IntraVENous PRN Daren Cowan MD       • glucagon injection 1 mg  1 mg SubCUTAneous PRN Daren Cowan MD       • dextrose 10 % infusion   IntraVENous Continuous PRN Daren Cowan MD       • atenolol (TENORMIN) tablet 100 mg  100 mg Oral QAM Keerthi Rose APRN - CNP   100 mg at 05/21/24 0955   • dilTIAZem (CARDIZEM) tablet 30 mg  30 mg Oral Q12H Keerthi Rose APRN - CNP   30 mg at 05/21/24 0955   • pantoprazole (PROTONIX) tablet 40 mg  40 mg Oral Daily Keerthi Rose APRN - CNP   40 mg at 05/21/24 0955   • PARoxetine (PAXIL) tablet 20 mg  20 mg Oral Daily Keerthi Rose APRN - CNP   20 mg at 05/21/24 0955   • atorvastatin (LIPITOR) tablet 20 mg  20 mg Oral Daily Keerthi Rose APRN - CNP   20 mg at 05/21/24 0955   • sodium chloride flush 0.9 % injection 5-40 mL  5-40 mL IntraVENous 2 times per day Keerthi Rose APRN - CNP   10 mL at 05/21/24 0956   • sodium chloride flush 0.9 % injection 5-40 mL  5-40 mL IntraVENous PRN Keerthi Rose APRN - CNP       • 0.9 % sodium chloride infusion   IntraVENous PRN Keerthi Rose APRN -  mL/hr at 05/21/24 1016 25 mL at 05/21/24 1016   • ondansetron (ZOFRAN-ODT) disintegrating tablet 4 mg  4 mg Oral Q8H PRN Rose, Keerthi, APRN - CNP        Or   • ondansetron (ZOFRAN) injection 4 mg  4 mg IntraVENous

## 2024-05-22 ENCOUNTER — ANESTHESIA (OUTPATIENT)
Dept: OPERATING ROOM | Age: 84
DRG: 481 | End: 2024-05-22
Payer: MEDICARE

## 2024-05-22 ENCOUNTER — APPOINTMENT (OUTPATIENT)
Dept: GENERAL RADIOLOGY | Age: 84
DRG: 481 | End: 2024-05-22
Payer: MEDICARE

## 2024-05-22 PROBLEM — M80.00XA OSTEOPOROSIS WITH CURRENT PATHOLOGICAL FRACTURE, INITIAL ENCOUNTER: Status: ACTIVE | Noted: 2024-05-22

## 2024-05-22 PROBLEM — R29.6 RECURRENT FALLS: Status: ACTIVE | Noted: 2024-05-22

## 2024-05-22 PROBLEM — Z79.01 CHRONIC ANTICOAGULATION: Status: ACTIVE | Noted: 2024-05-22

## 2024-05-22 LAB
ALBUMIN SERPL-MCNC: 3.4 G/DL (ref 3.4–5)
ALBUMIN/GLOB SERPL: 1 {RATIO} (ref 1.1–2.2)
ALP SERPL-CCNC: 77 U/L (ref 40–129)
ALT SERPL-CCNC: 12 U/L (ref 10–40)
ANION GAP SERPL CALCULATED.3IONS-SCNC: 12 MMOL/L (ref 3–16)
AST SERPL-CCNC: 23 U/L (ref 15–37)
BASOPHILS # BLD: 0.1 K/UL (ref 0–0.2)
BASOPHILS NFR BLD: 0.8 %
BILIRUB SERPL-MCNC: 0.8 MG/DL (ref 0–1)
BUN SERPL-MCNC: 25 MG/DL (ref 7–20)
CALCIUM SERPL-MCNC: 9.5 MG/DL (ref 8.3–10.6)
CHLORIDE SERPL-SCNC: 105 MMOL/L (ref 99–110)
CO2 SERPL-SCNC: 24 MMOL/L (ref 21–32)
CREAT SERPL-MCNC: 1.4 MG/DL (ref 0.6–1.2)
DEPRECATED RDW RBC AUTO: 15.2 % (ref 12.4–15.4)
EOSINOPHIL # BLD: 0.1 K/UL (ref 0–0.6)
EOSINOPHIL NFR BLD: 1.7 %
GFR SERPLBLD CREATININE-BSD FMLA CKD-EPI: 37 ML/MIN/{1.73_M2}
GLUCOSE BLD-MCNC: 113 MG/DL (ref 70–99)
GLUCOSE BLD-MCNC: 134 MG/DL (ref 70–99)
GLUCOSE BLD-MCNC: 81 MG/DL (ref 70–99)
GLUCOSE BLD-MCNC: 93 MG/DL (ref 70–99)
GLUCOSE BLD-MCNC: 97 MG/DL (ref 70–99)
GLUCOSE SERPL-MCNC: 110 MG/DL (ref 70–99)
HCT VFR BLD AUTO: 43 % (ref 36–48)
HGB BLD-MCNC: 14 G/DL (ref 12–16)
INR PPP: 1.32 (ref 0.85–1.15)
LYMPHOCYTES # BLD: 2.2 K/UL (ref 1–5.1)
LYMPHOCYTES NFR BLD: 25.7 %
MAGNESIUM SERPL-MCNC: 1.8 MG/DL (ref 1.8–2.4)
MCH RBC QN AUTO: 28.5 PG (ref 26–34)
MCHC RBC AUTO-ENTMCNC: 32.5 G/DL (ref 31–36)
MCV RBC AUTO: 87.7 FL (ref 80–100)
MONOCYTES # BLD: 0.7 K/UL (ref 0–1.3)
MONOCYTES NFR BLD: 8.4 %
NEUTROPHILS # BLD: 5.5 K/UL (ref 1.7–7.7)
NEUTROPHILS NFR BLD: 63.4 %
PERFORMED ON: ABNORMAL
PERFORMED ON: ABNORMAL
PERFORMED ON: NORMAL
PLATELET # BLD AUTO: 273 K/UL (ref 135–450)
PMV BLD AUTO: 8.3 FL (ref 5–10.5)
POTASSIUM SERPL-SCNC: 4.1 MMOL/L (ref 3.5–5.1)
PROT SERPL-MCNC: 6.8 G/DL (ref 6.4–8.2)
PROTHROMBIN TIME: 16.6 SEC (ref 11.9–14.9)
RBC # BLD AUTO: 4.91 M/UL (ref 4–5.2)
SODIUM SERPL-SCNC: 141 MMOL/L (ref 136–145)
WBC # BLD AUTO: 8.6 K/UL (ref 4–11)

## 2024-05-22 PROCEDURE — C1713 ANCHOR/SCREW BN/BN,TIS/BN: HCPCS | Performed by: ORTHOPAEDIC SURGERY

## 2024-05-22 PROCEDURE — 3600000014 HC SURGERY LEVEL 4 ADDTL 15MIN: Performed by: ORTHOPAEDIC SURGERY

## 2024-05-22 PROCEDURE — 3700000000 HC ANESTHESIA ATTENDED CARE: Performed by: ORTHOPAEDIC SURGERY

## 2024-05-22 PROCEDURE — 51702 INSERT TEMP BLADDER CATH: CPT

## 2024-05-22 PROCEDURE — 2580000003 HC RX 258: Performed by: ORTHOPAEDIC SURGERY

## 2024-05-22 PROCEDURE — 6370000000 HC RX 637 (ALT 250 FOR IP): Performed by: NURSE PRACTITIONER

## 2024-05-22 PROCEDURE — 2500000003 HC RX 250 WO HCPCS: Performed by: NURSE ANESTHETIST, CERTIFIED REGISTERED

## 2024-05-22 PROCEDURE — 73502 X-RAY EXAM HIP UNI 2-3 VIEWS: CPT

## 2024-05-22 PROCEDURE — 2720000010 HC SURG SUPPLY STERILE: Performed by: ORTHOPAEDIC SURGERY

## 2024-05-22 PROCEDURE — 0QS604Z REPOSITION RIGHT UPPER FEMUR WITH INTERNAL FIXATION DEVICE, OPEN APPROACH: ICD-10-PCS | Performed by: ORTHOPAEDIC SURGERY

## 2024-05-22 PROCEDURE — 2580000003 HC RX 258: Performed by: NURSE PRACTITIONER

## 2024-05-22 PROCEDURE — 3600000004 HC SURGERY LEVEL 4 BASE: Performed by: ORTHOPAEDIC SURGERY

## 2024-05-22 PROCEDURE — 85025 COMPLETE CBC W/AUTO DIFF WBC: CPT

## 2024-05-22 PROCEDURE — 2500000003 HC RX 250 WO HCPCS: Performed by: ORTHOPAEDIC SURGERY

## 2024-05-22 PROCEDURE — A4217 STERILE WATER/SALINE, 500 ML: HCPCS | Performed by: ORTHOPAEDIC SURGERY

## 2024-05-22 PROCEDURE — C1769 GUIDE WIRE: HCPCS | Performed by: ORTHOPAEDIC SURGERY

## 2024-05-22 PROCEDURE — 2709999900 HC NON-CHARGEABLE SUPPLY: Performed by: ORTHOPAEDIC SURGERY

## 2024-05-22 PROCEDURE — 6360000002 HC RX W HCPCS: Performed by: NURSE PRACTITIONER

## 2024-05-22 PROCEDURE — 85610 PROTHROMBIN TIME: CPT

## 2024-05-22 PROCEDURE — 3700000001 HC ADD 15 MINUTES (ANESTHESIA): Performed by: ORTHOPAEDIC SURGERY

## 2024-05-22 PROCEDURE — 6360000002 HC RX W HCPCS: Performed by: ORTHOPAEDIC SURGERY

## 2024-05-22 PROCEDURE — 1200000000 HC SEMI PRIVATE

## 2024-05-22 PROCEDURE — 7100000000 HC PACU RECOVERY - FIRST 15 MIN: Performed by: ORTHOPAEDIC SURGERY

## 2024-05-22 PROCEDURE — 83735 ASSAY OF MAGNESIUM: CPT

## 2024-05-22 PROCEDURE — 80053 COMPREHEN METABOLIC PANEL: CPT

## 2024-05-22 PROCEDURE — 7100000001 HC PACU RECOVERY - ADDTL 15 MIN: Performed by: ORTHOPAEDIC SURGERY

## 2024-05-22 PROCEDURE — 36415 COLL VENOUS BLD VENIPUNCTURE: CPT

## 2024-05-22 PROCEDURE — 6360000002 HC RX W HCPCS: Performed by: NURSE ANESTHETIST, CERTIFIED REGISTERED

## 2024-05-22 DEVICE — BOLT BONE L80MM DIA10MM GLD TI ALLOY FOR FEM NK SYS: Type: IMPLANTABLE DEVICE | Site: HIP | Status: FUNCTIONAL

## 2024-05-22 DEVICE — PLATE BONE 1 H TI ALLOY FOR FEM NK SYS: Type: IMPLANTABLE DEVICE | Site: HIP | Status: FUNCTIONAL

## 2024-05-22 DEVICE — SCREW BONE L42MM DIA5MM TI ALLOY LCK ST W/ T25 STARDRV: Type: IMPLANTABLE DEVICE | Site: HIP | Status: FUNCTIONAL

## 2024-05-22 DEVICE — SCREW BONE L85MM DIA6.4MM BLU TI ALLOY ANTIROTATION T25: Type: IMPLANTABLE DEVICE | Site: HIP | Status: FUNCTIONAL

## 2024-05-22 RX ORDER — PHENYLEPHRINE HCL IN 0.9% NACL 1 MG/10 ML
SYRINGE (ML) INTRAVENOUS PRN
Status: DISCONTINUED | OUTPATIENT
Start: 2024-05-22 | End: 2024-05-22 | Stop reason: SDUPTHER

## 2024-05-22 RX ORDER — DEXAMETHASONE SODIUM PHOSPHATE 4 MG/ML
INJECTION, SOLUTION INTRA-ARTICULAR; INTRALESIONAL; INTRAMUSCULAR; INTRAVENOUS; SOFT TISSUE PRN
Status: DISCONTINUED | OUTPATIENT
Start: 2024-05-22 | End: 2024-05-22 | Stop reason: SDUPTHER

## 2024-05-22 RX ORDER — LEVOFLOXACIN 5 MG/ML
750 INJECTION, SOLUTION INTRAVENOUS
Status: DISCONTINUED | OUTPATIENT
Start: 2024-05-22 | End: 2024-05-22

## 2024-05-22 RX ORDER — LIDOCAINE HYDROCHLORIDE 20 MG/ML
INJECTION, SOLUTION EPIDURAL; INFILTRATION; INTRACAUDAL; PERINEURAL PRN
Status: DISCONTINUED | OUTPATIENT
Start: 2024-05-22 | End: 2024-05-22 | Stop reason: SDUPTHER

## 2024-05-22 RX ORDER — BUPIVACAINE HYDROCHLORIDE 5 MG/ML
INJECTION, SOLUTION EPIDURAL; INTRACAUDAL
Status: COMPLETED | OUTPATIENT
Start: 2024-05-22 | End: 2024-05-22

## 2024-05-22 RX ORDER — SUCCINYLCHOLINE/SOD CL,ISO/PF 200MG/10ML
SYRINGE (ML) INTRAVENOUS PRN
Status: DISCONTINUED | OUTPATIENT
Start: 2024-05-22 | End: 2024-05-22 | Stop reason: SDUPTHER

## 2024-05-22 RX ORDER — FENTANYL CITRATE 50 UG/ML
INJECTION, SOLUTION INTRAMUSCULAR; INTRAVENOUS PRN
Status: DISCONTINUED | OUTPATIENT
Start: 2024-05-22 | End: 2024-05-22 | Stop reason: SDUPTHER

## 2024-05-22 RX ORDER — ONDANSETRON 2 MG/ML
INJECTION INTRAMUSCULAR; INTRAVENOUS PRN
Status: DISCONTINUED | OUTPATIENT
Start: 2024-05-22 | End: 2024-05-22 | Stop reason: SDUPTHER

## 2024-05-22 RX ORDER — MAGNESIUM HYDROXIDE 1200 MG/15ML
LIQUID ORAL CONTINUOUS PRN
Status: COMPLETED | OUTPATIENT
Start: 2024-05-22 | End: 2024-05-22

## 2024-05-22 RX ORDER — ROCURONIUM BROMIDE 10 MG/ML
INJECTION, SOLUTION INTRAVENOUS PRN
Status: DISCONTINUED | OUTPATIENT
Start: 2024-05-22 | End: 2024-05-22 | Stop reason: SDUPTHER

## 2024-05-22 RX ORDER — TRANEXAMIC ACID 10 MG/ML
1000 INJECTION, SOLUTION INTRAVENOUS ONCE
Status: COMPLETED | OUTPATIENT
Start: 2024-05-22 | End: 2024-05-22

## 2024-05-22 RX ORDER — HEPARIN SODIUM 5000 [USP'U]/ML
5000 INJECTION, SOLUTION INTRAVENOUS; SUBCUTANEOUS EVERY 8 HOURS SCHEDULED
Status: DISCONTINUED | OUTPATIENT
Start: 2024-05-23 | End: 2024-05-24 | Stop reason: HOSPADM

## 2024-05-22 RX ORDER — PROPOFOL 10 MG/ML
INJECTION, EMULSION INTRAVENOUS PRN
Status: DISCONTINUED | OUTPATIENT
Start: 2024-05-22 | End: 2024-05-22 | Stop reason: SDUPTHER

## 2024-05-22 RX ORDER — TRANEXAMIC ACID 10 MG/ML
INJECTION, SOLUTION INTRAVENOUS
Status: COMPLETED
Start: 2024-05-22 | End: 2024-05-22

## 2024-05-22 RX ADMIN — SUGAMMADEX 200 MG: 100 INJECTION, SOLUTION INTRAVENOUS at 16:44

## 2024-05-22 RX ADMIN — SODIUM CHLORIDE 250 ML: 9 INJECTION, SOLUTION INTRAVENOUS at 07:13

## 2024-05-22 RX ADMIN — Medication 100 MCG: at 16:28

## 2024-05-22 RX ADMIN — SODIUM CHLORIDE 2000 MG: 900 INJECTION INTRAVENOUS at 15:33

## 2024-05-22 RX ADMIN — ONDANSETRON 4 MG: 2 INJECTION INTRAMUSCULAR; INTRAVENOUS at 16:37

## 2024-05-22 RX ADMIN — PANTOPRAZOLE SODIUM 40 MG: 40 TABLET, DELAYED RELEASE ORAL at 10:12

## 2024-05-22 RX ADMIN — FENTANYL CITRATE 25 MCG: 50 INJECTION, SOLUTION INTRAMUSCULAR; INTRAVENOUS at 16:37

## 2024-05-22 RX ADMIN — Medication 100 MG: at 15:37

## 2024-05-22 RX ADMIN — WATER 2000 MG: 1 INJECTION INTRAMUSCULAR; INTRAVENOUS; SUBCUTANEOUS at 18:25

## 2024-05-22 RX ADMIN — DILTIAZEM HYDROCHLORIDE 30 MG: 30 TABLET, FILM COATED ORAL at 20:49

## 2024-05-22 RX ADMIN — DILTIAZEM HYDROCHLORIDE 30 MG: 30 TABLET, FILM COATED ORAL at 10:11

## 2024-05-22 RX ADMIN — ACETAMINOPHEN 650 MG: 325 TABLET ORAL at 20:49

## 2024-05-22 RX ADMIN — ATENOLOL 100 MG: 50 TABLET ORAL at 10:11

## 2024-05-22 RX ADMIN — DEXAMETHASONE SODIUM PHOSPHATE 4 MG: 4 INJECTION, SOLUTION INTRAMUSCULAR; INTRAVENOUS at 16:00

## 2024-05-22 RX ADMIN — FENTANYL CITRATE 25 MCG: 50 INJECTION, SOLUTION INTRAMUSCULAR; INTRAVENOUS at 15:37

## 2024-05-22 RX ADMIN — FENTANYL CITRATE 50 MCG: 50 INJECTION, SOLUTION INTRAMUSCULAR; INTRAVENOUS at 16:08

## 2024-05-22 RX ADMIN — ROCURONIUM BROMIDE 40 MG: 10 INJECTION, SOLUTION INTRAVENOUS at 15:45

## 2024-05-22 RX ADMIN — PROPOFOL 100 MG: 10 INJECTION, EMULSION INTRAVENOUS at 15:37

## 2024-05-22 RX ADMIN — LIDOCAINE HYDROCHLORIDE 40 MG: 20 INJECTION, SOLUTION EPIDURAL; INFILTRATION; INTRACAUDAL; PERINEURAL at 15:37

## 2024-05-22 RX ADMIN — LEVOFLOXACIN 750 MG: 5 INJECTION, SOLUTION INTRAVENOUS at 07:14

## 2024-05-22 RX ADMIN — SODIUM CHLORIDE: 9 INJECTION, SOLUTION INTRAVENOUS at 17:00

## 2024-05-22 RX ADMIN — Medication 100 MCG: at 16:26

## 2024-05-22 RX ADMIN — ACETAMINOPHEN 650 MG: 325 TABLET ORAL at 10:11

## 2024-05-22 RX ADMIN — TRANEXAMIC ACID 1000 MG: 10 INJECTION, SOLUTION INTRAVENOUS at 15:43

## 2024-05-22 RX ADMIN — PAROXETINE HYDROCHLORIDE 20 MG: 20 TABLET, FILM COATED ORAL at 10:11

## 2024-05-22 RX ADMIN — ATORVASTATIN CALCIUM 20 MG: 20 TABLET, FILM COATED ORAL at 10:11

## 2024-05-22 ASSESSMENT — PAIN - FUNCTIONAL ASSESSMENT: PAIN_FUNCTIONAL_ASSESSMENT: 0-10

## 2024-05-22 NOTE — OP NOTE
[facility] [place]    PATIENT NAME:  Laurie Dominguez    PATIENT :  1940    DATE OF PROCEDURE:  24     PREOPERATIVE DIAGNOSIS:  Right valgus impacted femoral neck fracture.     POSTOPERATIVE DIAGNOSIS:  same     OPERATION PERFORMED:  Open treatment of the right femoral neck fracture with internal fixation using plate/screw construct.     ANESTHESIA:  General endotracheal.     SURGEON:  Dustin Diaz MD     FIRST ASSISTANT:  Yves Mar.     BLOOD LOSS:  ~100 mL.     COMPLICATIONS:  None immediately apparent.       IMPLANTS:  1.  Synthes femoral neck system, one-hole plate.  2.  80-mm bolt.  3.  85-mm antirotation screw.  4.  5 mm locking screw.    OPERATIVE PROCEDURE:    The patient was brought back to the OR and general anesthesia was administered.  Boots were applied to both feet and the patient was transferred onto the HANA table.  All pressure points were well padded and the lower extremities were brought into the scissored position to facilitate intraoperative imaging.  The operative lower extremity was brought into slight adduction and rotation so that the patella was facing straight up.  The lower extremity and flank was subsequently prepped and draped in the usual sterile fashion.  A full time-out was performed with all parties in agreement.  The patient did receive cefazolin for antibiotic prophylaxis.     A lateral hip incision was made and this incision was carried down past the skin and subcutaneous fat.  Hemostasis was obtained.  The IT band was split in line with the skin incision and the vastus lateralis was lifted anteriorly.  Using the guide from the femoral neck system tray, the guidewire was inserted.  The trajectory of the guidewire was confirmed on both AP and lateral imaging to ensure appropriate positioning and once I was satisfied with its position, a temporary superior antirotation guidewire was inserted as well.     I measured the guidewire and decided on an 80-mm

## 2024-05-22 NOTE — DISCHARGE INSTRUCTIONS
Dr Diaz's orthopaedic right hip fracture postoperative instructions:    Right leg full weight bearing as tolerated.  Use a walker for ambulation until the therapist advances you.  Do not get up without assistance.    Keep your surgical dressing clean/dry/intact, but replace as needed for soiling or saturation.  Surgical site should be covered for 10 days after surgery, after which it can be left open to air if there is no drainage.  Leave the steristrips in place if present.  Use a leak proof ice pack, and ice the surgical site frequently to aid in pain (alternate 15 minutes on, 15 minutes off); make sure there is a barrier between the ice pack and your skin, such as a clean dry towel.    Wean off narcotics as soon as possible.    Resume your warfarin per your cardiologist.  Use compression stockings during the day.  Perform quad sets, ankle pumps throughout the day.  Perform incentive spirometry every hour while awake, including at home.    Follow up with Dr Dustin Diaz in the office 10-14 days after surgery ([phone]).

## 2024-05-22 NOTE — H&P
length with the patient and an opportunity for questions was afforded.  Possible complications of this fracture and surgery include, but are not limited to, non-union, malunion, leg-length discrepancy, loss of reduction, bleeding, infection, persistent pain, weakness, blood clots, hardware failure, periprosthetic fracture, painful hardware, neurovascular injury, numbness around the incision, and wound healing problems.  she demonstrated a good understanding of the procedure, anticipated outcomes, possible complications, the post-operative restrictions and therapy required (including possible stay at a rehabilitation facility/skilled nursing facility), and at this time voiced desire to proceed.   An informed consent form was signed and the patient will proceed with surgery today.  INR 1.32 this morning.    The patient has clinical osteoporosis with history of multiple fragility fractures.  Therefore, I have recommended treatment, and DEXA screening if not recently performed to establish baseline values.  Calcium and vitamin D supplementation may be indicated as well.  Will defer to PCP on outpatient basis.      Dustin Diaz MD  5/22/2024       
5/20/2024  EXAMINATION: ONE XRAY VIEW OF THE PELVIS AND TWO XRAY VIEWS RIGHT HIP 5/20/2024 5:04 pm COMPARISON: AP pelvis and left hip radiograph series 03/29/2024 HISTORY: Right grown pain radiating to pubic symphysis FINDINGS: Unremarkable appearance of the right and left hemipelvis, articulating normally at the SI joints and pubic symphysis.  Bipolar left hip arthroplasty. Acute, mildly impacted subcapital fracture proximal right femur.  Right femoral head aligns normally at the right acetabulum.  Right hip joint appears well maintained.     Acute, mildly impacted subcapital fracture proximal right femur.     XR CHEST PORTABLE    Result Date: 5/20/2024  EXAMINATION: ONE XRAY VIEW OF THE CHEST 5/20/2024 5:04 pm COMPARISON: 02/08/2024 HISTORY: ORDERING SYSTEM PROVIDED HISTORY: fall, known mec TECHNOLOGIST PROVIDED HISTORY: Reason for exam:->fall, known mech Reason for Exam: fall, known mech FINDINGS: The lungs are without acute focal process.  There is no effusion or pneumothorax. The cardiomediastinal silhouette is stable. The osseous structures are stable.     No acute process.         Electronically signed by BRIGHT Ag CNP on 5/20/2024 at 8:00 PM

## 2024-05-22 NOTE — ANESTHESIA POSTPROCEDURE EVALUATION
Department of Anesthesiology  Postprocedure Note    Patient: Laurie Dominguez  MRN: [id number]  YOB: 1940  Date of evaluation: 5/22/2024    Procedure Summary       Date: 05/22/24 Room / Location: [address]    Anesthesia Start: 1533 Anesthesia Stop: 1700    Procedure: OPEN REDUCTION INTERNAL FIXATION OF RIGHT HIP USING SYNTHES FEMORAL NECK SYSTEM (Right: Hip) Diagnosis:       Closed fracture of neck of right femur, initial encounter ([facility])      (Closed fracture of neck of right femur, initial encounter ([facility]) [S72.001A])    Surgeons: Dustin Diaz MD Responsible Provider: Yandel Langston MD    Anesthesia Type: general ASA Status: 3            Anesthesia Type: No value filed.    Taylor Phase I: Taylor Score: 8    Taylor Phase II:      Anesthesia Post Evaluation    Patient location during evaluation: PACU  Patient participation: complete - patient participated  Level of consciousness: awake and alert  Airway patency: patent  Nausea & Vomiting: no nausea and no vomiting  Cardiovascular status: blood pressure returned to baseline  Respiratory status: acceptable  Hydration status: euvolemic  Multimodal analgesia pain management approach  Pain management: adequate    No notable events documented.

## 2024-05-22 NOTE — CARE COORDINATION
Case Management Assessment  Initial Evaluation    Date/Time of Evaluation: 5/22/2024 1:29 PM  Assessment Completed by: Shabbir Pretty Jr, RN    If patient is discharged prior to next notation, then this note serves as note for discharge by case management.    Patient Name: Laurie Dominguez                   YOB: 1940  Diagnosis: Fracture, subtrochanteric, right femur, closed, initial encounter ([facility]) [S72.21XA]  Closed right hip fracture, initial encounter ([facility]) [S72.001A]  Fall from standing, initial encounter [W19.XXXA]                   Date / Time: 5/20/2024  4:16 PM    Patient Admission Status: Inpatient   Readmission Risk (Low < 19, Mod (19-27), High > 27): Readmission Risk Score: 14.3    Current PCP: Glory Miner APRN - CNP  PCP verified by CM? (P) Yes    Chart Reviewed: Yes      History Provided by: (P) Patient  Patient Orientation: (P) Alert and Oriented    Patient Cognition: (P) Alert    Hospitalization in the last 30 days (Readmission):  No    If yes, Readmission Assessment in  Navigator will be completed.    Advance Directives:      Code Status: Full Code   Patient's Primary Decision Maker is: (P) Legal Next of Kin    Primary Decision Maker: william,christiano - Child - [phone]    Secondary Decision Maker: JamarRosamaria - Child - [phone]    Discharge Planning:    Patient lives with: (P) Other (Comment) Type of Home: (P) Assisted living  Primary Care Giver: (P) Self  Patient Support Systems include: (P) Family Members   Current Financial resources: (P) Medicare  Current community resources: (P) Assisted Living ([facility])  Current services prior to admission: (P) None            Current DME:              Type of Home Care services:  None    ADLS  Prior functional level: (P) Assistance with the following:, Mobility, Bathing, Dressing ([place])  Current functional level: (P) Assistance with the following:, Mobility, Bathing, Dressing    PT AM-PAC:   /24  OT

## 2024-05-23 LAB
25(OH)D3 SERPL-MCNC: 32.8 NG/ML
ALBUMIN SERPL-MCNC: 3 G/DL (ref 3.4–5)
ALBUMIN/GLOB SERPL: 0.8 {RATIO} (ref 1.1–2.2)
ALP SERPL-CCNC: 78 U/L (ref 40–129)
ALT SERPL-CCNC: 10 U/L (ref 10–40)
ANION GAP SERPL CALCULATED.3IONS-SCNC: 17 MMOL/L (ref 3–16)
AST SERPL-CCNC: 21 U/L (ref 15–37)
BASOPHILS # BLD: 0 K/UL (ref 0–0.2)
BASOPHILS NFR BLD: 0.2 %
BILIRUB SERPL-MCNC: 0.3 MG/DL (ref 0–1)
BUN SERPL-MCNC: 29 MG/DL (ref 7–20)
CALCIUM SERPL-MCNC: 9.1 MG/DL (ref 8.3–10.6)
CHLORIDE SERPL-SCNC: 103 MMOL/L (ref 99–110)
CO2 SERPL-SCNC: 18 MMOL/L (ref 21–32)
CREAT SERPL-MCNC: 1.4 MG/DL (ref 0.6–1.2)
DEPRECATED RDW RBC AUTO: 15.5 % (ref 12.4–15.4)
EOSINOPHIL # BLD: 0 K/UL (ref 0–0.6)
EOSINOPHIL NFR BLD: 0 %
GFR SERPLBLD CREATININE-BSD FMLA CKD-EPI: 37 ML/MIN/{1.73_M2}
GLUCOSE BLD-MCNC: 125 MG/DL (ref 70–99)
GLUCOSE BLD-MCNC: 130 MG/DL (ref 70–99)
GLUCOSE BLD-MCNC: 157 MG/DL (ref 70–99)
GLUCOSE BLD-MCNC: 174 MG/DL (ref 70–99)
GLUCOSE SERPL-MCNC: 132 MG/DL (ref 70–99)
HCT VFR BLD AUTO: 42.9 % (ref 36–48)
HGB BLD-MCNC: 14 G/DL (ref 12–16)
INR PPP: 1.17 (ref 0.85–1.15)
LYMPHOCYTES # BLD: 1.5 K/UL (ref 1–5.1)
LYMPHOCYTES NFR BLD: 16.1 %
MAGNESIUM SERPL-MCNC: 1.9 MG/DL (ref 1.8–2.4)
MCH RBC QN AUTO: 28.8 PG (ref 26–34)
MCHC RBC AUTO-ENTMCNC: 32.6 G/DL (ref 31–36)
MCV RBC AUTO: 88.4 FL (ref 80–100)
MONOCYTES # BLD: 0.2 K/UL (ref 0–1.3)
MONOCYTES NFR BLD: 2.7 %
NEUTROPHILS # BLD: 7.4 K/UL (ref 1.7–7.7)
NEUTROPHILS NFR BLD: 81 %
PERFORMED ON: ABNORMAL
PLATELET # BLD AUTO: 287 K/UL (ref 135–450)
PMV BLD AUTO: 8.5 FL (ref 5–10.5)
POTASSIUM SERPL-SCNC: 4.6 MMOL/L (ref 3.5–5.1)
PROT SERPL-MCNC: 6.6 G/DL (ref 6.4–8.2)
PROTHROMBIN TIME: 15.1 SEC (ref 11.9–14.9)
RBC # BLD AUTO: 4.85 M/UL (ref 4–5.2)
SODIUM SERPL-SCNC: 138 MMOL/L (ref 136–145)
WBC # BLD AUTO: 9.2 K/UL (ref 4–11)

## 2024-05-23 PROCEDURE — 97530 THERAPEUTIC ACTIVITIES: CPT

## 2024-05-23 PROCEDURE — 97161 PT EVAL LOW COMPLEX 20 MIN: CPT

## 2024-05-23 PROCEDURE — 97535 SELF CARE MNGMENT TRAINING: CPT

## 2024-05-23 PROCEDURE — 97165 OT EVAL LOW COMPLEX 30 MIN: CPT

## 2024-05-23 PROCEDURE — 6360000002 HC RX W HCPCS: Performed by: NURSE PRACTITIONER

## 2024-05-23 PROCEDURE — 83735 ASSAY OF MAGNESIUM: CPT

## 2024-05-23 PROCEDURE — 6370000000 HC RX 637 (ALT 250 FOR IP): Performed by: STUDENT IN AN ORGANIZED HEALTH CARE EDUCATION/TRAINING PROGRAM

## 2024-05-23 PROCEDURE — 6370000000 HC RX 637 (ALT 250 FOR IP): Performed by: NURSE PRACTITIONER

## 2024-05-23 PROCEDURE — 2580000003 HC RX 258: Performed by: NURSE PRACTITIONER

## 2024-05-23 PROCEDURE — 36415 COLL VENOUS BLD VENIPUNCTURE: CPT

## 2024-05-23 PROCEDURE — 85025 COMPLETE CBC W/AUTO DIFF WBC: CPT

## 2024-05-23 PROCEDURE — 82306 VITAMIN D 25 HYDROXY: CPT

## 2024-05-23 PROCEDURE — 80053 COMPREHEN METABOLIC PANEL: CPT

## 2024-05-23 PROCEDURE — 85610 PROTHROMBIN TIME: CPT

## 2024-05-23 PROCEDURE — 1200000000 HC SEMI PRIVATE

## 2024-05-23 PROCEDURE — 6360000002 HC RX W HCPCS: Performed by: ORTHOPAEDIC SURGERY

## 2024-05-23 RX ORDER — WARFARIN SODIUM 2 MG/1
4 TABLET ORAL
Status: COMPLETED | OUTPATIENT
Start: 2024-05-23 | End: 2024-05-23

## 2024-05-23 RX ORDER — TRAMADOL HYDROCHLORIDE 50 MG/1
50 TABLET ORAL EVERY 6 HOURS PRN
Qty: 20 TABLET | Refills: 0 | Status: SHIPPED | OUTPATIENT
Start: 2024-05-23 | End: 2024-05-28

## 2024-05-23 RX ORDER — WARFARIN SODIUM 2 MG/1
2 TABLET ORAL DAILY
Status: DISCONTINUED | OUTPATIENT
Start: 2024-05-24 | End: 2024-05-24

## 2024-05-23 RX ORDER — TRAMADOL HYDROCHLORIDE 50 MG/1
50 TABLET ORAL EVERY 6 HOURS PRN
Status: DISCONTINUED | OUTPATIENT
Start: 2024-05-23 | End: 2024-05-24 | Stop reason: HOSPADM

## 2024-05-23 RX ADMIN — ATORVASTATIN CALCIUM 20 MG: 20 TABLET, FILM COATED ORAL at 09:28

## 2024-05-23 RX ADMIN — PAROXETINE HYDROCHLORIDE 20 MG: 20 TABLET, FILM COATED ORAL at 09:28

## 2024-05-23 RX ADMIN — SODIUM CHLORIDE, PRESERVATIVE FREE 10 ML: 5 INJECTION INTRAVENOUS at 22:07

## 2024-05-23 RX ADMIN — HEPARIN SODIUM 5000 UNITS: 5000 INJECTION INTRAVENOUS; SUBCUTANEOUS at 22:04

## 2024-05-23 RX ADMIN — HEPARIN SODIUM 5000 UNITS: 5000 INJECTION INTRAVENOUS; SUBCUTANEOUS at 06:55

## 2024-05-23 RX ADMIN — PANTOPRAZOLE SODIUM 40 MG: 40 TABLET, DELAYED RELEASE ORAL at 09:28

## 2024-05-23 RX ADMIN — DILTIAZEM HYDROCHLORIDE 30 MG: 30 TABLET, FILM COATED ORAL at 22:04

## 2024-05-23 RX ADMIN — WATER 2000 MG: 1 INJECTION INTRAMUSCULAR; INTRAVENOUS; SUBCUTANEOUS at 03:03

## 2024-05-23 RX ADMIN — ATENOLOL 100 MG: 50 TABLET ORAL at 09:28

## 2024-05-23 RX ADMIN — DILTIAZEM HYDROCHLORIDE 30 MG: 30 TABLET, FILM COATED ORAL at 09:31

## 2024-05-23 RX ADMIN — ACETAMINOPHEN 650 MG: 325 TABLET ORAL at 22:04

## 2024-05-23 RX ADMIN — WARFARIN SODIUM 4 MG: 2 TABLET ORAL at 16:56

## 2024-05-23 RX ADMIN — ACETAMINOPHEN 650 MG: 325 TABLET ORAL at 09:28

## 2024-05-23 RX ADMIN — HEPARIN SODIUM 5000 UNITS: 5000 INJECTION INTRAVENOUS; SUBCUTANEOUS at 14:28

## 2024-05-23 RX ADMIN — SODIUM CHLORIDE, PRESERVATIVE FREE 10 ML: 5 INJECTION INTRAVENOUS at 09:30

## 2024-05-23 ASSESSMENT — PAIN SCALES - GENERAL
PAINLEVEL_OUTOF10: 0
PAINLEVEL_OUTOF10: 0

## 2024-05-23 NOTE — DISCHARGE INSTR - COC
incision CLEAN at all times.  Do not allow pets near incision.  Ok to shower with Mepilex dressing if covered.   Prevent constipation while taking narcotics by drinking plenty of water and using stool softener/laxative as needed.  Follow up with Dr. Dustin Diaz 2 weeks post op. Call [phone] for appt.     DVT Prophylaxis:  Resume warfarin      Patient's personal belongings (please select all that are sent with patient):  ***    RN SIGNATURE:  Electronically signed by Juvenal Ireland RN on 5/24/24 at 11:41 AM EDT    CASE MANAGEMENT/SOCIAL WORK SECTION    Inpatient Status Date: 05/20/2024    Readmission Risk Assessment Score:  Readmission Risk              Risk of Unplanned Readmission:  17           Discharging to Facility/ Agency     [place] [address]  Phone: [phone]  Fax: [phone]      / signature: Electronically signed by Shabbir Pretty Jr, RN on 5/24/24 at 12:21 PM EDT    PHYSICIAN SECTION    Prognosis: Good    Condition at Discharge: Stable    Rehab Potential (if transferring to Rehab): Good    Recommended Labs or Other Treatments After Discharge: PT/OT, INR monitoring to get to therapeutic levels    Physician Certification: I certify the above information and transfer of Laurie Dominguez  is necessary for the continuing treatment of the diagnosis listed and that she requires Skilled Nursing Facility for greater 30 days.     Update Admission H&P: No change in H&P    PHYSICIAN SIGNATURE:  Electronically signed by Daren Cowan MD on 5/23/24 at 4:14 PM EDT

## 2024-05-23 NOTE — CARE COORDINATION
Discharge Planning Note:    Update:    - [facility] - ACCEPTED    Will continue to follow.    BILLY Nicholson RN    [facility]  Phone: [phone]

## 2024-05-23 NOTE — CONSULTS
Clinical Pharmacy Note: Pharmacy to Dose Warfarin    Pharmacy consulted by Dr. Cowan to dose warfarin.    Laurie Dominguez is a 83 y.o. female  is receiving warfarin for indication: Afib.    INR Goal Range: 2-3  Prior to admission warfarin dosing regimen: 2 mg daily   INR today:   Lab Results   Component Value Date/Time    INR 1.17 05/23/2024 04:59 AM       Assessment/Plan:  INR is subtherapeutic on current inpatient dosing regimen. Patient INR was reversed on admission for surgery d/t femur fracture.   Possible concomitant drug-drug interactions include: N/A   Based on today's assessment, dose warfarin 4 mg tonight. Discussed with Dr. Cowan, if patient not currently in Afib, would not bridge with lovenox/heparin- will just be a little more aggressive with dosing of warfarin given recent surgery.  Ok to continue SC heparin 5000 units TID for DVT ppx while INR trending up.  Daily INR is ordered. Pharmacy will continue to monitor and make adjustments to regimen as necessary.     Thank you for the consult,     Sammie Hubbard, PharmD, BCPS  Clinical Pharmacist  [phone]

## 2024-05-24 VITALS
HEIGHT: 67 IN | TEMPERATURE: 98 F | OXYGEN SATURATION: 96 % | RESPIRATION RATE: 16 BRPM | BODY MASS INDEX: 21.19 KG/M2 | WEIGHT: 135 LBS | DIASTOLIC BLOOD PRESSURE: 90 MMHG | SYSTOLIC BLOOD PRESSURE: 153 MMHG | HEART RATE: 80 BPM

## 2024-05-24 LAB
ALBUMIN SERPL-MCNC: 3.1 G/DL (ref 3.4–5)
ALBUMIN/GLOB SERPL: 0.9 {RATIO} (ref 1.1–2.2)
ALP SERPL-CCNC: 73 U/L (ref 40–129)
ALT SERPL-CCNC: <5 U/L (ref 10–40)
ANION GAP SERPL CALCULATED.3IONS-SCNC: 13 MMOL/L (ref 3–16)
AST SERPL-CCNC: 22 U/L (ref 15–37)
BASOPHILS # BLD: 0.1 K/UL (ref 0–0.2)
BASOPHILS NFR BLD: 0.5 %
BILIRUB SERPL-MCNC: 0.3 MG/DL (ref 0–1)
BUN SERPL-MCNC: 34 MG/DL (ref 7–20)
CALCIUM SERPL-MCNC: 9.2 MG/DL (ref 8.3–10.6)
CHLORIDE SERPL-SCNC: 104 MMOL/L (ref 99–110)
CO2 SERPL-SCNC: 21 MMOL/L (ref 21–32)
CREAT SERPL-MCNC: 1.4 MG/DL (ref 0.6–1.2)
DEPRECATED RDW RBC AUTO: 15.4 % (ref 12.4–15.4)
EOSINOPHIL # BLD: 0 K/UL (ref 0–0.6)
EOSINOPHIL NFR BLD: 0.1 %
GFR SERPLBLD CREATININE-BSD FMLA CKD-EPI: 37 ML/MIN/{1.73_M2}
GLUCOSE BLD-MCNC: 132 MG/DL (ref 70–99)
GLUCOSE BLD-MCNC: 151 MG/DL (ref 70–99)
GLUCOSE SERPL-MCNC: 141 MG/DL (ref 70–99)
HCT VFR BLD AUTO: 41.9 % (ref 36–48)
HGB BLD-MCNC: 13.5 G/DL (ref 12–16)
INR PPP: 1.18 (ref 0.85–1.15)
LYMPHOCYTES # BLD: 2.1 K/UL (ref 1–5.1)
LYMPHOCYTES NFR BLD: 16.9 %
MAGNESIUM SERPL-MCNC: 2 MG/DL (ref 1.8–2.4)
MCH RBC QN AUTO: 28.3 PG (ref 26–34)
MCHC RBC AUTO-ENTMCNC: 32.2 G/DL (ref 31–36)
MCV RBC AUTO: 87.9 FL (ref 80–100)
MONOCYTES # BLD: 0.7 K/UL (ref 0–1.3)
MONOCYTES NFR BLD: 6.1 %
NEUTROPHILS # BLD: 9.4 K/UL (ref 1.7–7.7)
NEUTROPHILS NFR BLD: 76.4 %
PERFORMED ON: ABNORMAL
PERFORMED ON: ABNORMAL
PLATELET # BLD AUTO: 287 K/UL (ref 135–450)
PMV BLD AUTO: 8.8 FL (ref 5–10.5)
POTASSIUM SERPL-SCNC: 4.4 MMOL/L (ref 3.5–5.1)
PROT SERPL-MCNC: 6.5 G/DL (ref 6.4–8.2)
PROTHROMBIN TIME: 15.2 SEC (ref 11.9–14.9)
RBC # BLD AUTO: 4.77 M/UL (ref 4–5.2)
SODIUM SERPL-SCNC: 138 MMOL/L (ref 136–145)
WBC # BLD AUTO: 12.3 K/UL (ref 4–11)

## 2024-05-24 PROCEDURE — 97530 THERAPEUTIC ACTIVITIES: CPT

## 2024-05-24 PROCEDURE — 83735 ASSAY OF MAGNESIUM: CPT

## 2024-05-24 PROCEDURE — 85610 PROTHROMBIN TIME: CPT

## 2024-05-24 PROCEDURE — 80053 COMPREHEN METABOLIC PANEL: CPT

## 2024-05-24 PROCEDURE — 6370000000 HC RX 637 (ALT 250 FOR IP): Performed by: NURSE PRACTITIONER

## 2024-05-24 PROCEDURE — 97535 SELF CARE MNGMENT TRAINING: CPT

## 2024-05-24 PROCEDURE — 97116 GAIT TRAINING THERAPY: CPT

## 2024-05-24 PROCEDURE — 2580000003 HC RX 258: Performed by: NURSE PRACTITIONER

## 2024-05-24 PROCEDURE — 85025 COMPLETE CBC W/AUTO DIFF WBC: CPT

## 2024-05-24 PROCEDURE — 36415 COLL VENOUS BLD VENIPUNCTURE: CPT

## 2024-05-24 PROCEDURE — 6360000002 HC RX W HCPCS: Performed by: ORTHOPAEDIC SURGERY

## 2024-05-24 RX ORDER — WARFARIN SODIUM 2 MG/1
4 TABLET ORAL
Status: DISCONTINUED | OUTPATIENT
Start: 2024-05-24 | End: 2024-05-24 | Stop reason: HOSPADM

## 2024-05-24 RX ORDER — WARFARIN SODIUM 2 MG/1
2 TABLET ORAL DAILY
Status: DISCONTINUED | OUTPATIENT
Start: 2024-05-25 | End: 2024-05-24 | Stop reason: HOSPADM

## 2024-05-24 RX ADMIN — TRAMADOL HYDROCHLORIDE 50 MG: 50 TABLET ORAL at 10:56

## 2024-05-24 RX ADMIN — SODIUM CHLORIDE, PRESERVATIVE FREE 10 ML: 5 INJECTION INTRAVENOUS at 09:41

## 2024-05-24 RX ADMIN — ACETAMINOPHEN 650 MG: 325 TABLET ORAL at 09:39

## 2024-05-24 RX ADMIN — PANTOPRAZOLE SODIUM 40 MG: 40 TABLET, DELAYED RELEASE ORAL at 09:40

## 2024-05-24 RX ADMIN — HEPARIN SODIUM 5000 UNITS: 5000 INJECTION INTRAVENOUS; SUBCUTANEOUS at 14:59

## 2024-05-24 RX ADMIN — PAROXETINE HYDROCHLORIDE 20 MG: 20 TABLET, FILM COATED ORAL at 09:40

## 2024-05-24 RX ADMIN — ATORVASTATIN CALCIUM 20 MG: 20 TABLET, FILM COATED ORAL at 09:40

## 2024-05-24 RX ADMIN — ATENOLOL 100 MG: 50 TABLET ORAL at 09:40

## 2024-05-24 RX ADMIN — HEPARIN SODIUM 5000 UNITS: 5000 INJECTION INTRAVENOUS; SUBCUTANEOUS at 06:25

## 2024-05-24 RX ADMIN — ACETAMINOPHEN 650 MG: 325 TABLET ORAL at 15:00

## 2024-05-24 RX ADMIN — DILTIAZEM HYDROCHLORIDE 30 MG: 30 TABLET, FILM COATED ORAL at 09:39

## 2024-05-24 ASSESSMENT — PAIN SCALES - GENERAL
PAINLEVEL_OUTOF10: 6
PAINLEVEL_OUTOF10: 5
PAINLEVEL_OUTOF10: 0

## 2024-05-24 ASSESSMENT — PAIN DESCRIPTION - LOCATION: LOCATION: HIP

## 2024-05-24 ASSESSMENT — PAIN DESCRIPTION - ORIENTATION: ORIENTATION: RIGHT

## 2024-05-24 NOTE — PLAN OF CARE
Problem: Cardiovascular - Adult  Goal: Maintains optimal cardiac output and hemodynamic stability  Outcome: Progressing  Goal: Absence of cardiac dysrhythmias or at baseline  Outcome: Progressing     Problem: Genitourinary - Adult  Goal: Absence of urinary retention  Outcome: Progressing  Goal: Urinary catheter remains patent  Outcome: Progressing     
  Problem: Discharge Planning  Goal: Discharge to home or other facility with appropriate resources  5/21/2024 1315 by Sheyla Lara RN  Outcome: Progressing  5/20/2024 2321 by Michael Thompson RN  Outcome: Progressing     Problem: Pain  Goal: Verbalizes/displays adequate comfort level or baseline comfort level  5/21/2024 1315 by Sheyla Lara RN  Outcome: Progressing  5/20/2024 2321 by Michael Thompson RN  Outcome: Progressing     Problem: ABCDS Injury Assessment  Goal: Absence of physical injury  Outcome: Progressing     Problem: Safety - Adult  Goal: Free from fall injury  Outcome: Progressing     
  Problem: Discharge Planning  Goal: Discharge to home or other facility with appropriate resources  5/24/2024 1618 by Juvenal Ireland RN  Outcome: Completed  5/24/2024 1117 by Juvenal Ireland RN  Outcome: Progressing  Flowsheets (Taken 5/23/2024 2200 by Lisa Stone, RN)  Discharge to home or other facility with appropriate resources: Refer to discharge planning if patient needs post-hospital services based on physician order or complex needs related to functional status, cognitive ability or social support system     Problem: Pain  Goal: Verbalizes/displays adequate comfort level or baseline comfort level  5/24/2024 1618 by Juvenal Ireland RN  Outcome: Completed  5/24/2024 1117 by Juvenal Ireland RN  Outcome: Progressing     Problem: ABCDS Injury Assessment  Goal: Absence of physical injury  5/24/2024 1618 by Juvenal Ireland RN  Outcome: Completed  5/24/2024 1117 by Juvenal Ireland RN  Outcome: Progressing     Problem: Safety - Adult  Goal: Free from fall injury  5/24/2024 1618 by Juvenal Ireland RN  Outcome: Completed  5/24/2024 1117 by Juvenal Ireland RN  Outcome: Progressing     Problem: Cardiovascular - Adult  Goal: Maintains optimal cardiac output and hemodynamic stability  5/24/2024 1618 by Juvenal Ireland RN  Outcome: Completed  5/24/2024 1117 by Juvenal Ireland RN  Outcome: Progressing  Flowsheets (Taken 5/23/2024 2200 by Lisa Stone, RN)  Maintains optimal cardiac output and hemodynamic stability:   Monitor blood pressure and heart rate   Monitor urine output and notify Licensed Independent Practitioner for values outside of normal range  Goal: Absence of cardiac dysrhythmias or at baseline  5/24/2024 1618 by Juvenal Ireland RN  Outcome: Completed  5/24/2024 1117 by Juvenal Ireland RN  Outcome: Progressing  Flowsheets (Taken 5/23/2024 2200 by Lisa Stone, RN)  Absence of cardiac dysrhythmias or at baseline: Monitor 
  Problem: Discharge Planning  Goal: Discharge to home or other facility with appropriate resources  Outcome: Progressing     Problem: Pain  Goal: Verbalizes/displays adequate comfort level or baseline comfort level  Outcome: Progressing     
  Problem: Discharge Planning  Goal: Discharge to home or other facility with appropriate resources  Outcome: Progressing     Problem: Pain  Goal: Verbalizes/displays adequate comfort level or baseline comfort level  Outcome: Progressing     Problem: ABCDS Injury Assessment  Goal: Absence of physical injury  Outcome: Progressing     Problem: Safety - Adult  Goal: Free from fall injury  Outcome: Progressing     
  Problem: Discharge Planning  Goal: Discharge to home or other facility with appropriate resources  Outcome: Progressing  Flowsheets (Taken 5/22/2024 1740 by Nora Mary, RN)  Discharge to home or other facility with appropriate resources: Identify barriers to discharge with patient and caregiver     Problem: Pain  Goal: Verbalizes/displays adequate comfort level or baseline comfort level  Outcome: Progressing  Flowsheets (Taken 5/22/2024 1740 by Nora Mary, RN)  Verbalizes/displays adequate comfort level or baseline comfort level:   Encourage patient to monitor pain and request assistance   Assess pain using appropriate pain scale   Administer analgesics based on type and severity of pain and evaluate response     Problem: ABCDS Injury Assessment  Goal: Absence of physical injury  Outcome: Progressing     Problem: Safety - Adult  Goal: Free from fall injury  Outcome: Progressing     Problem: Cardiovascular - Adult  Goal: Maintains optimal cardiac output and hemodynamic stability  Outcome: Progressing     Problem: Cardiovascular - Adult  Goal: Absence of cardiac dysrhythmias or at baseline  Outcome: Progressing     Problem: Genitourinary - Adult  Goal: Absence of urinary retention  Outcome: Progressing     Problem: Genitourinary - Adult  Goal: Urinary catheter remains patent  Outcome: Progressing     Problem: Skin/Tissue Integrity  Goal: Absence of new skin breakdown  Description: 1.  Monitor for areas of redness and/or skin breakdown  2.  Assess vascular access sites hourly  3.  Every 4-6 hours minimum:  Change oxygen saturation probe site  4.  Every 4-6 hours:  If on nasal continuous positive airway pressure, respiratory therapy assess nares and determine need for appliance change or resting period.  Outcome: Progressing     Problem: Chronic Conditions and Co-morbidities  Goal: Patient's chronic conditions and co-morbidity symptoms are monitored and maintained or improved  Outcome: Progressing     
  Problem: Discharge Planning  Goal: Discharge to home or other facility with appropriate resources  Outcome: Progressing  Flowsheets (Taken 5/23/2024 2200 by Lisa Stone, RN)  Discharge to home or other facility with appropriate resources: Refer to discharge planning if patient needs post-hospital services based on physician order or complex needs related to functional status, cognitive ability or social support system     Problem: Pain  Goal: Verbalizes/displays adequate comfort level or baseline comfort level  Outcome: Progressing     Problem: ABCDS Injury Assessment  Goal: Absence of physical injury  Outcome: Progressing     Problem: Safety - Adult  Goal: Free from fall injury  Outcome: Progressing     Problem: Cardiovascular - Adult  Goal: Maintains optimal cardiac output and hemodynamic stability  Outcome: Progressing  Flowsheets (Taken 5/23/2024 2200 by Lisa Stone, RN)  Maintains optimal cardiac output and hemodynamic stability:   Monitor blood pressure and heart rate   Monitor urine output and notify Licensed Independent Practitioner for values outside of normal range  Goal: Absence of cardiac dysrhythmias or at baseline  Outcome: Progressing  Flowsheets (Taken 5/23/2024 2200 by Lisa Stone, RN)  Absence of cardiac dysrhythmias or at baseline: Monitor cardiac rate and rhythm     Problem: Genitourinary - Adult  Goal: Absence of urinary retention  Outcome: Progressing  Flowsheets (Taken 5/23/2024 2200 by Lisa Stone, RN)  Absence of urinary retention: Monitor intake/output and perform bladder scan as needed  Goal: Urinary catheter remains patent  Outcome: Progressing     Problem: Skin/Tissue Integrity  Goal: Absence of new skin breakdown  Description: 1.  Monitor for areas of redness and/or skin breakdown  2.  Assess vascular access sites hourly  3.  Every 4-6 hours minimum:  Change oxygen saturation probe site  4.  Every 4-6 hours:  If on nasal continuous positive airway 
[facility] Orthopedic Surgery  Plan of Care Note        Orthopedic Consult received, full note to follow.    Laurie Dominguez 83 y.o. presenting to ER for right sided hip pain after fall at her ECF.    Xray reviewed, and showed minimally impacted right femoral neck fracture.    Plan:  - INR remains elevated despite Vit K last night.  Will need to push surgery back until tomorrow.   - Diet ordered today  - NPO at [facility]  - NWB RLE  - Vit K 5mg IV now and repeat INR 1500 then again tomorrow morning  - Hold all anticoags please  - Pain control  - Verify informed consent (Right hip open reduction and internal fixation with Dr. Dustin Diaz)  - Request pre-op clearance from hospitalist     BRIGHT Crump - CNP 5/21/2024 7:17 AM    
Progressing  Goal: Urinary catheter remains patent  5/23/2024 0946 by Sheyla Lara RN  Outcome: Progressing  5/23/2024 0211 by Chela Kelley RN  Outcome: Progressing     Problem: Skin/Tissue Integrity  Goal: Absence of new skin breakdown  Description: 1.  Monitor for areas of redness and/or skin breakdown  2.  Assess vascular access sites hourly  3.  Every 4-6 hours minimum:  Change oxygen saturation probe site  4.  Every 4-6 hours:  If on nasal continuous positive airway pressure, respiratory therapy assess nares and determine need for appliance change or resting period.  5/23/2024 0946 by Sheyla Lara RN  Outcome: Progressing  5/23/2024 0211 by Chela Kelley RN  Outcome: Progressing     Problem: Chronic Conditions and Co-morbidities  Goal: Patient's chronic conditions and co-morbidity symptoms are monitored and maintained or improved  5/23/2024 0946 by Sheyla Lara RN  Outcome: Progressing  5/23/2024 0211 by Chela Kelley, RN  Outcome: Progressing

## 2024-05-24 NOTE — PROGRESS NOTES
Admit Date: 5/20/2024  Diet: ADULT DIET; Regular    CC: Right proximal femur fracture    Interval history:   Overnight, there were no acute events. Patient's vitals remained stable    Patient was seen this morning sitting up in a chair with her daughter at bedside.  Patient endorses that she does not want to do rehab here and is more familiar with her old facility rehab.  She is hoping to be discharged back there.  Patient denies fevers, chills, nausea, vomiting, chest pain, shortness of breath, diarrhea, constipation, dysuria, urinary frequency or urgency.     Plan:     -Discharge order in place  -Pending placement to [facility]    Assessment:   Laurie Dominguez is a 83 y.o. female with PMH of dementia, CKD 3, DM2 not on insulin, hypertension, diastolic CHF, A-fib on Coumadin  who was admitted with acute fracture of R proximal femur    Acute impacted subcapital fracture proximal right femur  Patient endorses she had a fall and had acute right hip pain.  On admission she was found to have acute mildly impacted subcapital fracture proximal right femur.  Of note, patient is also s/p left hip hemiarthroplasty on 2/8/2024  -Orthopedic surgery consulted    Chronic CKD 3.    Patient baseline creatinine runs between 1.6-1.7.  On admission her creatinine was 1.8.       Chronic diastolic CHF-not in acute exacerbation  Patient's last echo done in 2/2024 showed EF 55-60% with grade 3 diastolic dysfunction.  At home, patient is on Lasix 20 mg as needed for weight gain >3 pounds in 1 day     T2DM-controlled  Patient is not on insulin at home.  Patient's last HgbA1c on 8/2023 = 6.6%     Atrial fibrillation.  Patient is on warfarin at home.  She is rate controlled with diltiazem.  INR goals at home is between 2-3     Code Status: Full Code   FEN: ADULT DIET; Regular   DVT PPX: []Lovenox, []Heparin, []Coumadin, []Eliquis, []Xarelto, [x]SCD  DISPO: Continue management of acute issues     Daren Cowan MD  5/23/2024,  4:20 
      Admit Date: 5/20/2024  Diet: Diet NPO Exceptions are: Sips of Water with Meds    CC: Right proximal femur fracture    Interval history:   Overnight, there were no acute events. Patient's vitals remained stable    Patient was seen this morning in bed.  She was anticipating when her surgery will be and she is hoping everything goes well. Patient denies fevers, chills, nausea, vomiting, chest pain, shortness of breath, diarrhea, constipation, dysuria, urinary frequency or urgency.     Plan:     -OR today for right hip open reduction and internal fixation tomorrow  -Pain control  -PT/OT tomorrow    Assessment:   Laurie Dominguez is a 83 y.o. female with PMH of dementia, CKD 3, DM2 not on insulin, hypertension, diastolic CHF, A-fib on Coumadin  who was admitted with acute fracture of R proximal femur    Acute impacted subcapital fracture proximal right femur  Patient endorses she had a fall and had acute right hip pain.  On admission she was found to have acute mildly impacted subcapital fracture proximal right femur.  Of note, patient is also s/p left hip hemiarthroplasty on 2/8/2024  -Orthopedic surgery consulted    Chronic CKD 3.    Patient baseline creatinine runs between 1.6-1.7.  On admission her creatinine was 1.8.       Chronic diastolic CHF-not in acute exacerbation  Patient's last echo done in 2/2024 showed EF 55-60% with grade 3 diastolic dysfunction.  At home, patient is on Lasix 20 mg as needed for weight gain >3 pounds in 1 day     T2DM-controlled  Patient is not on insulin at home.  Patient's last HgbA1c on 8/2023 = 6.6%     Atrial fibrillation.  Patient is on warfarin at home.  She is rate controlled with diltiazem.  INR goals at home is between 2-3     Code Status: Full Code   FEN: Diet NPO Exceptions are: Sips of Water with Meds   DVT PPX: []Lovenox, []Heparin, []Coumadin, []Eliquis, []Xarelto, [x]SCD  DISPO: Continue management of acute issues     Daren Cowan MD  5/22/2024,  4:45 PM    This 
    Pharmacy to Dose Warfarin    Pharmacy consulted to dose warfarin for Afib.    INR Goal: 2-3    INR today: 1.18    Assessment/Plan:  - INR subtherapeutic today  - Will give additional 4 mg warfarin tonight - anticipate prolonged duration to get to goal given recent Vitamin K administration on admission  - Ok to continue SC heparin for DVT ppx at this time  - Possible concomitant drug-drug interactions include: N/A     Pharmacy will continue to follow.    Sammie Hubbard, PharmD, BCPS  Clinical Pharmacist  [phone]       
  [facility] - Inpatient Rehabilitation Department   Phone: [phone]    Physical Therapy    [] Initial Evaluation            [x] Daily Treatment Note         [] Discharge Summary      Patient: Laurie Dominguez   : 1940   MRN: [id number]   Date of Service:  2024  Admitting Diagnosis: Closed subcapital fracture of neck of right femur, initial encounter ([facility])  Current Admission Summary: Laurie Dominguez is a 83 y.o. female with a pmh of dementia, CKD 3, DM2 not on insulin, hypertension, diastolic CHF, A-fib on Coumadin in an assisted living resident who presents with right hip pain radiating to right groin following a fall. Found to have an acute right hip impacted proximal femur fracture.    S/p ORIF R Hip 24  Past Medical History:  has a past medical history of A-fib (HCC), Atrial fibrillation (HCC), Hypercholesterolemia, Hypertension, and Pelvic mass.  Past Surgical History:  has a past surgical history that includes Appendectomy; Cystocopy (Right, 2018); Hysterectomy, total abdominal (2018); pr cysto/uretero w/lithotripsy &indwell stent insrt (Right, 10/31/2018); Upper gastrointestinal endoscopy (N/A, 2020); Upper gastrointestinal endoscopy (N/A, 2020); Upper gastrointestinal endoscopy (N/A, 3/2/2021); hip surgery (Left, 2024); and hip surgery (Right, 2024).  Discharge Recommendations: Laurie Dominguez scored a 17/ on the AM-PAC short mobility form. Current research shows that an AM-PAC score of 17 or less is typically not associated with a discharge to the patient's home setting. Based on the patient's AM-PAC score and their current functional mobility deficits, it is recommended that the patient have 3-5 sessions per week of Physical Therapy at d/c to increase the patient's independence.  Please see assessment section for further patient specific details.    If patient discharges prior to next session this note will serve as a discharge summary.  
  [facility] - Inpatient Rehabilitation Department   Phone: [phone]    Occupational Therapy    [x] Initial Evaluation            [] Daily Treatment Note         [] Discharge Summary      Patient: Laurie Dominguez   : 1940   MRN: [id number]   Date of Service:  2024    Admitting Diagnosis:  Closed subcapital fracture of neck of right femur, initial encounter ([facility])  Current Admission Summary: 83 y.o. female with a pmh of dementia, CKD 3, DM2 not on insulin, hypertension, diastolic CHF, A-fib on Coumadin in an assisted living resident who presents with right hip pain radiating to right groin follow . S/p R hip ORIF   Past Medical History:  has a past medical history of A-fib (HCC), Atrial fibrillation (HCC), Hypercholesterolemia, Hypertension, and Pelvic mass.  Past Surgical History:  has a past surgical history that includes Appendectomy; Cystocopy (Right, 2018); Hysterectomy, total abdominal (2018); pr cysto/uretero w/lithotripsy &indwell stent insrt (Right, 10/31/2018); Upper gastrointestinal endoscopy (N/A, 2020); Upper gastrointestinal endoscopy (N/A, 2020); Upper gastrointestinal endoscopy (N/A, 3/2/2021); hip surgery (Left, 2024); and hip surgery (Right, 2024).    Discharge Recommendations: Laurie Dominguez scored a 16/24 on the AM-PAC ADL Inpatient form. Current research shows that an AM-PAC score of 17 or less is typically not associated with a discharge to the patient's home setting. Based on the patient's AM-PAC score and their current ADL deficits, it is recommended that the patient have 3-5 sessions per week of Occupational Therapy at d/c to increase the patient's independence.  Please see assessment section for further patient specific details.    If patient discharges prior to next session this note will serve as a discharge summary.  Please see below for the latest assessment towards goals.      DME Required For Discharge: DME to be 
  [facility] - Inpatient Rehabilitation Department   Phone: [phone]    Occupational Therapy    [] Initial Evaluation            [x] Daily Treatment Note         [] Discharge Summary      Patient: Laurie Dominguez   : 1940   MRN: [id number]   Date of Service:  2024    Admitting Diagnosis:  Closed subcapital fracture of neck of right femur, initial encounter ([facility])  Current Admission Summary: 83 y.o. female with a pmh of dementia, CKD 3, DM2 not on insulin, hypertension, diastolic CHF, A-fib on Coumadin in an assisted living resident who presents with right hip pain radiating to right groin follow . S/p R hip ORIF   Past Medical History:  has a past medical history of A-fib (HCC), Atrial fibrillation (HCC), Hypercholesterolemia, Hypertension, and Pelvic mass.  Past Surgical History:  has a past surgical history that includes Appendectomy; Cystocopy (Right, 2018); Hysterectomy, total abdominal (2018); pr cysto/uretero w/lithotripsy &indwell stent insrt (Right, 10/31/2018); Upper gastrointestinal endoscopy (N/A, 2020); Upper gastrointestinal endoscopy (N/A, 2020); Upper gastrointestinal endoscopy (N/A, 3/2/2021); hip surgery (Left, 2024); and hip surgery (Right, 2024).    Discharge Recommendations: Laurie Dominguez scored a 17/ on the AM-PAC ADL Inpatient form. Current research shows that an AM-PAC score of 17 or less is typically not associated with a discharge to the patient's home setting. Based on the patient's AM-PAC score and their current ADL deficits, it is recommended that the patient have 3-5 sessions per week of Occupational Therapy at d/c to increase the patient's independence.  Please see assessment section for further patient specific details.    If patient discharges prior to next session this note will serve as a discharge summary.  Please see below for the latest assessment towards goals.      DME Required For Discharge: DME to be 
 [facility] Orthopedic Surgery   Progress Note    CHIEF COMPLAINT/DIAGNOSIS: S/p right hip ORIF for fracture    SUBJECTIVE: Seen sitting up in the chair; describes no pain or N/T.  Appears comfortable - intermittent confusion as baseline.     OBJECTIVE  Physical    VITALS:  BP (!) 153/90   Pulse 80   Temp 98 °F (36.7 °C) (Oral)   Resp 16   Ht 1.702 m (5' 7\")   Wt 61.2 kg (135 lb)   SpO2 96%   BMI 21.14 kg/m²     GENERAL: Alert and oriented to person, in no acute distress.   MUSCULOSKELETAL: Able to dorsi and plantarflex the ankle on operative side without issue.   INCISION:  Covered with post-op dressing; clean dry and intact.  ROM: Limited secondary pain and swelling.   Sensory:  Intact to light touch in peroneal and tibial distributions.   Vascular:   2+ DP pulses with brisk cap refill;  calf soft and nontender.    Data    ALL MEDICATIONS HAVE BEEN REVIEWED    CBC:   Recent Labs     05/22/24 0447 05/23/24 0459 05/24/24 0458   WBC 8.6 9.2 12.3*   HGB 14.0 14.0 13.5   HCT 43.0 42.9 41.9    287 287     BMP:   Recent Labs     05/22/24 0447 05/23/24 0459 05/24/24 0458    138 138   K 4.1 4.6 4.4    103 104   CO2 24 18* 21   BUN 25* 29* 34*   CREATININE 1.4* 1.4* 1.4*     INR:   Recent Labs     05/22/24 0447 05/23/24 0459 05/24/24 0458   INR 1.32* 1.17* 1.18*       ASSESSMENT:  S/p right hip ORIF (5/23/24), POD#2  Dementia  DM II  Afib on warfarin    PLAN:   - WB status:  WBAT through operative extremity  - DVT prophylaxis: pharmacy dosed warfarin  - Ice therapy  - PT/OT  - Pain Control: Rx for dc in chart.  Due to orthopaedic surgical procedure/condition, patient may require pain medication for up to 6-8 weeks.  - ID:  Ancef x 2 doses post-op completed.   - Disposition: per primary team; ok to d/c from our end once medically stable and dispo needs met.     Follow-up in two weeks with Dr. Diaz.  Office # [phone]    BRIGHT Crump - CNP  5/24/2024  1:00 PM  
 [facility] Orthopedic Surgery   Progress Note    CHIEF COMPLAINT/DIAGNOSIS: S/p right hip ORIF for fracture    SUBJECTIVE: Seen sitting up in the chair working with therapy; describes no pain or N/T.  Appears comfortable - intermittent confusion as baseline.     OBJECTIVE  Physical    VITALS:  /76   Pulse 93   Temp 97.5 °F (36.4 °C) (Oral)   Resp 16   Ht 1.702 m (5' 7\")   Wt 61.2 kg (135 lb)   SpO2 95%   BMI 21.14 kg/m²     GENERAL: Alert and oriented to person, in no acute distress.   MUSCULOSKELETAL: Able to dorsi and plantarflex the ankle on operative side without issue.   INCISION:  Covered with post-op dressing; clean dry and intact.  ROM: Limited secondary pain and swelling.   Sensory:  Intact to light touch in peroneal and tibial distributions.   Vascular:   2+ DP pulses with brisk cap refill;  calf soft and nontender.    Data    ALL MEDICATIONS HAVE BEEN REVIEWED    CBC:   Recent Labs     05/21/24  0523 05/22/24  0447 05/23/24  0459   WBC 9.2 8.6 9.2   HGB 13.3 14.0 14.0   HCT 41.2 43.0 42.9    273 287     BMP:   Recent Labs     05/21/24  0523 05/22/24  0447 05/23/24  0459    141 138   K 4.0 4.1 4.6    105 103   CO2 22 24 18*   BUN 30* 25* 29*   CREATININE 1.5* 1.4* 1.4*     INR:   Recent Labs     05/21/24  1549 05/22/24  0447 05/23/24  0459   INR 1.61* 1.32* 1.17*       ASSESSMENT:  S/p right hip ORIF (5/23/24), POD#0  Dementia  DM II  Afib on warfarin    PLAN:   - WB status:  WBAT through operative extremity  - DVT prophylaxis: Heparin resumed today; ok to resume warfarin today  - Ice therapy  - PT/OT  - Pain Control: Rx for dc in chart.  Due to orthopaedic surgical procedure/condition, patient may require pain medication for up to 6-8 weeks.  - ID:  Ancef x 2 doses post-op completed.   - Disposition: per primary team; ok to d/c from our end once medically stable and dispo needs met.     Follow-up in two weeks with Dr. Diaz.  Office # [phone]    BRIGHT Crump - 
2017: Assessment complete, VSS, pulses intact in ALIA LE, pt denies pain, pt sleey but arouses to voice and was able to sit up and take PO medications, O2 decreased to 88% applied 3L O2, now up to 94%, encouraged deep breathing, pt sat more upright, discussed care plan, pt mutually agrees, fall precautions in place, call light and belongings in reach, no other needs at this time.    2200: Decreased O2 down to 2L, pt tolerating well, continues to sleep , resp even and unlabored, no s/s of distress.    0045: pt O2 92% on RA, pt repositioned herself in the bed, no pain at this time.    Chela Kelley, RN    
2045: Assessment complete, VSS, pulses and sensation intact in ALIA LE, R hip dressing CDI, ice pack re applied, pt denies pain, pt sleepy but wakes up to voice, took PO medications w/o difficulty, answers orientation questions appropriately, discussed care plan, pt mutually agrees, call light and belongings in reach, no other needs at this time.    0700: pt dangled at the bedside, tolerated well, declined to get to the chair at this time, assisted back to bed.  Chela Kelley, RN    
AM assessments completed. VSS. Morning meds given, well tolerated. Alert and oriented x4.    The care plan and education has been reviewed and mutually agreed upon with the patient.    Patient remains free from falls or accidental injury. Room free from clutter. All fall precautions in place. Bed in lowest position with wheels locked and alarm on, call light and belongings within reach, and door open.    
Discontinuation of indwelling catheter order placed. Villanueva to be removed today.  
PM assessment complete, vitals stable, medications given per MAR. Dressing to Right hip CDI, neuro check charted.  
Patient to PACU from OR. Drowsy. VSS. Surgical dressing to right hip c/d/I, with distal pulses present.   
Pharmacy Home Medication Reconciliation Note    A medication reconciliation has been completed for Laurie Dominguez 1940    Pharmacy: [facility], [address] [place], [place]  Information provided by: patient and facility    The patient's home medication list is as follows:  No current facility-administered medications on file prior to encounter.     Current Outpatient Medications on File Prior to Encounter   Medication Sig Dispense Refill    buPROPion (WELLBUTRIN XL) 300 MG extended release tablet Take 1 tablet by mouth every morning      Cranberry 450 MG TABS tablet Take 2 tablets by mouth daily      PARoxetine (PAXIL) 20 MG tablet TAKE 1 TABLET BY MOUTH DAILY 90 tablet 0    simvastatin (ZOCOR) 40 MG tablet TAKE 1 TABLET BY MOUTH EVERY NIGHT 90 tablet 0    atenolol (TENORMIN) 100 MG tablet TAKE 1 TABLET BY MOUTH EVERY MORNING 90 tablet 3    buPROPion (WELLBUTRIN XL) 150 MG extended release tablet TAKE 1 TABLET BY MOUTH EVERY DAY IN THE MORNING (Patient not taking: Reported on 5/20/2024) 90 tablet 0    pantoprazole (PROTONIX) 40 MG tablet TAKE 1 TABLET BY MOUTH DAILY 90 tablet 0    dilTIAZem (CARDIZEM) 30 MG tablet Take 1 tablet by mouth in the morning and 1 tablet in the evening. 60 tablet 1    furosemide (LASIX) 20 MG tablet Take 1 tablet by mouth daily as needed (for weight gain of 3lbs in one day.) (Patient not taking: Reported on 5/20/2024) 60 tablet 3    hydrOXYzine HCl (ATARAX) 25 MG tablet Take 1 tablet by mouth 2 times daily as needed for Anxiety (Patient not taking: Reported on 5/20/2024)      warfarin (COUMADIN) 2 MG tablet Take 1 tablet by mouth every evening Take one tablet by mouth every evening at 17:00.      OXYGEN Inhale into the lungs 2L via NC.      calcium citrate (CALCITRATE) 950 (200 Ca) MG tablet Take 1 tablet by mouth daily      Multiple Vitamins-Minerals (MULTIVITAMIN PO) Take 1 tablet by mouth daily         Patient is no longer taking furosemide or 
Report was called to [facility].   Pt was discharged to the facility via [facility].   
Shift assessment complete. A&Ox4. VSS. Medications administered per MAR, tolerated well. The care plan and patient education has been reviewed with the patient. Surgery will be tomorrow. Vitamin K replaced due to INR being elevated. The patient denies any current needs. All safety precautions are in place. FABRICE KELLY RN   
Shift assessment complete. VSS. A&Ox4. Medications administered per MAR, tolerated well. The care plan and patient education has been reviewed with the patient. The patient denies any current needs. PT/OT is currently in room working with the patient, up in chair. All safety precautions are in place. FABRICE KELLY RN   
Shift assessment completed. Neuro WNL. Denies any pain at this time. AM meds administered per MAR. Villanueva remains in place draining well. The care plan and education has been reviewed and mutually agreed upon with the patient. Standard safety measures in place.    10:22 am  A telephone consent obtained at this time w/ CAROLYN Delgadillo RN. Magalys Tiwari, the daughter & POA gave consent for the ORIF procedure to the right hip.   
Teaching / education initiated regarding perioperative experience, expectations, and pain management during stay. Patient verbalized understanding.  
Continue management of acute issues     Daren Cowan MD  5/21/2024,  12:19 PM    This note was likely completed using voice recognition technology and may contain unintended errors.     Objective:   Vitals:   T-max:  Patient Vitals for the past 8 hrs:   BP Temp Temp src Pulse Resp SpO2   05/21/24 1207 119/74 98 °F (36.7 °C) Axillary 76 16 94 %   05/21/24 0953 135/89 98.2 °F (36.8 °C) Oral (!) 104 16 93 %   05/21/24 0445 128/75 97.4 °F (36.3 °C) Axillary (!) 104 17 90 %       Intake/Output Summary (Last 24 hours) at 5/21/2024 1219  Last data filed at 5/21/2024 1021  Gross per 24 hour   Intake --   Output 650 ml   Net -650 ml       Physical Exam  General: Nontoxic-appearing female lying in bed on room air in no acute distress.  Eyes: EOMI  ENT: neck supple  Cardiovascular: Irregular.  A-fib on monitor.  Rate less than 100.  No peripheral edema.  No JVD.  S1-S2 present.  No chest wall tenderness.  Respiratory: Regular nonlabored respiration.  Even chest rise.  Bilateral lungs clear to auscultation.  No wheezing or crackles noted.  She is on room air.  Gastrointestinal: Soft, non tender and nondistended.  Normal active bowel sounds.  No ascites noted.  Genitourinary: no suprapubic tenderness  Musculoskeletal: No edema.  Right hip tender to touch.  She grimaced with palpation towards the right groin area.  No external rotation of the right foot noted.  Skin: warm, dry and without jaundice.  Neuro: Alert.  Oriented to self only.  Baseline dementia.  Spontaneously moves all 4 extremities but limited range of motion of the right lower extremity due to acute fracture.  No pupillary deviation, facial drooping or tongue deviation.  Psych: Mood appropriate.     Review of Systems  - Negative except Interval History    LABS:    CBC:   Recent Labs     05/20/24 1753 05/21/24 0523   WBC 12.8* 9.2   HGB 13.6 13.3   HCT 41.4 41.2    290   MCV 88.5 87.6     Renal:    Recent Labs     05/20/24 1753 05/21/24 0523    138 
taken based on observed faces and behaviors and Patient unable to rate secondary to cognition/communication deficits, pt reporting no pain but verbalizing something wrong with R hip with ambulation  Pain Interventions: repositioned  and therapy activities modified       Functional Mobility  Bed Mobility:  Supine to Sit: minimal assistance  Scooting: minimal assistance  Comments: HOB elevated. Increased verbal and tactile cueing for hand placement and sequencing of movement  Transfers:  Sit to stand transfer: minimal assistance  Stand to sit transfer: minimal assistance  Comments: With RW from EOB and recliner. Increased verbal and tactile cueing for walker mgt, safety and initation.  Ambulation:  Surface:level surface  Assistive Device: rolling walker  Assistance: contact guard assistance  Distance: 12 feet  Gait Mechanics: slow, antalgic gait  Comments:  Pt required increased cueing for gait pattern to offload weight on R LE.   Stair Mobility:  Stair mobility not completed on this date.  Comments:  Wheelchair Mobility:  No w/c mobility completed on this date.  Comments:  Balance:  Static Sitting Balance: fair (-): maintains balance at SBA with use of UE support  Dynamic Sitting Balance: fair (-): maintains balance at SBA with use of UE support  Static Standing Balance: fair (-): maintains balance at CGA with use of UE support  Dynamic Standing Balance: fair (-): maintains balance at CGA with use of UE support  Comments: No LOB requiring assistance     Other Therapeutic Interventions  See OT note for further details regarding ADLs completed    Functional Outcomes  AM-PAC Inpatient Mobility Raw Score : 17              Cognition  Overall Cognitive Status: Impaired  Arousal/Alertness: appropriate responses to stimuli  Following Commands: follows all commands without difficulty  Attention Span: appears intact  Memory: appears intact  Safety Judgement: decreased awareness of need for assistance, decreased awareness of

## 2024-05-24 NOTE — CARE COORDINATION
Discharge Plan:     Patient discharged to:    [facility]  [address] [place] [address] [place] [address]    SW/DC Planner faxed, SANTIAGO and AVS to: [phone]    Narcotic Prescriptions faxed were: yes    RN: will call report to: [phone]      Medical Transport with: [facility] [phone]     time: 1600    Family advised of discharge?: yes, Rosamaria Roldan, daughter    JOSEPH Submitted?:  yes    All discharge needs met per case management.    ROMIE NicholsonN RN    [facility]  Phone: [phone]

## 2024-05-28 ENCOUNTER — COMMUNITY CARE MANAGEMENT (OUTPATIENT)
Facility: CLINIC | Age: 84
End: 2024-05-28

## 2024-05-30 ENCOUNTER — TELEPHONE (OUTPATIENT)
Dept: ORTHOPEDIC SURGERY | Age: 84
End: 2024-05-30

## 2024-05-30 NOTE — TELEPHONE ENCOUNTER
Appointment Request     Patient requesting earlier appointment: Yes  Appointment offered to patient: 6-  Patient Contact Number: [phone]    ALMAS CALLING IN FROM [facility] AND WANTED SCHED PT P.O APPT WITH DR. CAREY.     DUE TO DR CAREY PROTOCOL HE PREFER HIS PT TO COMEBACK 2 WEEKS AFTER SX DATE WHICH WAS 5-.     DIDN'T KNOW WHETHER THIS DATE WAS TO FAR OUT AND THE FACILITY PREFER [facility] LOCATION.     PLEASE CALL BACK AT THE ABOVE NUMBER.

## 2024-06-12 ENCOUNTER — OFFICE VISIT (OUTPATIENT)
Dept: ORTHOPEDIC SURGERY | Age: 84
End: 2024-06-12

## 2024-06-12 VITALS — HEIGHT: 67 IN | WEIGHT: 128 LBS | BODY MASS INDEX: 20.09 KG/M2

## 2024-06-12 DIAGNOSIS — S72.001D CLOSED FRACTURE OF NECK OF RIGHT FEMUR WITH ROUTINE HEALING, SUBSEQUENT ENCOUNTER: Primary | ICD-10-CM

## 2024-06-12 PROCEDURE — 99024 POSTOP FOLLOW-UP VISIT: CPT | Performed by: ORTHOPAEDIC SURGERY

## 2024-06-12 NOTE — PROGRESS NOTES
ORTHOPAEDIC PROGRESS NOTE    Chief Complaint   Patient presents with    Post-Op Check     Post op 5/22/2024 ORIF right hip       HPI  6/12/24  Postop check right hip  She reports her right hip is doing well  She denies pain, rated 0 out of 10  No incisional problems  No numbness or tingling  She is currently at [facility]    5/22/24  OPERATION PERFORMED:  Open treatment of the right femoral neck fracture with internal fixation using plate/screw construct.       Past Medical History:   Diagnosis Date    A-fib (HCC)     Atrial fibrillation (HCC)     Hypercholesterolemia     Hypertension     Pelvic mass        Past Surgical History:   Procedure Laterality Date    APPENDECTOMY      CYSTOSCOPY Right 06/14/2018    retrograde pyelogram, ureteroscopy, and stent placement    HIP SURGERY Left 2/7/2024    LEFT BIPOLAR HIP HEMIARTHROPLASTY ANTERIOR APPROACH (ESE) performed by Porfirio Ledesma MD at [facility] OR    HIP SURGERY Right 5/22/2024    OPEN REDUCTION INTERNAL FIXATION OF RIGHT HIP USING SYNTHES FEMORAL NECK SYSTEM performed by Dustin Diaz MD at [facility] OR    HYSTERECTOMY, TOTAL ABDOMINAL (CERVIX REMOVED)  07/09/2018    robotic with BSO, omentectomy, pelvic mass removal, lymphnode dissection    IA CYSTO/URETERO W/LITHOTRIPSY &INDWELL STENT INSRT Right 10/31/2018    CYSTOSCOPY WITH RIGHT URETEROSCOPY HOLMIUM LASER LITHOTRIPSY STONE MANIPULATION STONE BASKET WITH RIGHT STENT EXCHANGE performed by Daniel Hasitngs MD at [facility] OR    UPPER GASTROINTESTINAL ENDOSCOPY N/A 11/29/2020    EGD CONTROL HEMORRHAGE performed by Stoney Allen MD at [facility] ENDOSCOPY    UPPER GASTROINTESTINAL ENDOSCOPY N/A 11/29/2020    EGD BIOPSY performed by Stoney Allen MD at [facility] ENDOSCOPY    UPPER GASTROINTESTINAL ENDOSCOPY N/A 3/2/2021    EGD BIOPSY performed by Stoney Allen MD at [facility] ENDOSCOPY       Allergies   Allergen Reactions    Penicillins      Patient cannot remember reaction    Sulfa Antibiotics        Current Outpatient Medications

## 2024-10-07 ENCOUNTER — OFFICE VISIT (OUTPATIENT)
Dept: ORTHOPEDIC SURGERY | Age: 84
End: 2024-10-07

## 2024-10-07 DIAGNOSIS — Z87.81 S/P LEFT HIP FRACTURE: Primary | ICD-10-CM

## 2024-10-07 NOTE — PROGRESS NOTES
Patient: Laurie Dominguez                  : 1940   MRN: [id number]   Date of Visit: 10/7/24     Physician: Porfirio Ledesma MD.     Reason for Visit: Status post REJI     Subjective History of Present Illness:     Laurie Dominguez is here for regularly scheduled follow-up s/p L hip HA. Reports they are doing well, occasional aches and pains are controlled with over the counter medications. They do not report fevers, chills or drainage from the incision site    Physical Examination??: ?   General: Patient is alert and oriented x 3 and appears comfortable.   Incision is well-approximated, no erythema, fluctuance   Fires quad, SILT to thigh     Radiographs: AP pelvis/AP/lateral hip views of operative hip with well-positioned total hip arthroplasty. No evidence of fracture subluxation or dislocation.      Assessment and Plan?: The patient is progressing well approximately 8 months s/p HA     1. A thorough discussion was had with the patient concerning the ?postoperative course and the patient is in agreement with the plan.   2. She is WBAT, activity as tolerated  3. Discussed the need for antibiotics prior to dental procedures at least for 1 years after arthroplasty  4. She may f/u as needed.    _______________________      Will ROOSEVELT Phelps

## 2024-10-11 NOTE — PROGRESS NOTES
[facility]   Cardiac Evaluation      Patient: Laurie Dominguez  YOB: 1940  Date: 10/23/24    Chief Complaint   Patient presents with    Atrial Fibrillation    Hypertension    Hyperlipidemia       Referring provider: Glory Miner APRN - CNP    History of Present Illness:   Ms. Dominguez is here today for regular follow up of her AF, HTN, HLD. 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 She had surgery on 7/18.  Hospital course complicated by increased HR of her AF which is chronic (due to her illness) requiring the addition of cardizem and her usual atenolol.     Ms. Dominguez was hospitalized 5/28/22 with severe sepsis likely 2nd to UTI. She had AF w/ RVR at the time. Laurie converted to NSR after 1 dose diltiazem bolus.       She was admitted to the hospital 2/5-2/10/24 after a fall. She was found to have subcapital left femoral neck fracture and underwent left hip hemiarthroplasty on 2/7/24. Post op she had hypoxia, chest x-ray revealed small bilateral pleural effusion. She was given IV Lasix and had an echo. Echo showed EF 55-60%, grade III DD, and severe MR.     Today, Ms Dominguez is here with her daughter.  She states she does not have any energy. She lives at [facility] now.  Laurie fell again 5/24 and broke her right hip. Laurie walks with a walker. She states she does not do much during the day. Her daughter says she sleeps a lot. Laurie denies chest pain, palpitations, dizziness, or edema.     Past Medical History:   has a past medical history of A-fib (HCC), Atrial fibrillation (HCC), Hypercholesterolemia, Hypertension, and Pelvic mass.    Surgical History:   has a past surgical history that includes Appendectomy; Cystocopy (Right, 06/14/2018); Hysterectomy, total abdominal (07/09/2018); pr cysto/uretero w/lithotripsy &indwell stent insrt (Right, 10/31/2018);

## 2024-10-23 ENCOUNTER — OFFICE VISIT (OUTPATIENT)
Dept: CARDIOLOGY CLINIC | Age: 84
End: 2024-10-23
Payer: MEDICARE

## 2024-10-23 VITALS
SYSTOLIC BLOOD PRESSURE: 124 MMHG | OXYGEN SATURATION: 96 % | DIASTOLIC BLOOD PRESSURE: 70 MMHG | HEART RATE: 65 BPM | BODY MASS INDEX: 20.72 KG/M2 | WEIGHT: 132 LBS | HEIGHT: 67 IN

## 2024-10-23 DIAGNOSIS — I48.20 CHRONIC ATRIAL FIBRILLATION (HCC): Primary | ICD-10-CM

## 2024-10-23 DIAGNOSIS — I10 ESSENTIAL HYPERTENSION: ICD-10-CM

## 2024-10-23 DIAGNOSIS — E78.2 MIXED HYPERLIPIDEMIA: ICD-10-CM

## 2024-10-23 PROCEDURE — 3074F SYST BP LT 130 MM HG: CPT | Performed by: INTERNAL MEDICINE

## 2024-10-23 PROCEDURE — 3078F DIAST BP <80 MM HG: CPT | Performed by: INTERNAL MEDICINE

## 2024-10-23 PROCEDURE — G8427 DOCREV CUR MEDS BY ELIG CLIN: HCPCS | Performed by: INTERNAL MEDICINE

## 2024-10-23 PROCEDURE — G8420 CALC BMI NORM PARAMETERS: HCPCS | Performed by: INTERNAL MEDICINE

## 2024-10-23 PROCEDURE — 1123F ACP DISCUSS/DSCN MKR DOCD: CPT | Performed by: INTERNAL MEDICINE

## 2024-10-23 PROCEDURE — G8484 FLU IMMUNIZE NO ADMIN: HCPCS | Performed by: INTERNAL MEDICINE

## 2024-10-23 PROCEDURE — G8400 PT W/DXA NO RESULTS DOC: HCPCS | Performed by: INTERNAL MEDICINE

## 2024-10-23 PROCEDURE — 1036F TOBACCO NON-USER: CPT | Performed by: INTERNAL MEDICINE

## 2024-10-23 PROCEDURE — 1090F PRES/ABSN URINE INCON ASSESS: CPT | Performed by: INTERNAL MEDICINE

## 2024-10-23 PROCEDURE — 99214 OFFICE O/P EST MOD 30 MIN: CPT | Performed by: INTERNAL MEDICINE

## 2024-10-23 PROCEDURE — 1159F MED LIST DOCD IN RCRD: CPT | Performed by: INTERNAL MEDICINE

## 2024-10-23 RX ORDER — DULOXETIN HYDROCHLORIDE 60 MG/1
CAPSULE, DELAYED RELEASE ORAL
COMMUNITY
Start: 2024-09-02

## 2024-10-23 RX ORDER — APIXABAN 2.5 MG/1
TABLET, FILM COATED ORAL
COMMUNITY
Start: 2024-09-02

## 2024-10-23 RX ORDER — NITROFURANTOIN 25; 75 MG/1; MG/1
100 CAPSULE ORAL 2 TIMES DAILY
COMMUNITY

## 2024-12-03 NOTE — CARE COORDINATION
- stable. continue lorazepam. Has tried many other options and has not been successful    Orders:    Vitamin B12; Future    CBC and Auto Differential; Future    Comprehensive Metabolic Panel; Future    TSH with reflex to Free T4 if abnormal; Future    LORazepam (Ativan) 2 mg tablet; Take 1 tablet (2 mg) by mouth once daily at bedtime.     Discharge planning note:    Dr. Negra Campa reports patient will discharge tomorrow. CM working on getting a PCP new patient appointment. Therapy is recommending [facility] and patient is agreeable. No preference for agency. Therapy is also recommending a rolling walker and patient would like us to provide that from [facility] through her Medicare.     Patients daughter can transport her home tomorrow but doesn't get off work until 2:30 pm.    Nila Mitchell RN BSN  Case Management  [phone]

## (undated) DEVICE — SHEET,DRAPE,53X77,STERILE: Brand: MEDLINE

## (undated) DEVICE — MOUTHPIECE ENDOSCP L CTRL OPN AND SIDE PORTS DISP

## (undated) DEVICE — SUTURE VICRYL + SZ 2-0 L18IN ABSRB UD CT1 L36MM 1/2 CIR VCP839D

## (undated) DEVICE — PADDING UNDERCAST W4INXL4YD 100% COT CRIMPED FINISH WBRL II

## (undated) DEVICE — GOWN AURORA NONREINF LG: Brand: MEDLINE INDUSTRIES, INC.

## (undated) DEVICE — MAT FLR W28XL40IN STD GRN 60% RECYCL MAT LO ABSRB SGL LAYR

## (undated) DEVICE — MERCY FAIRFIELD TURNOVER KIT: Brand: MEDLINE INDUSTRIES, INC.

## (undated) DEVICE — PROCEDURE KIT ENDOSCP CUST

## (undated) DEVICE — HANDPIECE SET WITH HIGH FLOW TIP AND SUCTION TUBE: Brand: INTERPULSE

## (undated) DEVICE — 3M™ COBAN™ NL STERILE NON-LATEX SELF-ADHERENT WRAP, 2084S, 4 IN X 5 YD (10 CM X 4,5 M), 18 ROLLS/CASE: Brand: 3M™ COBAN™

## (undated) DEVICE — BLANKET WRM W29.9XL79.1IN UP BODY FORC AIR MISTRAL-AIR

## (undated) DEVICE — ADHESIVE SKIN CLOSURE WND 8.661X1.5 IN 22 CM LIQUIBAND SECUR

## (undated) DEVICE — Y-TYPE TUR/BLADDER IRRIGATION SET, REGULATING CLAMP

## (undated) DEVICE — APPLICATOR MEDICATED 26 CC SOLUTION HI LT ORNG CHLORAPREP

## (undated) DEVICE — SOLUTION IV IRRIG WATER 1000ML POUR BRL 2F7114

## (undated) DEVICE — SYRINGE, LUER LOCK, 10ML: Brand: MEDLINE

## (undated) DEVICE — SUTURE VCRL + SZ 1 L36IN ABSRB UD L36MM CT-1 1/2 CIR VCP947H

## (undated) DEVICE — SPONGE LAP W18XL18IN WHT COT 4 PLY FLD STRUNG RADPQ DISP ST 2 PER PACK

## (undated) DEVICE — PACK SURG HIP ASSESSORY

## (undated) DEVICE — MAJOR SET UP PK

## (undated) DEVICE — DRESSING W4XL8IN ISLANDTHERABOND 3D

## (undated) DEVICE — SUTURE VCRL SZ 0 L36IN ABSRB UD CT-1 L36MM 1/2 CIR TAPR PNT VCP946H

## (undated) DEVICE — C-ARM: Brand: UNBRANDED

## (undated) DEVICE — BOWL MED L 32OZ PLAS W/ MOLD GRAD EZ OPN PEEL PCH

## (undated) DEVICE — GLOVE ORANGE PI 7   MSG9070

## (undated) DEVICE — BIPOLAR SEALER 23-112-1 AQM 6.0: Brand: AQUAMANTYS ®

## (undated) DEVICE — COVER,MAYO STAND,XL,STERILE: Brand: MEDLINE

## (undated) DEVICE — SUTURE VICRYL + SZ 0 L18IN ABSRB UD L36MM CT-1 1/2 CIR VCP840D

## (undated) DEVICE — SET VLV 3 PC AWS DISPOSABLE GRDIAN SCOPEVALET

## (undated) DEVICE — SUTURE MCRYL + SZ 3-0 L27IN ABSRB UD PS1 L24MM 3/8 CIR REV MCP936H

## (undated) DEVICE — BW-412T DISP COMBO CLEANING BRUSH: Brand: SINGLE USE COMBINATION CLEANING BRUSH

## (undated) DEVICE — YANKAUER,OPEN TIP,W/O VENT,STERILE: Brand: MEDLINE INDUSTRIES, INC.

## (undated) DEVICE — SUTURE VCRL + SZ 2-0 L36IN ABSRB UD L36MM CT-1 1/2 CIR VCP945H

## (undated) DEVICE — GUIDEWIRE ORTH L400MM DIA3.2MM FOR TFN

## (undated) DEVICE — FORCEPS BX L240CM WRK CHN 2.8MM STD CAP W/ NDL MIC MESH

## (undated) DEVICE — 6619 2 PTNT ISO SYS INCISE AREA&LT;(&GT;&&LT;)&GT;P: Brand: STERI-DRAPE™ IOBAN™ 2

## (undated) DEVICE — BANDAGE COBAN 4 IN COMPR W4INXL5YD FOAM COHESIVE QUIK STK SELF ADH SFT

## (undated) DEVICE — MARKER,SKIN,WI/RULER AND LABELS: Brand: MEDLINE

## (undated) DEVICE — RESTRAINT EXT ANK WRST LT BLU FOAM 2 STRP SIDE BCKL HK AND

## (undated) DEVICE — Device: Brand: MEDEX

## (undated) DEVICE — CYSTO PACK: Brand: MEDLINE INDUSTRIES, INC.

## (undated) DEVICE — PENCIL SMK EVAC TELSCP 3 M TBNG

## (undated) DEVICE — SUTURE STRATAFIX SZ 1 L14IN ABSRB VLT L36MM MO-4 TAPERPOINT SXPD2B400

## (undated) DEVICE — SOLUTION IRRIG 2000ML 0.9% SOD CHL USP UROMATIC PLAS CONT

## (undated) DEVICE — NEEDLE,22GX1.5",REG,BEVEL: Brand: MEDLINE

## (undated) DEVICE — Device: Brand: COVER, PERINEAL POST, 12 PK

## (undated) DEVICE — SYRINGE MED 30ML STD CLR PLAS LUERLOCK TIP N CTRL DISP

## (undated) DEVICE — CONTAINER,SPECIMEN,OR STERILE,4OZ: Brand: MEDLINE

## (undated) DEVICE — DUP USE 240185 SOLUTION IV IRRIG WATER 1000ML IRRIG BAG 2B7114

## (undated) DEVICE — ELECTRODE PT RET AD L9FT HI MOIST COND ADH HYDRGEL CORDED

## (undated) DEVICE — SYRINGE MED 10ML SLIP TIP BLNT FILL AND LUERLOCK DISP

## (undated) DEVICE — HOOD: Brand: FLYTE

## (undated) DEVICE — DRESSING WND 4X8 IN ANTIMICROBIAL CONTACT SYS THERABOND 3D

## (undated) DEVICE — STAPLER SKIN H3.9MM WIRE DIA0.58MM CRWN 6.9MM 35 STPL ROT

## (undated) DEVICE — WORKING LENGTH 235CM, WORKING CHANNEL 2.8MM: Brand: RESOLUTION 360 CLIP

## (undated) DEVICE — SOLUTION IV IRRIG WATER 500ML POUR BRL ST 2F7113

## (undated) DEVICE — COVER LT HNDL BLU PLAS

## (undated) DEVICE — BAG DRNGE L6FT 20L PREFIL ABSRB POLYMER EXP TBNG DISP FOR

## (undated) DEVICE — PAD,NON-ADHERENT,3X8,STERILE,LF,1/PK: Brand: MEDLINE

## (undated) DEVICE — HOOD, PEEL-AWAY: Brand: FLYTE

## (undated) DEVICE — PADDING CAST W4INXL4YD ST COT RAYON MICROPLEATED HIGHLY

## (undated) DEVICE — HYPODERMIC SAFETY NEEDLE: Brand: MAGELLAN

## (undated) DEVICE — TRAY PREP DRY W/ PREM GLV 2 APPL 6 SPNG 2 UNDPD 1 OVERWRAP

## (undated) DEVICE — GUIDEWIRE ENDOSCP L150CM DIA0.035IN TIP 3CM PTFE NIT

## (undated) DEVICE — FIBER LASER HOLM DISP 10611] FORTEC MEDICAL INC]

## (undated) DEVICE — SOLUTION IRRIG 1000ML STRL H2O USP PLAS POUR BTL

## (undated) DEVICE — BIT DRL L413MM DIA4.3MM FOR FEM NK SYSTEN

## (undated) DEVICE — STRYKER PERFORMANCE SERIES SAGITTAL BLADE: Brand: STRYKER PERFORMANCE SERIES

## (undated) DEVICE — Device